# Patient Record
Sex: FEMALE | Race: WHITE | Employment: UNEMPLOYED | URBAN - METROPOLITAN AREA
[De-identification: names, ages, dates, MRNs, and addresses within clinical notes are randomized per-mention and may not be internally consistent; named-entity substitution may affect disease eponyms.]

---

## 2019-06-10 ENCOUNTER — DOCUMENTATION ONLY (OUTPATIENT)
Dept: SURGERY | Age: 44
End: 2019-06-10

## 2019-06-10 ENCOUNTER — HOSPITAL ENCOUNTER (OUTPATIENT)
Dept: LAB | Age: 44
Discharge: HOME | End: 2019-06-10

## 2019-06-10 ENCOUNTER — OFFICE VISIT (OUTPATIENT)
Dept: SURGERY | Age: 44
End: 2019-06-10

## 2019-06-10 VITALS
HEART RATE: 87 BPM | DIASTOLIC BLOOD PRESSURE: 72 MMHG | BODY MASS INDEX: 25.34 KG/M2 | SYSTOLIC BLOOD PRESSURE: 113 MMHG | WEIGHT: 143 LBS | HEIGHT: 63 IN

## 2019-06-10 DIAGNOSIS — N63.20 BREAST MASS, LEFT: Primary | ICD-10-CM

## 2019-06-10 DIAGNOSIS — R92.8 ABNORMAL ULTRASOUND OF BREAST: ICD-10-CM

## 2019-06-10 PROCEDURE — 88360 TUMOR IMMUNOHISTOCHEM/MANUAL: CPT

## 2019-06-10 RX ORDER — LEVOTHYROXINE, LIOTHYRONINE 9.5; 2.25 UG/1; UG/1
15 TABLET ORAL DAILY
COMMUNITY
Start: 2019-04-12 | End: 2020-01-28

## 2019-06-10 NOTE — PROGRESS NOTES
HISTORY OF PRESENT ILLNESS  Floresita Rahman is a 44 y.o. female. HPI  NEW patient presents who is self-referred for consultation for a palpable LEFT breast mass that she felt about a month ago. Says that the lump feels hard, is \"square\" in shape. Thinks that the lump has decreased in size since when she first felt it. Does have aching pain in the LEFT breast and it feels firmer and \"heavier\" to her. There is no nipple discharge/retraction or skin change. Says that she had a breast biopsy 9-10 years, but she is unsure which breast.      FH is significant for two paternal aunts who were diagnosed with breast cancer (both probably post-menopausal). They  of their breast cancer. Last mammogram was at least 2 years ago and was when she lived in [place]. No past medical history on file. Past Surgical History:   Procedure Laterality Date    HX APPENDECTOMY      HX  SECTION      x3       Social History     Socioeconomic History    Marital status: UNKNOWN     Spouse name: Not on file    Number of children: Not on file    Years of education: Not on file    Highest education level: Not on file   Occupational History    Not on file   Social Needs    Financial resource strain: Not on file    Food insecurity:     Worry: Not on file     Inability: Not on file    Transportation needs:     Medical: Not on file     Non-medical: Not on file   Tobacco Use    Smoking status: Never Smoker    Smokeless tobacco: Never Used   Substance and Sexual Activity    Alcohol use:  Yes    Drug use: Not on file    Sexual activity: Not on file   Lifestyle    Physical activity:     Days per week: Not on file     Minutes per session: Not on file    Stress: Not on file   Relationships    Social connections:     Talks on phone: Not on file     Gets together: Not on file     Attends Zoroastrian service: Not on file     Active member of club or organization: Not on file     Attends meetings of clubs or organizations: Not on file     Relationship status: Not on file    Intimate partner violence:     Fear of current or ex partner: Not on file     Emotionally abused: Not on file     Physically abused: Not on file     Forced sexual activity: Not on file   Other Topics Concern    Not on file   Social History Narrative    Not on file       Current Outpatient Medications on File Prior to Visit   Medication Sig Dispense Refill    NP THYROID 15 mg tablet        No current facility-administered medications on file prior to visit. No Known Allergies    OB History    None         ROS  Constitutional: Negative. HENT: Negative. Eyes: Negative. Respiratory: Negative. Cardiovascular: Negative. Gastrointestinal: Negative. Genitourinary: Negative. Musculoskeletal: Negative. Skin: Negative. Neurological: Negative. Endo/Heme/Allergies: Negative. Psychiatric/Behavioral: Negative. Physical Exam   Cardiovascular: Normal rate, regular rhythm and normal heart sounds. Pulmonary/Chest: Breath sounds normal. Right breast exhibits no inverted nipple, no mass, no nipple discharge, no skin change and no tenderness. Left breast exhibits mass. Left breast exhibits no inverted nipple, no nipple discharge, no skin change and no tenderness. Breasts are symmetrical.       Lymphadenopathy:        Right cervical: No superficial cervical, no deep cervical and no posterior cervical adenopathy present. Left cervical: No superficial cervical, no deep cervical and no posterior cervical adenopathy present. She has no axillary adenopathy. Right axillary: No pectoral and no lateral adenopathy present. Left axillary: No pectoral and no lateral adenopathy present. BREAST ULTRASOUND  Indication: Left breast mass 3:00  Technique:   The area was scanned using a high-frequency linear-array near-field transducer  Findings: 1.27 x 1.34 x 1.35cm mass  Impression: Suspicious mass  Disposition: Ultrasound-guided biopsy performed    US - Guided Core Biopsy  Following detailed explanation and description of the Biopsy procedure, its risk, benefits and possible alternatives, the patient signed the informed consent. Indication: Mass, Ultrasound Visible, LEFT Breast 3:00   Prep: We cleansed the skin with alcohol. Anesthesia: We anesthetized the skin and underlying tissues with 1% lidocaine with epinephrine. Device: We advanced the BARD Marquee device through the lesion and captured tissue with real-time Ultrasound Confirmation. Core Sampling: We repeated this sampling for the following number of cores, 3. Marker: We placed a marking clip to damien the biopsy site. Marker Type: HydroMARK. Dressing: We then closed the incision with steristrips and placed a sterile dressing. Instructions: The patient was instructed regarding post-procedure care and activities. Pathology: Pending at this time. Patient tolerated procedure well and discharged in stable condition. Informed patient that they will be notified of pathology results in 3 to 5 days. ASSESSMENT and PLAN    ICD-10-CM ICD-9-CM    1. Breast mass, left N63.20 611.72    2. Abnormal ultrasound of breast R92.8 793.89       New patient presents for evaluation of LEFT breast mass, and is doing well overall. Palpable mass on exam at LEFT breast 3:00. US visualizes 1.27 x 1.34 x 1.35cm mass. Discussed bx of this mass and pt agreed to proceed. She tolerated the procedure well, and 3 cores were taken, [facility] marker placed. If bx PATH demonstrates malignancy, may also order breast MRI for further evaluation. Will send for PATH and f/u with results. This plan was reviewed with the patient and patient agrees. All questions were answered.     Written by Benigno Quinonez, as dictated by Dr. Adelfo Barnes MD.

## 2019-06-10 NOTE — LETTER
2019 12:48 PM 
 
Patient: Lázaro Dueñas YOB: 1975 Date of Visit: 6/10/2019 Dear Dr. Josue Webster: Thank you for referring Ms. Lázaro Dueñas to me for evaluation/treatment. Below are the relevant portions of my assessment and plan of care. HISTORY OF PRESENT ILLNESS Lázaro Dueñas is a 44 y.o. female. HPI 
NEW patient presents who is self-referred for consultation for a palpable LEFT breast mass that she felt about a month ago. Says that the lump feels hard, is \"square\" in shape. Thinks that the lump has decreased in size since when she first felt it. Does have aching pain in the LEFT breast and it feels firmer and \"heavier\" to her. There is no nipple discharge/retraction or skin change. Says that she had a breast biopsy 9-10 years, but she is unsure which breast.   
 
FH is significant for two paternal aunts who were diagnosed with breast cancer (both probably post-menopausal). They  of their breast cancer. Last mammogram was at least 2 years ago and was when she lived in [place]. No past medical history on file. Past Surgical History:  
Procedure Laterality Date  HX APPENDECTOMY  HX  SECTION    
 x3 Social History Socioeconomic History  Marital status: UNKNOWN Spouse name: Not on file  Number of children: Not on file  Years of education: Not on file  Highest education level: Not on file Occupational History  Not on file Social Needs  Financial resource strain: Not on file  Food insecurity:  
  Worry: Not on file Inability: Not on file  Transportation needs:  
  Medical: Not on file Non-medical: Not on file Tobacco Use  Smoking status: Never Smoker  Smokeless tobacco: Never Used Substance and Sexual Activity  Alcohol use: Yes  Drug use: Not on file  Sexual activity: Not on file Lifestyle  Physical activity:  
  Days per week: Not on file Minutes per session: Not on file  Stress: Not on file Relationships  Social connections:  
  Talks on phone: Not on file Gets together: Not on file Attends Yazdanism service: Not on file Active member of club or organization: Not on file Attends meetings of clubs or organizations: Not on file Relationship status: Not on file  Intimate partner violence:  
  Fear of current or ex partner: Not on file Emotionally abused: Not on file Physically abused: Not on file Forced sexual activity: Not on file Other Topics Concern  Not on file Social History Narrative  Not on file Current Outpatient Medications on File Prior to Visit Medication Sig Dispense Refill  NP THYROID 15 mg tablet No current facility-administered medications on file prior to visit. No Known Allergies OB History None ROS Constitutional: Negative. HENT: Negative. Eyes: Negative. Respiratory: Negative. Cardiovascular: Negative. Gastrointestinal: Negative. Genitourinary: Negative. Musculoskeletal: Negative. Skin: Negative. Neurological: Negative. Endo/Heme/Allergies: Negative. Psychiatric/Behavioral: Negative. Physical Exam  
Cardiovascular: Normal rate, regular rhythm and normal heart sounds. Pulmonary/Chest: Breath sounds normal. Right breast exhibits no inverted nipple, no mass, no nipple discharge, no skin change and no tenderness. Left breast exhibits mass. Left breast exhibits no inverted nipple, no nipple discharge, no skin change and no tenderness. Breasts are symmetrical.  
 
 
Lymphadenopathy:  
     Right cervical: No superficial cervical, no deep cervical and no posterior cervical adenopathy present. Left cervical: No superficial cervical, no deep cervical and no posterior cervical adenopathy present. She has no axillary adenopathy. Right axillary: No pectoral and no lateral adenopathy present. Left axillary: No pectoral and no lateral adenopathy present. BREAST ULTRASOUND Indication: Left breast mass 3:00 Technique: The area was scanned using a high-frequency linear-array near-field transducer Findings: 1.27 x 1.34 x 1.35cm mass Impression: Suspicious mass Disposition: Ultrasound-guided biopsy performed US - Guided Core Biopsy Following detailed explanation and description of the Biopsy procedure, its risk, benefits and possible alternatives, the patient signed the informed consent. Indication: Mass, Ultrasound Visible, LEFT Breast 3:00 Prep: We cleansed the skin with alcohol. Anesthesia: We anesthetized the skin and underlying tissues with 1% lidocaine with epinephrine. Device: We advanced the [employer] Marquee device through the lesion and captured tissue with real-time Ultrasound Confirmation. Core Sampling: We repeated this sampling for the following number of cores, 3. Marker: We placed a marking clip to damien the biopsy site. Marker Type: HydroMARK. Dressing: We then closed the incision with steristrips and placed a sterile dressing. Instructions: The patient was instructed regarding post-procedure care and activities. Pathology: Pending at this time. Patient tolerated procedure well and discharged in stable condition. Informed patient that they will be notified of pathology results in 3 to 5 days. ASSESSMENT and PLAN 
  ICD-10-CM ICD-9-CM 1. Breast mass, left N63.20 611.72   
2. Abnormal ultrasound of breast R92.8 793.89 New patient presents for evaluation of LEFT breast mass, and is doing well overall. Palpable mass on exam at LEFT breast 3:00. US visualizes 1.27 x 1.34 x 1.35cm mass. Discussed bx of this mass and pt agreed to proceed. She tolerated the procedure well, and 3 cores were taken, [facility] marker placed.  If bx PATH demonstrates malignancy, may also order breast MRI for further evaluation. Will send for PATH and f/u with results. This plan was reviewed with the patient and patient agrees. All questions were answered. Written by Benigno Quinonez, as dictated by Dr. Adelfo Barnes MD. 
 
If you have questions, please do not hesitate to call me. I look forward to following MsOrtega Josue Redman along with you.  
 
 
 
Sincerely, 
 
 
Quincy More MD

## 2019-06-10 NOTE — PROGRESS NOTES
HISTORY OF PRESENT ILLNESS Osman Grayson is a 44 y.o. female. HPI    NEW patient presents who is self-referred for consultation for a palpable LEFT breast mass that she felt about a month ago. Says that the lump feels hard, is \"square\" in shape. Thinks that the lump has decreased in size since when she first felt it. Does have aching pain in the LEFT breast and it feels firmer and \"heavier\" to her. There is no nipple discharge/retraction or skin change. Says that she had a breast biopsy 9-10 years, but she is unsure which breast.   
FH is significant for two paternal aunts who were diagnosed with breast cancer (both probably post-menopausal). They  of their breast cancer. Last mammogram was at least 2 years ago and was when she lived in [place]. Review of Systems Constitutional: Negative. HENT: Negative. Eyes: Negative. Respiratory: Negative. Cardiovascular: Negative. Gastrointestinal: Negative. Genitourinary: Negative. Musculoskeletal: Negative. Skin: Negative. Neurological: Negative. Endo/Heme/Allergies: Negative. Psychiatric/Behavioral: Negative. Physical Exam 
 
ASSESSMENT and PLAN 
{ASSESSMENT/PLAN:[id number]}

## 2019-06-10 NOTE — PROGRESS NOTES
[facility]  OFFICE PROCEDURE PROGRESS NOTE        Chart reviewed for the following:   Toni Gavin MD, have reviewed the History, Physical and updated the Allergic reactions for [address] performed immediately prior to start of procedure:   Toni Gavin MD, have performed the following reviews on [employer] prior to the start of the procedure:            * Patient was identified by name and date of birth   * Agreement on procedure being performed was verified  * Risks and Benefits explained to the patient  * Procedure site verified and marked as necessary  * Patient was positioned for comfort  * Consent was signed and verified     Time: 11:37 am      Date of procedure: 6/10/2019    Procedure performed by:  Avila Novak MD    Provider assisted by:   Anna Swain RN    Patient assisted by: self    How tolerated by patient: tolerated the procedure well with no complications    Post Procedural Pain Scale: 0 - No Hurt    Comments: Written and verbal post biopsy instructions reviewed with and given to patient with her understanding.

## 2019-06-11 NOTE — COMMUNICATION BODY
HISTORY OF PRESENT ILLNESS  Genna Mccann is a 44 y.o. female. HPI  NEW patient presents who is self-referred for consultation for a palpable LEFT breast mass that she felt about a month ago. Says that the lump feels hard, is \"square\" in shape. Thinks that the lump has decreased in size since when she first felt it. Does have aching pain in the LEFT breast and it feels firmer and \"heavier\" to her. There is no nipple discharge/retraction or skin change. Says that she had a breast biopsy 9-10 years, but she is unsure which breast.      FH is significant for two paternal aunts who were diagnosed with breast cancer (both probably post-menopausal). They  of their breast cancer. Last mammogram was at least 2 years ago and was when she lived in [place]. No past medical history on file. Past Surgical History:   Procedure Laterality Date    HX APPENDECTOMY      HX  SECTION      x3       Social History     Socioeconomic History    Marital status: UNKNOWN     Spouse name: Not on file    Number of children: Not on file    Years of education: Not on file    Highest education level: Not on file   Occupational History    Not on file   Social Needs    Financial resource strain: Not on file    Food insecurity:     Worry: Not on file     Inability: Not on file    Transportation needs:     Medical: Not on file     Non-medical: Not on file   Tobacco Use    Smoking status: Never Smoker    Smokeless tobacco: Never Used   Substance and Sexual Activity    Alcohol use:  Yes    Drug use: Not on file    Sexual activity: Not on file   Lifestyle    Physical activity:     Days per week: Not on file     Minutes per session: Not on file    Stress: Not on file   Relationships    Social connections:     Talks on phone: Not on file     Gets together: Not on file     Attends Pentecostalism service: Not on file     Active member of club or organization: Not on file     Attends meetings of clubs or organizations: Not on file     Relationship status: Not on file    Intimate partner violence:     Fear of current or ex partner: Not on file     Emotionally abused: Not on file     Physically abused: Not on file     Forced sexual activity: Not on file   Other Topics Concern    Not on file   Social History Narrative    Not on file       Current Outpatient Medications on File Prior to Visit   Medication Sig Dispense Refill    NP THYROID 15 mg tablet        No current facility-administered medications on file prior to visit. No Known Allergies    OB History    None         ROS  Constitutional: Negative. HENT: Negative. Eyes: Negative. Respiratory: Negative. Cardiovascular: Negative. Gastrointestinal: Negative. Genitourinary: Negative. Musculoskeletal: Negative. Skin: Negative. Neurological: Negative. Endo/Heme/Allergies: Negative. Psychiatric/Behavioral: Negative. Physical Exam   Cardiovascular: Normal rate, regular rhythm and normal heart sounds. Pulmonary/Chest: Breath sounds normal. Right breast exhibits no inverted nipple, no mass, no nipple discharge, no skin change and no tenderness. Left breast exhibits mass. Left breast exhibits no inverted nipple, no nipple discharge, no skin change and no tenderness. Breasts are symmetrical.       Lymphadenopathy:        Right cervical: No superficial cervical, no deep cervical and no posterior cervical adenopathy present. Left cervical: No superficial cervical, no deep cervical and no posterior cervical adenopathy present. She has no axillary adenopathy. Right axillary: No pectoral and no lateral adenopathy present. Left axillary: No pectoral and no lateral adenopathy present. BREAST ULTRASOUND  Indication: Left breast mass 3:00  Technique:   The area was scanned using a high-frequency linear-array near-field transducer  Findings: 1.27 x 1.34 x 1.35cm mass  Impression: Suspicious mass  Disposition: Ultrasound-guided biopsy performed    US - Guided Core Biopsy  Following detailed explanation and description of the Biopsy procedure, its risk, benefits and possible alternatives, the patient signed the informed consent. Indication: Mass, Ultrasound Visible, LEFT Breast 3:00   Prep: We cleansed the skin with alcohol. Anesthesia: We anesthetized the skin and underlying tissues with 1% lidocaine with epinephrine. Device: We advanced the BARD Marquee device through the lesion and captured tissue with real-time Ultrasound Confirmation. Core Sampling: We repeated this sampling for the following number of cores, 3. Marker: We placed a marking clip to damien the biopsy site. Marker Type: HydroMARK. Dressing: We then closed the incision with steristrips and placed a sterile dressing. Instructions: The patient was instructed regarding post-procedure care and activities. Pathology: Pending at this time. Patient tolerated procedure well and discharged in stable condition. Informed patient that they will be notified of pathology results in 3 to 5 days. ASSESSMENT and PLAN    ICD-10-CM ICD-9-CM    1. Breast mass, left N63.20 611.72    2. Abnormal ultrasound of breast R92.8 793.89       New patient presents for evaluation of LEFT breast mass, and is doing well overall. Palpable mass on exam at LEFT breast 3:00. US visualizes 1.27 x 1.34 x 1.35cm mass. Discussed bx of this mass and pt agreed to proceed. She tolerated the procedure well, and 3 cores were taken, [facility] marker placed. If bx PATH demonstrates malignancy, may also order breast MRI for further evaluation. Will send for PATH and f/u with results. This plan was reviewed with the patient and patient agrees. All questions were answered.     Written by Stephie Clifton, as dictated by Dr. Secundino Saint, MD.

## 2019-06-12 ENCOUNTER — TELEPHONE (OUTPATIENT)
Dept: SURGERY | Age: 44
End: 2019-06-12

## 2019-06-13 DIAGNOSIS — Z17.1 BREAST CANCER, STAGE 1, ESTROGEN RECEPTOR NEGATIVE, LEFT (HCC): Primary | ICD-10-CM

## 2019-06-13 DIAGNOSIS — C50.912 BREAST CANCER, STAGE 1, ESTROGEN RECEPTOR NEGATIVE, LEFT (HCC): Primary | ICD-10-CM

## 2019-06-14 ENCOUNTER — TELEPHONE (OUTPATIENT)
Dept: SURGERY | Age: 44
End: 2019-06-14

## 2019-06-14 NOTE — TELEPHONE ENCOUNTER
Returned call to patient. No answer. Left message, re: okay to do massage. Also, patient had wanted to know \"what kind\" of cancer she has. I said IDC. Provided office call back number.

## 2019-06-14 NOTE — TELEPHONE ENCOUNTER
----- Message from Saad Gonzalez MD sent at 6/14/2019 10:33 AM EDT -----  Regarding: RE: massage  Absolutely, recommended      ----- Message -----  From: Bandar Chinchilla RN  Sent: 6/14/2019   9:53 AM  To: Saad Gonzalez MD, #  Subject: massage                                          Dr. Je Esquivel,  Patient called and asked if it is okay to get massage while she is undergoing breast cancer treatment?   Thanks,  Danilo Guido

## 2019-06-20 DIAGNOSIS — C50.912 MALIGNANT NEOPLASM OF LEFT FEMALE BREAST, UNSPECIFIED ESTROGEN RECEPTOR STATUS, UNSPECIFIED SITE OF BREAST (HCC): Primary | ICD-10-CM

## 2019-06-24 ENCOUNTER — TELEPHONE (OUTPATIENT)
Dept: SURGERY | Age: 44
End: 2019-06-24

## 2019-06-24 NOTE — TELEPHONE ENCOUNTER
JACKELINE calling asking questions about patient's auth for breast MRI. (new diagnosis of breast cancer). I called her back and answered her questions.

## 2019-06-27 NOTE — PERIOP NOTES
[address] [address] [facility]                            MAIN OR                                  [phone]   MAIN PRE OP                          [phone]                                                                                AMBULATORY PRE OP          [phone]  PRE-ADMISSION TESTING    [phone]     Surgery Date:   7/9/19         Is surgery arrival time given by surgeon? NO  If NO, [facility] staff will call you between 3 and 7pm the day before your surgery with your arrival time. (If your surgery is on a Monday, we will call you the Friday before.)    Call [phone] after 7pm Monday-Friday if you did not receive your arrival time. INSTRUCTIONS BEFORE YOUR SURGERY   When You  Arrive   Arrive at the [address] [facility] on the day of your surgery  Have your insurance card, photo ID, and any copayment (if needed)     Food   and   Drink   NO food or drink after midnight the night before surgery    This means NO water, gum, mints, coffee, juice, etc.  No alcohol (beer, wine, liquor) 24 hours before and after surgery     Medications to   TAKE   Morning of Surgery   MEDICATIONS TO TAKE THE MORNING OF SURGERY WITH A SIP OF WATER:    NONE     Medications  To  STOP      7 days before surgery    Non-Steroidal anti-inflammatory Drugs (NSAID's): for example, Ibuprofen (Advil, Motrin), Naproxen (Aleve)   Aspirin, if taking for pain    Herbal supplements, vitamins, and fish oil   Other:  (Pain medications not listed above, including Tylenol may be taken)   Blood  Thinners    If you take  Aspirin, Plavix, Coumadin, or any blood-thinning or anti-blood clot medicine, talk to the doctor who prescribed the medications for pre-operative instructions.      Bathing Clothing  Jewelry  Valuables       If you shower the morning of surgery, please do not apply anything to your skin (lotions, powders, deodorant, or makeup, especially ivett)   Follow all special bath instructions (for total joint replacement, spine and bowel surgeries)   Do not shave or trim anywhere 24 hours before surgery   Wear your hair loose or down; no pony-tails, buns, or metal hair clips   Wear loose, comfortable, clean clothes   Wear glasses instead of contacts   Leave money, valuables, and jewelry, including body piercings, at home     Going Home       or Spending the Night    SAME-DAY SURGERY: You must have a responsible adult drive you home and stay with you 24 hours after surgery   ADMITS: If your doctor is keeping you into the hospital after surgery, leave personal belongings/luggage in your car until you have a hospital room number. Hospital discharge time is 12 noon  Drivers must be here before 12 noon unless you are told differently   Special Instructions          Follow all instructions so your surgery wont be cancelled. Please, be on time. If a situation occurs and you are delayed the day of surgery, call [phone] or          [phone]. If your physical condition changes (like a fever, cold, flu, etc.) call your surgeon. The patient was contacted  via phone. Home medication reviewed and verified during PAT appointment. The patient verbalizes understanding of all instructions and does not  need reinforcement.

## 2019-07-02 ENCOUNTER — HOSPITAL ENCOUNTER (OUTPATIENT)
Dept: MRI IMAGING | Age: 44
Discharge: HOME | End: 2019-07-02
Attending: SURGERY
Payer: OTHER GOVERNMENT

## 2019-07-02 DIAGNOSIS — Z17.1 BREAST CANCER, STAGE 1, ESTROGEN RECEPTOR NEGATIVE, LEFT (HCC): ICD-10-CM

## 2019-07-02 DIAGNOSIS — C50.912 BREAST CANCER, STAGE 1, ESTROGEN RECEPTOR NEGATIVE, LEFT (HCC): ICD-10-CM

## 2019-07-02 PROCEDURE — A9585 GADOBUTROL INJECTION: HCPCS | Performed by: SURGERY

## 2019-07-02 PROCEDURE — 77049 MRI BREAST C-+ W/CAD BI: CPT

## 2019-07-02 PROCEDURE — 74011250636 HC RX REV CODE- 250/636: Performed by: SURGERY

## 2019-07-02 RX ADMIN — GADOBUTROL 6 ML: 604.72 INJECTION INTRAVENOUS at 14:29

## 2019-07-03 ENCOUNTER — TELEPHONE (OUTPATIENT)
Dept: SURGERY | Age: 44
End: 2019-07-03

## 2019-07-03 ENCOUNTER — RESEARCH ENCOUNTER (OUTPATIENT)
Dept: ONCOLOGY | Age: 44
End: 2019-07-03

## 2019-07-03 ENCOUNTER — DOCUMENTATION ONLY (OUTPATIENT)
Dept: ONCOLOGY | Age: 44
End: 2019-07-03

## 2019-07-03 ENCOUNTER — OFFICE VISIT (OUTPATIENT)
Dept: ONCOLOGY | Age: 44
End: 2019-07-03

## 2019-07-03 VITALS
HEIGHT: 63 IN | OXYGEN SATURATION: 98 % | SYSTOLIC BLOOD PRESSURE: 152 MMHG | DIASTOLIC BLOOD PRESSURE: 101 MMHG | HEART RATE: 81 BPM | WEIGHT: 137.8 LBS | RESPIRATION RATE: 18 BRPM | TEMPERATURE: 98 F | BODY MASS INDEX: 24.41 KG/M2

## 2019-07-03 DIAGNOSIS — Z17.1 MALIGNANT NEOPLASM OF UPPER-OUTER QUADRANT OF LEFT BREAST IN FEMALE, ESTROGEN RECEPTOR NEGATIVE (HCC): Primary | ICD-10-CM

## 2019-07-03 DIAGNOSIS — C50.412 MALIGNANT NEOPLASM OF UPPER-OUTER QUADRANT OF LEFT BREAST IN FEMALE, ESTROGEN RECEPTOR NEGATIVE (HCC): Primary | ICD-10-CM

## 2019-07-03 DIAGNOSIS — R92.8 ABNORMAL MRI, BREAST: Primary | ICD-10-CM

## 2019-07-03 RX ORDER — PROCHLORPERAZINE MALEATE 10 MG
10 TABLET ORAL
Qty: 50 TAB | Refills: 5 | Status: SHIPPED | OUTPATIENT
Start: 2019-07-03 | End: 2020-01-28

## 2019-07-03 RX ORDER — LIDOCAINE AND PRILOCAINE 25; 25 MG/G; MG/G
CREAM TOPICAL AS NEEDED
Qty: 30 G | Refills: 0 | Status: SHIPPED | OUTPATIENT
Start: 2019-07-03 | End: 2020-01-28

## 2019-07-03 RX ORDER — ONDANSETRON HYDROCHLORIDE 8 MG/1
8 TABLET, FILM COATED ORAL
Qty: 60 TAB | Refills: 3 | Status: SHIPPED | OUTPATIENT
Start: 2019-07-03 | End: 2020-01-28

## 2019-07-03 RX ORDER — OLANZAPINE 10 MG/1
TABLET ORAL
Qty: 8 TAB | Refills: 1 | Status: SHIPPED | OUTPATIENT
Start: 2019-07-03 | End: 2019-10-07 | Stop reason: SDUPTHER

## 2019-07-03 NOTE — PROGRESS NOTES
Keara Ziegler is a 44 y.o. female here as a new patient for the evaluation of therapy for breast cancer.

## 2019-07-03 NOTE — PROGRESS NOTES
[facility] [place] [address] [facility] Pedro: [phone]  F: [phone]      Reason for Visit:   Avani Alcala is a 44 y.o. female who is seen in consultation at the request of Dr. Karlo Mercedes for evaluation of therapy for breast cancer    Treatment History:   · 6/10/19 L breast core bx:  IDC, gr 3, 1.1 cm, + LVI, ER negative, OR negative, HER 2 negative at [facility]+; DCIS present gr 3, solid with expansile necrosis    History of Present Illness:   She felt the L breast mass herself in May 2019, with aching pain, leading to the pathology above. FH:   2 paternal aunts with post-menopausal breast cancer, one aunt with ovarian cancer; that aunt's daughter tested positive for BRCA1 (first cousin); no pancreas cancer, no prostate cancer    Past Medical History:   Diagnosis Date    Breast cancer ([facility] [address] 2019    left breast cancer      Past Surgical History:   Procedure Laterality Date    HX APPENDECTOMY      HX  SECTION      x3      Social History     Tobacco Use    Smoking status: Never Smoker    Smokeless tobacco: Never Used   Substance Use Topics    Alcohol use: Not Currently      Family History   Problem Relation Age of Onset    Diabetes Mother     Heart Disease Mother     Hypertension Mother     Heart Disease Father     Cancer Paternal Aunt         Breast    Cancer Paternal Aunt         Breast     Current Outpatient Medications   Medication Sig    cholecalciferol, vitamin D3, (VITAMIN D3 PO) Take  by mouth.  Lactobacillus acidophilus (PROBIOTIC PO) Take  by mouth.  prasterone, DHEA, (DHEA PO) Take  by mouth.  MELATONIN PO Take  by mouth.  NP THYROID 15 mg tablet     testosterone 75 mg pllt by Implant route. No current facility-administered medications for this visit. No Known Allergies     Review of Systems: A complete review of systems was obtained, negative except as described above.     Physical Exam: Visit Vitals  BP (!) 152/101 (BP 1 Location: Right arm, BP Patient Position: Sitting)   Pulse 81   Temp 98 °F (36.7 °C) (Oral)   Resp 18   Ht 5' 3\" (1.6 m)   Wt 137 lb 12.8 oz (62.5 kg)   LMP 07/03/2019   SpO2 98%   BMI 24.41 kg/m²     ECOG PS: 0  General: No distress  Eyes: PERRLA, anicteric sclerae  HENT: Atraumatic, OP clear  Neck: Supple  Lymphatic: No cervical, supraclavicular adenopathy  Respiratory: CTAB, normal respiratory effort  CV: Normal rate, regular rhythm, no murmurs, no peripheral edema  GI: Soft, nontender, nondistended, no masses, no hepatomegaly, no splenomegaly; + BS  MS:  Digits without clubbing or cyanosis. Skin: No rashes, ecchymoses, or petechiae. Normal temperature, turgor, and texture. Psych: Alert, oriented, appropriate affect, normal judgment/insight  Breasts:  L 2:00 breast, 4 cmfn, 3 cm mass, no LAD    Results:   No results found for: WBC, HGB, HCT, PLT, MCV, ANEU, HGBPOC, HCTPOC, HGBEXT, HCTEXT, PLTEXT, HGBEXT, HCTEXT, PLTEXT  No results found for: NA, K, CL, CO2, GLU, BUN, CREA, GFRAA, GFRNA, CA, NAPOC, KPOCT, CLPOC, GLUCPOC, IBUN, CREAPOC, ICAI  No results found for: TBILI, ALT, SGOT, AP, TP, ALB, GLOB    6/13/19 MRI breast  FINDINGS:     Background parenchymal enhancement: Moderate. Lymph nodes: No lymphadenopathy.     Left breast: Irregular triangular-shaped mass in the posterior third of the left  breast at 2:00 measures 2.8 x 1.8 x 4.2 cm. Posterior margin is 0.3 cm from the  left pectoralis major muscle. Biopsy clip is in the inferior margin of the mass.     In the middle and posterior thirds of the left breast at 5-6:00, segmental non  mass enhancement with heterogeneous kinetics measures 2.6 cm in oblique AP  diameter in the axial plane. Otherwise, there are foci in the left breast.     Right breast: Heterogeneous patchy enhancement in multiple locations. No  suspicious kinetics. Biopsy clip in the middle third is at 3:00.  No surrounding  abnormal enhancement or morphology.     IMPRESSION  IMPRESSION:   1. 2.8 x 1.8 x 4.2 cm biopsy-proven infiltrating ductal carcinoma in the left  breast at 2:00 is in close approximation to the chest wall. 2. Left breast 5-6:00 suspicious segmental non mass enhancement. 3. No MRI evidence of malignancy in the right breast.  4. No lymphadenopathy.     Assessment: ACR BI-RADS category   Left breast: BI-RADS Assessment Category 4: Suspicious abnormality - Biopsy  should be performed in the absence of clinical contraindication. Right breast: BI-RADS Assessment Category 2: Benign finding.     Recommendation: Bilateral mammography if not recently performed elsewhere. Second look ultrasound of the left breast at 5-6:00 to determine  ultrasound-guided or MRI guided biopsy of non mass enhancement.     A negative breast MRI examination speaks strongly against invasive cancer down  to a detection threshold of 3 to 5 mm but may not detect some lower grade or in  situ carcinomas. Therefore, routine clinical and mammographic followup are  recommended. A summary portfolio has been created in PACS. Records reviewed and summarized above. Pathology report(s) reviewed above. Radiology report(s) reviewed above. Assessment/plan:   1. L UOQ IDC, gr 3, triple negative:  cT2 cN0 cM0, stage IIA, prognostic stage IIB    We explained to the patient that the goal of systemic adjuvant therapy is to improve the chances for cure and decrease the risk of relapse. We explained why a patient can have microscopic cancer spread now even though physical examination, laboratory studies and imaging studies are negative for cancer. We explained that the same treatments used now as adjuvant or preventive treatments rarely if ever are curative in women who develop metastases. We discussed that there is no difference in overall survival between neoadjuvant and adjuvant chemotherapy.   We discussed the rationale for neoadjuvant chemotherapy, if chemotherapy is warranted, as it is in this case: to avoid any potential delays in giving chemotherapy following surgery, to be able to see the response of the tumor to chemotherapy, and to potentially downstage the tumor prior to surgery. I discussed the potential risks of dose-dense chemotherapy with the patient. (DD AC-T, adriamycin 60 mg/m2; cyclophosphamide 600 mg/m2 q 2 weeks x 4; paclitaxel 80 mg/m2 qweekly x 12). Major toxicities include nausea and vomiting, stomatitis, fatigue, and a small risk of heart damage. Anemia frequently results and occasionally requires growth factors and rarely transfusions. Neutropenic fever is uncommon, but can be a life-threatening problem. Also, there is a small but increased risk of myelodysplasia and acute leukemia. We provided the patient with detailed information concerning toxicity including frequent toxicities that only last a few days, such as nausea, vomiting, mouth sores, arthralgia, myalgia, and potentially allergic reactions to paclitaxel, as well as toxicities which can be longer lasting including total alopecia, fatigue, anemia and neuropathy. We provided the patient with detailed information concerning the toxicities of their regimen in addition to our verbal discussion. We discussed the potential addition of carboplatin auc 6 q 3 weeks during weekly paclitaxel as evaluated in CALGB 81759, showing a significant improvement in pCR for patients with stage II-III triple negative breast cancer. Additional side effects of added myelosuppression, fatigue, renal damage with dehydration, and nausea and vomiting were discussed. AUC is the dose that is currently being used in all investigations, and thus is what will be used here. Discussed in great detail NSABP-B59 which is using the same backbone carbo/taxol, then [facility] +/- atezolizumab. A research biopsy would be required. Research team has met with the patient today.     We discussed the risks and benefits of atezolizumab therapy today. Potential side effects include, but are not limited to fatigue, rash, auto-immune issues, infertility, allergic reactions, and rarely, death. Discussed oral and peripheral cryotherapy. Cold caps would not work with [facility]. MRI biopsy of 2nd breast lesion ordered by Dr. Raimundo Rosado. Dr. Raimundo Rosado to place port on Tuesday    TTE ordered, will also get bilateral mammogram.  Dr. Raimundo Rosado did an [address] in his office. The patient was given the following prescriptions with written and verbal instructions on how to use each:  Compazine, zofran, olanzapine, a wig, and emla cream.  IV Olivas Moros will be used with AC. Neulasta the following day (bone pain side effect discussed) with AC. She is agreeable to neoadjuvant chemotherapy, will let us know Friday or Monday about B-59. Plan to start on 7/22/19    2. Emotional well being:  She has excellent support and is coping well with her disease    3. [place] breast cancer/FH of BRCA1 mutation:   Discussed potential ramifications of a positive test including the need for bilateral mastectomies and bilateral oophorectomies and the risk then for her family members to also have a mutation. VUS discussed as well. Testing performed today    4. Elevated BP:  Will monitor, if remains high at next visit, will treat; 117/81 on repeat    5. Fertility preservation:  Discussed that there is > 20% chance of loss of menses with chemotherapy. She and her  state that they are not interested in further children and fertility preservation     > 80 min were spent with this patient with > 50% of that time spent in face to face counseling. I appreciate the opportunity to participate in Ms. Lanny East's care. Signed By: John Farfan MD      No orders of the defined types were placed in this encounter.

## 2019-07-03 NOTE — PROGRESS NOTES
7/3/19 3:56 PM: Provided patient with chemotherapy education packet. Packet included a handout on breast cancer diagnosis printed from cancer. net, handouts on carboplatin/paclitaxel and DD-AC chemotherapy regimens, a handout on side effects of chemotherapy, and a handout on how to safely handle bodily fluids at home during chemotherapy. RN reviewed the packet with the patient and provided opportunity for questions and concerns. Saliva specimen obtained for [facility] genetic testing.

## 2019-07-03 NOTE — PROGRESS NOTES
Oncology Navigator  Psychosocial Assessment    Reason for Assessment:    []Depression  []Anxiety  []Caregiver [place]  []Maladaptive Coping with Serious Illness   [] Social Work Referral [x] Initial Assessment  [] Other newly dx: breast cancer    Sources of Information:    [x]Patient  [x]Family  [x]Staff  [x]Medical Record    Advance Care Planning: None on file; conversation deferred  No flowsheet data found.     Mental Status:    [x]Alert  []Lethargic  []Unresponsive   [] Unable to assess   Oriented to:  [x]Person  [x]Place  [x]Time  [x]Situation      Barriers to Learning:    []Language  []Developmental  []Cognitive  []Altered Mental Status  []Visual/Hearing Impairment  []Unable to Read/Write  []Motivational   [x]No Barriers Identified  []Other:    Relationship Status:  []Single  [x]  []Significant Other/Life Partner  []  []  []      Living Circumstances:  []Lives Alone  [x]Family/Significant Other in Household  []Roommates  [x]Children in the Home  []Paid Caregivers  []Assisted Living Facility/Group Home  []Skilled [address]  []Homeless  []Incarcerated  []Environmental/Care Concerns  []other:    Employment Status:  []Employed Full-time []Employed 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 [] Disabled  [] Retired []Other:    Support System:    [x]Strong  []Fair  []Limited    Financial/Legal Concerns:    []Uninsured  []Limited Income/Resources  []Non-Citizen  [x]No Concerns Identified  []Financial POA:    []Other:    Zoroastrian/Spiritual/Existential:  [x]Strong Sense of Spirituality  [x]Involved in [employer]  []Request  Visit  []Expressing Edwige Rubiery  []No Concerns Identified    Coping with Illness:         Patient: Family/Caregiver:   Understanding and Acceptance of Illness/Prognosis  [x] []   Strong Sense of Resilience [] []   Self Reflection [] []   Engaged Support System [x] []   Does not Readily Discuss Illness [] []   Denial of Terminal Status [] []   Anger [] [] Depression [] []   Anxiety/Fear [x] []   Bargaining [] []   Recent Diagnosis/Prognosis [x] []   Difficulties with Body Image [x] []   Loss of Identity [] []   Excessive Substance Use [] []   Mental Health History [] []   Enmeshed Relationships [] []   History of Loss [] []   Anticipatory Grief [] []   Concern for Complicated Grief [] []   Suicidal Ideation or Plan [] []   Unable to assess [] []            Narrative: Met with patient to introduce social work navigator role and supports. Patient here with her , Rashida Ambrosio. They have three daughters ages 3, 5 and 15. They are a  family who move frequently. They moved [place], [place] in December. Patient tells me she has a few local friends who have offered assistance. Her sister has also offered to travel from [place] to help during treatment. Patient advised dana is important to her and a source of strength. Patient tells me she has been coping through exercise and distraction. Patient has shared diagnosis with her daughters. Reviewed barriers to care and none identified at this time. Reviewed supportive resources with patient (Yoga, massage, meditation, support groups). Patient would like a massage referral. Encouraged patient to contact me as needed for support. Assessment/Action:   1. Informed the patient of the [employer] and available supportive programing and services offered there. Massage referral placed. 2. Ongoing psychosocial support to include assessment of patients adjustment to diagnosis and treatment, counseling, information sharing and resource linkage. 3. Provided reassurance that we are here to support her during this process.      Plan/Referral:   Complementary therapies referral [employer])  Support Groups referral

## 2019-07-03 NOTE — PATIENT INSTRUCTIONS
Common Side Effects of Chemotherapy Decreased Blood Counts Your blood counts can decrease temporarily due to chemotherapy, they will recover over time. This is an expected side effect that your Doctor will be monitoring. 
- If you experience fevers (temperature >100.4°F), bleeding or unexplained bruising, please call the office right away Risk of Infection Your white blood cells can decrease temporarily due to chemotherapy and can put you at higher risk of infection. Washing hands frequently with soap and avoiding sick contacts can reduce your risk of infection. 
- If you experience fevers (temperature >100.4°F), shaking chills, or any signs of infection, please call the office immediately Anemia Chemotherapy can cause your red blood cells to temporarily decrease; this is an expected side effect that your Doctor will be monitoring. 
- You may experience fatigue if this occurs, please notify the office if you experience bleeding, shortness of breath with minimal exertion or at rest, rapid heartbeat, or feeling as though you may lose consciousness. Hair Loss Chemotherapy can affect your hair follicles and cause you to lose hair. This can occur on your scalp hair but also all over your body including eyebrows and eye lashes Nausea  You have been prescribed nausea medication to take if needed. Please follow the directions given to you by your Doctor. - Please call the office if the medications you have been given are not relieving nausea. Vomiting Make sure you are taking anti-nausea medication as prescribed. Eating small amounts of bland foods frequently can help. - Please call the office right away if you are vomiting more than 4 times per day or are unable to keep down food or fluids Diarrhea Eating small amounts of bland foods frequently can help, increase your fluid intake. It is usually ok to take Imodium for diarrhea. - Please call the office right away if you experience more than 4 episodes of watery diarrhea or if you are feeling dehydrated. Female patients of childbearing age need to avoid pregnancy during chemotherapy. You can reach Medical Oncology at [facility] with further questions or concerns at: [phone]. 
- Calls during normal business hours will reach our office. 
- Calls after hours or on the weekend will reach an answering service and the on-call Oncologist will return your call. Prognosis: Good This is our best current assessment. Cancers respond differently to treatment. Overall prognosis depends on many factors including other conditions, cancer stage, side effects, and other unforeseen events. Goal of therapy: Curative Expected response to treatment:  Very good: Anticipate remission (no sign of cancer) and possible cancer cure Treatment benefits and harms:  We discussed potential short term side effects to include:see handout Long term side effects of treatment:  see handout Quality of life: Quality of life concerns have been addressed. Treatment as outlined is expected to have minimal impact on patients quality of life.   
 
prescriptions:  Compazine, zofran, olanzapine, a wig, and emla cream.

## 2019-07-05 ENCOUNTER — DOCUMENTATION ONLY (OUTPATIENT)
Dept: ONCOLOGY | Age: 44
End: 2019-07-05

## 2019-07-05 ENCOUNTER — TELEPHONE (OUTPATIENT)
Dept: ONCOLOGY | Age: 44
End: 2019-07-05

## 2019-07-05 PROBLEM — Z17.1 MALIGNANT NEOPLASM OF UPPER-OUTER QUADRANT OF LEFT BREAST IN FEMALE, ESTROGEN RECEPTOR NEGATIVE (HCC): Status: ACTIVE | Noted: 2019-07-05

## 2019-07-05 PROBLEM — C50.412 MALIGNANT NEOPLASM OF UPPER-OUTER QUADRANT OF LEFT BREAST IN FEMALE, ESTROGEN RECEPTOR NEGATIVE (HCC): Status: ACTIVE | Noted: 2019-07-05

## 2019-07-05 RX ORDER — ACETAMINOPHEN 325 MG/1
650 TABLET ORAL AS NEEDED
Status: CANCELLED
Start: 2019-12-03

## 2019-07-05 RX ORDER — PALONOSETRON 0.05 MG/ML
0.25 INJECTION, SOLUTION INTRAVENOUS ONCE
Status: CANCELLED | OUTPATIENT
Start: 2019-11-11

## 2019-07-05 RX ORDER — ACETAMINOPHEN 325 MG/1
650 TABLET ORAL AS NEEDED
Status: CANCELLED
Start: 2019-09-16

## 2019-07-05 RX ORDER — DIPHENHYDRAMINE HYDROCHLORIDE 50 MG/ML
50 INJECTION, SOLUTION INTRAMUSCULAR; INTRAVENOUS AS NEEDED
Status: CANCELLED
Start: 2019-10-07

## 2019-07-05 RX ORDER — DIPHENHYDRAMINE HYDROCHLORIDE 50 MG/ML
50 INJECTION, SOLUTION INTRAMUSCULAR; INTRAVENOUS AS NEEDED
Status: CANCELLED
Start: 2019-10-21

## 2019-07-05 RX ORDER — EPINEPHRINE 1 MG/ML
0.3 INJECTION, SOLUTION, CONCENTRATE INTRAVENOUS AS NEEDED
Status: CANCELLED | OUTPATIENT
Start: 2019-07-29

## 2019-07-05 RX ORDER — SODIUM CHLORIDE 0.9 % (FLUSH) 0.9 %
10 SYRINGE (ML) INJECTION AS NEEDED
Status: CANCELLED
Start: 2019-12-17

## 2019-07-05 RX ORDER — ONDANSETRON 2 MG/ML
8 INJECTION INTRAMUSCULAR; INTRAVENOUS AS NEEDED
Status: CANCELLED | OUTPATIENT
Start: 2019-12-17

## 2019-07-05 RX ORDER — HEPARIN 100 UNIT/ML
300-500 SYRINGE INTRAVENOUS AS NEEDED
Status: CANCELLED
Start: 2019-09-30

## 2019-07-05 RX ORDER — ALBUTEROL SULFATE 0.83 MG/ML
2.5 SOLUTION RESPIRATORY (INHALATION) AS NEEDED
Status: CANCELLED
Start: 2019-12-17

## 2019-07-05 RX ORDER — EPINEPHRINE 1 MG/ML
0.3 INJECTION, SOLUTION, CONCENTRATE INTRAVENOUS AS NEEDED
Status: CANCELLED | OUTPATIENT
Start: 2019-08-12

## 2019-07-05 RX ORDER — ACETAMINOPHEN 325 MG/1
650 TABLET ORAL AS NEEDED
Status: CANCELLED
Start: 2019-11-11

## 2019-07-05 RX ORDER — SODIUM CHLORIDE 9 MG/ML
25 INJECTION, SOLUTION INTRAVENOUS CONTINUOUS
Status: CANCELLED | OUTPATIENT
Start: 2019-08-26

## 2019-07-05 RX ORDER — ALBUTEROL SULFATE 0.83 MG/ML
2.5 SOLUTION RESPIRATORY (INHALATION) AS NEEDED
Status: CANCELLED
Start: 2019-09-09

## 2019-07-05 RX ORDER — HEPARIN 100 UNIT/ML
300-500 SYRINGE INTRAVENOUS AS NEEDED
Status: CANCELLED
Start: 2019-08-26

## 2019-07-05 RX ORDER — SODIUM CHLORIDE 0.9 % (FLUSH) 0.9 %
10 SYRINGE (ML) INJECTION AS NEEDED
Status: CANCELLED
Start: 2019-08-19

## 2019-07-05 RX ORDER — EPINEPHRINE 1 MG/ML
0.3 INJECTION, SOLUTION, CONCENTRATE INTRAVENOUS AS NEEDED
Status: CANCELLED | OUTPATIENT
Start: 2019-10-21

## 2019-07-05 RX ORDER — HEPARIN 100 UNIT/ML
300-500 SYRINGE INTRAVENOUS AS NEEDED
Status: CANCELLED
Start: 2019-07-29

## 2019-07-05 RX ORDER — ACETAMINOPHEN 325 MG/1
650 TABLET ORAL AS NEEDED
Status: CANCELLED
Start: 2019-08-26

## 2019-07-05 RX ORDER — HYDROCORTISONE SODIUM SUCCINATE 100 MG/2ML
100 INJECTION, POWDER, FOR SOLUTION INTRAMUSCULAR; INTRAVENOUS AS NEEDED
Status: CANCELLED | OUTPATIENT
Start: 2019-08-26

## 2019-07-05 RX ORDER — ALBUTEROL SULFATE 0.83 MG/ML
2.5 SOLUTION RESPIRATORY (INHALATION) AS NEEDED
Status: CANCELLED
Start: 2019-10-14

## 2019-07-05 RX ORDER — SODIUM CHLORIDE 9 MG/ML
10 INJECTION INTRAMUSCULAR; INTRAVENOUS; SUBCUTANEOUS AS NEEDED
Status: CANCELLED | OUTPATIENT
Start: 2019-11-11

## 2019-07-05 RX ORDER — ALBUTEROL SULFATE 0.83 MG/ML
2.5 SOLUTION RESPIRATORY (INHALATION) AS NEEDED
Status: CANCELLED
Start: 2019-09-30

## 2019-07-05 RX ORDER — SODIUM CHLORIDE 0.9 % (FLUSH) 0.9 %
10 SYRINGE (ML) INJECTION AS NEEDED
Status: CANCELLED
Start: 2019-10-21

## 2019-07-05 RX ORDER — DEXAMETHASONE SODIUM PHOSPHATE 100 MG/10ML
10 INJECTION INTRAMUSCULAR; INTRAVENOUS ONCE
Status: CANCELLED | OUTPATIENT
Start: 2019-08-19

## 2019-07-05 RX ORDER — DIPHENHYDRAMINE HYDROCHLORIDE 50 MG/ML
50 INJECTION, SOLUTION INTRAMUSCULAR; INTRAVENOUS AS NEEDED
Status: CANCELLED
Start: 2019-09-30

## 2019-07-05 RX ORDER — SODIUM CHLORIDE 9 MG/ML
10 INJECTION INTRAMUSCULAR; INTRAVENOUS; SUBCUTANEOUS AS NEEDED
Status: CANCELLED | OUTPATIENT
Start: 2019-07-29

## 2019-07-05 RX ORDER — DIPHENHYDRAMINE HYDROCHLORIDE 50 MG/ML
50 INJECTION, SOLUTION INTRAMUSCULAR; INTRAVENOUS AS NEEDED
Status: CANCELLED
Start: 2019-12-03

## 2019-07-05 RX ORDER — ACETAMINOPHEN 325 MG/1
650 TABLET ORAL AS NEEDED
Status: CANCELLED
Start: 2019-10-14

## 2019-07-05 RX ORDER — SODIUM CHLORIDE 9 MG/ML
10 INJECTION INTRAMUSCULAR; INTRAVENOUS; SUBCUTANEOUS AS NEEDED
Status: CANCELLED | OUTPATIENT
Start: 2019-08-05

## 2019-07-05 RX ORDER — HEPARIN 100 UNIT/ML
300-500 SYRINGE INTRAVENOUS AS NEEDED
Status: CANCELLED
Start: 2019-09-03

## 2019-07-05 RX ORDER — DOXORUBICIN HYDROCHLORIDE 2 MG/ML
60 INJECTION, SOLUTION INTRAVENOUS ONCE
Status: CANCELLED
Start: 2019-11-11

## 2019-07-05 RX ORDER — ONDANSETRON 2 MG/ML
8 INJECTION INTRAMUSCULAR; INTRAVENOUS AS NEEDED
Status: CANCELLED | OUTPATIENT
Start: 2019-10-07

## 2019-07-05 RX ORDER — DIPHENHYDRAMINE HYDROCHLORIDE 50 MG/ML
50 INJECTION, SOLUTION INTRAMUSCULAR; INTRAVENOUS AS NEEDED
Status: CANCELLED
Start: 2019-07-22

## 2019-07-05 RX ORDER — HYDROCORTISONE SODIUM SUCCINATE 100 MG/2ML
100 INJECTION, POWDER, FOR SOLUTION INTRAMUSCULAR; INTRAVENOUS AS NEEDED
Status: CANCELLED | OUTPATIENT
Start: 2019-10-07

## 2019-07-05 RX ORDER — SODIUM CHLORIDE 9 MG/ML
25 INJECTION, SOLUTION INTRAVENOUS CONTINUOUS
Status: CANCELLED | OUTPATIENT
Start: 2019-12-03

## 2019-07-05 RX ORDER — HEPARIN 100 UNIT/ML
300-500 SYRINGE INTRAVENOUS AS NEEDED
Status: CANCELLED
Start: 2019-09-09

## 2019-07-05 RX ORDER — ACETAMINOPHEN 325 MG/1
650 TABLET ORAL AS NEEDED
Status: CANCELLED
Start: 2019-09-30

## 2019-07-05 RX ORDER — HYDROCORTISONE SODIUM SUCCINATE 100 MG/2ML
100 INJECTION, POWDER, FOR SOLUTION INTRAMUSCULAR; INTRAVENOUS AS NEEDED
Status: CANCELLED | OUTPATIENT
Start: 2019-12-03

## 2019-07-05 RX ORDER — SODIUM CHLORIDE 9 MG/ML
25 INJECTION, SOLUTION INTRAVENOUS CONTINUOUS
Status: CANCELLED | OUTPATIENT
Start: 2019-07-22

## 2019-07-05 RX ORDER — HEPARIN 100 UNIT/ML
300-500 SYRINGE INTRAVENOUS AS NEEDED
Status: CANCELLED
Start: 2019-10-21

## 2019-07-05 RX ORDER — DIPHENHYDRAMINE HYDROCHLORIDE 50 MG/ML
50 INJECTION, SOLUTION INTRAMUSCULAR; INTRAVENOUS AS NEEDED
Status: CANCELLED
Start: 2019-08-12

## 2019-07-05 RX ORDER — ONDANSETRON 2 MG/ML
8 INJECTION INTRAMUSCULAR; INTRAVENOUS AS NEEDED
Status: CANCELLED | OUTPATIENT
Start: 2019-09-16

## 2019-07-05 RX ORDER — ALBUTEROL SULFATE 0.83 MG/ML
2.5 SOLUTION RESPIRATORY (INHALATION) AS NEEDED
Status: CANCELLED
Start: 2019-07-29

## 2019-07-05 RX ORDER — HYDROCORTISONE SODIUM SUCCINATE 100 MG/2ML
100 INJECTION, POWDER, FOR SOLUTION INTRAMUSCULAR; INTRAVENOUS AS NEEDED
Status: CANCELLED | OUTPATIENT
Start: 2019-08-12

## 2019-07-05 RX ORDER — DIPHENHYDRAMINE HYDROCHLORIDE 50 MG/ML
50 INJECTION, SOLUTION INTRAMUSCULAR; INTRAVENOUS AS NEEDED
Status: CANCELLED
Start: 2019-08-05

## 2019-07-05 RX ORDER — HYDROCORTISONE SODIUM SUCCINATE 100 MG/2ML
100 INJECTION, POWDER, FOR SOLUTION INTRAMUSCULAR; INTRAVENOUS AS NEEDED
Status: CANCELLED | OUTPATIENT
Start: 2019-08-19

## 2019-07-05 RX ORDER — ALBUTEROL SULFATE 0.83 MG/ML
2.5 SOLUTION RESPIRATORY (INHALATION) AS NEEDED
Status: CANCELLED
Start: 2019-10-21

## 2019-07-05 RX ORDER — SODIUM CHLORIDE 0.9 % (FLUSH) 0.9 %
10 SYRINGE (ML) INJECTION AS NEEDED
Status: CANCELLED
Start: 2019-07-29

## 2019-07-05 RX ORDER — SODIUM CHLORIDE 0.9 % (FLUSH) 0.9 %
10 SYRINGE (ML) INJECTION AS NEEDED
Status: CANCELLED
Start: 2019-09-09

## 2019-07-05 RX ORDER — SODIUM CHLORIDE 9 MG/ML
25 INJECTION, SOLUTION INTRAVENOUS CONTINUOUS
Status: CANCELLED | OUTPATIENT
Start: 2019-09-30

## 2019-07-05 RX ORDER — SODIUM CHLORIDE 9 MG/ML
25 INJECTION, SOLUTION INTRAVENOUS CONTINUOUS
Status: CANCELLED | OUTPATIENT
Start: 2019-12-17

## 2019-07-05 RX ORDER — ONDANSETRON 2 MG/ML
8 INJECTION INTRAMUSCULAR; INTRAVENOUS AS NEEDED
Status: CANCELLED | OUTPATIENT
Start: 2019-07-22

## 2019-07-05 RX ORDER — ACETAMINOPHEN 325 MG/1
650 TABLET ORAL AS NEEDED
Status: CANCELLED
Start: 2019-07-22

## 2019-07-05 RX ORDER — EPINEPHRINE 1 MG/ML
0.3 INJECTION, SOLUTION, CONCENTRATE INTRAVENOUS AS NEEDED
Status: CANCELLED | OUTPATIENT
Start: 2019-07-22

## 2019-07-05 RX ORDER — ALBUTEROL SULFATE 0.83 MG/ML
2.5 SOLUTION RESPIRATORY (INHALATION) AS NEEDED
Status: CANCELLED
Start: 2019-09-03

## 2019-07-05 RX ORDER — HYDROCORTISONE SODIUM SUCCINATE 100 MG/2ML
100 INJECTION, POWDER, FOR SOLUTION INTRAMUSCULAR; INTRAVENOUS AS NEEDED
Status: CANCELLED | OUTPATIENT
Start: 2019-07-22

## 2019-07-05 RX ORDER — ACETAMINOPHEN 325 MG/1
650 TABLET ORAL AS NEEDED
Status: CANCELLED
Start: 2019-09-03

## 2019-07-05 RX ORDER — PALONOSETRON 0.05 MG/ML
0.25 INJECTION, SOLUTION INTRAVENOUS ONCE
Status: CANCELLED | OUTPATIENT
Start: 2019-08-12

## 2019-07-05 RX ORDER — PALONOSETRON 0.05 MG/ML
0.25 INJECTION, SOLUTION INTRAVENOUS ONCE
Status: CANCELLED | OUTPATIENT
Start: 2019-10-21

## 2019-07-05 RX ORDER — PALONOSETRON 0.05 MG/ML
0.25 INJECTION, SOLUTION INTRAVENOUS ONCE
Status: CANCELLED | OUTPATIENT
Start: 2019-12-17

## 2019-07-05 RX ORDER — HYDROCORTISONE SODIUM SUCCINATE 100 MG/2ML
100 INJECTION, POWDER, FOR SOLUTION INTRAMUSCULAR; INTRAVENOUS AS NEEDED
Status: CANCELLED | OUTPATIENT
Start: 2019-09-30

## 2019-07-05 RX ORDER — SODIUM CHLORIDE 9 MG/ML
10 INJECTION INTRAMUSCULAR; INTRAVENOUS; SUBCUTANEOUS AS NEEDED
Status: CANCELLED | OUTPATIENT
Start: 2019-08-19

## 2019-07-05 RX ORDER — DIPHENHYDRAMINE HYDROCHLORIDE 50 MG/ML
50 INJECTION, SOLUTION INTRAMUSCULAR; INTRAVENOUS AS NEEDED
Status: CANCELLED
Start: 2019-09-16

## 2019-07-05 RX ORDER — HEPARIN 100 UNIT/ML
300-500 SYRINGE INTRAVENOUS AS NEEDED
Status: CANCELLED
Start: 2019-08-19

## 2019-07-05 RX ORDER — ALBUTEROL SULFATE 0.83 MG/ML
2.5 SOLUTION RESPIRATORY (INHALATION) AS NEEDED
Status: CANCELLED
Start: 2019-09-16

## 2019-07-05 RX ORDER — DIPHENHYDRAMINE HYDROCHLORIDE 50 MG/ML
50 INJECTION, SOLUTION INTRAMUSCULAR; INTRAVENOUS AS NEEDED
Status: CANCELLED
Start: 2019-10-14

## 2019-07-05 RX ORDER — ALBUTEROL SULFATE 0.83 MG/ML
2.5 SOLUTION RESPIRATORY (INHALATION) AS NEEDED
Status: CANCELLED
Start: 2019-08-12

## 2019-07-05 RX ORDER — DIPHENHYDRAMINE HYDROCHLORIDE 50 MG/ML
50 INJECTION, SOLUTION INTRAMUSCULAR; INTRAVENOUS AS NEEDED
Status: CANCELLED
Start: 2019-11-11

## 2019-07-05 RX ORDER — SODIUM CHLORIDE 0.9 % (FLUSH) 0.9 %
10 SYRINGE (ML) INJECTION AS NEEDED
Status: CANCELLED
Start: 2019-09-03

## 2019-07-05 RX ORDER — SODIUM CHLORIDE 9 MG/ML
25 INJECTION, SOLUTION INTRAVENOUS CONTINUOUS
Status: CANCELLED | OUTPATIENT
Start: 2019-08-12

## 2019-07-05 RX ORDER — HYDROCORTISONE SODIUM SUCCINATE 100 MG/2ML
100 INJECTION, POWDER, FOR SOLUTION INTRAMUSCULAR; INTRAVENOUS AS NEEDED
Status: CANCELLED | OUTPATIENT
Start: 2019-10-14

## 2019-07-05 RX ORDER — HEPARIN 100 UNIT/ML
300-500 SYRINGE INTRAVENOUS AS NEEDED
Status: CANCELLED
Start: 2019-10-14

## 2019-07-05 RX ORDER — SODIUM CHLORIDE 9 MG/ML
10 INJECTION INTRAMUSCULAR; INTRAVENOUS; SUBCUTANEOUS AS NEEDED
Status: CANCELLED | OUTPATIENT
Start: 2019-12-17

## 2019-07-05 RX ORDER — EPINEPHRINE 1 MG/ML
0.3 INJECTION, SOLUTION, CONCENTRATE INTRAVENOUS AS NEEDED
Status: CANCELLED | OUTPATIENT
Start: 2019-09-09

## 2019-07-05 RX ORDER — ACETAMINOPHEN 325 MG/1
650 TABLET ORAL AS NEEDED
Status: CANCELLED
Start: 2019-12-17

## 2019-07-05 RX ORDER — ACETAMINOPHEN 325 MG/1
650 TABLET ORAL AS NEEDED
Status: CANCELLED
Start: 2019-08-19

## 2019-07-05 RX ORDER — EPINEPHRINE 1 MG/ML
0.3 INJECTION, SOLUTION, CONCENTRATE INTRAVENOUS AS NEEDED
Status: CANCELLED | OUTPATIENT
Start: 2019-09-30

## 2019-07-05 RX ORDER — SODIUM CHLORIDE 0.9 % (FLUSH) 0.9 %
10 SYRINGE (ML) INJECTION AS NEEDED
Status: CANCELLED
Start: 2019-08-12

## 2019-07-05 RX ORDER — ONDANSETRON 2 MG/ML
8 INJECTION INTRAMUSCULAR; INTRAVENOUS AS NEEDED
Status: CANCELLED | OUTPATIENT
Start: 2019-09-03

## 2019-07-05 RX ORDER — SODIUM CHLORIDE 0.9 % (FLUSH) 0.9 %
10 SYRINGE (ML) INJECTION AS NEEDED
Status: CANCELLED
Start: 2019-07-22

## 2019-07-05 RX ORDER — SODIUM CHLORIDE 9 MG/ML
25 INJECTION, SOLUTION INTRAVENOUS CONTINUOUS
Status: CANCELLED | OUTPATIENT
Start: 2019-07-29

## 2019-07-05 RX ORDER — HEPARIN 100 UNIT/ML
300-500 SYRINGE INTRAVENOUS AS NEEDED
Status: CANCELLED
Start: 2019-07-22

## 2019-07-05 RX ORDER — DIPHENHYDRAMINE HYDROCHLORIDE 50 MG/ML
50 INJECTION, SOLUTION INTRAMUSCULAR; INTRAVENOUS AS NEEDED
Status: CANCELLED
Start: 2019-12-17

## 2019-07-05 RX ORDER — HYDROCORTISONE SODIUM SUCCINATE 100 MG/2ML
100 INJECTION, POWDER, FOR SOLUTION INTRAMUSCULAR; INTRAVENOUS AS NEEDED
Status: CANCELLED | OUTPATIENT
Start: 2019-11-11

## 2019-07-05 RX ORDER — DIPHENHYDRAMINE HYDROCHLORIDE 50 MG/ML
50 INJECTION, SOLUTION INTRAMUSCULAR; INTRAVENOUS ONCE
Status: CANCELLED
Start: 2019-08-05

## 2019-07-05 RX ORDER — EPINEPHRINE 1 MG/ML
0.3 INJECTION, SOLUTION, CONCENTRATE INTRAVENOUS AS NEEDED
Status: CANCELLED | OUTPATIENT
Start: 2019-10-14

## 2019-07-05 RX ORDER — DIPHENHYDRAMINE HYDROCHLORIDE 50 MG/ML
50 INJECTION, SOLUTION INTRAMUSCULAR; INTRAVENOUS AS NEEDED
Status: CANCELLED
Start: 2019-09-03

## 2019-07-05 RX ORDER — SODIUM CHLORIDE 9 MG/ML
25 INJECTION, SOLUTION INTRAVENOUS CONTINUOUS
Status: CANCELLED | OUTPATIENT
Start: 2019-10-07

## 2019-07-05 RX ORDER — DIPHENHYDRAMINE HYDROCHLORIDE 50 MG/ML
50 INJECTION, SOLUTION INTRAMUSCULAR; INTRAVENOUS AS NEEDED
Status: CANCELLED
Start: 2019-09-09

## 2019-07-05 RX ORDER — DEXAMETHASONE SODIUM PHOSPHATE 100 MG/10ML
10 INJECTION INTRAMUSCULAR; INTRAVENOUS ONCE
Status: CANCELLED | OUTPATIENT
Start: 2019-07-29

## 2019-07-05 RX ORDER — HEPARIN 100 UNIT/ML
300-500 SYRINGE INTRAVENOUS AS NEEDED
Status: CANCELLED
Start: 2019-09-16

## 2019-07-05 RX ORDER — DOXORUBICIN HYDROCHLORIDE 2 MG/ML
60 INJECTION, SOLUTION INTRAVENOUS ONCE
Status: CANCELLED
Start: 2019-11-25

## 2019-07-05 RX ORDER — ACETAMINOPHEN 325 MG/1
650 TABLET ORAL AS NEEDED
Status: CANCELLED
Start: 2019-10-07

## 2019-07-05 RX ORDER — ACETAMINOPHEN 325 MG/1
650 TABLET ORAL AS NEEDED
Status: CANCELLED
Start: 2019-08-12

## 2019-07-05 RX ORDER — DIPHENHYDRAMINE HYDROCHLORIDE 50 MG/ML
50 INJECTION, SOLUTION INTRAMUSCULAR; INTRAVENOUS AS NEEDED
Status: CANCELLED
Start: 2019-07-29

## 2019-07-05 RX ORDER — SODIUM CHLORIDE 9 MG/ML
10 INJECTION INTRAMUSCULAR; INTRAVENOUS; SUBCUTANEOUS AS NEEDED
Status: CANCELLED | OUTPATIENT
Start: 2019-08-26

## 2019-07-05 RX ORDER — HEPARIN 100 UNIT/ML
300-500 SYRINGE INTRAVENOUS AS NEEDED
Status: CANCELLED
Start: 2019-10-07

## 2019-07-05 RX ORDER — SODIUM CHLORIDE 9 MG/ML
25 INJECTION, SOLUTION INTRAVENOUS CONTINUOUS
Status: CANCELLED | OUTPATIENT
Start: 2019-09-03

## 2019-07-05 RX ORDER — DOXORUBICIN HYDROCHLORIDE 2 MG/ML
60 INJECTION, SOLUTION INTRAVENOUS ONCE
Status: CANCELLED
Start: 2019-12-09

## 2019-07-05 RX ORDER — SODIUM CHLORIDE 0.9 % (FLUSH) 0.9 %
10 SYRINGE (ML) INJECTION AS NEEDED
Status: CANCELLED
Start: 2019-10-07

## 2019-07-05 RX ORDER — EPINEPHRINE 1 MG/ML
0.3 INJECTION, SOLUTION, CONCENTRATE INTRAVENOUS AS NEEDED
Status: CANCELLED | OUTPATIENT
Start: 2019-08-26

## 2019-07-05 RX ORDER — EPINEPHRINE 1 MG/ML
0.3 INJECTION, SOLUTION, CONCENTRATE INTRAVENOUS AS NEEDED
Status: CANCELLED | OUTPATIENT
Start: 2019-10-07

## 2019-07-05 RX ORDER — DIPHENHYDRAMINE HYDROCHLORIDE 50 MG/ML
50 INJECTION, SOLUTION INTRAMUSCULAR; INTRAVENOUS AS NEEDED
Status: CANCELLED
Start: 2019-08-26

## 2019-07-05 RX ORDER — DIPHENHYDRAMINE HYDROCHLORIDE 50 MG/ML
50 INJECTION, SOLUTION INTRAMUSCULAR; INTRAVENOUS ONCE
Status: CANCELLED
Start: 2019-07-29

## 2019-07-05 RX ORDER — SODIUM CHLORIDE 0.9 % (FLUSH) 0.9 %
10 SYRINGE (ML) INJECTION AS NEEDED
Status: CANCELLED
Start: 2019-09-16

## 2019-07-05 RX ORDER — HYDROCORTISONE SODIUM SUCCINATE 100 MG/2ML
100 INJECTION, POWDER, FOR SOLUTION INTRAMUSCULAR; INTRAVENOUS AS NEEDED
Status: CANCELLED | OUTPATIENT
Start: 2019-09-16

## 2019-07-05 RX ORDER — ONDANSETRON 2 MG/ML
8 INJECTION INTRAMUSCULAR; INTRAVENOUS AS NEEDED
Status: CANCELLED | OUTPATIENT
Start: 2019-09-30

## 2019-07-05 RX ORDER — HEPARIN 100 UNIT/ML
300-500 SYRINGE INTRAVENOUS AS NEEDED
Status: CANCELLED
Start: 2019-12-17

## 2019-07-05 RX ORDER — SODIUM CHLORIDE 9 MG/ML
25 INJECTION, SOLUTION INTRAVENOUS CONTINUOUS
Status: CANCELLED | OUTPATIENT
Start: 2019-08-19

## 2019-07-05 RX ORDER — DIPHENHYDRAMINE HYDROCHLORIDE 50 MG/ML
50 INJECTION, SOLUTION INTRAMUSCULAR; INTRAVENOUS ONCE
Status: CANCELLED
Start: 2019-07-22

## 2019-07-05 RX ORDER — SODIUM CHLORIDE 9 MG/ML
10 INJECTION INTRAMUSCULAR; INTRAVENOUS; SUBCUTANEOUS AS NEEDED
Status: CANCELLED | OUTPATIENT
Start: 2019-08-12

## 2019-07-05 RX ORDER — ONDANSETRON 2 MG/ML
8 INJECTION INTRAMUSCULAR; INTRAVENOUS AS NEEDED
Status: CANCELLED | OUTPATIENT
Start: 2019-12-03

## 2019-07-05 RX ORDER — SODIUM CHLORIDE 9 MG/ML
10 INJECTION INTRAMUSCULAR; INTRAVENOUS; SUBCUTANEOUS AS NEEDED
Status: CANCELLED | OUTPATIENT
Start: 2019-10-07

## 2019-07-05 RX ORDER — ONDANSETRON 2 MG/ML
8 INJECTION INTRAMUSCULAR; INTRAVENOUS AS NEEDED
Status: CANCELLED | OUTPATIENT
Start: 2019-08-26

## 2019-07-05 RX ORDER — HYDROCORTISONE SODIUM SUCCINATE 100 MG/2ML
100 INJECTION, POWDER, FOR SOLUTION INTRAMUSCULAR; INTRAVENOUS AS NEEDED
Status: CANCELLED | OUTPATIENT
Start: 2019-12-17

## 2019-07-05 RX ORDER — PALONOSETRON 0.05 MG/ML
0.25 INJECTION, SOLUTION INTRAVENOUS ONCE
Status: CANCELLED | OUTPATIENT
Start: 2019-09-03

## 2019-07-05 RX ORDER — ONDANSETRON 2 MG/ML
8 INJECTION INTRAMUSCULAR; INTRAVENOUS AS NEEDED
Status: CANCELLED | OUTPATIENT
Start: 2019-10-14

## 2019-07-05 RX ORDER — SODIUM CHLORIDE 0.9 % (FLUSH) 0.9 %
10 SYRINGE (ML) INJECTION AS NEEDED
Status: CANCELLED
Start: 2019-12-03

## 2019-07-05 RX ORDER — SODIUM CHLORIDE 9 MG/ML
10 INJECTION INTRAMUSCULAR; INTRAVENOUS; SUBCUTANEOUS AS NEEDED
Status: CANCELLED | OUTPATIENT
Start: 2019-10-14

## 2019-07-05 RX ORDER — PALONOSETRON 0.05 MG/ML
0.25 INJECTION, SOLUTION INTRAVENOUS ONCE
Status: CANCELLED | OUTPATIENT
Start: 2019-07-22

## 2019-07-05 RX ORDER — HYDROCORTISONE SODIUM SUCCINATE 100 MG/2ML
100 INJECTION, POWDER, FOR SOLUTION INTRAMUSCULAR; INTRAVENOUS AS NEEDED
Status: CANCELLED | OUTPATIENT
Start: 2019-09-03

## 2019-07-05 RX ORDER — EPINEPHRINE 1 MG/ML
0.3 INJECTION, SOLUTION, CONCENTRATE INTRAVENOUS AS NEEDED
Status: CANCELLED | OUTPATIENT
Start: 2019-09-03

## 2019-07-05 RX ORDER — PALONOSETRON 0.05 MG/ML
0.25 INJECTION, SOLUTION INTRAVENOUS ONCE
Status: CANCELLED | OUTPATIENT
Start: 2019-09-30

## 2019-07-05 RX ORDER — SODIUM CHLORIDE 9 MG/ML
25 INJECTION, SOLUTION INTRAVENOUS CONTINUOUS
Status: CANCELLED | OUTPATIENT
Start: 2019-11-11

## 2019-07-05 RX ORDER — ALBUTEROL SULFATE 0.83 MG/ML
2.5 SOLUTION RESPIRATORY (INHALATION) AS NEEDED
Status: CANCELLED
Start: 2019-12-03

## 2019-07-05 RX ORDER — ONDANSETRON 2 MG/ML
8 INJECTION INTRAMUSCULAR; INTRAVENOUS AS NEEDED
Status: CANCELLED | OUTPATIENT
Start: 2019-09-09

## 2019-07-05 RX ORDER — ONDANSETRON 2 MG/ML
8 INJECTION INTRAMUSCULAR; INTRAVENOUS AS NEEDED
Status: CANCELLED | OUTPATIENT
Start: 2019-07-29

## 2019-07-05 RX ORDER — SODIUM CHLORIDE 9 MG/ML
25 INJECTION, SOLUTION INTRAVENOUS CONTINUOUS
Status: CANCELLED | OUTPATIENT
Start: 2019-10-14

## 2019-07-05 RX ORDER — HYDROCORTISONE SODIUM SUCCINATE 100 MG/2ML
100 INJECTION, POWDER, FOR SOLUTION INTRAMUSCULAR; INTRAVENOUS AS NEEDED
Status: CANCELLED | OUTPATIENT
Start: 2019-07-29

## 2019-07-05 RX ORDER — SODIUM CHLORIDE 9 MG/ML
25 INJECTION, SOLUTION INTRAVENOUS CONTINUOUS
Status: CANCELLED | OUTPATIENT
Start: 2019-08-05

## 2019-07-05 RX ORDER — ONDANSETRON 2 MG/ML
8 INJECTION INTRAMUSCULAR; INTRAVENOUS AS NEEDED
Status: CANCELLED | OUTPATIENT
Start: 2019-08-05

## 2019-07-05 RX ORDER — SODIUM CHLORIDE 0.9 % (FLUSH) 0.9 %
10 SYRINGE (ML) INJECTION AS NEEDED
Status: CANCELLED
Start: 2019-08-05

## 2019-07-05 RX ORDER — SODIUM CHLORIDE 9 MG/ML
10 INJECTION INTRAMUSCULAR; INTRAVENOUS; SUBCUTANEOUS AS NEEDED
Status: CANCELLED | OUTPATIENT
Start: 2019-09-09

## 2019-07-05 RX ORDER — DEXAMETHASONE SODIUM PHOSPHATE 100 MG/10ML
10 INJECTION INTRAMUSCULAR; INTRAVENOUS ONCE
Status: CANCELLED | OUTPATIENT
Start: 2019-10-07

## 2019-07-05 RX ORDER — ONDANSETRON 2 MG/ML
8 INJECTION INTRAMUSCULAR; INTRAVENOUS AS NEEDED
Status: CANCELLED | OUTPATIENT
Start: 2019-11-11

## 2019-07-05 RX ORDER — ALBUTEROL SULFATE 0.83 MG/ML
2.5 SOLUTION RESPIRATORY (INHALATION) AS NEEDED
Status: CANCELLED
Start: 2019-08-19

## 2019-07-05 RX ORDER — SODIUM CHLORIDE 9 MG/ML
10 INJECTION INTRAMUSCULAR; INTRAVENOUS; SUBCUTANEOUS AS NEEDED
Status: CANCELLED | OUTPATIENT
Start: 2019-12-03

## 2019-07-05 RX ORDER — DEXAMETHASONE SODIUM PHOSPHATE 100 MG/10ML
10 INJECTION INTRAMUSCULAR; INTRAVENOUS ONCE
Status: CANCELLED | OUTPATIENT
Start: 2019-08-05

## 2019-07-05 RX ORDER — SODIUM CHLORIDE 0.9 % (FLUSH) 0.9 %
10 SYRINGE (ML) INJECTION AS NEEDED
Status: CANCELLED
Start: 2019-11-11

## 2019-07-05 RX ORDER — DEXAMETHASONE SODIUM PHOSPHATE 100 MG/10ML
10 INJECTION INTRAMUSCULAR; INTRAVENOUS ONCE
Status: CANCELLED | OUTPATIENT
Start: 2019-09-09

## 2019-07-05 RX ORDER — SODIUM CHLORIDE 9 MG/ML
10 INJECTION INTRAMUSCULAR; INTRAVENOUS; SUBCUTANEOUS AS NEEDED
Status: CANCELLED | OUTPATIENT
Start: 2019-09-30

## 2019-07-05 RX ORDER — EPINEPHRINE 1 MG/ML
0.3 INJECTION, SOLUTION, CONCENTRATE INTRAVENOUS AS NEEDED
Status: CANCELLED | OUTPATIENT
Start: 2019-09-16

## 2019-07-05 RX ORDER — EPINEPHRINE 1 MG/ML
0.3 INJECTION, SOLUTION, CONCENTRATE INTRAVENOUS AS NEEDED
Status: CANCELLED | OUTPATIENT
Start: 2019-11-11

## 2019-07-05 RX ORDER — EPINEPHRINE 1 MG/ML
0.3 INJECTION, SOLUTION, CONCENTRATE INTRAVENOUS AS NEEDED
Status: CANCELLED | OUTPATIENT
Start: 2019-12-17

## 2019-07-05 RX ORDER — DIPHENHYDRAMINE HYDROCHLORIDE 50 MG/ML
50 INJECTION, SOLUTION INTRAMUSCULAR; INTRAVENOUS ONCE
Status: CANCELLED
Start: 2019-09-23

## 2019-07-05 RX ORDER — ACETAMINOPHEN 325 MG/1
650 TABLET ORAL AS NEEDED
Status: CANCELLED
Start: 2019-07-29

## 2019-07-05 RX ORDER — DEXAMETHASONE SODIUM PHOSPHATE 100 MG/10ML
10 INJECTION INTRAMUSCULAR; INTRAVENOUS ONCE
Status: CANCELLED | OUTPATIENT
Start: 2019-08-26

## 2019-07-05 RX ORDER — HEPARIN 100 UNIT/ML
300-500 SYRINGE INTRAVENOUS AS NEEDED
Status: CANCELLED
Start: 2019-12-03

## 2019-07-05 RX ORDER — SODIUM CHLORIDE 9 MG/ML
10 INJECTION INTRAMUSCULAR; INTRAVENOUS; SUBCUTANEOUS AS NEEDED
Status: CANCELLED | OUTPATIENT
Start: 2019-10-21

## 2019-07-05 RX ORDER — ALBUTEROL SULFATE 0.83 MG/ML
2.5 SOLUTION RESPIRATORY (INHALATION) AS NEEDED
Status: CANCELLED
Start: 2019-07-22

## 2019-07-05 RX ORDER — DIPHENHYDRAMINE HYDROCHLORIDE 50 MG/ML
50 INJECTION, SOLUTION INTRAMUSCULAR; INTRAVENOUS ONCE
Status: CANCELLED
Start: 2019-08-12

## 2019-07-05 RX ORDER — DIPHENHYDRAMINE HYDROCHLORIDE 50 MG/ML
50 INJECTION, SOLUTION INTRAMUSCULAR; INTRAVENOUS ONCE
Status: CANCELLED
Start: 2019-09-02

## 2019-07-05 RX ORDER — ACETAMINOPHEN 325 MG/1
650 TABLET ORAL AS NEEDED
Status: CANCELLED
Start: 2019-10-21

## 2019-07-05 RX ORDER — DEXAMETHASONE SODIUM PHOSPHATE 100 MG/10ML
10 INJECTION INTRAMUSCULAR; INTRAVENOUS ONCE
Status: CANCELLED | OUTPATIENT
Start: 2019-10-14

## 2019-07-05 RX ORDER — DIPHENHYDRAMINE HYDROCHLORIDE 50 MG/ML
50 INJECTION, SOLUTION INTRAMUSCULAR; INTRAVENOUS ONCE
Status: CANCELLED
Start: 2019-10-07

## 2019-07-05 RX ORDER — HYDROCORTISONE SODIUM SUCCINATE 100 MG/2ML
100 INJECTION, POWDER, FOR SOLUTION INTRAMUSCULAR; INTRAVENOUS AS NEEDED
Status: CANCELLED | OUTPATIENT
Start: 2019-10-21

## 2019-07-05 RX ORDER — DIPHENHYDRAMINE HYDROCHLORIDE 50 MG/ML
50 INJECTION, SOLUTION INTRAMUSCULAR; INTRAVENOUS ONCE
Status: CANCELLED
Start: 2019-09-30

## 2019-07-05 RX ORDER — DIPHENHYDRAMINE HYDROCHLORIDE 50 MG/ML
50 INJECTION, SOLUTION INTRAMUSCULAR; INTRAVENOUS AS NEEDED
Status: CANCELLED
Start: 2019-08-19

## 2019-07-05 RX ORDER — HEPARIN 100 UNIT/ML
300-500 SYRINGE INTRAVENOUS AS NEEDED
Status: CANCELLED
Start: 2019-11-11

## 2019-07-05 RX ORDER — PALONOSETRON 0.05 MG/ML
0.25 INJECTION, SOLUTION INTRAVENOUS ONCE
Status: CANCELLED | OUTPATIENT
Start: 2019-12-03

## 2019-07-05 RX ORDER — SODIUM CHLORIDE 9 MG/ML
25 INJECTION, SOLUTION INTRAVENOUS CONTINUOUS
Status: CANCELLED | OUTPATIENT
Start: 2019-09-09

## 2019-07-05 RX ORDER — SODIUM CHLORIDE 9 MG/ML
25 INJECTION, SOLUTION INTRAVENOUS CONTINUOUS
Status: CANCELLED | OUTPATIENT
Start: 2019-10-21

## 2019-07-05 RX ORDER — HYDROCORTISONE SODIUM SUCCINATE 100 MG/2ML
100 INJECTION, POWDER, FOR SOLUTION INTRAMUSCULAR; INTRAVENOUS AS NEEDED
Status: CANCELLED | OUTPATIENT
Start: 2019-08-05

## 2019-07-05 RX ORDER — SODIUM CHLORIDE 9 MG/ML
10 INJECTION INTRAMUSCULAR; INTRAVENOUS; SUBCUTANEOUS AS NEEDED
Status: CANCELLED | OUTPATIENT
Start: 2019-09-16

## 2019-07-05 RX ORDER — DIPHENHYDRAMINE HYDROCHLORIDE 50 MG/ML
50 INJECTION, SOLUTION INTRAMUSCULAR; INTRAVENOUS ONCE
Status: CANCELLED
Start: 2019-08-19

## 2019-07-05 RX ORDER — HEPARIN 100 UNIT/ML
300-500 SYRINGE INTRAVENOUS AS NEEDED
Status: CANCELLED
Start: 2019-08-12

## 2019-07-05 RX ORDER — SODIUM CHLORIDE 0.9 % (FLUSH) 0.9 %
10 SYRINGE (ML) INJECTION AS NEEDED
Status: CANCELLED
Start: 2019-09-30

## 2019-07-05 RX ORDER — EPINEPHRINE 1 MG/ML
0.3 INJECTION, SOLUTION, CONCENTRATE INTRAVENOUS AS NEEDED
Status: CANCELLED | OUTPATIENT
Start: 2019-08-19

## 2019-07-05 RX ORDER — DIPHENHYDRAMINE HYDROCHLORIDE 50 MG/ML
50 INJECTION, SOLUTION INTRAMUSCULAR; INTRAVENOUS ONCE
Status: CANCELLED
Start: 2019-09-16

## 2019-07-05 RX ORDER — ALBUTEROL SULFATE 0.83 MG/ML
2.5 SOLUTION RESPIRATORY (INHALATION) AS NEEDED
Status: CANCELLED
Start: 2019-08-05

## 2019-07-05 RX ORDER — DOXORUBICIN HYDROCHLORIDE 2 MG/ML
60 INJECTION, SOLUTION INTRAVENOUS ONCE
Status: CANCELLED
Start: 2019-10-21

## 2019-07-05 RX ORDER — ONDANSETRON 2 MG/ML
8 INJECTION INTRAMUSCULAR; INTRAVENOUS AS NEEDED
Status: CANCELLED | OUTPATIENT
Start: 2019-08-12

## 2019-07-05 RX ORDER — ONDANSETRON 2 MG/ML
8 INJECTION INTRAMUSCULAR; INTRAVENOUS AS NEEDED
Status: CANCELLED | OUTPATIENT
Start: 2019-10-21

## 2019-07-05 RX ORDER — SODIUM CHLORIDE 0.9 % (FLUSH) 0.9 %
10 SYRINGE (ML) INJECTION AS NEEDED
Status: CANCELLED
Start: 2019-10-14

## 2019-07-05 RX ORDER — SODIUM CHLORIDE 9 MG/ML
25 INJECTION, SOLUTION INTRAVENOUS CONTINUOUS
Status: CANCELLED | OUTPATIENT
Start: 2019-09-16

## 2019-07-05 RX ORDER — SODIUM CHLORIDE 0.9 % (FLUSH) 0.9 %
10 SYRINGE (ML) INJECTION AS NEEDED
Status: CANCELLED
Start: 2019-08-26

## 2019-07-05 RX ORDER — ALBUTEROL SULFATE 0.83 MG/ML
2.5 SOLUTION RESPIRATORY (INHALATION) AS NEEDED
Status: CANCELLED
Start: 2019-11-11

## 2019-07-05 RX ORDER — EPINEPHRINE 1 MG/ML
0.3 INJECTION, SOLUTION, CONCENTRATE INTRAVENOUS AS NEEDED
Status: CANCELLED | OUTPATIENT
Start: 2019-08-05

## 2019-07-05 RX ORDER — ACETAMINOPHEN 325 MG/1
650 TABLET ORAL AS NEEDED
Status: CANCELLED
Start: 2019-08-05

## 2019-07-05 RX ORDER — HYDROCORTISONE SODIUM SUCCINATE 100 MG/2ML
100 INJECTION, POWDER, FOR SOLUTION INTRAMUSCULAR; INTRAVENOUS AS NEEDED
Status: CANCELLED | OUTPATIENT
Start: 2019-09-09

## 2019-07-05 RX ORDER — SODIUM CHLORIDE 9 MG/ML
10 INJECTION INTRAMUSCULAR; INTRAVENOUS; SUBCUTANEOUS AS NEEDED
Status: CANCELLED | OUTPATIENT
Start: 2019-07-22

## 2019-07-05 RX ORDER — DIPHENHYDRAMINE HYDROCHLORIDE 50 MG/ML
50 INJECTION, SOLUTION INTRAMUSCULAR; INTRAVENOUS ONCE
Status: CANCELLED
Start: 2019-09-09

## 2019-07-05 RX ORDER — HEPARIN 100 UNIT/ML
300-500 SYRINGE INTRAVENOUS AS NEEDED
Status: CANCELLED
Start: 2019-08-05

## 2019-07-05 RX ORDER — EPINEPHRINE 1 MG/ML
0.3 INJECTION, SOLUTION, CONCENTRATE INTRAVENOUS AS NEEDED
Status: CANCELLED | OUTPATIENT
Start: 2019-12-03

## 2019-07-05 RX ORDER — ONDANSETRON 2 MG/ML
8 INJECTION INTRAMUSCULAR; INTRAVENOUS AS NEEDED
Status: CANCELLED | OUTPATIENT
Start: 2019-08-19

## 2019-07-05 RX ORDER — DIPHENHYDRAMINE HYDROCHLORIDE 50 MG/ML
50 INJECTION, SOLUTION INTRAMUSCULAR; INTRAVENOUS ONCE
Status: CANCELLED
Start: 2019-08-26

## 2019-07-05 RX ORDER — ALBUTEROL SULFATE 0.83 MG/ML
2.5 SOLUTION RESPIRATORY (INHALATION) AS NEEDED
Status: CANCELLED
Start: 2019-10-07

## 2019-07-05 RX ORDER — SODIUM CHLORIDE 9 MG/ML
10 INJECTION INTRAMUSCULAR; INTRAVENOUS; SUBCUTANEOUS AS NEEDED
Status: CANCELLED | OUTPATIENT
Start: 2019-09-03

## 2019-07-05 RX ORDER — ALBUTEROL SULFATE 0.83 MG/ML
2.5 SOLUTION RESPIRATORY (INHALATION) AS NEEDED
Status: CANCELLED
Start: 2019-08-26

## 2019-07-05 RX ORDER — DEXAMETHASONE SODIUM PHOSPHATE 100 MG/10ML
10 INJECTION INTRAMUSCULAR; INTRAVENOUS ONCE
Status: CANCELLED | OUTPATIENT
Start: 2019-09-16

## 2019-07-05 RX ORDER — ACETAMINOPHEN 325 MG/1
650 TABLET ORAL AS NEEDED
Status: CANCELLED
Start: 2019-09-09

## 2019-07-05 NOTE — PROGRESS NOTES
7/5/19 5:36 PM: [facility] saliva specimen prepared for [employer] shipment and scheduled to be picked up on Monday, 7/8/19, by 5 PM. Tracking number [id number].

## 2019-07-08 ENCOUNTER — RESEARCH ENCOUNTER (OUTPATIENT)
Dept: ONCOLOGY | Age: 44
End: 2019-07-08

## 2019-07-08 ENCOUNTER — ANESTHESIA EVENT (OUTPATIENT)
Dept: SURGERY | Age: 44
End: 2019-07-08
Payer: OTHER GOVERNMENT

## 2019-07-08 NOTE — PROGRESS NOTES
Met with patient to discuss clinical trial B-59. Provided read only consent for her review. Patient will call with decision Friday or Monday.

## 2019-07-09 ENCOUNTER — ANESTHESIA (OUTPATIENT)
Dept: SURGERY | Age: 44
End: 2019-07-09
Payer: OTHER GOVERNMENT

## 2019-07-09 ENCOUNTER — APPOINTMENT (OUTPATIENT)
Dept: GENERAL RADIOLOGY | Age: 44
End: 2019-07-09
Attending: SURGERY
Payer: OTHER GOVERNMENT

## 2019-07-09 ENCOUNTER — HOSPITAL ENCOUNTER (OUTPATIENT)
Age: 44
Setting detail: OUTPATIENT SURGERY
Discharge: HOME | End: 2019-07-09
Attending: SURGERY | Admitting: SURGERY
Payer: OTHER GOVERNMENT

## 2019-07-09 VITALS
RESPIRATION RATE: 17 BRPM | HEIGHT: 63 IN | SYSTOLIC BLOOD PRESSURE: 106 MMHG | BODY MASS INDEX: 24.57 KG/M2 | OXYGEN SATURATION: 93 % | HEART RATE: 83 BPM | TEMPERATURE: 99.2 F | WEIGHT: 138.67 LBS | DIASTOLIC BLOOD PRESSURE: 70 MMHG

## 2019-07-09 DIAGNOSIS — Z17.1 MALIGNANT NEOPLASM OF UPPER-OUTER QUADRANT OF LEFT BREAST IN FEMALE, ESTROGEN RECEPTOR NEGATIVE (HCC): Primary | ICD-10-CM

## 2019-07-09 DIAGNOSIS — C50.412 MALIGNANT NEOPLASM OF UPPER-OUTER QUADRANT OF LEFT BREAST IN FEMALE, ESTROGEN RECEPTOR NEGATIVE (HCC): Primary | ICD-10-CM

## 2019-07-09 DIAGNOSIS — C50.912 MALIGNANT NEOPLASM OF LEFT FEMALE BREAST, UNSPECIFIED ESTROGEN RECEPTOR STATUS, UNSPECIFIED SITE OF BREAST (HCC): ICD-10-CM

## 2019-07-09 LAB — HCG UR QL: NEGATIVE

## 2019-07-09 PROCEDURE — 36561 INSERT TUNNELED CV CATH: CPT

## 2019-07-09 PROCEDURE — 74011250636 HC RX REV CODE- 250/636: Performed by: SURGERY

## 2019-07-09 PROCEDURE — 74011250636 HC RX REV CODE- 250/636

## 2019-07-09 PROCEDURE — 76060000061 HC AMB SURG ANES 0.5 TO 1 HR: Performed by: SURGERY

## 2019-07-09 PROCEDURE — C1894 INTRO/SHEATH, NON-LASER: HCPCS | Performed by: SURGERY

## 2019-07-09 PROCEDURE — 77030031139 HC SUT VCRL2 J&J -A: Performed by: SURGERY

## 2019-07-09 PROCEDURE — 77030011267 HC ELECTRD BLD COVD -A: Performed by: SURGERY

## 2019-07-09 PROCEDURE — 77030020256 HC SOL INJ NACL 0.9%  500ML: Performed by: SURGERY

## 2019-07-09 PROCEDURE — 77030032490 HC SLV COMPR SCD KNE COVD -B

## 2019-07-09 PROCEDURE — 74011000250 HC RX REV CODE- 250: Performed by: SURGERY

## 2019-07-09 PROCEDURE — C1788 PORT, INDWELLING, IMP: HCPCS | Performed by: SURGERY

## 2019-07-09 PROCEDURE — 77030020782 HC GWN BAIR PAWS FLX 3M -B

## 2019-07-09 PROCEDURE — 74011250636 HC RX REV CODE- 250/636: Performed by: ANESTHESIOLOGY

## 2019-07-09 PROCEDURE — 71045 X-RAY EXAM CHEST 1 VIEW: CPT

## 2019-07-09 PROCEDURE — 76210000057 HC AMBSU PH II REC 1 TO 1.5 HR: Performed by: SURGERY

## 2019-07-09 PROCEDURE — 77030010507 HC ADH SKN DERMBND J&J -B: Performed by: SURGERY

## 2019-07-09 PROCEDURE — 76030000000 HC AMB SURG OR TIME 0.5 TO 1: Performed by: SURGERY

## 2019-07-09 PROCEDURE — 77030002933 HC SUT MCRYL J&J -A: Performed by: SURGERY

## 2019-07-09 PROCEDURE — 81025 URINE PREGNANCY TEST: CPT

## 2019-07-09 DEVICE — POWERPORT ISP IMPLANTABLE PORT WITH ATTACHABLE 8F CHRONOFLEX OPEN-ENDED SINGLE-LUMEN VENOUS CATHETER INTERMEDIATE KIT (WITH SUTURE PLUGS)
Type: IMPLANTABLE DEVICE | Site: CHEST | Status: FUNCTIONAL
Brand: POWERPORT, CHRONOFLEX

## 2019-07-09 RX ORDER — SODIUM CHLORIDE, SODIUM LACTATE, POTASSIUM CHLORIDE, CALCIUM CHLORIDE 600; 310; 30; 20 MG/100ML; MG/100ML; MG/100ML; MG/100ML
125 INJECTION, SOLUTION INTRAVENOUS CONTINUOUS
Status: DISCONTINUED | OUTPATIENT
Start: 2019-07-09 | End: 2019-07-09 | Stop reason: HOSPADM

## 2019-07-09 RX ORDER — PROPOFOL 10 MG/ML
INJECTION, EMULSION INTRAVENOUS
Status: DISCONTINUED | OUTPATIENT
Start: 2019-07-09 | End: 2019-07-09 | Stop reason: HOSPADM

## 2019-07-09 RX ORDER — LIDOCAINE HYDROCHLORIDE 20 MG/ML
INJECTION, SOLUTION INFILTRATION; PERINEURAL AS NEEDED
Status: DISCONTINUED | OUTPATIENT
Start: 2019-07-09 | End: 2019-07-09 | Stop reason: HOSPADM

## 2019-07-09 RX ORDER — PROPOFOL 10 MG/ML
INJECTION, EMULSION INTRAVENOUS AS NEEDED
Status: DISCONTINUED | OUTPATIENT
Start: 2019-07-09 | End: 2019-07-09 | Stop reason: HOSPADM

## 2019-07-09 RX ORDER — ONDANSETRON 2 MG/ML
INJECTION INTRAMUSCULAR; INTRAVENOUS AS NEEDED
Status: DISCONTINUED | OUTPATIENT
Start: 2019-07-09 | End: 2019-07-09 | Stop reason: HOSPADM

## 2019-07-09 RX ORDER — HEPARIN SODIUM (PORCINE) LOCK FLUSH IV SOLN 100 UNIT/ML 100 UNIT/ML
SOLUTION INTRAVENOUS AS NEEDED
Status: DISCONTINUED | OUTPATIENT
Start: 2019-07-09 | End: 2019-07-09 | Stop reason: HOSPADM

## 2019-07-09 RX ORDER — LIDOCAINE HYDROCHLORIDE AND EPINEPHRINE 10; 10 MG/ML; UG/ML
30 INJECTION, SOLUTION INFILTRATION; PERINEURAL ONCE
Status: DISCONTINUED | OUTPATIENT
Start: 2019-07-09 | End: 2019-07-09 | Stop reason: HOSPADM

## 2019-07-09 RX ORDER — HYDROCODONE BITARTRATE AND ACETAMINOPHEN 7.5; 325 MG/1; MG/1
1 TABLET ORAL
Qty: 20 TAB | Refills: 0 | Status: SHIPPED | OUTPATIENT
Start: 2019-07-09 | End: 2019-07-16

## 2019-07-09 RX ORDER — LIDOCAINE HYDROCHLORIDE 10 MG/ML
0.1 INJECTION, SOLUTION EPIDURAL; INFILTRATION; INTRACAUDAL; PERINEURAL AS NEEDED
Status: DISCONTINUED | OUTPATIENT
Start: 2019-07-09 | End: 2019-07-09 | Stop reason: HOSPADM

## 2019-07-09 RX ORDER — HYDROMORPHONE HYDROCHLORIDE 1 MG/ML
.5-1 INJECTION, SOLUTION INTRAMUSCULAR; INTRAVENOUS; SUBCUTANEOUS
Status: DISCONTINUED | OUTPATIENT
Start: 2019-07-09 | End: 2019-07-09 | Stop reason: HOSPADM

## 2019-07-09 RX ORDER — BUPIVACAINE HYDROCHLORIDE AND EPINEPHRINE 5; 5 MG/ML; UG/ML
30 INJECTION, SOLUTION EPIDURAL; INTRACAUDAL; PERINEURAL ONCE
Status: DISCONTINUED | OUTPATIENT
Start: 2019-07-09 | End: 2019-07-09 | Stop reason: HOSPADM

## 2019-07-09 RX ORDER — FENTANYL CITRATE 50 UG/ML
INJECTION, SOLUTION INTRAMUSCULAR; INTRAVENOUS AS NEEDED
Status: DISCONTINUED | OUTPATIENT
Start: 2019-07-09 | End: 2019-07-09 | Stop reason: HOSPADM

## 2019-07-09 RX ORDER — ONDANSETRON 2 MG/ML
4 INJECTION INTRAMUSCULAR; INTRAVENOUS AS NEEDED
Status: DISCONTINUED | OUTPATIENT
Start: 2019-07-09 | End: 2019-07-09 | Stop reason: HOSPADM

## 2019-07-09 RX ORDER — MIDAZOLAM HYDROCHLORIDE 1 MG/ML
INJECTION, SOLUTION INTRAMUSCULAR; INTRAVENOUS AS NEEDED
Status: DISCONTINUED | OUTPATIENT
Start: 2019-07-09 | End: 2019-07-09 | Stop reason: HOSPADM

## 2019-07-09 RX ORDER — CEFAZOLIN SODIUM/WATER 2 G/20 ML
2 SYRINGE (ML) INTRAVENOUS
Status: COMPLETED | OUTPATIENT
Start: 2019-07-09 | End: 2019-07-09

## 2019-07-09 RX ADMIN — PROPOFOL 20 MG: 10 INJECTION, EMULSION INTRAVENOUS at 08:24

## 2019-07-09 RX ADMIN — LIDOCAINE HYDROCHLORIDE 40 MG: 20 INJECTION, SOLUTION INFILTRATION; PERINEURAL at 08:23

## 2019-07-09 RX ADMIN — Medication 2 G: at 08:22

## 2019-07-09 RX ADMIN — ONDANSETRON 4 MG: 2 INJECTION INTRAMUSCULAR; INTRAVENOUS at 08:28

## 2019-07-09 RX ADMIN — FENTANYL CITRATE 50 MCG: 50 INJECTION, SOLUTION INTRAMUSCULAR; INTRAVENOUS at 08:42

## 2019-07-09 RX ADMIN — PROPOFOL 140 MCG/KG/MIN: 10 INJECTION, EMULSION INTRAVENOUS at 08:24

## 2019-07-09 RX ADMIN — SODIUM CHLORIDE, SODIUM LACTATE, POTASSIUM CHLORIDE, AND CALCIUM CHLORIDE 125 ML/HR: 600; 310; 30; 20 INJECTION, SOLUTION INTRAVENOUS at 07:54

## 2019-07-09 RX ADMIN — FENTANYL CITRATE 50 MCG: 50 INJECTION, SOLUTION INTRAMUSCULAR; INTRAVENOUS at 08:23

## 2019-07-09 RX ADMIN — MIDAZOLAM HYDROCHLORIDE 2 MG: 1 INJECTION, SOLUTION INTRAMUSCULAR; INTRAVENOUS at 08:20

## 2019-07-09 NOTE — H&P
HISTORY OF PRESENT ILLNESS  Saad Sepulveda is a 44 y.o. female. HPI  NEW patient presents who is self-referred for consultation for a palpable LEFT breast mass that she felt about a month ago. Benja Crockett that the lump feels hard, is \"square\" in shape.  Thinks that the lump has decreased in size since when she first felt it.  Does have aching pain in the LEFT breast and it feels firmer and \"heavier\" to her. Margaret Cuevas is no nipple discharge/retraction or skin change.   Says that she had a breast biopsy 9-10 years, but she is unsure which breast.       FH is significant for two paternal aunts who were diagnosed with breast cancer (both probably post-menopausal).  They  of their breast cancer.       Last mammogram was at least 2 years ago and was when she lived in [facility]        No past medical history on file.           Past Surgical History:   Procedure Laterality Date    HX APPENDECTOMY        HX  SECTION         x3         Social History            Socioeconomic History    Marital status: UNKNOWN       Spouse name: Not on file    Number of children: Not on file    Years of education: Not on file    Highest education level: Not on file   Occupational History    Not on file   Social Needs    Financial resource strain: Not on file    Food insecurity:       Worry: Not on file       Inability: Not on file    Transportation needs:       Medical: Not on file       Non-medical: Not on file   Tobacco Use    Smoking status: Never Smoker    Smokeless tobacco: Never Used   Substance and Sexual Activity    Alcohol use:  Yes    Drug use: Not on file    Sexual activity: Not on file   Lifestyle    Physical activity:       Days per week: Not on file       Minutes per session: Not on file    Stress: Not on file   Relationships    Social connections:       Talks on phone: Not on file       Gets together: Not on file       Attends Yazdanism service: Not on file       Active member of club or organization: Not on file       Attends meetings of clubs or organizations: Not on file       Relationship status: Not on file    Intimate partner violence:       Fear of current or ex partner: Not on file       Emotionally abused: Not on file       Physically abused: Not on file       Forced sexual activity: Not on file   Other Topics Concern    Not on file   Social History Narrative    Not on file                Current Outpatient Medications on File Prior to Visit   Medication Sig Dispense Refill    NP THYROID 15 mg tablet            No current facility-administered medications on file prior to visit.          No Known Allergies     OB History    None            ROS  Constitutional: Negative.    HENT: Negative.    Eyes: Negative.    Respiratory: Negative.    Cardiovascular: Negative.    Gastrointestinal: Negative.    Genitourinary: Negative.    Musculoskeletal: Negative.    Skin: Negative.    Neurological: Negative.    Endo/Heme/Allergies: Negative.    Psychiatric/Behavioral: Negative.         Physical Exam   Cardiovascular: Normal rate, regular rhythm and normal heart sounds. Pulmonary/Chest: Breath sounds normal. Right breast exhibits no inverted nipple, no mass, no nipple discharge, no skin change and no tenderness. Left breast exhibits mass. Left breast exhibits no inverted nipple, no nipple discharge, no skin change and no tenderness. Breasts are symmetrical.       Lymphadenopathy:        Right cervical: No superficial cervical, no deep cervical and no posterior cervical adenopathy present. Left cervical: No superficial cervical, no deep cervical and no posterior cervical adenopathy present. She has no axillary adenopathy. Right axillary: No pectoral and no lateral adenopathy present. Left axillary: No pectoral and no lateral adenopathy present.        BREAST ULTRASOUND  Indication: Left breast mass 3:00  Technique:   The area was scanned using a high-frequency linear-array near-field transducer  Findings: 1.27 x 1.34 x 1.35cm mass  Impression: Suspicious mass  Disposition: Ultrasound-guided biopsy performed     US - Guided Core Biopsy  Following detailed explanation and description of the Biopsy procedure, its risk, benefits and possible alternatives, the patient signed the informed consent. Indication: Mass, Ultrasound Visible, LEFT Breast 3:00   Prep: We cleansed the skin with alcohol. Anesthesia: We anesthetized the skin and underlying tissues with 1% lidocaine with epinephrine. Device: We advanced the [employer] device through the lesion and captured tissue with real-time Ultrasound Confirmation. Core Sampling: We repeated this sampling for the following number of cores, 3. Marker: We placed a marking clip to damien the biopsy site. Marker Type: HydroMARK. Dressing: We then closed the incision with steristrips and placed a sterile dressing. Instructions: The patient was instructed regarding post-procedure care and activities. Pathology: Pending at this time.      Patient tolerated procedure well and discharged in stable condition. Informed patient that they will be notified of pathology results in 3 to 5 days.        ASSESSMENT and PLAN      ICD-10-CM ICD-9-CM     1. Breast mass, left N63.20 611.72     2. Abnormal ultrasound of breast R92.8 793.89        New patient presents for evaluation of LEFT breast mass, and is doing well overall. Palpable mass on exam at LEFT breast 3:00. US visualizes 1.27 x 1.34 x 1.35cm mass. Discussed bx of this mass and pt agreed to proceed. She tolerated the procedure well, and 3 cores were taken, [facility] marker placed. If bx PATH demonstrates malignancy, may also order breast MRI for further evaluation. Will send for PATH and f/u with results. This plan was reviewed with the patient and patient agrees.  All questions were answered.     For portacath for neoadjuvant chemotherapy

## 2019-07-09 NOTE — ANESTHESIA POSTPROCEDURE EVALUATION
Procedure(s):  PORT A CATH INSERTION. MAC    Anesthesia Post Evaluation      Multimodal analgesia: multimodal analgesia not used between 6 hours prior to anesthesia start to PACU discharge  Patient location during evaluation: PACU  Patient participation: complete - patient participated  Level of consciousness: awake and alert  Pain score: 0  Pain management: satisfactory to patient  Airway patency: patent  Anesthetic complications: no  Cardiovascular status: acceptable  Respiratory status: acceptable  Hydration status: acceptable  Post anesthesia nausea and vomiting:  none      Vitals Value Taken Time   /74 7/9/2019  9:40 AM   Temp     Pulse 85 7/9/2019  9:44 AM   Resp 16 7/9/2019  9:44 AM   SpO2 94 % 7/9/2019  9:44 AM   Vitals shown include unvalidated device data.

## 2019-07-09 NOTE — PERIOP NOTES
PACU IN REPORT FROM ANESTHESIA    Verbal report received from   Jamar Beauchamp   [] MD Anesthesiologist    [x] CRNA   [] with student    CHOICE ANESTHESIA:  [x] MAC  [] GENERAL  [x] LOCAL  [] REGIONAL  [] SPINAL   **Note the anesthesia record for medications given intraoperatively. **    SURGICAL PROCEDURE: Procedure(s) (LRB):  PORT A CATH INSERTION (N/A)     SURGEON: Brian Mckee MD.    Brief Initial Visual Assessment:    Patient Age: [] Infant(1-12mo)      []Pediatric(1-13yrs)    [] Adolescent(13-18yrs)    [x] Adult(18-65yrs)      []Geriatric Adult(>65yrs). Patient    [x] Alert           []Calm & Cooperative      [] Anxious  Appearance: [x] Drowsy      [] Sedated                          [] Unresponsive     Oriented x  3             Airway:     [x] Patent                          [] Near-obstructed   [] Obstructed                        []Improved with head/airway repositioning                       Airway Adjuncts Present: [] Oral Airway    [] Nasal Trumpet         [] ETT     Respiratory  [x] Even      [] Labored      [] Shallow  Pattern:    [x] Non-Labored  [] VENT and/or respiratory assistance     being provided. Skin:     [x] Pink [] Dusky    [] Pale        [x] Warm    [] Hot [] Cool        [] Cold   [x]Dry [] Moist [] Diaphoretic     Membranes:  [x] Pink [] Pale       [x] Moist [] Dry [] Crusty     Pain:   [x] No Acute Discomfort. 0   /10 Scale [x] Verbal Numeric   [] Moderate Discomfort.      [] V.A.S. [] Acute Discomfort.                [] A.N.V.    [] Chronic-Issue Related Discomfort.   [] F.L.A.C.C. Note E-MAR for medications administered. []Faces, Sanchez/Baker    Note assessments documented in flowsheets; any assessment variants to be found in comments or narrative perioperative nurse notes.        Post-anesthesia care now assumed by [facility] BSN, RN-BC

## 2019-07-09 NOTE — BRIEF OP NOTE
BRIEF OPERATIVE NOTE    Date of Procedure: 7/9/2019   Preoperative Diagnosis: LEFT BREAST CANCER  Postoperative Diagnosis: LEFT BREAST CANCER    Procedure(s):  PORT A CATH INSERTION  Surgeon(s) and Role:     * Amrik Saavedra MD - Primary         Surgical Assistant: None    Surgical Staff:  Circ-1: Michael Negrete, RN  Scrub RN-1: Lavern Arizmendi RN  Radiology Technologist: Yovani Dean RT, R  Event Time In Time Out   Incision Start 9612    Incision Close 0904      Anesthesia: MAC   Estimated Blood Loss: minimal  Specimens: * No specimens in log *   Findings: 8 Fr power port right subclavian vein   Complications: none  Implants:   Implant Name Type Inv.  Item Serial No.  Lot No. LRB No. Used Action   PORT SL POWERPORT 8FR TI --  - SNA  PORT SL POWERPORT 8FR TI --  NA [employer] [id number] 1 Implanted

## 2019-07-09 NOTE — DISCHARGE INSTRUCTIONS
Discharge Instructions from Dr. Kimberly Townsend      · You may shower, but no hot tubs, swimming pools, or baths until your incision is healed. · No heavy lifting with the affected extremity (nothing greater than 5 pounds), and limit its use for the next 4-5 days. · You may use an ice pack for comfort for the next couple of days, but do not place ice directly on the skin. Rather, use a towel or clothing to serve as a barrier between skin and ice to prevent injury. · Follow medication instructions carefully. · Watch for signs of infection as listed below. · Redness  · Swelling  · Drainage from the incision or from your nipple that appears infected  · Fever over 101 degrees for consecutive readings, or over 99.5 if you are currently undergoing chemotherapy. · Call our office (number is below) for a follow-up appointment. · If you have any problems, our phone number is [phone]. DISCHARGE SUMMARY from Your Nurse    PATIENT INSTRUCTIONS:    AFTER ANESTHESIA & SEDATION, and WHILE TAKING PAIN MEDICINE  After general anesthesia / intravenous sedation and the 24 hours following, and/or while taking prescription Opiates:  · Limit your activities  · Do not drive and operate hazardous machinery until you have been of all narcotics and sedatives for over 24 hours  · Do not make important personal or business decisions  · Do  not drink alcoholic beverages  · If you have not urinated within 8 hours after discharge, please contact your surgeon on call.       SIGNS OF INFECTION  Report the following Signs of Infection or General Problems after surgery to your surgeon:  · Excessive pain, swelling, redness or odor of or around the surgical area  · Temperature over 101; Temperature over 100 if on medications that affect your ability to fight infections  · Nausea and vomiting lasting longer than 4 hours or if unable to take medications  · Any signs of decreased circulation or nerve impairment to extremity: change in color, persistent  numbness, tingling, coldness or increase pain  · If you have any questions. COUGH AND DEEP BREATHE  Breathing deep and coughing are very important exercises to do after surgery. Deep breathing and coughing open the little air tubes and air sacks in your lungs. You take deep breaths every day. You may not even notice - it is just something you do when you sigh or yawn. It is a natural exercise you do to keep these air passages open. After surgery, take deep breaths and cough, on purpose. Coughing and deep breathing help prevent bronchitis and pneumonia after surgery. If you had chest or belly surgery, use a pillow as a \"hug josue\" and hold it tightly to your chest or belly when you cough. DIRECTIONS:  1. Take 10 to 15 slow deep breaths every hour while awake. 2. Breathe in deeply, and hold it for 2 seconds. 3. Exhale slowly through puckered lips, like blowing up a balloon. 4. After every 4th or 5th deep breath, hug your pillow to your chest or belly and give a hard, deep cough. Yes, it will probably hurt. But doing this exercise is very important part of healing after surgery. Take your pain medicine to help you do this exercise without too much pain. ANKLE PUMPS    Ankle pumps increase the circulation of oxygenated blood to your lower extremities and decrease your risk for circulation problems such as blood clots. They also stretch the muscles, tendons and ligaments in your foot and ankle, and prevent joint contracture in the ankle and foot, especially after surgeries on the legs. It is important to do ankle pump exercises regularly after surgery because immobility increases your risk for developing a blood clot. Your doctor may also have you take an Aspirin for the next few days as well. If your doctor did not ask you to take an Aspirin, consult with him before starting Aspirin therapy on your own.     The exercise is quite simple. · Slowly point your foot forward, feeling the muscles on the top of your lower leg stretch, and hold this position for 5 seconds. · Next, pull your foot back toward you as far as possible, stretching the calf muscles, and hold that position for 5 seconds. · Repeat with the other foot. · Perform 10 repetitions every hour while awake for both ankles if possible (down and then up with the foot once is one repetition). You should feel gentle stretching of the muscles in your lower leg when doing this exercise. If you feel pain, or your range of motion is limited, don't push too hard. Only go the limit your joint and muscles will let you go. If you have increasing pain, progressively worsening leg warmth or swelling, STOP the exercise and call your doctor. Other Wound Care information:  [x] No additional recommendations. Below is information on the medication(s) your doctor is prescribing for you: The maximum daily dose of acetaminophen was discussed with the patient. She was encouraged not to exceed 3,000 mg of acetaminophen during a 24 hour period and was asked to keep in mind that acetaminophen can also be found in many over-the-counter cold medications as well as narcotics that may be given for pain. The patient expresses understanding of these issues and questions were answered. 4 THINGS ABOUT PAIN MEDICINE I ALWAYS TALK ABOUT:  There are 4 side effects I always talk about for pain medications. 1. They make you sleepy and drowsy. Do not drive a car or operate machines while taking pain medication. Do not make any major decisions. Take a nap. Relax. Let your body recover from the affects of anesthesia and surgery. 2. Some people have quite a problem with itching and. ..  3. Nausea or vomiting. I mention these together because research tends to suggest there is a gene-related issue.   While some have a hard time with these problems, others do not. These are expected and know side effects. Over-the-counter Benadryl® (the drug name is diphenhydramine) can help with the itching. Your doctor may also have given you some medicine for nausea. IF HE DID NOT, CALL HIM/HER. 4. Last but not least is the problem of constipation (not carlton able to have a bowel movement - poop.)  All pain medicine can slow down the movement of food through the gut. The slower it goes, the worse it can be. Frankly, this means hard poop adding insult to the injury of surgery. And if you had tummy surgery, like having your gall bladder removed or a hernia repair, YOU DO NOT WANT THIS PROBLEM. There are 4 things I recommend. · Drink lots of fluids. For healthy people with no heart problems, this means at least 64 ounces of liquids or more per day. For example, a Big Gulp® from [employer] is 32 ounces. So you need to drink at least 2  Big Gulp®'s of fluids every day. If you have heart problems you may not be able to do this. Talk to your doctor about what you should do to prevent constipation. · Drinking fruit juice like apple, pear, or prune juice gives you extra \"BANG\" for your beverage. These drinks are high in natural fiber. If you are a diabetic, drink sugar-free fluids with fiber additives (see next 2 points.)  Avoid drinking extra fruit juice unless this is a regular part of your diet plan. · Eat extra fresh fruits and vegetables. · Add extra fiber-products. Fiber products like Metamucil®, Citrucel®, Miralax® or Benefiber® can help. These products are over-the-counter and you do not need a prescription from your doctor. If you have followed these recommendations and still have some difficulty having a good poop, take and over-the-counter stimulant like Dulcolax® (biscodyl)  or Senokot® (senna concentrate). These may help get things moving.       [facility] MEDICATION AND   SIDE EFFECT GUIDE    The [facility] MEDICATION AND SIDE EFFECT GUIDE was provided to the PATIENT AND CARE PROVIDER. Information provided includes instruction about drug purpose and common side effects for the following medications:   · [address]        Medication information added to discharge record on July 9, 2019 at 9:25 AM.      Some information we wish all of our patients to be familiar with and General Information for Healthy Lifestyle choices:    · Make a list of your current medications with your Primary Care Provider. · Update this list whenever your medications are discontinued, doses are changed, or new medications (including over-the-counter products like ibuprofen, vitamins, or herbal remedies) are added. · Carry medication information at all times in case of emergency situations      No smoking / No tobacco products / Avoid exposure to second hand smoke    Surgeon General's Warning:  Quitting smoking now greatly reduces serious risk to your health. Obesity, smoking, and sedentary lifestyle greatly increases your risk for illness. A healthy diet, regular physical exercise & weight monitoring are important for maintaining a healthy lifestyle. A Note About Congestive Heart Failure: You may be retaining fluid if you have a history of heart failure or if you experience any of the following symptoms:      · Weight gain of 3 pounds or more overnight or 5 pounds in a week  · Increased swelling in our hands or feet  · Shortness of breath while lying flat in bed      Please call your doctor as soon as you notice any of these symptoms; do not wait until your next office visit. A Note About Strokes:  Recognize signs and symptoms of STROKE.   The simple mnemonic, F.A.S.T., can help you remember signs of a stroke and what to do if you suspect a stroke is occuring to you or someone you are with:    F - Face looks uneven  A - Arms unable to move, or move evenly  S - Speech is slurred or non-existent  T - Time - CALL 911 as soon as signs and symptoms begin - DO NOT go to bed or wait to see if you get better - TIME IS BRAIN. Warning Signs of HEART ATTACK   Call 911 if you have these symptoms:     Chest discomfort. Most heart attacks involve discomfort in the center of the chest that lasts more than a few minutes, or that goes away and comes back. It can feel like uncomfortable pressure, squeezing, fullness, or pain.  Discomfort in other areas of the upper body. Symptoms can include pain or discomfort in one or both arms, the back, neck, jaw, or stomach.  Shortness of breath with or without chest discomfort.  Other signs may include breaking out in a cold sweat, nausea, or lightheadedness. Don't wait more than five minutes to call 911 - MINUTES MATTER! Fast action can save your life. Calling 911 is almost always the fastest way to get lifesaving treatment. Emergency Medical Services staff can begin treatment when they arrive -- up to an hour sooner than if someone gets to the hospital by car. AT THE COMPLETION OF DISCHARGE INSTRUCTION REVIEW, WE VERIFY:  The discharge information has been reviewed with the patient and caregiver. Questions have been asked and answered meeting patient and caregiver expectations. The patient and caregiver verbalized understanding.     Your discharge nurse was Joe Da Silva RN-BC       Board Certified - Pain Management      CONTENTS FOUND IN YOUR DISCHARGE ENVELOPE:  [x]     Surgeon and Hospital Discharge Instructions  [x]     [facility] Care Provider Card  [x]     Medication Prescription(s) x 1    ( [] These have been sent electronically to your preferred pharmacy on file by your surgeon, or   your surgeon has already provided these to you during a previous/pre-op office visit)  []     Physical Therapy Prescription  []     Follow-up Appointment Cards  []     Surgery-related Pictures/Media  []     Pain block and/or block with On-Q Catheter from Anesthesia Service (information included in your instructions above)  []     Medical device information sheets/pamphlets from their    []     School/work excuse note. []     /parent work excuse note. The following personal items collected during your admission for safe keeping are returned to you:     Dental Appliance: Dental Appliances: None  Vi ray: Visual Aid: None  Hearing Aid:    Jewelry:    Clothing: Clothing: (denies valuables. clothing to locker)  Other Valuables:    Valuables sent to safe:

## 2019-07-09 NOTE — ANESTHESIA PREPROCEDURE EVALUATION
Relevant Problems   No relevant active problems       Anesthetic History   No history of anesthetic complications            Review of Systems / Medical History  Patient summary reviewed, nursing notes reviewed and pertinent labs reviewed    Pulmonary  Within defined limits                 Neuro/Psych   Within defined limits           Cardiovascular                  Exercise tolerance: >4 METS     GI/Hepatic/Renal  Within defined limits              Endo/Other      Hypothyroidism: well controlled       Other Findings   Comments: Breast cancer           Physical Exam    Airway  Mallampati: I    Neck ROM: normal range of motion   Mouth opening: Normal     Cardiovascular    Rhythm: regular  Rate: normal         Dental  No notable dental hx       Pulmonary  Breath sounds clear to auscultation               Abdominal  GI exam deferred       Other Findings            Anesthetic Plan    ASA: 2  Anesthesia type: MAC          Induction: Intravenous  Anesthetic plan and risks discussed with: Patient

## 2019-07-09 NOTE — PROGRESS NOTES
POST ANESTHESIA CARE    DISCHARGE / TRANSFER NOTE    Barby Zeng was   discharged     via     wheel chair     to      private vehicle    . Patient was escorted by   nurse      . Patient verbalized   appreciation and was very pleased with care received throughout their stay. Patient was discharged in     pleasant mood    . Pain at discharge/transfer was       0   /10. Discharge, medication and follow-up instructions were verbalized as understood prior to discharge  (if applicable for same-day procedures being discharged.)    All personal belongings have been returned to patient, and patient/family verbally confirm receiving belongings as all present.     Signed: Emigdio JOHNSONN RN-BC

## 2019-07-10 NOTE — OP NOTES
[facility] [place] [facility]  OPERATIVE REPORT    Name:  Darius aHll  MR#:  [id number]  :  1975  ACCOUNT #:  [de-identified]  DATE OF SERVICE:  2019    PREOPERATIVE DIAGNOSIS:  Carcinoma of the breast with need for neoadjuvant chemotherapy. POSTOPERATIVE DIAGNOSIS:  Carcinoma of the breast with need for neoadjuvant chemotherapy. PROCEDURE PERFORMED:  Insertion of venous Port-A-Cath. SURGEON:  Conrado Palumbo MD    ASSISTANT:  None. ANESTHESIA:  Local standby. COMPLICATIONS:  None. SPECIMENS REMOVED:  None. IMPLANTS:  None. ESTIMATED BLOOD LOSS:  Minimal.    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  She is admitted for Port-A-Cath insertion. PROCEDURE:  After adequate anesthesia, sterile prep and drape, and local anesthesia with 1% lidocaine mixed with 0.5% Marcaine, the right subclavian was cannulated on the first pass. A wire was passed into the superior vena cava and position confirmed with fluoroscopy. An anterior chest wall pocket was made and the 8-[place] PowerPort was tunneled from the chest wall pocket to the original stick site and cut to length. Using a combination of dilator and breakaway sheath, the catheter was placed in the superior vena cava and again position confirmed with fluoroscopy. The catheter did not aspirate, although flushed easily. I felt that this was in a tight area underneath the clavicle and therefore, the catheter was removed. Then, we re-accessed a little bit more laterally and the wire passed into the superior vena cava. Using a second combination of dilator and breakaway sheath, the catheter was placed in the superior vena cava and position again confirmed with fluoroscopy. The catheter was then aspirated and was flushed with heparinized saline, followed by Hep-Lock flush.   The stick site was closed with a single stitch of 4-0 Monocryl and the skin was closed with interrupted 3-0 Vicryl and a running subcuticular 4-0 Monocryl on the skin. The patient tolerated the procedure well with no complications. She was taken to the recovery room in stable condition.         Ines Liu MD      /[id number]  D:  07/09/2019 13:56  T:  07/09/2019 21:24  JOB #:  [id number]  CC:  MD Nikkie Banegas MD

## 2019-07-18 ENCOUNTER — HOSPITAL ENCOUNTER (OUTPATIENT)
Dept: MRI IMAGING | Age: 44
Discharge: HOME | End: 2019-07-18
Attending: SURGERY
Payer: OTHER GOVERNMENT

## 2019-07-18 ENCOUNTER — HOSPITAL ENCOUNTER (OUTPATIENT)
Dept: MAMMOGRAPHY | Age: 44
Discharge: HOME | End: 2019-07-18
Attending: SURGERY
Payer: OTHER GOVERNMENT

## 2019-07-18 VITALS — BODY MASS INDEX: 24.45 KG/M2 | WEIGHT: 138 LBS

## 2019-07-18 DIAGNOSIS — C50.919 BREAST CA (HCC): ICD-10-CM

## 2019-07-18 DIAGNOSIS — R92.8 ABNORMAL MAMMOGRAM: ICD-10-CM

## 2019-07-18 DIAGNOSIS — R92.8 ABNORMAL MRI, BREAST: ICD-10-CM

## 2019-07-18 PROCEDURE — A9585 GADOBUTROL INJECTION: HCPCS | Performed by: SURGERY

## 2019-07-18 PROCEDURE — 77061 BREAST TOMOSYNTHESIS UNI: CPT

## 2019-07-18 PROCEDURE — 19085 BX BREAST 1ST LESION MR IMAG: CPT

## 2019-07-18 PROCEDURE — 74011000250 HC RX REV CODE- 250: Performed by: RADIOLOGY

## 2019-07-18 PROCEDURE — 77065 DX MAMMO INCL CAD UNI: CPT

## 2019-07-18 PROCEDURE — 77062 BREAST TOMOSYNTHESIS BI: CPT

## 2019-07-18 PROCEDURE — 74011250636 HC RX REV CODE- 250/636: Performed by: SURGERY

## 2019-07-18 PROCEDURE — 88305 TISSUE EXAM BY PATHOLOGIST: CPT

## 2019-07-18 RX ORDER — LIDOCAINE HYDROCHLORIDE AND EPINEPHRINE 10; 10 MG/ML; UG/ML
19 INJECTION, SOLUTION INFILTRATION; PERINEURAL ONCE
Status: COMPLETED | OUTPATIENT
Start: 2019-07-18 | End: 2019-07-18

## 2019-07-18 RX ORDER — LIDOCAINE HYDROCHLORIDE 10 MG/ML
4 INJECTION INFILTRATION; PERINEURAL ONCE
Status: COMPLETED | OUTPATIENT
Start: 2019-07-18 | End: 2019-07-18

## 2019-07-18 RX ADMIN — LIDOCAINE HYDROCHLORIDE 4 ML: 10 INJECTION INFILTRATION; PERINEURAL at 13:28

## 2019-07-18 RX ADMIN — LIDOCAINE HYDROCHLORIDE,EPINEPHRINE BITARTRATE 190 MG: 10; .01 INJECTION, SOLUTION INFILTRATION; PERINEURAL at 13:28

## 2019-07-18 RX ADMIN — GADOBUTROL 6 ML: 604.72 INJECTION INTRAVENOUS at 14:09

## 2019-07-18 NOTE — PROGRESS NOTES
1300- IV SL established without problem. Pt tolerated well. Resting on stretcher. 12- Dr. Basil Downey speaking with pt and answering questions. 1310- Pt amb to MRI room for positioning and pre-procedural scanning. 1328- Time out done. Pt procedure confirmed. Dr. Basil Downey begins invasive portion of left breast MRI guided biopsy. 1350- Biopsy complete. Pt tolerated procedure well. Specimens obtained and labeled accordingly. Pressure held to biopsy site immediately. 1400- Pt moved to stretcher in recovery area. No active bleeding noted. Resting comfortably. IV D/C'd intact without problem. 1410- Pt to mammo area for clip films. 1420- Pt returns. Bx site dressed with steri-strip, telfa, and hypafix. 6\" ace wrap snugly to chest with ice pack over bx site. D/C instructions reviewed with pt and copy given. Verbalized her understanding. D/C'd amb, stable, NAD with daughter. Specimens prepped for  p/u.

## 2019-07-22 ENCOUNTER — DOCUMENTATION ONLY (OUTPATIENT)
Dept: SURGERY | Age: 44
End: 2019-07-22

## 2019-07-22 ENCOUNTER — HOSPITAL ENCOUNTER (OUTPATIENT)
Dept: INFUSION THERAPY | Age: 44
Discharge: HOME | End: 2019-07-22
Payer: OTHER GOVERNMENT

## 2019-07-22 ENCOUNTER — OFFICE VISIT (OUTPATIENT)
Dept: ONCOLOGY | Age: 44
End: 2019-07-22

## 2019-07-22 VITALS
HEIGHT: 63 IN | RESPIRATION RATE: 18 BRPM | OXYGEN SATURATION: 100 % | SYSTOLIC BLOOD PRESSURE: 108 MMHG | HEART RATE: 90 BPM | BODY MASS INDEX: 23.94 KG/M2 | WEIGHT: 135.1 LBS | TEMPERATURE: 97.3 F | DIASTOLIC BLOOD PRESSURE: 65 MMHG

## 2019-07-22 VITALS
WEIGHT: 135.1 LBS | RESPIRATION RATE: 18 BRPM | BODY MASS INDEX: 23.94 KG/M2 | HEART RATE: 82 BPM | HEIGHT: 63 IN | TEMPERATURE: 97.3 F | DIASTOLIC BLOOD PRESSURE: 76 MMHG | SYSTOLIC BLOOD PRESSURE: 111 MMHG | OXYGEN SATURATION: 100 %

## 2019-07-22 DIAGNOSIS — Z17.1 MALIGNANT NEOPLASM OF UPPER-OUTER QUADRANT OF LEFT BREAST IN FEMALE, ESTROGEN RECEPTOR NEGATIVE (HCC): Primary | ICD-10-CM

## 2019-07-22 DIAGNOSIS — C50.412 MALIGNANT NEOPLASM OF UPPER-OUTER QUADRANT OF LEFT BREAST IN FEMALE, ESTROGEN RECEPTOR NEGATIVE (HCC): Primary | ICD-10-CM

## 2019-07-22 LAB
ALBUMIN SERPL-MCNC: 3.9 G/DL (ref 3.5–5)
ALBUMIN/GLOB SERPL: 1.2 {RATIO} (ref 1.1–2.2)
ALP SERPL-CCNC: 38 U/L (ref 45–117)
ALT SERPL-CCNC: 16 U/L (ref 12–78)
ANION GAP SERPL CALC-SCNC: 8 MMOL/L (ref 5–15)
AST SERPL-CCNC: 15 U/L (ref 15–37)
BASOPHILS # BLD: 0 K/UL (ref 0–0.1)
BASOPHILS NFR BLD: 1 % (ref 0–1)
BILIRUB SERPL-MCNC: 1.1 MG/DL (ref 0.2–1)
BUN SERPL-MCNC: 7 MG/DL (ref 6–20)
BUN/CREAT SERPL: 12 (ref 12–20)
CALCIUM SERPL-MCNC: 8.8 MG/DL (ref 8.5–10.1)
CHLORIDE SERPL-SCNC: 108 MMOL/L (ref 97–108)
CO2 SERPL-SCNC: 22 MMOL/L (ref 21–32)
CREAT SERPL-MCNC: 0.6 MG/DL (ref 0.55–1.02)
DIFFERENTIAL METHOD BLD: NORMAL
EOSINOPHIL # BLD: 0.1 K/UL (ref 0–0.4)
EOSINOPHIL NFR BLD: 1 % (ref 0–7)
ERYTHROCYTE [DISTWIDTH] IN BLOOD BY AUTOMATED COUNT: 13.7 % (ref 11.5–14.5)
GLOBULIN SER CALC-MCNC: 3.3 G/DL (ref 2–4)
GLUCOSE SERPL-MCNC: 70 MG/DL (ref 65–100)
HCT VFR BLD AUTO: 38.8 % (ref 35–47)
HGB BLD-MCNC: 12.9 G/DL (ref 11.5–16)
IMM GRANULOCYTES # BLD AUTO: 0 K/UL (ref 0–0.04)
IMM GRANULOCYTES NFR BLD AUTO: 0 % (ref 0–0.5)
LYMPHOCYTES # BLD: 1.9 K/UL (ref 0.8–3.5)
LYMPHOCYTES NFR BLD: 27 % (ref 12–49)
MCH RBC QN AUTO: 29.9 PG (ref 26–34)
MCHC RBC AUTO-ENTMCNC: 33.2 G/DL (ref 30–36.5)
MCV RBC AUTO: 89.8 FL (ref 80–99)
MONOCYTES # BLD: 0.6 K/UL (ref 0–1)
MONOCYTES NFR BLD: 8 % (ref 5–13)
NEUTS SEG # BLD: 4.3 K/UL (ref 1.8–8)
NEUTS SEG NFR BLD: 63 % (ref 32–75)
NRBC # BLD: 0 K/UL (ref 0–0.01)
NRBC BLD-RTO: 0 PER 100 WBC
PLATELET # BLD AUTO: 230 K/UL (ref 150–400)
PMV BLD AUTO: 10.6 FL (ref 8.9–12.9)
POTASSIUM SERPL-SCNC: 3.6 MMOL/L (ref 3.5–5.1)
PROT SERPL-MCNC: 7.2 G/DL (ref 6.4–8.2)
RBC # BLD AUTO: 4.32 M/UL (ref 3.8–5.2)
SODIUM SERPL-SCNC: 138 MMOL/L (ref 136–145)
WBC # BLD AUTO: 6.9 K/UL (ref 3.6–11)

## 2019-07-22 PROCEDURE — 77030012965 HC NDL HUBR BBMI -A

## 2019-07-22 PROCEDURE — 96375 TX/PRO/DX INJ NEW DRUG ADDON: CPT

## 2019-07-22 PROCEDURE — 74011000258 HC RX REV CODE- 258: Performed by: INTERNAL MEDICINE

## 2019-07-22 PROCEDURE — 96413 CHEMO IV INFUSION 1 HR: CPT

## 2019-07-22 PROCEDURE — 74011250636 HC RX REV CODE- 250/636: Performed by: INTERNAL MEDICINE

## 2019-07-22 PROCEDURE — 85025 COMPLETE CBC W/AUTO DIFF WBC: CPT

## 2019-07-22 PROCEDURE — 96367 TX/PROPH/DG ADDL SEQ IV INF: CPT

## 2019-07-22 PROCEDURE — 80053 COMPREHEN METABOLIC PANEL: CPT

## 2019-07-22 PROCEDURE — 96417 CHEMO IV INFUS EACH ADDL SEQ: CPT

## 2019-07-22 PROCEDURE — 36415 COLL VENOUS BLD VENIPUNCTURE: CPT

## 2019-07-22 RX ORDER — DIPHENHYDRAMINE HYDROCHLORIDE 50 MG/ML
50 INJECTION, SOLUTION INTRAMUSCULAR; INTRAVENOUS ONCE
Status: COMPLETED | OUTPATIENT
Start: 2019-07-22 | End: 2019-07-22

## 2019-07-22 RX ORDER — ACETAMINOPHEN 325 MG/1
650 TABLET ORAL AS NEEDED
Status: DISCONTINUED | OUTPATIENT
Start: 2019-07-22 | End: 2019-07-23 | Stop reason: HOSPADM

## 2019-07-22 RX ORDER — DIPHENHYDRAMINE HYDROCHLORIDE 50 MG/ML
50 INJECTION, SOLUTION INTRAMUSCULAR; INTRAVENOUS AS NEEDED
Status: DISCONTINUED | OUTPATIENT
Start: 2019-07-22 | End: 2019-07-23 | Stop reason: HOSPADM

## 2019-07-22 RX ORDER — SODIUM CHLORIDE 9 MG/ML
25 INJECTION, SOLUTION INTRAVENOUS CONTINUOUS
Status: DISCONTINUED | OUTPATIENT
Start: 2019-07-22 | End: 2019-07-23 | Stop reason: HOSPADM

## 2019-07-22 RX ORDER — PALONOSETRON 0.05 MG/ML
0.25 INJECTION, SOLUTION INTRAVENOUS ONCE
Status: COMPLETED | OUTPATIENT
Start: 2019-07-22 | End: 2019-07-22

## 2019-07-22 RX ORDER — SODIUM CHLORIDE 9 MG/ML
10 INJECTION INTRAMUSCULAR; INTRAVENOUS; SUBCUTANEOUS AS NEEDED
Status: DISCONTINUED | OUTPATIENT
Start: 2019-07-22 | End: 2019-07-23 | Stop reason: HOSPADM

## 2019-07-22 RX ORDER — ONDANSETRON 2 MG/ML
8 INJECTION INTRAMUSCULAR; INTRAVENOUS AS NEEDED
Status: DISCONTINUED | OUTPATIENT
Start: 2019-07-22 | End: 2019-07-23 | Stop reason: HOSPADM

## 2019-07-22 RX ORDER — SODIUM CHLORIDE 0.9 % (FLUSH) 0.9 %
10 SYRINGE (ML) INJECTION AS NEEDED
Status: DISCONTINUED | OUTPATIENT
Start: 2019-07-22 | End: 2019-07-23 | Stop reason: HOSPADM

## 2019-07-22 RX ORDER — HEPARIN 100 UNIT/ML
300-500 SYRINGE INTRAVENOUS AS NEEDED
Status: DISCONTINUED | OUTPATIENT
Start: 2019-07-22 | End: 2019-07-23 | Stop reason: HOSPADM

## 2019-07-22 RX ADMIN — SODIUM CHLORIDE 150 MG: 900 INJECTION, SOLUTION INTRAVENOUS at 14:25

## 2019-07-22 RX ADMIN — Medication 10 ML: at 14:10

## 2019-07-22 RX ADMIN — FAMOTIDINE 20 MG: 10 INJECTION, SOLUTION INTRAVENOUS at 14:17

## 2019-07-22 RX ADMIN — SODIUM CHLORIDE 10 ML: 9 INJECTION INTRAMUSCULAR; INTRAVENOUS; SUBCUTANEOUS at 14:10

## 2019-07-22 RX ADMIN — DIPHENHYDRAMINE HYDROCHLORIDE 50 MG: 50 INJECTION INTRAMUSCULAR; INTRAVENOUS at 14:21

## 2019-07-22 RX ADMIN — Medication 10 ML: at 17:11

## 2019-07-22 RX ADMIN — SODIUM CHLORIDE 25 ML/HR: 900 INJECTION, SOLUTION INTRAVENOUS at 14:12

## 2019-07-22 RX ADMIN — CARBOPLATIN 750 MG: 10 INJECTION, SOLUTION INTRAVENOUS at 16:35

## 2019-07-22 RX ADMIN — HEPARIN 500 UNITS: 100 SYRINGE at 17:11

## 2019-07-22 RX ADMIN — PALONOSETRON HYDROCHLORIDE 0.25 MG: 0.25 INJECTION, SOLUTION INTRAVENOUS at 14:14

## 2019-07-22 RX ADMIN — DEXAMETHASONE SODIUM PHOSPHATE 12 MG: 4 INJECTION INTRA-ARTICULAR; INTRALESIONAL; INTRAMUSCULAR; INTRAVENOUS; SOFT TISSUE at 14:28

## 2019-07-22 RX ADMIN — PACLITAXEL 134 MG: 6 INJECTION, SOLUTION INTRAVENOUS at 15:21

## 2019-07-22 NOTE — PROGRESS NOTES
MRI guided biopsy results were negative. It looks like Dr. Stewart Dancer office has relayed this result to her, so I won't call her. She is getting her first chemotherapy treatment today.

## 2019-07-22 NOTE — PROGRESS NOTES
[facility] - Chemotherapy Progress Note    7157 Pt admit to [facility] for C1D1 Carbo/Taxol ambulatory in stable condition. Assessment completed. No new concerns voiced. PAC with positive blood return. Labs drawn and pt proceeded to scheduled appt. Chemotherapy Flowsheet 7/22/2019   Cycle D1C1   Date 7/22/2019   Drug / Regimen Carbo/Taxol       Patient Vitals for the past 12 hrs:   Temp Pulse Resp BP SpO2   07/22/19 1710 -- 90 -- 108/65 --   07/22/19 1213 97.3 °F (36.3 °C) 82 18 111/76 100 %       Pt declines pregnancy    Medications:  Emend IV  Aloxi IVP  Decadron IVP  Benadryl  Pepcid  Taxol  Carboplatin      1715 Pt tolerated treatment well. PAC maintained positive blood return throughout treatment, flushed with positive blood return at conclusion. D/c home ambulatory in no distress. Pt aware of next appointment scheduled for 7/29 at 1. Recent Results (from the past 12 hour(s))   CBC WITH AUTOMATED DIFF    Collection Time: 07/22/19 12:23 PM   Result Value Ref Range    WBC 6.9 3.6 - 11.0 K/uL    RBC 4.32 3.80 - 5.20 M/uL    HGB 12.9 11.5 - 16.0 g/dL    HCT 38.8 35.0 - 47.0 %    MCV 89.8 80.0 - 99.0 FL    MCH 29.9 26.0 - 34.0 PG    MCHC 33.2 30.0 - 36.5 g/dL    RDW 13.7 11.5 - 14.5 %    PLATELET 113 912 - 655 K/uL    MPV 10.6 8.9 - 12.9 FL    NRBC 0.0 0  WBC    ABSOLUTE NRBC 0.00 0.00 - 0.01 K/uL    NEUTROPHILS 63 32 - 75 %    LYMPHOCYTES 27 12 - 49 %    MONOCYTES 8 5 - 13 %    EOSINOPHILS 1 0 - 7 %    BASOPHILS 1 0 - 1 %    IMMATURE GRANULOCYTES 0 0.0 - 0.5 %    ABS. NEUTROPHILS 4.3 1.8 - 8.0 K/UL    ABS. LYMPHOCYTES 1.9 0.8 - 3.5 K/UL    ABS. MONOCYTES 0.6 0.0 - 1.0 K/UL    ABS. EOSINOPHILS 0.1 0.0 - 0.4 K/UL    ABS. BASOPHILS 0.0 0.0 - 0.1 K/UL    ABS. IMM.  GRANS. 0.0 0.00 - 0.04 K/UL    DF AUTOMATED     METABOLIC PANEL, COMPREHENSIVE    Collection Time: 07/22/19 12:23 PM   Result Value Ref Range    Sodium 138 136 - 145 mmol/L    Potassium 3.6 3.5 - 5.1 mmol/L    Chloride 108 97 - 108 mmol/L    CO2 22 21 - 32 mmol/L    Anion gap 8 5 - 15 mmol/L    Glucose 70 65 - 100 mg/dL    BUN 7 6 - 20 MG/DL    Creatinine 0.60 0.55 - 1.02 MG/DL    BUN/Creatinine ratio 12 12 - 20      GFR est AA >60 >60 ml/min/1.73m2    GFR est non-AA >60 >60 ml/min/1.73m2    Calcium 8.8 8.5 - 10.1 MG/DL    Bilirubin, total 1.1 (H) 0.2 - 1.0 MG/DL    ALT (SGPT) 16 12 - 78 U/L    AST (SGOT) 15 15 - 37 U/L    Alk.  phosphatase 38 (L) 45 - 117 U/L    Protein, total 7.2 6.4 - 8.2 g/dL    Albumin 3.9 3.5 - 5.0 g/dL    Globulin 3.3 2.0 - 4.0 g/dL    A-G Ratio 1.2 1.1 - 2.2

## 2019-07-22 NOTE — PROGRESS NOTES

## 2019-07-22 NOTE — PROGRESS NOTES
[facility]  [address] [facility], [address] [place]: [phone]  F: [phone]      Reason for Visit:   Holly Lgauna is a 44 y.o. female who is seen in consultation at the request of Dr. Raimundo Rosado for evaluation of therapy for breast cancer    Treatment History:   · 6/10/19 L breast core bx:  IDC, gr 3, 1.1 cm, + LVI, ER negative, MD negative, HER 2 negative at [facility] 1+; DCIS present gr 3, solid with expansile necrosis  · 7/3/19 BRCA1 pathogenic mutation (invitae), c.5266dup  · 19 MRI breast bx:  negative    History of Present Illness:   She felt the L breast mass herself in May 2019, with aching pain, leading to the pathology above. Interval history:  No complaints today    FH:   2 paternal aunts with post-menopausal breast cancer, one aunt with ovarian cancer; that aunt's daughter tested positive for BRCA1 (first cousin); no pancreas cancer, no prostate cancer    Past Medical History:   Diagnosis Date    Breast cancer ([facility] [address] 2019    left breast cancer      Past Surgical History:   Procedure Laterality Date    HX APPENDECTOMY      HX  SECTION      x3      Social History     Tobacco Use    Smoking status: Never Smoker    Smokeless tobacco: Never Used   Substance Use Topics    Alcohol use: Not Currently      Family History   Problem Relation Age of Onset    Diabetes Mother     Heart Disease Mother     Hypertension Mother     Heart Disease Father     Cancer Paternal Aunt         Breast    Cancer Paternal Aunt         Breast     Current Outpatient Medications   Medication Sig    cholecalciferol, vitamin D3, (VITAMIN D3 PO) Take 2,000 Units by mouth daily.  Lactobacillus acidophilus (PROBIOTIC PO) Take 1 Tab by mouth daily.  prasterone, DHEA, (DHEA PO) Take 10 mg by mouth daily.  OTHER Massage    Dx c50.412 breast cancer    NP THYROID 15 mg tablet Take 15 mg by mouth daily.  testosterone 75 mg pllt by Implant route.  MELATONIN PO Take  by mouth nightly as needed.  prochlorperazine (COMPAZINE) 10 mg tablet Take 1 Tab by mouth every six (6) hours as needed for Nausea.  lidocaine-prilocaine (EMLA) topical cream Apply  to affected area as needed for Pain.  ondansetron hcl (ZOFRAN) 8 mg tablet Take 1 Tab by mouth every eight (8) hours as needed for Nausea.  OLANZapine (ZYPREXA) 10 mg tablet 10 mg qhs on days 1-4 following chemo     No current facility-administered medications for this visit. No Known Allergies     Review of Systems: A complete review of systems was obtained, negative except as described above. Physical Exam:     Visit Vitals  /76   Pulse 82   Temp 97.3 °F (36.3 °C) (Temporal)   Resp 18   Ht 5' 3\" (1.6 m)   Wt 135 lb 1.6 oz (61.3 kg)   LMP 07/03/2019   SpO2 100%   BMI 23.93 kg/m²     ECOG PS: 0  General: No distress  Eyes: PERRLA, anicteric sclerae  HENT: Atraumatic, OP clear  Neck: Supple  Lymphatic: No cervical, supraclavicular adenopathy  Respiratory: CTAB, normal respiratory effort  CV: Normal rate, regular rhythm, no murmurs, no peripheral edema  GI: Soft, nontender, nondistended, no masses, no hepatomegaly, no splenomegaly; + BS  MS:  Digits without clubbing or cyanosis. Skin: No rashes, ecchymoses, or petechiae. Normal temperature, turgor, and texture. Psych: Alert, oriented, appropriate affect, normal judgment/insight  Breasts:  L 2:00 breast, 4 cmfn, 3 cm mass, no LAD, last exam, deferred today    Results:     Lab Results   Component Value Date/Time    WBC 6.9 07/22/2019 12:23 PM    HGB 12.9 07/22/2019 12:23 PM    HCT 38.8 07/22/2019 12:23 PM    PLATELET 452 94/39/0638 12:23 PM    MCV 89.8 07/22/2019 12:23 PM    ABS.  NEUTROPHILS 4.3 07/22/2019 12:23 PM     No results found for: NA, K, CL, CO2, GLU, BUN, CREA, GFRAA, GFRNA, CA, NAPOC, KPOCT, CLPOC, GLUCPOC, IBUN, CREAPOC, ICAI  No results found for: TBILI, ALT, SGOT, AP, TP, ALB, GLOB    6/13/19 MRI breast  FINDINGS:     Background parenchymal enhancement: Moderate. Lymph nodes: No lymphadenopathy.     Left breast: Irregular triangular-shaped mass in the posterior third of the left  breast at 2:00 measures 2.8 x 1.8 x 4.2 cm. Posterior margin is 0.3 cm from the  left pectoralis major muscle. Biopsy clip is in the inferior margin of the mass.     In the middle and posterior thirds of the left breast at 5-6:00, segmental non  mass enhancement with heterogeneous kinetics measures 2.6 cm in oblique AP  diameter in the axial plane. Otherwise, there are foci in the left breast.     Right breast: Heterogeneous patchy enhancement in multiple locations. No  suspicious kinetics. Biopsy clip in the middle third is at 3:00. No surrounding  abnormal enhancement or morphology.     IMPRESSION  IMPRESSION:   1. 2.8 x 1.8 x 4.2 cm biopsy-proven infiltrating ductal carcinoma in the left  breast at 2:00 is in close approximation to the chest wall. 2. Left breast 5-6:00 suspicious segmental non mass enhancement. 3. No MRI evidence of malignancy in the right breast.  4. No lymphadenopathy.     Assessment: ACR BI-RADS category   Left breast: BI-RADS Assessment Category 4: Suspicious abnormality - Biopsy  should be performed in the absence of clinical contraindication. Right breast: BI-RADS Assessment Category 2: Benign finding.     Recommendation: Bilateral mammography if not recently performed elsewhere. Second look ultrasound of the left breast at 5-6:00 to determine  ultrasound-guided or MRI guided biopsy of non mass enhancement.     A negative breast MRI examination speaks strongly against invasive cancer down  to a detection threshold of 3 to 5 mm but may not detect some lower grade or in  situ carcinomas. Therefore, routine clinical and mammographic followup are  recommended. A summary portfolio has been created in PACS.    7/12/19 TTE EF 65%    Records reviewed and summarized above.   Pathology report(s) reviewed above. Radiology report(s) reviewed above. Assessment/plan:   1. L UOQ IDC, gr 3, triple negative:  cT2 cN0 cM0, stage IIA, prognostic stage IIB    We explained to the patient that the goal of systemic adjuvant therapy is to improve the chances for cure and decrease the risk of relapse. We explained why a patient can have microscopic cancer spread now even though physical examination, laboratory studies and imaging studies are negative for cancer. We explained that the same treatments used now as adjuvant or preventive treatments rarely if ever are curative in women who develop metastases. We discussed that there is no difference in overall survival between neoadjuvant and adjuvant chemotherapy. We discussed the rationale for neoadjuvant chemotherapy, if chemotherapy is warranted, as it is in this case: to avoid any potential delays in giving chemotherapy following surgery, to be able to see the response of the tumor to chemotherapy, and to potentially downstage the tumor prior to surgery. I discussed the potential risks of dose-dense chemotherapy with the patient. (DD AC-T, adriamycin 60 mg/m2; cyclophosphamide 600 mg/m2 q 2 weeks x 4; paclitaxel 80 mg/m2 qweekly x 12). Major toxicities include nausea and vomiting, stomatitis, fatigue, and a small risk of heart damage. Anemia frequently results and occasionally requires growth factors and rarely transfusions. Neutropenic fever is uncommon, but can be a life-threatening problem. Also, there is a small but increased risk of myelodysplasia and acute leukemia. We provided the patient with detailed information concerning toxicity including frequent toxicities that only last a few days, such as nausea, vomiting, mouth sores, arthralgia, myalgia, and potentially allergic reactions to paclitaxel, as well as toxicities which can be longer lasting including total alopecia, fatigue, anemia and neuropathy.  We provided the patient with detailed information concerning the toxicities of their regimen in addition to our verbal discussion. We discussed the potential addition of carboplatin auc 6 q 3 weeks during weekly paclitaxel as evaluated in CALGB 74129, showing a significant improvement in pCR for patients with stage II-III triple negative breast cancer. Additional side effects of added myelosuppression, fatigue, renal damage with dehydration, and nausea and vomiting were discussed. AUC is the dose that is currently being used in all investigations, and thus is what will be used here. Discussed in great detail NSABP-B59 which is using the same backbone carbo/taxol, then DD [facility] +/- atezolizumab. A research biopsy would be required. We discussed the risks and benefits of atezolizumab therapy today. Potential side effects include, but are not limited to fatigue, rash, auto-immune issues, infertility, allergic reactions, and rarely, death. Discussed oral and peripheral cryotherapy. Cold caps would not work with [facility]. MRI biopsy of 2nd breast lesion ordered by Dr. Isai Mclain, negative. Dr. Isai Mclain has placed port     The patient was given the following prescriptions with written and verbal instructions on how to use each:  Compazine, zofran, olanzapine, a wig, and emla cream.  IV [place] will be used with [facility]. Neulasta the following day (bone pain side effect discussed) with AC. She is agreeable to neoadjuvant chemotherapy, declined B-59. Plan to start on 7/22/19. Agreeable to carboplatin/paclitaxel followed by DD AC. She has signed informed consent    2. Emotional well being:  She has excellent support and is coping well with her disease    3. BRCA1 pathogenic mutation:   Discussed recommendation for bilateral mastectomies and bilateral oophorectomies; risk of ovarian, fallopian or peritoneal cancer is 16-59%; risk for pancreatic cancer is 1-3%; risk for contralateral breast cancer is 10-15%    4. Elevated BP:  Resolved    5.  Fertility preservation:  Discussed that there is > 20% chance of loss of menses with chemotherapy. She and her  state that they are not interested in further children and fertility preservation       > 40 minutes were spent with this patient with > 50% of that time spent in face to face counseling. I appreciate the opportunity to participate in Ms. Lanny East's care. Signed By: Caroline Parrish MD      No orders of the defined types were placed in this encounter.

## 2019-07-23 ENCOUNTER — TELEPHONE (OUTPATIENT)
Dept: ONCOLOGY | Age: 44
End: 2019-07-23

## 2019-07-23 NOTE — TELEPHONE ENCOUNTER
Patient called and stated that her body looks red. Patient stated that she looks like she has been in the sun but has not been. Patient denied any itching, swollen. Patient stated that it does feel a little warm.  # [phone]

## 2019-07-23 NOTE — TELEPHONE ENCOUNTER
7/23/19 1:38 PM: Returned call to patient, who stated she is doing well except that she noticed her face, chest, arms, and back are red today. Patient stated that it looks like a \"mild sunburn\" and that it is even/solid in color. Denied rash, itching, and swelling but stated that her skin is warm to the touch. Requested that the patient check her temperature while I had her on the phone; patient stated that she has a temporal thermometer and on the right side, it was 101 and 100.8 on the left side but she had been drinking hot soup when I called her. Requested that patient not drink anything hot for the next 10 - 15 minutes and then recheck her temp and I will call her back. Patient verbalized understanding. 7/23/19 2:00 PM: Called patient, who stated that her temperature recheck was 100.1 on both sides. Advised that Dr. Fili Thibodeaux will be consulted and the patient will receive a call back. 7/23/19 3:58 PM: Called patient and advised that per Dr. Fili Thibodeaux and Zak Royal NP, the skin redness could be from Taxol and they recommend over the counter Benadryl and continuing to monitor her temperature, especially if she develops chills. Patient verbalized understanding and wanted to know what she should do overnight if her temperature rises. Advised patient to call this office's  number and she will be connected to the on-call physician. Patient verbalized understanding and denied further questions or concerns.

## 2019-07-29 ENCOUNTER — HOSPITAL ENCOUNTER (OUTPATIENT)
Dept: INFUSION THERAPY | Age: 44
Discharge: HOME | End: 2019-07-29
Payer: OTHER GOVERNMENT

## 2019-07-29 ENCOUNTER — OFFICE VISIT (OUTPATIENT)
Dept: ONCOLOGY | Age: 44
End: 2019-07-29

## 2019-07-29 VITALS
BODY MASS INDEX: 23.55 KG/M2 | SYSTOLIC BLOOD PRESSURE: 122 MMHG | TEMPERATURE: 96.5 F | OXYGEN SATURATION: 100 % | RESPIRATION RATE: 18 BRPM | WEIGHT: 132.9 LBS | HEIGHT: 63 IN | DIASTOLIC BLOOD PRESSURE: 73 MMHG | HEART RATE: 85 BPM

## 2019-07-29 VITALS
HEART RATE: 84 BPM | WEIGHT: 132.9 LBS | BODY MASS INDEX: 23.55 KG/M2 | TEMPERATURE: 97.6 F | SYSTOLIC BLOOD PRESSURE: 118 MMHG | RESPIRATION RATE: 18 BRPM | DIASTOLIC BLOOD PRESSURE: 68 MMHG | HEIGHT: 63 IN

## 2019-07-29 DIAGNOSIS — Z17.1 MALIGNANT NEOPLASM OF UPPER-OUTER QUADRANT OF LEFT BREAST IN FEMALE, ESTROGEN RECEPTOR NEGATIVE (HCC): Primary | ICD-10-CM

## 2019-07-29 DIAGNOSIS — K59.03 DRUG-INDUCED CONSTIPATION: ICD-10-CM

## 2019-07-29 DIAGNOSIS — C50.412 MALIGNANT NEOPLASM OF UPPER-OUTER QUADRANT OF LEFT BREAST IN FEMALE, ESTROGEN RECEPTOR NEGATIVE (HCC): Primary | ICD-10-CM

## 2019-07-29 DIAGNOSIS — Z51.11 CHEMOTHERAPY MANAGEMENT, ENCOUNTER FOR: ICD-10-CM

## 2019-07-29 DIAGNOSIS — R21 RASH: ICD-10-CM

## 2019-07-29 LAB
BASO+EOS+MONOS # BLD AUTO: 0.3 K/UL (ref 0.2–1.2)
BASO+EOS+MONOS NFR BLD AUTO: 8 % (ref 3.2–16.9)
DIFFERENTIAL METHOD BLD: ABNORMAL
ERYTHROCYTE [DISTWIDTH] IN BLOOD BY AUTOMATED COUNT: 13.3 % (ref 11.8–15.8)
HCT VFR BLD AUTO: 34.1 % (ref 35–47)
HGB BLD-MCNC: 12.2 G/DL (ref 11.5–16)
LYMPHOCYTES # BLD: 1.6 K/UL (ref 0.8–3.5)
LYMPHOCYTES NFR BLD: 38 % (ref 12–49)
MCH RBC QN AUTO: 32 PG (ref 26–34)
MCHC RBC AUTO-ENTMCNC: 35.8 G/DL (ref 30–36.5)
MCV RBC AUTO: 89.5 FL (ref 80–99)
NEUTS SEG # BLD: 2.4 K/UL (ref 1.8–8)
NEUTS SEG NFR BLD: 54 % (ref 32–75)
PLATELET # BLD AUTO: 173 K/UL (ref 150–400)
RBC # BLD AUTO: 3.81 M/UL (ref 3.8–5.2)
WBC # BLD AUTO: 4.3 K/UL (ref 3.6–11)

## 2019-07-29 PROCEDURE — 74011250636 HC RX REV CODE- 250/636: Performed by: INTERNAL MEDICINE

## 2019-07-29 PROCEDURE — 85025 COMPLETE CBC W/AUTO DIFF WBC: CPT

## 2019-07-29 PROCEDURE — 96413 CHEMO IV INFUSION 1 HR: CPT

## 2019-07-29 PROCEDURE — 74011000250 HC RX REV CODE- 250: Performed by: INTERNAL MEDICINE

## 2019-07-29 PROCEDURE — 77030012965 HC NDL HUBR BBMI -A

## 2019-07-29 PROCEDURE — 36415 COLL VENOUS BLD VENIPUNCTURE: CPT

## 2019-07-29 PROCEDURE — 74011250636 HC RX REV CODE- 250/636: Performed by: NURSE PRACTITIONER

## 2019-07-29 PROCEDURE — 96375 TX/PRO/DX INJ NEW DRUG ADDON: CPT

## 2019-07-29 RX ORDER — DEXAMETHASONE SODIUM PHOSPHATE 100 MG/10ML
10 INJECTION INTRAMUSCULAR; INTRAVENOUS ONCE
Status: COMPLETED | OUTPATIENT
Start: 2019-07-29 | End: 2019-07-29

## 2019-07-29 RX ORDER — SODIUM CHLORIDE 9 MG/ML
10 INJECTION INTRAMUSCULAR; INTRAVENOUS; SUBCUTANEOUS AS NEEDED
Status: DISCONTINUED | OUTPATIENT
Start: 2019-07-29 | End: 2019-07-30 | Stop reason: HOSPADM

## 2019-07-29 RX ORDER — DIPHENHYDRAMINE HCL 25 MG
50 CAPSULE ORAL
Status: DISCONTINUED | OUTPATIENT
Start: 2019-07-29 | End: 2019-08-02 | Stop reason: HOSPADM

## 2019-07-29 RX ORDER — SODIUM CHLORIDE 0.9 % (FLUSH) 0.9 %
10 SYRINGE (ML) INJECTION AS NEEDED
Status: DISCONTINUED | OUTPATIENT
Start: 2019-07-29 | End: 2019-07-30 | Stop reason: HOSPADM

## 2019-07-29 RX ORDER — HEPARIN 100 UNIT/ML
300-500 SYRINGE INTRAVENOUS AS NEEDED
Status: DISCONTINUED | OUTPATIENT
Start: 2019-07-29 | End: 2019-07-30 | Stop reason: HOSPADM

## 2019-07-29 RX ORDER — SODIUM CHLORIDE 9 MG/ML
25 INJECTION, SOLUTION INTRAVENOUS CONTINUOUS
Status: DISCONTINUED | OUTPATIENT
Start: 2019-07-29 | End: 2019-07-30 | Stop reason: HOSPADM

## 2019-07-29 RX ORDER — DIPHENHYDRAMINE HCL 25 MG
50 CAPSULE ORAL
Status: CANCELLED
Start: 2019-07-29

## 2019-07-29 RX ADMIN — Medication 500 UNITS: at 16:55

## 2019-07-29 RX ADMIN — METHYLPREDNISOLONE SODIUM SUCCINATE 125 MG: 125 INJECTION, POWDER, FOR SOLUTION INTRAMUSCULAR; INTRAVENOUS at 15:19

## 2019-07-29 RX ADMIN — SODIUM CHLORIDE 10 ML: 9 INJECTION, SOLUTION INTRAMUSCULAR; INTRAVENOUS; SUBCUTANEOUS at 13:46

## 2019-07-29 RX ADMIN — FAMOTIDINE 20 MG: 10 INJECTION, SOLUTION INTRAVENOUS at 15:12

## 2019-07-29 RX ADMIN — PACLITAXEL 134 MG: 6 INJECTION INTRAVENOUS at 15:54

## 2019-07-29 RX ADMIN — Medication 10 ML: at 16:55

## 2019-07-29 RX ADMIN — DEXAMETHASONE SODIUM PHOSPHATE 10 MG: 10 INJECTION INTRAMUSCULAR; INTRAVENOUS at 15:15

## 2019-07-29 RX ADMIN — SODIUM CHLORIDE 25 ML/HR: 900 INJECTION, SOLUTION INTRAVENOUS at 15:09

## 2019-07-29 NOTE — PROGRESS NOTES
[facility] [place] [facility] [place] [address] [facility]  [place]: [phone]  F: [phone]      Reason for Visit:   Karlo Rivas is a 44 y.o. female who is seen in consultation at the request of Dr. Heather England for evaluation of therapy for breast cancer    Treatment History:   · 6/10/19 L breast core bx:  IDC, gr 3, 1.1 cm, + LVI, ER negative, PA negative, HER 2 negative at [facility] 1+; DCIS present gr 3, solid with expansile necrosis  · 7/3/19 BRCA1 pathogenic mutation (invitae), c.5266dup  · 19 MRI breast bx:  negative    History of Present Illness:   She felt the L breast mass herself in May 2019, with aching pain, leading to the pathology above. Interval history:  No complaints today    FH:   2 paternal aunts with post-menopausal breast cancer, one aunt with ovarian cancer; that aunt's daughter tested positive for BRCA1 (first cousin); no pancreas cancer, no prostate cancer    Past Medical History:   Diagnosis Date    Breast cancer ([facility] [address] 2019    left breast cancer      Past Surgical History:   Procedure Laterality Date    HX APPENDECTOMY      HX  SECTION      x3      Social History     Tobacco Use    Smoking status: Never Smoker    Smokeless tobacco: Never Used   Substance Use Topics    Alcohol use: Not Currently      Family History   Problem Relation Age of Onset    Diabetes Mother     Heart Disease Mother     Hypertension Mother     Heart Disease Father     Cancer Paternal Aunt         Breast    Cancer Paternal Aunt         Breast     Current Outpatient Medications   Medication Sig    cholecalciferol, vitamin D3, (VITAMIN D3 PO) Take 2,000 Units by mouth daily.  Lactobacillus acidophilus (PROBIOTIC PO) Take 1 Tab by mouth daily.  prasterone, DHEA, (DHEA PO) Take 10 mg by mouth daily.     OLANZapine (ZYPREXA) 10 mg tablet 10 mg qhs on days 1-4 following chemo    OTHER Massage    Dx c50.412 breast cancer    NP THYROID 15 mg tablet Take 15 mg by mouth daily.  testosterone 75 mg pllt by Implant route.  MELATONIN PO Take  by mouth nightly as needed.  prochlorperazine (COMPAZINE) 10 mg tablet Take 1 Tab by mouth every six (6) hours as needed for Nausea.  lidocaine-prilocaine (EMLA) topical cream Apply  to affected area as needed for Pain.  ondansetron hcl (ZOFRAN) 8 mg tablet Take 1 Tab by mouth every eight (8) hours as needed for Nausea. No current facility-administered medications for this visit. Facility-Administered Medications Ordered in Other Visits   Medication Dose Route Frequency    saline peripheral flush soln 10 mL  10 mL InterCATHeter PRN    sodium chloride 0.9% injection 10 mL  10 mL IntraVENous PRN    heparin (porcine) pf 300-500 Units  300-500 Units InterCATHeter PRN      No Known Allergies     Review of Systems: A complete review of systems was obtained, negative except as described above. Physical Exam:     Visit Vitals  /73   Pulse 85   Temp 96.5 °F (35.8 °C) (Temporal)   Resp 18   Ht 5' 2.99\" (1.6 m)   Wt 132 lb 14.4 oz (60.3 kg)   LMP 07/03/2019   SpO2 100%   BMI 23.55 kg/m²     ECOG PS: 0  General: No distress  Eyes: PERRLA, anicteric sclerae  HENT: Atraumatic, OP clear  Neck: Supple  Lymphatic: No cervical, supraclavicular adenopathy  Respiratory: CTAB, normal respiratory effort  CV: Normal rate, regular rhythm, no murmurs, no peripheral edema  GI: Soft, nontender, nondistended, no masses, no hepatomegaly, no splenomegaly; + BS  MS:  Digits without clubbing or cyanosis. Skin: No rashes, ecchymoses, or petechiae. Normal temperature, turgor, and texture.   Psych: Alert, oriented, appropriate affect, normal judgment/insight  Breasts:  L 2:00 breast, 4 cmfn, 1.8 cm mass, no LAD, last exam    Results:     Lab Results   Component Value Date/Time    WBC 4.3 07/29/2019 01:45 PM    HGB 12.2 07/29/2019 01:45 PM    HCT 34.1 (L) 07/29/2019 01:45 PM    PLATELET 491 39/00/7325 01:45 PM    MCV 89.5 07/29/2019 01:45 PM    ABS. NEUTROPHILS 2.4 07/29/2019 01:45 PM     Lab Results   Component Value Date/Time    Sodium 138 07/22/2019 12:23 PM    Potassium 3.6 07/22/2019 12:23 PM    Chloride 108 07/22/2019 12:23 PM    CO2 22 07/22/2019 12:23 PM    Glucose 70 07/22/2019 12:23 PM    BUN 7 07/22/2019 12:23 PM    Creatinine 0.60 07/22/2019 12:23 PM    GFR est AA >60 07/22/2019 12:23 PM    GFR est non-AA >60 07/22/2019 12:23 PM    Calcium 8.8 07/22/2019 12:23 PM     Lab Results   Component Value Date/Time    Bilirubin, total 1.1 (H) 07/22/2019 12:23 PM    ALT (SGPT) 16 07/22/2019 12:23 PM    AST (SGOT) 15 07/22/2019 12:23 PM    Alk. phosphatase 38 (L) 07/22/2019 12:23 PM    Protein, total 7.2 07/22/2019 12:23 PM    Albumin 3.9 07/22/2019 12:23 PM    Globulin 3.3 07/22/2019 12:23 PM       6/13/19 MRI breast  FINDINGS:     Background parenchymal enhancement: Moderate. Lymph nodes: No lymphadenopathy.     Left breast: Irregular triangular-shaped mass in the posterior third of the left  breast at 2:00 measures 2.8 x 1.8 x 4.2 cm. Posterior margin is 0.3 cm from the  left pectoralis major muscle. Biopsy clip is in the inferior margin of the mass.     In the middle and posterior thirds of the left breast at 5-6:00, segmental non  mass enhancement with heterogeneous kinetics measures 2.6 cm in oblique AP  diameter in the axial plane. Otherwise, there are foci in the left breast.     Right breast: Heterogeneous patchy enhancement in multiple locations. No  suspicious kinetics. Biopsy clip in the middle third is at 3:00. No surrounding  abnormal enhancement or morphology.     IMPRESSION  IMPRESSION:   1. 2.8 x 1.8 x 4.2 cm biopsy-proven infiltrating ductal carcinoma in the left  breast at 2:00 is in close approximation to the chest wall. 2. Left breast 5-6:00 suspicious segmental non mass enhancement. 3.  No MRI evidence of malignancy in the right breast.  4. No lymphadenopathy.     Assessment: ACR BI-RADS category   Left breast: BI-RADS Assessment Category 4: Suspicious abnormality - Biopsy  should be performed in the absence of clinical contraindication. Right breast: BI-RADS Assessment Category 2: Benign finding.     Recommendation: Bilateral mammography if not recently performed elsewhere. Second look ultrasound of the left breast at 5-6:00 to determine  ultrasound-guided or MRI guided biopsy of non mass enhancement.     A negative breast MRI examination speaks strongly against invasive cancer down  to a detection threshold of 3 to 5 mm but may not detect some lower grade or in  situ carcinomas. Therefore, routine clinical and mammographic followup are  recommended. A summary portfolio has been created in PACS.    7/12/19 TTE EF 65%    Records reviewed and summarized above. Pathology report(s) reviewed above. Radiology report(s) reviewed above. Assessment/plan:   1. L UOQ IDC, gr 3, triple negative:  cT2 cN0 cM0, stage IIA, prognostic stage IIB    We explained to the patient that the goal of systemic adjuvant therapy is to improve the chances for cure and decrease the risk of relapse. We explained why a patient can have microscopic cancer spread now even though physical examination, laboratory studies and imaging studies are negative for cancer. We explained that the same treatments used now as adjuvant or preventive treatments rarely if ever are curative in women who develop metastases. We discussed that there is no difference in overall survival between neoadjuvant and adjuvant chemotherapy. We discussed the rationale for neoadjuvant chemotherapy, if chemotherapy is warranted, as it is in this case: to avoid any potential delays in giving chemotherapy following surgery, to be able to see the response of the tumor to chemotherapy, and to potentially downstage the tumor prior to surgery. I discussed the potential risks of dose-dense chemotherapy with the patient.   (DD AC-T, adriamycin 60 mg/m2; cyclophosphamide 600 mg/m2 q 2 weeks x 4; paclitaxel 80 mg/m2 qweekly x 12). Major toxicities include nausea and vomiting, stomatitis, fatigue, and a small risk of heart damage. Anemia frequently results and occasionally requires growth factors and rarely transfusions. Neutropenic fever is uncommon, but can be a life-threatening problem. Also, there is a small but increased risk of myelodysplasia and acute leukemia. We provided the patient with detailed information concerning toxicity including frequent toxicities that only last a few days, such as nausea, vomiting, mouth sores, arthralgia, myalgia, and potentially allergic reactions to paclitaxel, as well as toxicities which can be longer lasting including total alopecia, fatigue, anemia and neuropathy. We provided the patient with detailed information concerning the toxicities of their regimen in addition to our verbal discussion. We discussed the potential addition of carboplatin auc 6 q 3 weeks during weekly paclitaxel as evaluated in CALGB 05606, showing a significant improvement in pCR for patients with stage II-III triple negative breast cancer. Additional side effects of added myelosuppression, fatigue, renal damage with dehydration, and nausea and vomiting were discussed. AUC is the dose that is currently being used in all investigations, and thus is what will be used here. Discussed in great detail NSABP-B59 which is using the same backbone carbo/taxol, then DD [facility] [place], operated by [facility] +/- atezolizumab. A research biopsy would be required. We discussed the risks and benefits of atezolizumab therapy today. Potential side effects include, but are not limited to fatigue, rash, auto-immune issues, infertility, allergic reactions, and rarely, death. Discussed oral and peripheral cryotherapy. Cold caps would not work with [facility] [place], operated by [facility]. MRI biopsy of 2nd breast lesion ordered by Dr. Wen Babb, negative.   Dr. Wen Babb has placed port     The patient was given the following prescriptions with written and verbal instructions on how to use each:  Compazine, zofran, olanzapine, a wig, and emla cream.  IV [place] will be used with AC. Neulasta the following day (bone pain side effect discussed) with AC. She is agreeable to neoadjuvant chemotherapy, declined B-59. Plan to start on 7/22/19. Agreeable to carboplatin/paclitaxel followed by DD AC. She has signed informed consent     Carbo/taxol C1D8 today. Reports IV benadryl made her feel extremely loopy and drowsy. Plan to switch to PO benadryl. 2. Emotional well being:  She has excellent support and is coping well with her disease    3. BRCA1 pathogenic mutation:   Discussed recommendation for bilateral mastectomies and bilateral oophorectomies; risk of ovarian, fallopian or peritoneal cancer is 16-59%; risk for pancreatic cancer is 1-3%; risk for contralateral breast cancer is 10-15%    4. Elevated BP:  Resolved    5. Fertility preservation:  Discussed that there is > 20% chance of loss of menses with chemotherapy. She and her  state that they are not interested in further children and fertility preservation     6. Rash: Started after treatment last week, lasted for about 2-3 days. Giovanna Donahue Resolved with PRN po benadryl. Will add solumedrol 125 mg IV     7. Low grade temperatures:  Reports temperatures throughout the last week ranged between .5. States she uses a temporal thermometer, recommend getting an oral one as well and to notify us if her temperature remains elevated or is greater than 100.4.    8. Constipation: Relieved with a suppository. Now she is taking fiber as well as a stool softener. Recommend adding miralax or senna and use suppositories only as needed. I appreciate the opportunity to participate in Ms. Lanny East's care. Signed By: Stephanie Leigh MD      No orders of the defined types were placed in this encounter.

## 2019-07-29 NOTE — PROGRESS NOTES
[facility] VISIT NOTE    1330. Pt arrived at [address] [place] [address] ambulatory and in no distress for C1D8 Taxol. Assessment completed, pt c/o a rash after last treatment. MD office aware. RCW chest port accessed with . 75 in hernandez with no difficulty. Positive blood return noted and labs drawn. Pt to see MD. Lucas Room resulted and are within parameters to treat. Medications received:  NS KVO  Pepcid IV  Dexamethasone IV  Solumedrol IV  Taxol IV    Tolerated treatment well, no adverse reaction noted. Port de-accessed and flushed per protocol. Positive blood return noted. Patient Vitals for the past 12 hrs:   Temp Pulse Resp BP   07/29/19 1653 97.6 °F (36.4 °C) 84 18 118/68   07/29/19 1336 96.5 °F (35.8 °C) 85 18 122/73       Recent Results (from the past 12 hour(s))   CBC WITH 3 PART DIFF    Collection Time: 07/29/19  1:45 PM   Result Value Ref Range    WBC 4.3 3.6 - 11.0 K/uL    RBC 3.81 3.80 - 5.20 M/uL    HGB 12.2 11.5 - 16.0 g/dL    HCT 34.1 (L) 35.0 - 47.0 %    MCV 89.5 80.0 - 99.0 FL    MCH 32.0 26.0 - 34.0 PG    MCHC 35.8 30.0 - 36.5 g/dL    RDW 13.3 11.8 - 15.8 %    PLATELET 035 634 - 405 K/uL    NEUTROPHILS 54 32 - 75 %    MIXED CELLS 8 3.2 - 16.9 %    LYMPHOCYTES 38 12 - 49 %    ABS. NEUTROPHILS 2.4 1.8 - 8.0 K/UL    ABS. MIXED CELLS 0.3 0.2 - 1.2 K/uL    ABS. LYMPHOCYTES 1.6 0.8 - 3.5 K/UL    DF AUTOMATED       1700. D/C'd from [address] [place] [address] and in no distress accompanied by friend.  Next appointment is 8/5/19 at 1:00 pm.

## 2019-08-05 ENCOUNTER — HOSPITAL ENCOUNTER (OUTPATIENT)
Dept: INFUSION THERAPY | Age: 44
Discharge: HOME | End: 2019-08-05
Payer: OTHER GOVERNMENT

## 2019-08-05 VITALS
SYSTOLIC BLOOD PRESSURE: 120 MMHG | DIASTOLIC BLOOD PRESSURE: 74 MMHG | TEMPERATURE: 96.4 F | HEIGHT: 63 IN | RESPIRATION RATE: 18 BRPM | BODY MASS INDEX: 23.24 KG/M2 | WEIGHT: 131.17 LBS | HEART RATE: 84 BPM

## 2019-08-05 DIAGNOSIS — Z17.1 MALIGNANT NEOPLASM OF UPPER-OUTER QUADRANT OF LEFT BREAST IN FEMALE, ESTROGEN RECEPTOR NEGATIVE (HCC): Primary | ICD-10-CM

## 2019-08-05 DIAGNOSIS — C50.412 MALIGNANT NEOPLASM OF UPPER-OUTER QUADRANT OF LEFT BREAST IN FEMALE, ESTROGEN RECEPTOR NEGATIVE (HCC): Primary | ICD-10-CM

## 2019-08-05 LAB
BASOPHILS # BLD: 0 K/UL (ref 0–0.1)
BASOPHILS NFR BLD: 0 % (ref 0–1)
DIFFERENTIAL METHOD BLD: ABNORMAL
EOSINOPHIL # BLD: 0.1 K/UL (ref 0–0.4)
EOSINOPHIL NFR BLD: 2 % (ref 0–7)
ERYTHROCYTE [DISTWIDTH] IN BLOOD BY AUTOMATED COUNT: 12.2 % (ref 11.5–14.5)
HCT VFR BLD AUTO: 32.4 % (ref 35–47)
HGB BLD-MCNC: 10.8 G/DL (ref 11.5–16)
IMM GRANULOCYTES # BLD AUTO: 0 K/UL (ref 0–0.04)
IMM GRANULOCYTES NFR BLD AUTO: 0 % (ref 0–0.5)
LYMPHOCYTES # BLD: 2 K/UL (ref 0.8–3.5)
LYMPHOCYTES NFR BLD: 63 % (ref 12–49)
MCH RBC QN AUTO: 29.7 PG (ref 26–34)
MCHC RBC AUTO-ENTMCNC: 33.3 G/DL (ref 30–36.5)
MCV RBC AUTO: 89 FL (ref 80–99)
MONOCYTES # BLD: 0.2 K/UL (ref 0–1)
MONOCYTES NFR BLD: 7 % (ref 5–13)
NEUTS SEG # BLD: 0.9 K/UL (ref 1.8–8)
NEUTS SEG NFR BLD: 28 % (ref 32–75)
NRBC # BLD: 0 K/UL (ref 0–0.01)
NRBC BLD-RTO: 0 PER 100 WBC
PATH REV BLD -IMP: NORMAL
PLATELET # BLD AUTO: 115 K/UL (ref 150–400)
PMV BLD AUTO: 10.5 FL (ref 8.9–12.9)
RBC # BLD AUTO: 3.64 M/UL (ref 3.8–5.2)
RBC MORPH BLD: ABNORMAL
WBC # BLD AUTO: 3.2 K/UL (ref 3.6–11)

## 2019-08-05 PROCEDURE — 77030012965 HC NDL HUBR BBMI -A

## 2019-08-05 PROCEDURE — 74011250636 HC RX REV CODE- 250/636: Performed by: NURSE PRACTITIONER

## 2019-08-05 PROCEDURE — 74011250636 HC RX REV CODE- 250/636: Performed by: INTERNAL MEDICINE

## 2019-08-05 PROCEDURE — 85025 COMPLETE CBC W/AUTO DIFF WBC: CPT

## 2019-08-05 PROCEDURE — 96413 CHEMO IV INFUSION 1 HR: CPT

## 2019-08-05 PROCEDURE — 96375 TX/PRO/DX INJ NEW DRUG ADDON: CPT

## 2019-08-05 PROCEDURE — 36415 COLL VENOUS BLD VENIPUNCTURE: CPT

## 2019-08-05 PROCEDURE — 74011000250 HC RX REV CODE- 250: Performed by: INTERNAL MEDICINE

## 2019-08-05 RX ORDER — DEXAMETHASONE SODIUM PHOSPHATE 100 MG/10ML
10 INJECTION INTRAMUSCULAR; INTRAVENOUS ONCE
Status: COMPLETED | OUTPATIENT
Start: 2019-08-05 | End: 2019-08-05

## 2019-08-05 RX ORDER — DIPHENHYDRAMINE HCL 25 MG
50 CAPSULE ORAL
Status: DISCONTINUED | OUTPATIENT
Start: 2019-08-05 | End: 2019-08-06 | Stop reason: HOSPADM

## 2019-08-05 RX ORDER — SODIUM CHLORIDE 9 MG/ML
10 INJECTION INTRAMUSCULAR; INTRAVENOUS; SUBCUTANEOUS AS NEEDED
Status: DISCONTINUED | OUTPATIENT
Start: 2019-08-05 | End: 2019-08-06 | Stop reason: HOSPADM

## 2019-08-05 RX ORDER — SODIUM CHLORIDE 9 MG/ML
25 INJECTION, SOLUTION INTRAVENOUS CONTINUOUS
Status: DISCONTINUED | OUTPATIENT
Start: 2019-08-05 | End: 2019-08-06 | Stop reason: HOSPADM

## 2019-08-05 RX ORDER — HEPARIN 100 UNIT/ML
300-500 SYRINGE INTRAVENOUS AS NEEDED
Status: DISCONTINUED | OUTPATIENT
Start: 2019-08-05 | End: 2019-08-06 | Stop reason: HOSPADM

## 2019-08-05 RX ORDER — SODIUM CHLORIDE 0.9 % (FLUSH) 0.9 %
10 SYRINGE (ML) INJECTION AS NEEDED
Status: DISCONTINUED | OUTPATIENT
Start: 2019-08-05 | End: 2019-08-06 | Stop reason: HOSPADM

## 2019-08-05 RX ADMIN — FAMOTIDINE 20 MG: 10 INJECTION, SOLUTION INTRAVENOUS at 14:47

## 2019-08-05 RX ADMIN — DEXAMETHASONE SODIUM PHOSPHATE 10 MG: 10 INJECTION INTRAMUSCULAR; INTRAVENOUS at 14:50

## 2019-08-05 RX ADMIN — SODIUM CHLORIDE 25 ML/HR: 900 INJECTION, SOLUTION INTRAVENOUS at 14:31

## 2019-08-05 RX ADMIN — Medication 10 ML: at 16:36

## 2019-08-05 RX ADMIN — Medication 500 UNITS: at 16:40

## 2019-08-05 RX ADMIN — METHYLPREDNISOLONE SODIUM SUCCINATE 125 MG: 125 INJECTION, POWDER, FOR SOLUTION INTRAMUSCULAR; INTRAVENOUS at 14:56

## 2019-08-05 RX ADMIN — PACLITAXEL 134 MG: 6 INJECTION, SOLUTION INTRAVENOUS at 15:37

## 2019-08-05 RX ADMIN — SODIUM CHLORIDE 10 ML: 9 INJECTION, SOLUTION INTRAMUSCULAR; INTRAVENOUS; SUBCUTANEOUS at 13:24

## 2019-08-09 RX ORDER — DIPHENHYDRAMINE HCL 25 MG
50 CAPSULE ORAL
Status: CANCELLED
Start: 2019-08-19

## 2019-08-09 RX ORDER — DIPHENHYDRAMINE HCL 25 MG
50 CAPSULE ORAL
Status: CANCELLED
Start: 2019-08-26

## 2019-08-09 RX ORDER — DIPHENHYDRAMINE HCL 25 MG
50 CAPSULE ORAL
Status: CANCELLED
Start: 2019-08-12

## 2019-08-12 ENCOUNTER — OFFICE VISIT (OUTPATIENT)
Dept: ONCOLOGY | Age: 44
End: 2019-08-12

## 2019-08-12 ENCOUNTER — HOSPITAL ENCOUNTER (OUTPATIENT)
Dept: INFUSION THERAPY | Age: 44
Discharge: HOME | End: 2019-08-12
Payer: OTHER GOVERNMENT

## 2019-08-12 VITALS
BODY MASS INDEX: 23.11 KG/M2 | RESPIRATION RATE: 16 BRPM | TEMPERATURE: 98.1 F | HEIGHT: 63 IN | DIASTOLIC BLOOD PRESSURE: 70 MMHG | WEIGHT: 130.4 LBS | SYSTOLIC BLOOD PRESSURE: 109 MMHG | HEART RATE: 89 BPM

## 2019-08-12 VITALS
HEART RATE: 82 BPM | TEMPERATURE: 97.8 F | HEIGHT: 63 IN | RESPIRATION RATE: 18 BRPM | SYSTOLIC BLOOD PRESSURE: 124 MMHG | DIASTOLIC BLOOD PRESSURE: 78 MMHG | WEIGHT: 130.4 LBS | BODY MASS INDEX: 23.11 KG/M2 | OXYGEN SATURATION: 99 %

## 2019-08-12 DIAGNOSIS — Z17.1 MALIGNANT NEOPLASM OF UPPER-OUTER QUADRANT OF LEFT BREAST IN FEMALE, ESTROGEN RECEPTOR NEGATIVE (HCC): Primary | ICD-10-CM

## 2019-08-12 DIAGNOSIS — C50.412 MALIGNANT NEOPLASM OF UPPER-OUTER QUADRANT OF LEFT BREAST IN FEMALE, ESTROGEN RECEPTOR NEGATIVE (HCC): Primary | ICD-10-CM

## 2019-08-12 LAB
ALBUMIN SERPL-MCNC: 4 G/DL (ref 3.5–5)
ALBUMIN/GLOB SERPL: 1.3 {RATIO} (ref 1.1–2.2)
ALP SERPL-CCNC: 42 U/L (ref 45–117)
ALT SERPL-CCNC: 40 U/L (ref 12–78)
ANION GAP SERPL CALC-SCNC: 7 MMOL/L (ref 5–15)
APPEARANCE UR: ABNORMAL
AST SERPL-CCNC: 22 U/L (ref 15–37)
BACTERIA URNS QL MICRO: ABNORMAL /HPF
BASOPHILS # BLD: 0 K/UL (ref 0–0.1)
BASOPHILS NFR BLD: 0 % (ref 0–1)
BILIRUB SERPL-MCNC: 0.5 MG/DL (ref 0.2–1)
BILIRUB UR QL: NEGATIVE
BUN SERPL-MCNC: 6 MG/DL (ref 6–20)
BUN/CREAT SERPL: 10 (ref 12–20)
CALCIUM SERPL-MCNC: 8.8 MG/DL (ref 8.5–10.1)
CHLORIDE SERPL-SCNC: 109 MMOL/L (ref 97–108)
CO2 SERPL-SCNC: 27 MMOL/L (ref 21–32)
COLOR UR: ABNORMAL
CREAT SERPL-MCNC: 0.61 MG/DL (ref 0.55–1.02)
DIFFERENTIAL METHOD BLD: ABNORMAL
EOSINOPHIL # BLD: 0 K/UL (ref 0–0.4)
EOSINOPHIL NFR BLD: 0 % (ref 0–7)
EPITH CASTS URNS QL MICRO: ABNORMAL /LPF
ERYTHROCYTE [DISTWIDTH] IN BLOOD BY AUTOMATED COUNT: 13 % (ref 11.5–14.5)
GLOBULIN SER CALC-MCNC: 3.1 G/DL (ref 2–4)
GLUCOSE SERPL-MCNC: 84 MG/DL (ref 65–100)
GLUCOSE UR STRIP.AUTO-MCNC: NEGATIVE MG/DL
HCT VFR BLD AUTO: 32.5 % (ref 35–47)
HGB BLD-MCNC: 10.8 G/DL (ref 11.5–16)
HGB UR QL STRIP: ABNORMAL
HYALINE CASTS URNS QL MICRO: ABNORMAL /LPF (ref 0–5)
IMM GRANULOCYTES # BLD AUTO: 0 K/UL (ref 0–0.04)
IMM GRANULOCYTES NFR BLD AUTO: 0 % (ref 0–0.5)
KETONES UR QL STRIP.AUTO: NEGATIVE MG/DL
LEUKOCYTE ESTERASE UR QL STRIP.AUTO: ABNORMAL
LYMPHOCYTES # BLD: 1.8 K/UL (ref 0.8–3.5)
LYMPHOCYTES NFR BLD: 35 % (ref 12–49)
MCH RBC QN AUTO: 30.1 PG (ref 26–34)
MCHC RBC AUTO-ENTMCNC: 33.2 G/DL (ref 30–36.5)
MCV RBC AUTO: 90.5 FL (ref 80–99)
MONOCYTES # BLD: 0.5 K/UL (ref 0–1)
MONOCYTES NFR BLD: 10 % (ref 5–13)
NEUTS SEG # BLD: 2.8 K/UL (ref 1.8–8)
NEUTS SEG NFR BLD: 55 % (ref 32–75)
NITRITE UR QL STRIP.AUTO: NEGATIVE
NRBC # BLD: 0 K/UL (ref 0–0.01)
NRBC BLD-RTO: 0 PER 100 WBC
PH UR STRIP: 7 [PH] (ref 5–8)
PLATELET # BLD AUTO: 155 K/UL (ref 150–400)
PMV BLD AUTO: 9.6 FL (ref 8.9–12.9)
POTASSIUM SERPL-SCNC: 3.6 MMOL/L (ref 3.5–5.1)
PROT SERPL-MCNC: 7.1 G/DL (ref 6.4–8.2)
PROT UR STRIP-MCNC: NEGATIVE MG/DL
RBC # BLD AUTO: 3.59 M/UL (ref 3.8–5.2)
RBC #/AREA URNS HPF: ABNORMAL /HPF (ref 0–5)
SODIUM SERPL-SCNC: 143 MMOL/L (ref 136–145)
SP GR UR REFRACTOMETRY: <1.005 (ref 1–1.03)
UA: UC IF INDICATED,UAUC: ABNORMAL
UROBILINOGEN UR QL STRIP.AUTO: 0.2 EU/DL (ref 0.2–1)
WBC # BLD AUTO: 5.2 K/UL (ref 3.6–11)
WBC URNS QL MICRO: ABNORMAL /HPF (ref 0–4)

## 2019-08-12 PROCEDURE — 96375 TX/PRO/DX INJ NEW DRUG ADDON: CPT

## 2019-08-12 PROCEDURE — 96413 CHEMO IV INFUSION 1 HR: CPT

## 2019-08-12 PROCEDURE — 36415 COLL VENOUS BLD VENIPUNCTURE: CPT

## 2019-08-12 PROCEDURE — 74011250636 HC RX REV CODE- 250/636: Performed by: INTERNAL MEDICINE

## 2019-08-12 PROCEDURE — 74011250637 HC RX REV CODE- 250/637: Performed by: NURSE PRACTITIONER

## 2019-08-12 PROCEDURE — 87186 SC STD MICRODIL/AGAR DIL: CPT

## 2019-08-12 PROCEDURE — 87077 CULTURE AEROBIC IDENTIFY: CPT

## 2019-08-12 PROCEDURE — 80053 COMPREHEN METABOLIC PANEL: CPT

## 2019-08-12 PROCEDURE — 74011000250 HC RX REV CODE- 250: Performed by: INTERNAL MEDICINE

## 2019-08-12 PROCEDURE — 96417 CHEMO IV INFUS EACH ADDL SEQ: CPT

## 2019-08-12 PROCEDURE — 74011250636 HC RX REV CODE- 250/636: Performed by: NURSE PRACTITIONER

## 2019-08-12 PROCEDURE — 74011000258 HC RX REV CODE- 258: Performed by: INTERNAL MEDICINE

## 2019-08-12 PROCEDURE — 96367 TX/PROPH/DG ADDL SEQ IV INF: CPT

## 2019-08-12 PROCEDURE — 81001 URINALYSIS AUTO W/SCOPE: CPT

## 2019-08-12 PROCEDURE — 77030012965 HC NDL HUBR BBMI -A

## 2019-08-12 PROCEDURE — 87086 URINE CULTURE/COLONY COUNT: CPT

## 2019-08-12 PROCEDURE — 85025 COMPLETE CBC W/AUTO DIFF WBC: CPT

## 2019-08-12 RX ORDER — DIPHENHYDRAMINE HCL 25 MG
50 CAPSULE ORAL
Status: COMPLETED | OUTPATIENT
Start: 2019-08-12 | End: 2019-08-12

## 2019-08-12 RX ORDER — SODIUM CHLORIDE 0.9 % (FLUSH) 0.9 %
10 SYRINGE (ML) INJECTION AS NEEDED
Status: ACTIVE | OUTPATIENT
Start: 2019-08-12 | End: 2019-08-12

## 2019-08-12 RX ORDER — PALONOSETRON 0.05 MG/ML
0.25 INJECTION, SOLUTION INTRAVENOUS ONCE
Status: COMPLETED | OUTPATIENT
Start: 2019-08-12 | End: 2019-08-12

## 2019-08-12 RX ORDER — SODIUM CHLORIDE 9 MG/ML
10 INJECTION INTRAMUSCULAR; INTRAVENOUS; SUBCUTANEOUS AS NEEDED
Status: ACTIVE | OUTPATIENT
Start: 2019-08-12 | End: 2019-08-12

## 2019-08-12 RX ORDER — HEPARIN 100 UNIT/ML
300-500 SYRINGE INTRAVENOUS AS NEEDED
Status: ACTIVE | OUTPATIENT
Start: 2019-08-12 | End: 2019-08-12

## 2019-08-12 RX ORDER — SODIUM CHLORIDE 9 MG/ML
25 INJECTION, SOLUTION INTRAVENOUS CONTINUOUS
Status: DISCONTINUED | OUTPATIENT
Start: 2019-08-12 | End: 2019-08-13 | Stop reason: HOSPADM

## 2019-08-12 RX ORDER — NITROFURANTOIN 25; 75 MG/1; MG/1
100 CAPSULE ORAL 2 TIMES DAILY
Qty: 14 CAP | Refills: 0 | Status: SHIPPED | OUTPATIENT
Start: 2019-08-12 | End: 2019-08-19

## 2019-08-12 RX ADMIN — SODIUM CHLORIDE 25 ML/HR: 900 INJECTION, SOLUTION INTRAVENOUS at 12:39

## 2019-08-12 RX ADMIN — SODIUM CHLORIDE 150 MG: 900 INJECTION, SOLUTION INTRAVENOUS at 12:50

## 2019-08-12 RX ADMIN — Medication 10 ML: at 11:10

## 2019-08-12 RX ADMIN — CARBOPLATIN 686 MG: 10 INJECTION, SOLUTION INTRAVENOUS at 15:21

## 2019-08-12 RX ADMIN — SODIUM CHLORIDE 10 ML: 9 INJECTION, SOLUTION INTRAMUSCULAR; INTRAVENOUS; SUBCUTANEOUS at 11:11

## 2019-08-12 RX ADMIN — Medication 10 ML: at 11:11

## 2019-08-12 RX ADMIN — PACLITAXEL 134 MG: 6 INJECTION, SOLUTION INTRAVENOUS at 13:56

## 2019-08-12 RX ADMIN — FAMOTIDINE 20 MG: 10 INJECTION, SOLUTION INTRAVENOUS at 12:40

## 2019-08-12 RX ADMIN — DIPHENHYDRAMINE HYDROCHLORIDE 50 MG: 25 CAPSULE ORAL at 12:39

## 2019-08-12 RX ADMIN — PALONOSETRON 0.25 MG: 0.05 INJECTION, SOLUTION INTRAVENOUS at 12:41

## 2019-08-12 RX ADMIN — DEXAMETHASONE SODIUM PHOSPHATE 12 MG: 4 INJECTION INTRA-ARTICULAR; INTRALESIONAL; INTRAMUSCULAR; INTRAVENOUS; SOFT TISSUE at 12:50

## 2019-08-12 NOTE — PROGRESS NOTES
[facility] VISIT NOTE    1050  Pt arrived at [facility] and in no distress for C2D1 Carbo/Taxol. Assessment completed, pt c/o right flank pain and pressure when urinating that started last night. She notes her constipation is better. Blood pressure 124/78, pulse 82, temperature 97.8 °F (36.6 °C), resp. rate 16, height 5' 2.99\" (1.6 m), weight 59.1 kg (130 lb 6.4 oz), not currently breastfeeding. Right chest port accessed with 0.75 in hernandez no difficulty. Positive blood return noted and labs drawn. Patient proceeded to MD clinic visit. Lab results are within treatment parameters. UA/culture ordered today and sample was sent. Abx called to patient's pharmacy by Dr. Putnam Every office. Recent Results (from the past 12 hour(s))   CBC WITH AUTOMATED DIFF    Collection Time: 08/12/19 11:09 AM   Result Value Ref Range    WBC 5.2 3.6 - 11.0 K/uL    RBC 3.59 (L) 3.80 - 5.20 M/uL    HGB 10.8 (L) 11.5 - 16.0 g/dL    HCT 32.5 (L) 35.0 - 47.0 %    MCV 90.5 80.0 - 99.0 FL    MCH 30.1 26.0 - 34.0 PG    MCHC 33.2 30.0 - 36.5 g/dL    RDW 13.0 11.5 - 14.5 %    PLATELET 946 697 - 707 K/uL    MPV 9.6 8.9 - 12.9 FL    NRBC 0.0 0  WBC    ABSOLUTE NRBC 0.00 0.00 - 0.01 K/uL    NEUTROPHILS 55 32 - 75 %    LYMPHOCYTES 35 12 - 49 %    MONOCYTES 10 5 - 13 %    EOSINOPHILS 0 0 - 7 %    BASOPHILS 0 0 - 1 %    IMMATURE GRANULOCYTES 0 0.0 - 0.5 %    ABS. NEUTROPHILS 2.8 1.8 - 8.0 K/UL    ABS. LYMPHOCYTES 1.8 0.8 - 3.5 K/UL    ABS. MONOCYTES 0.5 0.0 - 1.0 K/UL    ABS. EOSINOPHILS 0.0 0.0 - 0.4 K/UL    ABS. BASOPHILS 0.0 0.0 - 0.1 K/UL    ABS. IMM.  GRANS. 0.0 0.00 - 0.04 K/UL    DF AUTOMATED     METABOLIC PANEL, COMPREHENSIVE    Collection Time: 08/12/19 11:09 AM   Result Value Ref Range    Sodium 143 136 - 145 mmol/L    Potassium 3.6 3.5 - 5.1 mmol/L    Chloride 109 (H) 97 - 108 mmol/L    CO2 27 21 - 32 mmol/L    Anion gap 7 5 - 15 mmol/L    Glucose 84 65 - 100 mg/dL    BUN 6 6 - 20 MG/DL    Creatinine 0.61 0.55 - 1.02 MG/DL    BUN/Creatinine ratio 10 (L) 12 - 20      GFR est AA >60 >60 ml/min/1.73m2    GFR est non-AA >60 >60 ml/min/1.73m2    Calcium 8.8 8.5 - 10.1 MG/DL    Bilirubin, total 0.5 0.2 - 1.0 MG/DL    ALT (SGPT) 40 12 - 78 U/L    AST (SGOT) 22 15 - 37 U/L    Alk. phosphatase 42 (L) 45 - 117 U/L    Protein, total 7.1 6.4 - 8.2 g/dL    Albumin 4.0 3.5 - 5.0 g/dL    Globulin 3.1 2.0 - 4.0 g/dL    A-G Ratio 1.3 1.1 - 2.2     URINALYSIS W/ REFLEX CULTURE    Collection Time: 08/12/19 12:21 PM   Result Value Ref Range    Color YELLOW/STRAW      Appearance CLOUDY (A) CLEAR      Specific gravity <1.005 1.003 - 1.030    pH (UA) 7.0 5.0 - 8.0      Protein NEGATIVE  NEG mg/dL    Glucose NEGATIVE  NEG mg/dL    Ketone NEGATIVE  NEG mg/dL    Bilirubin NEGATIVE  NEG      Blood MODERATE (A) NEG      Urobilinogen 0.2 0.2 - 1.0 EU/dL    Nitrites NEGATIVE  NEG      Leukocyte Esterase LARGE (A) NEG      WBC 20-50 0 - 4 /hpf    RBC 5-10 0 - 5 /hpf    Epithelial cells FEW FEW /lpf    Bacteria 4+ (A) NEG /hpf    UA:UC IF INDICATED URINE CULTURE ORDERED (A) CNI      Hyaline cast 0-2 0 - 5 /lpf     Medications received:  Aloxi IVP  Dexamethasone IV  Emend IV  Pepcid IVP  Benadryl PO  Taxol IV  Carboplatin IV    Blood pressure 109/70, pulse 89, temperature 98.1 °F (36.7 °C), resp. rate 16, height 5' 2.99\" (1.6 m), weight 59.1 kg (130 lb 6.4 oz), last menstrual period 07/27/2019, not currently breastfeeding. Tolerated treatment well, no adverse reaction noted. Port de-accessed and flushed per protocol. Positive blood return noted. 1610  D/C'd from [facility] ambulatory and in no distress accompanied by her friend. Next appointment is 8/19/19 at 1300.

## 2019-08-12 NOTE — PROGRESS NOTES
[facility] [place] [facility]  [address] [facility], [address] [facility] Michelle: [phone]  F: [phone]      Reason for Visit:   Saad Sepulveda is a 44 y.o. female who is seen in consultation at the request of Dr. Margarita Schwartz for evaluation of therapy for breast cancer    Treatment History:   · 6/10/19 L breast core bx:  IDC, gr 3, 1.1 cm, + LVI, ER negative, [place] negative, HER 2 negative at [facility] 1+; DCIS present gr 3, solid with expansile necrosis  · 7/3/19 BRCA1 pathogenic mutation (invitae), c.5266dup  · 19 MRI breast bx:  Negative  · 19- carbo/taxol    History of Present Illness:   She felt the L breast mass herself in May 2019, with aching pain, leading to the pathology above. Interval history:  Complains of gr 1 constipation, gr 1 fatigue, gr 1 urinary pain. FH:   2 paternal aunts with post-menopausal breast cancer, one aunt with ovarian cancer; that aunt's daughter tested positive for BRCA1 (first cousin); no pancreas cancer, no prostate cancer    Past Medical History:   Diagnosis Date    Breast cancer ([address] 2019    left breast cancer      Past Surgical History:   Procedure Laterality Date    HX APPENDECTOMY      HX  SECTION      x3      Social History     Tobacco Use    Smoking status: Never Smoker    Smokeless tobacco: Never Used   Substance Use Topics    Alcohol use: Not Currently      Family History   Problem Relation Age of Onset    Diabetes Mother     Heart Disease Mother     Hypertension Mother     Heart Disease Father     Cancer Paternal Aunt         Breast    Cancer Paternal Aunt         Breast     Current Outpatient Medications   Medication Sig    cholecalciferol, vitamin D3, (VITAMIN D3 PO) Take 2,000 Units by mouth daily.  Lactobacillus acidophilus (PROBIOTIC PO) Take 1 Tab by mouth daily.  prasterone, DHEA, (DHEA PO) Take 10 mg by mouth daily.     OLANZapine (ZYPREXA) 10 mg tablet 10 mg qhs on days 1-4 following chemo  OTHER Massage    Dx c50.412 breast cancer    NP THYROID 15 mg tablet Take 15 mg by mouth daily.  testosterone 75 mg pllt by Implant route.  MELATONIN PO Take  by mouth nightly as needed.  prochlorperazine (COMPAZINE) 10 mg tablet Take 1 Tab by mouth every six (6) hours as needed for Nausea.  lidocaine-prilocaine (EMLA) topical cream Apply  to affected area as needed for Pain.  ondansetron hcl (ZOFRAN) 8 mg tablet Take 1 Tab by mouth every eight (8) hours as needed for Nausea. No current facility-administered medications for this visit. Facility-Administered Medications Ordered in Other Visits   Medication Dose Route Frequency    saline peripheral flush soln 10 mL  10 mL InterCATHeter PRN    sodium chloride 0.9% injection 10 mL  10 mL IntraVENous PRN    heparin (porcine) pf 300-500 Units  300-500 Units InterCATHeter PRN      No Known Allergies     Review of Systems: A complete review of systems was obtained, negative except as described above. Physical Exam:     Visit Vitals  /78   Pulse 82   Temp 97.8 °F (36.6 °C) (Temporal)   Resp 18   Ht 5' 2.99\" (1.6 m)   Wt 130 lb 6.4 oz (59.1 kg)   LMP 07/27/2019   SpO2 99%   BMI 23.11 kg/m²     ECOG PS: 0  General: No distress  Eyes: PERRLA, anicteric sclerae  HENT: Atraumatic, OP clear  Neck: Supple  Lymphatic: No cervical, supraclavicular adenopathy  Respiratory: CTAB, normal respiratory effort  CV: Normal rate, regular rhythm, no murmurs, no peripheral edema  GI: Soft, nontender, nondistended, no masses, no hepatomegaly, no splenomegaly; + BS  MS:  Digits without clubbing or cyanosis. Skin: No rashes, ecchymoses, or petechiae. Normal temperature, turgor, and texture.   Psych: Alert, oriented, appropriate affect, normal judgment/insight  Breasts:  L 2:00 breast, 4 cmfn, 1.1 cm mass, no LAD    Results:     Lab Results   Component Value Date/Time    WBC 5.2 08/12/2019 11:09 AM    HGB 10.8 (L) 08/12/2019 11:09 AM    HCT 32.5 (L) 08/12/2019 11:09 AM    PLATELET 647 18/00/1886 11:09 AM    MCV 90.5 08/12/2019 11:09 AM    ABS. NEUTROPHILS 2.8 08/12/2019 11:09 AM     Lab Results   Component Value Date/Time    Sodium 138 07/22/2019 12:23 PM    Potassium 3.6 07/22/2019 12:23 PM    Chloride 108 07/22/2019 12:23 PM    CO2 22 07/22/2019 12:23 PM    Glucose 70 07/22/2019 12:23 PM    BUN 7 07/22/2019 12:23 PM    Creatinine 0.60 07/22/2019 12:23 PM    GFR est AA >60 07/22/2019 12:23 PM    GFR est non-AA >60 07/22/2019 12:23 PM    Calcium 8.8 07/22/2019 12:23 PM     Lab Results   Component Value Date/Time    Bilirubin, total 1.1 (H) 07/22/2019 12:23 PM    ALT (SGPT) 16 07/22/2019 12:23 PM    AST (SGOT) 15 07/22/2019 12:23 PM    Alk. phosphatase 38 (L) 07/22/2019 12:23 PM    Protein, total 7.2 07/22/2019 12:23 PM    Albumin 3.9 07/22/2019 12:23 PM    Globulin 3.3 07/22/2019 12:23 PM       6/13/19 MRI breast  FINDINGS:     Background parenchymal enhancement: Moderate. Lymph nodes: No lymphadenopathy.     Left breast: Irregular triangular-shaped mass in the posterior third of the left  breast at 2:00 measures 2.8 x 1.8 x 4.2 cm. Posterior margin is 0.3 cm from the  left pectoralis major muscle. Biopsy clip is in the inferior margin of the mass.     In the middle and posterior thirds of the left breast at 5-6:00, segmental non  mass enhancement with heterogeneous kinetics measures 2.6 cm in oblique AP  diameter in the axial plane. Otherwise, there are foci in the left breast.     Right breast: Heterogeneous patchy enhancement in multiple locations. No  suspicious kinetics. Biopsy clip in the middle third is at 3:00. No surrounding  abnormal enhancement or morphology.     IMPRESSION  IMPRESSION:   1. 2.8 x 1.8 x 4.2 cm biopsy-proven infiltrating ductal carcinoma in the left  breast at 2:00 is in close approximation to the chest wall. 2. Left breast 5-6:00 suspicious segmental non mass enhancement. 3.  No MRI evidence of malignancy in the right breast.  4. No lymphadenopathy.     Assessment: ACR BI-RADS category   Left breast: BI-RADS Assessment Category 4: Suspicious abnormality - Biopsy  should be performed in the absence of clinical contraindication. Right breast: BI-RADS Assessment Category 2: Benign finding.     Recommendation: Bilateral mammography if not recently performed elsewhere. Second look ultrasound of the left breast at 5-6:00 to determine  ultrasound-guided or MRI guided biopsy of non mass enhancement.     A negative breast MRI examination speaks strongly against invasive cancer down  to a detection threshold of 3 to 5 mm but may not detect some lower grade or in  situ carcinomas. Therefore, routine clinical and mammographic followup are  recommended. A summary portfolio has been created in PACS.    7/12/19 TTE EF 65%    Records reviewed and summarized above. Pathology report(s) reviewed above. Radiology report(s) reviewed above. Assessment/plan:   1. L UOQ IDC, gr 3, triple negative:  cT2 cN0 cM0, stage IIA, prognostic stage IIB    We explained to the patient that the goal of systemic adjuvant therapy is to improve the chances for cure and decrease the risk of relapse. We explained why a patient can have microscopic cancer spread now even though physical examination, laboratory studies and imaging studies are negative for cancer. We explained that the same treatments used now as adjuvant or preventive treatments rarely if ever are curative in women who develop metastases. We discussed that there is no difference in overall survival between neoadjuvant and adjuvant chemotherapy. We discussed the rationale for neoadjuvant chemotherapy, if chemotherapy is warranted, as it is in this case: to avoid any potential delays in giving chemotherapy following surgery, to be able to see the response of the tumor to chemotherapy, and to potentially downstage the tumor prior to surgery.     I discussed the potential risks of dose-dense chemotherapy with the patient. (DD AC-T, adriamycin 60 mg/m2; cyclophosphamide 600 mg/m2 q 2 weeks x 4; paclitaxel 80 mg/m2 qweekly x 12). Major toxicities include nausea and vomiting, stomatitis, fatigue, and a small risk of heart damage. Anemia frequently results and occasionally requires growth factors and rarely transfusions. Neutropenic fever is uncommon, but can be a life-threatening problem. Also, there is a small but increased risk of myelodysplasia and acute leukemia. We provided the patient with detailed information concerning toxicity including frequent toxicities that only last a few days, such as nausea, vomiting, mouth sores, arthralgia, myalgia, and potentially allergic reactions to paclitaxel, as well as toxicities which can be longer lasting including total alopecia, fatigue, anemia and neuropathy. We provided the patient with detailed information concerning the toxicities of their regimen in addition to our verbal discussion. We discussed the potential addition of carboplatin auc 6 q 3 weeks during weekly paclitaxel as evaluated in CALGB 19856, showing a significant improvement in pCR for patients with stage II-III triple negative breast cancer. Additional side effects of added myelosuppression, fatigue, renal damage with dehydration, and nausea and vomiting were discussed. AUC is the dose that is currently being used in all investigations, and thus is what will be used here. Discussed in great detail [id number] which is using the same backbone carbo/taxol, then [facility] +/- atezolizumab. A research biopsy would be required. We discussed the risks and benefits of atezolizumab therapy today. Potential side effects include, but are not limited to fatigue, rash, auto-immune issues, infertility, allergic reactions, and rarely, death. Discussed oral and peripheral cryotherapy. Cold caps would not work with [facility]. MRI biopsy of 2nd breast lesion ordered by Dr. Analia Stroud, negative.   Dr. Analia Stroud has placed port     The patient was given the following prescriptions with written and verbal instructions on how to use each:  Compazine, zofran, olanzapine, a wig, and emla cream.  IV Lisa Pinch will be used with AC. Neulasta the following day (bone pain side effect discussed) with AC. She is agreeable to neoadjuvant chemotherapy, declined B-59. Plan to start on 7/22/19. Agreeable to carboplatin/paclitaxel followed by DD AC. She has signed informed consent     Carbo/taxol C2D1 today. Reports IV benadryl made her feel extremely loopy and drowsy. Plan to switch to PO benadryl. 2. Emotional well being:  She has excellent support and is coping well with her disease    3. BRCA1 pathogenic mutation:   Discussed recommendation for bilateral mastectomies and bilateral oophorectomies; risk of ovarian, fallopian or peritoneal cancer is 16-59%; risk for pancreatic cancer is 1-3%; risk for contralateral breast cancer is 10-15%    4. Elevated BP:  Resolved    5. Fertility preservation:  Discussed that there is > 20% chance of loss of menses with chemotherapy. She and her  state that they are not interested in further children and fertility preservation     6. Rash: Started after treatment last week, lasted for about 2-3 days. Remona Mellow Resolved with PRN po benadryl. Will continue solumedrol 125 mg IV. 7. Low grade temperatures:  Reports temperatures throughout the last week ranged between .5. States she uses a temporal thermometer, recommend getting an oral one as well and to notify us if her temperature remains elevated or is greater than 100.4. None since last visit. 8. Constipation: Relieved with a suppository. Now she is taking fiber and miralax. This has improved. 9. Menses:  Reports this lasted a little longer than usual.  Will continue to monitor. 10. Dysuria:  Will get UA/UC today. I appreciate the opportunity to participate in Ms. Lanny East's care.     Signed By: Talha Romero MD No orders of the defined types were placed in this encounter.

## 2019-08-13 ENCOUNTER — TELEPHONE (OUTPATIENT)
Dept: ONCOLOGY | Age: 44
End: 2019-08-13

## 2019-08-13 RX ORDER — SULFAMETHOXAZOLE AND TRIMETHOPRIM 800; 160 MG/1; MG/1
1 TABLET ORAL 2 TIMES DAILY
Qty: 14 TAB | Refills: 0 | Status: SHIPPED | OUTPATIENT
Start: 2019-08-13 | End: 2019-08-20

## 2019-08-14 LAB
BACTERIA SPEC CULT: ABNORMAL
CC UR VC: ABNORMAL
SERVICE CMNT-IMP: ABNORMAL

## 2019-08-19 ENCOUNTER — HOSPITAL ENCOUNTER (OUTPATIENT)
Dept: INFUSION THERAPY | Age: 44
Discharge: HOME | End: 2019-08-19
Payer: OTHER GOVERNMENT

## 2019-08-19 VITALS
SYSTOLIC BLOOD PRESSURE: 108 MMHG | DIASTOLIC BLOOD PRESSURE: 64 MMHG | HEART RATE: 88 BPM | OXYGEN SATURATION: 98 % | RESPIRATION RATE: 18 BRPM | TEMPERATURE: 98.2 F | BODY MASS INDEX: 22.18 KG/M2 | WEIGHT: 125.2 LBS | HEIGHT: 63 IN

## 2019-08-19 DIAGNOSIS — Z17.1 MALIGNANT NEOPLASM OF UPPER-OUTER QUADRANT OF LEFT BREAST IN FEMALE, ESTROGEN RECEPTOR NEGATIVE (HCC): Primary | ICD-10-CM

## 2019-08-19 DIAGNOSIS — C50.412 MALIGNANT NEOPLASM OF UPPER-OUTER QUADRANT OF LEFT BREAST IN FEMALE, ESTROGEN RECEPTOR NEGATIVE (HCC): Primary | ICD-10-CM

## 2019-08-19 LAB
ALBUMIN SERPL-MCNC: 4.1 G/DL (ref 3.5–5)
ALBUMIN/GLOB SERPL: 1.3 {RATIO} (ref 1.1–2.2)
ALP SERPL-CCNC: 42 U/L (ref 45–117)
ALT SERPL-CCNC: 61 U/L (ref 12–78)
ANION GAP SERPL CALC-SCNC: 5 MMOL/L (ref 5–15)
AST SERPL-CCNC: 42 U/L (ref 15–37)
BASOPHILS # BLD: 0 K/UL (ref 0–0.1)
BASOPHILS NFR BLD: 1 % (ref 0–1)
BILIRUB SERPL-MCNC: 0.6 MG/DL (ref 0.2–1)
BUN SERPL-MCNC: 11 MG/DL (ref 6–20)
BUN/CREAT SERPL: 17 (ref 12–20)
CALCIUM SERPL-MCNC: 8.5 MG/DL (ref 8.5–10.1)
CHLORIDE SERPL-SCNC: 104 MMOL/L (ref 97–108)
CO2 SERPL-SCNC: 27 MMOL/L (ref 21–32)
CREAT SERPL-MCNC: 0.66 MG/DL (ref 0.55–1.02)
DIFFERENTIAL METHOD BLD: ABNORMAL
EOSINOPHIL # BLD: 0 K/UL (ref 0–0.4)
EOSINOPHIL NFR BLD: 1 % (ref 0–7)
ERYTHROCYTE [DISTWIDTH] IN BLOOD BY AUTOMATED COUNT: 13 % (ref 11.5–14.5)
GLOBULIN SER CALC-MCNC: 3.2 G/DL (ref 2–4)
GLUCOSE SERPL-MCNC: 83 MG/DL (ref 65–100)
HCT VFR BLD AUTO: 31.7 % (ref 35–47)
HGB BLD-MCNC: 10.6 G/DL (ref 11.5–16)
IMM GRANULOCYTES # BLD AUTO: 0 K/UL (ref 0–0.04)
IMM GRANULOCYTES NFR BLD AUTO: 0 % (ref 0–0.5)
LYMPHOCYTES # BLD: 1.3 K/UL (ref 0.8–3.5)
LYMPHOCYTES NFR BLD: 43 % (ref 12–49)
MCH RBC QN AUTO: 30.2 PG (ref 26–34)
MCHC RBC AUTO-ENTMCNC: 33.4 G/DL (ref 30–36.5)
MCV RBC AUTO: 90.3 FL (ref 80–99)
MONOCYTES # BLD: 0.2 K/UL (ref 0–1)
MONOCYTES NFR BLD: 6 % (ref 5–13)
NEUTS SEG # BLD: 1.5 K/UL (ref 1.8–8)
NEUTS SEG NFR BLD: 49 % (ref 32–75)
NRBC # BLD: 0 K/UL (ref 0–0.01)
NRBC BLD-RTO: 0 PER 100 WBC
PLATELET # BLD AUTO: 225 K/UL (ref 150–400)
PMV BLD AUTO: 9.5 FL (ref 8.9–12.9)
POTASSIUM SERPL-SCNC: 3.7 MMOL/L (ref 3.5–5.1)
PROT SERPL-MCNC: 7.3 G/DL (ref 6.4–8.2)
RBC # BLD AUTO: 3.51 M/UL (ref 3.8–5.2)
SODIUM SERPL-SCNC: 136 MMOL/L (ref 136–145)
WBC # BLD AUTO: 3 K/UL (ref 3.6–11)

## 2019-08-19 PROCEDURE — 74011250636 HC RX REV CODE- 250/636: Performed by: INTERNAL MEDICINE

## 2019-08-19 PROCEDURE — 77030012965 HC NDL HUBR BBMI -A

## 2019-08-19 PROCEDURE — 74011000250 HC RX REV CODE- 250: Performed by: INTERNAL MEDICINE

## 2019-08-19 PROCEDURE — 80053 COMPREHEN METABOLIC PANEL: CPT

## 2019-08-19 PROCEDURE — 36415 COLL VENOUS BLD VENIPUNCTURE: CPT

## 2019-08-19 PROCEDURE — 96375 TX/PRO/DX INJ NEW DRUG ADDON: CPT

## 2019-08-19 PROCEDURE — 96361 HYDRATE IV INFUSION ADD-ON: CPT

## 2019-08-19 PROCEDURE — 96413 CHEMO IV INFUSION 1 HR: CPT

## 2019-08-19 PROCEDURE — 74011250636 HC RX REV CODE- 250/636: Performed by: NURSE PRACTITIONER

## 2019-08-19 PROCEDURE — 85025 COMPLETE CBC W/AUTO DIFF WBC: CPT

## 2019-08-19 PROCEDURE — 74011250637 HC RX REV CODE- 250/637: Performed by: NURSE PRACTITIONER

## 2019-08-19 RX ORDER — SODIUM CHLORIDE 9 MG/ML
10 INJECTION INTRAMUSCULAR; INTRAVENOUS; SUBCUTANEOUS AS NEEDED
Status: DISCONTINUED | OUTPATIENT
Start: 2019-08-19 | End: 2019-08-20 | Stop reason: HOSPADM

## 2019-08-19 RX ORDER — DIPHENHYDRAMINE HCL 25 MG
50 CAPSULE ORAL
Status: COMPLETED | OUTPATIENT
Start: 2019-08-19 | End: 2019-08-19

## 2019-08-19 RX ORDER — SODIUM CHLORIDE 0.9 % (FLUSH) 0.9 %
10 SYRINGE (ML) INJECTION AS NEEDED
Status: DISCONTINUED | OUTPATIENT
Start: 2019-08-19 | End: 2019-08-20 | Stop reason: HOSPADM

## 2019-08-19 RX ORDER — SODIUM CHLORIDE 9 MG/ML
25 INJECTION, SOLUTION INTRAVENOUS CONTINUOUS
Status: DISCONTINUED | OUTPATIENT
Start: 2019-08-19 | End: 2019-08-20 | Stop reason: HOSPADM

## 2019-08-19 RX ORDER — ONDANSETRON 2 MG/ML
8 INJECTION INTRAMUSCULAR; INTRAVENOUS ONCE
Status: COMPLETED | OUTPATIENT
Start: 2019-08-19 | End: 2019-08-19

## 2019-08-19 RX ORDER — DEXAMETHASONE SODIUM PHOSPHATE 100 MG/10ML
10 INJECTION INTRAMUSCULAR; INTRAVENOUS ONCE
Status: COMPLETED | OUTPATIENT
Start: 2019-08-19 | End: 2019-08-19

## 2019-08-19 RX ORDER — HEPARIN 100 UNIT/ML
300-500 SYRINGE INTRAVENOUS AS NEEDED
Status: DISCONTINUED | OUTPATIENT
Start: 2019-08-19 | End: 2019-08-20 | Stop reason: HOSPADM

## 2019-08-19 RX ADMIN — FAMOTIDINE 20 MG: 10 INJECTION, SOLUTION INTRAVENOUS at 15:01

## 2019-08-19 RX ADMIN — PACLITAXEL 134 MG: 6 INJECTION, SOLUTION INTRAVENOUS at 15:48

## 2019-08-19 RX ADMIN — Medication 10 ML: at 16:55

## 2019-08-19 RX ADMIN — Medication 500 UNITS: at 16:55

## 2019-08-19 RX ADMIN — DIPHENHYDRAMINE HYDROCHLORIDE 50 MG: 25 CAPSULE ORAL at 14:51

## 2019-08-19 RX ADMIN — SODIUM CHLORIDE 1000 ML: 900 INJECTION, SOLUTION INTRAVENOUS at 14:21

## 2019-08-19 RX ADMIN — ONDANSETRON 8 MG: 2 INJECTION, SOLUTION INTRAMUSCULAR; INTRAVENOUS at 14:24

## 2019-08-19 RX ADMIN — SODIUM CHLORIDE 25 ML/HR: 900 INJECTION, SOLUTION INTRAVENOUS at 15:43

## 2019-08-19 RX ADMIN — DEXAMETHASONE SODIUM PHOSPHATE 10 MG: 10 INJECTION INTRAMUSCULAR; INTRAVENOUS at 14:53

## 2019-08-19 NOTE — PROGRESS NOTES
[facility] Progress Note    Date: 2019    Name: Cheyenne Galvan    MRN: [id number]         : 1975     1310:  Ms. Marta Flowers Arrived ambulatory and in no distress for C2D8 of Taxol Regimen. Assessment was completed, patient complained of increased nausea with the last treatment and constipation. Pati De Jesus NP visited patient in the [facility] and ordered hydration and an additional antiemetic, Zofran. PAC chest wall port accessed without difficulty, labs drawn & sent for processing. CMP ordered to assess kidney function. Chemotherapy Flowsheet 2019   Cycle C2D8   Date 2019   Drug / Regimen Taxol   Pre Meds given   Notes given           Ms. East's vitals were reviewed. Patient Vitals for the past 24 hrs:   Temp Pulse Resp BP SpO2   19 1653 98.2 °F (36.8 °C) 88 18 108/64 --   19 1320 98.2 °F (36.8 °C) 92 18 113/73 98 %     Lab results were obtained and reviewed. Recent Results (from the past 24 hour(s))   CBC WITH AUTOMATED DIFF    Collection Time: 19  1:45 PM   Result Value Ref Range    WBC 3.0 (L) 3.6 - 11.0 K/uL    RBC 3.51 (L) 3.80 - 5.20 M/uL    HGB 10.6 (L) 11.5 - 16.0 g/dL    HCT 31.7 (L) 35.0 - 47.0 %    MCV 90.3 80.0 - 99.0 FL    MCH 30.2 26.0 - 34.0 PG    MCHC 33.4 30.0 - 36.5 g/dL    RDW 13.0 11.5 - 14.5 %    PLATELET 791 132 - 365 K/uL    MPV 9.5 8.9 - 12.9 FL    NRBC 0.0 0  WBC    ABSOLUTE NRBC 0.00 0.00 - 0.01 K/uL    NEUTROPHILS 49 32 - 75 %    LYMPHOCYTES 43 12 - 49 %    MONOCYTES 6 5 - 13 %    EOSINOPHILS 1 0 - 7 %    BASOPHILS 1 0 - 1 %    IMMATURE GRANULOCYTES 0 0.0 - 0.5 %    ABS. NEUTROPHILS 1.5 (L) 1.8 - 8.0 K/UL    ABS. LYMPHOCYTES 1.3 0.8 - 3.5 K/UL    ABS. MONOCYTES 0.2 0.0 - 1.0 K/UL    ABS. EOSINOPHILS 0.0 0.0 - 0.4 K/UL    ABS. BASOPHILS 0.0 0.0 - 0.1 K/UL    ABS. IMM.  GRANS. 0.0 0.00 - 0.04 K/UL    DF AUTOMATED     METABOLIC PANEL, COMPREHENSIVE    Collection Time: 19  2:11 PM   Result Value Ref Range    Sodium 136 136 - 145 mmol/L Potassium 3.7 3.5 - 5.1 mmol/L    Chloride 104 97 - 108 mmol/L    CO2 27 21 - 32 mmol/L    Anion gap 5 5 - 15 mmol/L    Glucose 83 65 - 100 mg/dL    BUN 11 6 - 20 MG/DL    Creatinine 0.66 0.55 - 1.02 MG/DL    BUN/Creatinine ratio 17 12 - 20      GFR est AA >60 >60 ml/min/1.73m2    GFR est non-AA >60 >60 ml/min/1.73m2    Calcium 8.5 8.5 - 10.1 MG/DL    Bilirubin, total 0.6 0.2 - 1.0 MG/DL    ALT (SGPT) 61 12 - 78 U/L    AST (SGOT) 42 (H) 15 - 37 U/L    Alk.  phosphatase 42 (L) 45 - 117 U/L    Protein, total 7.3 6.4 - 8.2 g/dL    Albumin 4.1 3.5 - 5.0 g/dL    Globulin 3.2 2.0 - 4.0 g/dL    A-G Ratio 1.3 1.1 - 2.2         Medications:  Medications Administered     0.9% sodium chloride infusion     Admin Date  08/19/2019 Action  New Bag Dose  25 mL/hr Rate  25 mL/hr Route  IntraVENous Administered By  Debbie Bhandari RN          dexamethasone (DECADRON) injection 10 mg     Admin Date  08/19/2019 Action  Given Dose  10 mg Route  IntraVENous Administered By  Debbie Bhandari RN          diphenhydrAMINE (BENADRYL) capsule 50 mg     Admin Date  08/19/2019 Action  Given Dose  50 mg Route  Oral Administered By  Debbie Bhandari RN          famotidine (PF) (PEPCID) 20 mg in sodium chloride 0.9% 10 mL injection     Admin Date  08/19/2019 Action  Given Dose  20 mg Route  IntraVENous Administered By  Debbie Bhandari RN          heparin (porcine) pf 300-500 Units     Admin Date  08/19/2019 Action  Given Dose  500 Units Route  InterCATHeter Administered By  Debbie Bhandari RN          ondansetron [facility]) injection 8 mg     Admin Date  08/19/2019 Action  Given Dose  8 mg Route  IntraVENous Administered By  Debbie Bhandari RN          PACLitaxel (TAXOL) 134 mg in 0.9% sodium chloride 250 mL, overfill volume 25 mL chemo infusion     Admin Date  08/19/2019 Action  New Bag Dose  134 mg Rate  297.3 mL/hr Route  IntraVENous Administered By  Debbie Bhandari RN          saline peripheral flush soln 10 mL Admin Date  08/19/2019 Action  Given Dose  10 mL Route  InterCATHeter Administered By  Debbie Bhandari RN          sodium chloride 0.9 % bolus infusion 1,000 mL     Admin Date  08/19/2019 Action  New Bag Dose  1000 mL Rate  1,000 mL/hr Route  IntraVENous Administered By  Debbie Bhandari RN              Ms. Rom Morrison tolerated treatment well and was discharged from [place] [address] in stable condition at [phone]. Port de-accessed, flushed & heparinized per protocol. She is to return on August 26 at 1300 for her next appointment.     Analy Phillips RN  August 19, 2019

## 2019-08-26 ENCOUNTER — HOSPITAL ENCOUNTER (OUTPATIENT)
Dept: INFUSION THERAPY | Age: 44
Discharge: HOME | End: 2019-08-26
Payer: OTHER GOVERNMENT

## 2019-08-26 ENCOUNTER — OFFICE VISIT (OUTPATIENT)
Dept: ONCOLOGY | Age: 44
End: 2019-08-26

## 2019-08-26 VITALS
HEIGHT: 63 IN | SYSTOLIC BLOOD PRESSURE: 100 MMHG | WEIGHT: 129 LBS | BODY MASS INDEX: 22.86 KG/M2 | OXYGEN SATURATION: 100 % | RESPIRATION RATE: 16 BRPM | DIASTOLIC BLOOD PRESSURE: 71 MMHG | TEMPERATURE: 97.3 F | HEART RATE: 79 BPM

## 2019-08-26 VITALS
SYSTOLIC BLOOD PRESSURE: 109 MMHG | HEART RATE: 80 BPM | HEIGHT: 63 IN | OXYGEN SATURATION: 99 % | BODY MASS INDEX: 22.93 KG/M2 | DIASTOLIC BLOOD PRESSURE: 78 MMHG | WEIGHT: 129.4 LBS | RESPIRATION RATE: 16 BRPM | TEMPERATURE: 97.3 F

## 2019-08-26 DIAGNOSIS — R21 RASH: ICD-10-CM

## 2019-08-26 DIAGNOSIS — K59.03 DRUG-INDUCED CONSTIPATION: ICD-10-CM

## 2019-08-26 DIAGNOSIS — Z17.1 MALIGNANT NEOPLASM OF UPPER-OUTER QUADRANT OF LEFT BREAST IN FEMALE, ESTROGEN RECEPTOR NEGATIVE (HCC): Primary | ICD-10-CM

## 2019-08-26 DIAGNOSIS — C50.412 MALIGNANT NEOPLASM OF UPPER-OUTER QUADRANT OF LEFT BREAST IN FEMALE, ESTROGEN RECEPTOR NEGATIVE (HCC): Primary | ICD-10-CM

## 2019-08-26 DIAGNOSIS — Z51.11 CHEMOTHERAPY MANAGEMENT, ENCOUNTER FOR: ICD-10-CM

## 2019-08-26 LAB
BASOPHILS # BLD: 0 K/UL (ref 0–0.1)
BASOPHILS NFR BLD: 0 % (ref 0–1)
DIFFERENTIAL METHOD BLD: ABNORMAL
EOSINOPHIL # BLD: 0 K/UL (ref 0–0.4)
EOSINOPHIL NFR BLD: 1 % (ref 0–7)
ERYTHROCYTE [DISTWIDTH] IN BLOOD BY AUTOMATED COUNT: 13.2 % (ref 11.5–14.5)
HCT VFR BLD AUTO: 28 % (ref 35–47)
HGB BLD-MCNC: 9.3 G/DL (ref 11.5–16)
IMM GRANULOCYTES # BLD AUTO: 0 K/UL (ref 0–0.04)
IMM GRANULOCYTES NFR BLD AUTO: 0 % (ref 0–0.5)
LYMPHOCYTES # BLD: 1.7 K/UL (ref 0.8–3.5)
LYMPHOCYTES NFR BLD: 56 % (ref 12–49)
MCH RBC QN AUTO: 30.9 PG (ref 26–34)
MCHC RBC AUTO-ENTMCNC: 33.2 G/DL (ref 30–36.5)
MCV RBC AUTO: 93 FL (ref 80–99)
MONOCYTES # BLD: 0.3 K/UL (ref 0–1)
MONOCYTES NFR BLD: 10 % (ref 5–13)
NEUTS SEG # BLD: 1 K/UL (ref 1.8–8)
NEUTS SEG NFR BLD: 33 % (ref 32–75)
NRBC # BLD: 0 K/UL (ref 0–0.01)
NRBC BLD-RTO: 0 PER 100 WBC
PLATELET # BLD AUTO: 126 K/UL (ref 150–400)
PMV BLD AUTO: 9.9 FL (ref 8.9–12.9)
RBC # BLD AUTO: 3.01 M/UL (ref 3.8–5.2)
WBC # BLD AUTO: 3 K/UL (ref 3.6–11)

## 2019-08-26 PROCEDURE — 74011250637 HC RX REV CODE- 250/637: Performed by: NURSE PRACTITIONER

## 2019-08-26 PROCEDURE — 85025 COMPLETE CBC W/AUTO DIFF WBC: CPT

## 2019-08-26 PROCEDURE — 74011000250 HC RX REV CODE- 250: Performed by: INTERNAL MEDICINE

## 2019-08-26 PROCEDURE — 96375 TX/PRO/DX INJ NEW DRUG ADDON: CPT

## 2019-08-26 PROCEDURE — 74011250636 HC RX REV CODE- 250/636: Performed by: NURSE PRACTITIONER

## 2019-08-26 PROCEDURE — 36415 COLL VENOUS BLD VENIPUNCTURE: CPT

## 2019-08-26 PROCEDURE — 96413 CHEMO IV INFUSION 1 HR: CPT

## 2019-08-26 PROCEDURE — 96367 TX/PROPH/DG ADDL SEQ IV INF: CPT

## 2019-08-26 PROCEDURE — 74011250636 HC RX REV CODE- 250/636: Performed by: INTERNAL MEDICINE

## 2019-08-26 PROCEDURE — 77030012965 HC NDL HUBR BBMI -A

## 2019-08-26 RX ORDER — DEXAMETHASONE SODIUM PHOSPHATE 100 MG/10ML
10 INJECTION INTRAMUSCULAR; INTRAVENOUS ONCE
Status: COMPLETED | OUTPATIENT
Start: 2019-08-26 | End: 2019-08-26

## 2019-08-26 RX ORDER — DIPHENHYDRAMINE HCL 25 MG
50 CAPSULE ORAL
Status: COMPLETED | OUTPATIENT
Start: 2019-08-26 | End: 2019-08-26

## 2019-08-26 RX ORDER — SODIUM CHLORIDE 9 MG/ML
10 INJECTION INTRAMUSCULAR; INTRAVENOUS; SUBCUTANEOUS AS NEEDED
Status: DISCONTINUED | OUTPATIENT
Start: 2019-08-26 | End: 2019-08-27 | Stop reason: HOSPADM

## 2019-08-26 RX ORDER — HEPARIN 100 UNIT/ML
300-500 SYRINGE INTRAVENOUS AS NEEDED
Status: DISCONTINUED | OUTPATIENT
Start: 2019-08-26 | End: 2019-08-27 | Stop reason: HOSPADM

## 2019-08-26 RX ORDER — SODIUM CHLORIDE 9 MG/ML
25 INJECTION, SOLUTION INTRAVENOUS CONTINUOUS
Status: DISCONTINUED | OUTPATIENT
Start: 2019-08-26 | End: 2019-08-27 | Stop reason: HOSPADM

## 2019-08-26 RX ORDER — SODIUM CHLORIDE 0.9 % (FLUSH) 0.9 %
10 SYRINGE (ML) INJECTION AS NEEDED
Status: DISCONTINUED | OUTPATIENT
Start: 2019-08-26 | End: 2019-08-27 | Stop reason: HOSPADM

## 2019-08-26 RX ADMIN — Medication 10 ML: at 17:24

## 2019-08-26 RX ADMIN — PACLITAXEL 134 MG: 6 INJECTION, SOLUTION INTRAVENOUS at 16:24

## 2019-08-26 RX ADMIN — DIPHENHYDRAMINE HYDROCHLORIDE 50 MG: 25 CAPSULE ORAL at 15:29

## 2019-08-26 RX ADMIN — SODIUM CHLORIDE 10 ML: 9 INJECTION, SOLUTION INTRAMUSCULAR; INTRAVENOUS; SUBCUTANEOUS at 13:26

## 2019-08-26 RX ADMIN — SODIUM CHLORIDE 1000 ML: 900 INJECTION, SOLUTION INTRAVENOUS at 15:25

## 2019-08-26 RX ADMIN — Medication 500 UNITS: at 17:25

## 2019-08-26 RX ADMIN — FAMOTIDINE 20 MG: 10 INJECTION, SOLUTION INTRAVENOUS at 15:30

## 2019-08-26 RX ADMIN — DEXAMETHASONE SODIUM PHOSPHATE 10 MG: 10 INJECTION INTRAMUSCULAR; INTRAVENOUS at 15:33

## 2019-08-26 NOTE — PROGRESS NOTES
[facility] [place] [facility] [place] [address] [address] [facility] [place] [place]: [phone]  F: [phone]      Reason for Visit:   Martha Rodriguez is a 44 y.o. female who is seen in consultation at the request of Dr. Jose Turk for evaluation of therapy for breast cancer    Treatment History:   · 6/10/19 L breast core bx:  IDC, gr 3, 1.1 cm, + LVI, ER negative, ID negative, HER 2 negative at [facility] 1+; DCIS present gr 3, solid with expansile necrosis  · 7/3/19 BRCA1 pathogenic mutation (invitae), c.[id number]  · 19 MRI breast bx:  Negative  · 19- carbo/taxol    History of Present Illness:   She felt the L breast mass herself in May 2019, with aching pain, leading to the pathology above. Interval history:  In today for follow up and treatment. Complains of gr 1 constipation, gr 1 fatigue, gr 1 hair loss, gr 1 sob, gr 1 libido. FH:   2 paternal aunts with post-menopausal breast cancer, one aunt with ovarian cancer; that aunt's daughter tested positive for BRCA1 (first cousin); no pancreas cancer, no prostate cancer    Past Medical History:   Diagnosis Date    Breast cancer ([address] 2019    left breast cancer      Past Surgical History:   Procedure Laterality Date    HX APPENDECTOMY      HX  SECTION      x3      Social History     Tobacco Use    Smoking status: Never Smoker    Smokeless tobacco: Never Used   Substance Use Topics    Alcohol use: Not Currently      Family History   Problem Relation Age of Onset    Diabetes Mother     Heart Disease Mother     Hypertension Mother     Heart Disease Father     Cancer Paternal Aunt         Breast    Cancer Paternal Aunt         Breast     Current Outpatient Medications   Medication Sig    cholecalciferol, vitamin D3, (VITAMIN D3 PO) Take 2,000 Units by mouth daily.  Lactobacillus acidophilus (PROBIOTIC PO) Take 1 Tab by mouth daily.  prasterone, DHEA, (DHEA PO) Take 10 mg by mouth daily.     MELATONIN PO Take  by mouth nightly as needed.  prochlorperazine (COMPAZINE) 10 mg tablet Take 1 Tab by mouth every six (6) hours as needed for Nausea.  lidocaine-prilocaine (EMLA) topical cream Apply  to affected area as needed for Pain.  ondansetron hcl (ZOFRAN) 8 mg tablet Take 1 Tab by mouth every eight (8) hours as needed for Nausea.  OLANZapine (ZYPREXA) 10 mg tablet 10 mg qhs on days 1-4 following chemo    OTHER Massage    Dx c50.412 breast cancer    NP THYROID 15 mg tablet Take 15 mg by mouth daily.  testosterone 75 mg pllt by Implant route. No current facility-administered medications for this visit. Facility-Administered Medications Ordered in Other Visits   Medication Dose Route Frequency    saline peripheral flush soln 10 mL  10 mL InterCATHeter PRN    sodium chloride 0.9% injection 10 mL  10 mL IntraVENous PRN    heparin (porcine) pf 300-500 Units  300-500 Units InterCATHeter PRN      No Known Allergies     Review of Systems: A complete review of systems was obtained, negative except as described above. Physical Exam:     Visit Vitals  /71   Pulse 79   Temp 97.3 °F (36.3 °C) (Oral)   Resp 16   Ht 5' 3\" (1.6 m)   Wt 129 lb (58.5 kg)   LMP 07/27/2019   SpO2 100%   BMI 22.85 kg/m²     ECOG PS: 0  General: No distress  Eyes: PERRLA, anicteric sclerae  HENT: Atraumatic, OP clear  Neck: Supple  Lymphatic: No cervical, supraclavicular adenopathy  Respiratory: CTAB, normal respiratory effort  CV: Normal rate, regular rhythm, no murmurs, no peripheral edema  GI: Soft, nontender, nondistended, no masses, no hepatomegaly, no splenomegaly; + BS  MS:  Digits without clubbing or cyanosis. Skin: No rashes, ecchymoses, or petechiae. Normal temperature, turgor, and texture.   Psych: Alert, oriented, appropriate affect, normal judgment/insight  Breasts:  L 2:00 breast, 4 cmfn, <1 cm mass, no LAD    Results:     Lab Results   Component Value Date/Time    WBC 3.0 (L) 08/26/2019 01:25 PM    HGB 9.3 (L) 08/26/2019 01:25 PM    HCT 28.0 (L) 08/26/2019 01:25 PM    PLATELET 787 (L) 69/23/4538 01:25 PM    MCV 93.0 08/26/2019 01:25 PM    ABS. NEUTROPHILS 1.0 (L) 08/26/2019 01:25 PM     Lab Results   Component Value Date/Time    Sodium 136 08/19/2019 02:11 PM    Potassium 3.7 08/19/2019 02:11 PM    Chloride 104 08/19/2019 02:11 PM    CO2 27 08/19/2019 02:11 PM    Glucose 83 08/19/2019 02:11 PM    BUN 11 08/19/2019 02:11 PM    Creatinine 0.66 08/19/2019 02:11 PM    GFR est AA >60 08/19/2019 02:11 PM    GFR est non-AA >60 08/19/2019 02:11 PM    Calcium 8.5 08/19/2019 02:11 PM     Lab Results   Component Value Date/Time    Bilirubin, total 0.6 08/19/2019 02:11 PM    ALT (SGPT) 61 08/19/2019 02:11 PM    AST (SGOT) 42 (H) 08/19/2019 02:11 PM    Alk. phosphatase 42 (L) 08/19/2019 02:11 PM    Protein, total 7.3 08/19/2019 02:11 PM    Albumin 4.1 08/19/2019 02:11 PM    Globulin 3.2 08/19/2019 02:11 PM       6/13/19 MRI breast  FINDINGS:     Background parenchymal enhancement: Moderate. Lymph nodes: No lymphadenopathy.     Left breast: Irregular triangular-shaped mass in the posterior third of the left  breast at 2:00 measures 2.8 x 1.8 x 4.2 cm. Posterior margin is 0.3 cm from the  left pectoralis major muscle. Biopsy clip is in the inferior margin of the mass.     In the middle and posterior thirds of the left breast at 5-6:00, segmental non  mass enhancement with heterogeneous kinetics measures 2.6 cm in oblique AP  diameter in the axial plane. Otherwise, there are foci in the left breast.     Right breast: Heterogeneous patchy enhancement in multiple locations. No  suspicious kinetics. Biopsy clip in the middle third is at 3:00. No surrounding  abnormal enhancement or morphology.     IMPRESSION  IMPRESSION:   1. 2.8 x 1.8 x 4.2 cm biopsy-proven infiltrating ductal carcinoma in the left  breast at 2:00 is in close approximation to the chest wall. 2. Left breast 5-6:00 suspicious segmental non mass enhancement. 3.  No MRI evidence of malignancy in the right breast.  4. No lymphadenopathy.     Assessment: ACR BI-RADS category   Left breast: BI-RADS Assessment Category 4: Suspicious abnormality - Biopsy  should be performed in the absence of clinical contraindication. Right breast: BI-RADS Assessment Category 2: Benign finding.     Recommendation: Bilateral mammography if not recently performed elsewhere. Second look ultrasound of the left breast at 5-6:00 to determine  ultrasound-guided or MRI guided biopsy of non mass enhancement.     A negative breast MRI examination speaks strongly against invasive cancer down  to a detection threshold of 3 to 5 mm but may not detect some lower grade or in  situ carcinomas. Therefore, routine clinical and mammographic followup are  recommended. A summary portfolio has been created in PACS.    7/12/19 TTE EF 65%    Records reviewed and summarized above. Pathology report(s) reviewed above. Radiology report(s) reviewed above. Assessment/plan:   1. L UOQ IDC, gr 3, triple negative:  cT2 cN0 cM0, stage IIA, prognostic stage IIB    We explained to the patient that the goal of systemic adjuvant therapy is to improve the chances for cure and decrease the risk of relapse. We explained why a patient can have microscopic cancer spread now even though physical examination, laboratory studies and imaging studies are negative for cancer. We explained that the same treatments used now as adjuvant or preventive treatments rarely if ever are curative in women who develop metastases. We discussed that there is no difference in overall survival between neoadjuvant and adjuvant chemotherapy. We discussed the rationale for neoadjuvant chemotherapy, if chemotherapy is warranted, as it is in this case: to avoid any potential delays in giving chemotherapy following surgery, to be able to see the response of the tumor to chemotherapy, and to potentially downstage the tumor prior to surgery.     I discussed the potential risks of dose-dense chemotherapy with the patient. (DD AC-T, adriamycin 60 mg/m2; cyclophosphamide 600 mg/m2 q 2 weeks x 4; paclitaxel 80 mg/m2 qweekly x 12). Major toxicities include nausea and vomiting, stomatitis, fatigue, and a small risk of heart damage. Anemia frequently results and occasionally requires growth factors and rarely transfusions. Neutropenic fever is uncommon, but can be a life-threatening problem. Also, there is a small but increased risk of myelodysplasia and acute leukemia. We provided the patient with detailed information concerning toxicity including frequent toxicities that only last a few days, such as nausea, vomiting, mouth sores, arthralgia, myalgia, and potentially allergic reactions to paclitaxel, as well as toxicities which can be longer lasting including total alopecia, fatigue, anemia and neuropathy. We provided the patient with detailed information concerning the toxicities of their regimen in addition to our verbal discussion. We discussed the potential addition of carboplatin auc 6 q 3 weeks during weekly paclitaxel as evaluated in CALGB 42591, showing a significant improvement in pCR for patients with stage II-III triple negative breast cancer. Additional side effects of added myelosuppression, fatigue, renal damage with dehydration, and nausea and vomiting were discussed. AUC is the dose that is currently being used in all investigations, and thus is what will be used here. Discussed in great detail [id number]-B59 which is using the same backbone carbo/taxol, then DD [facility] +/- atezolizumab. A research biopsy would be required. We discussed the risks and benefits of atezolizumab therapy today. Potential side effects include, but are not limited to fatigue, rash, auto-immune issues, infertility, allergic reactions, and rarely, death. Discussed oral and peripheral cryotherapy. Cold caps would not work with [facility].     MRI biopsy of 2nd breast lesion ordered by Dr. Jyoti Mcdowell, negative. Dr. Jyoti Mcdowell has placed port     The patient was given the following prescriptions with written and verbal instructions on how to use each:  Compazine, zofran, olanzapine, a wig, and emla cream.  IV Marlaine Grams will be used with [facility]. Neulasta the following day (bone pain side effect discussed) with AC. She is agreeable to neoadjuvant chemotherapy, declined B-59. Plan to start on 7/22/19. Agreeable to carboplatin/paclitaxel followed by DD AC. She has signed informed consent     Carbo/taxol C2D15 today. Reports IV benadryl made her feel extremely loopy and drowsy, switched to PO bendaryl, patient taking this PRN. States she has been taking zyprexa with each treatment. Discussed with patient that this is for DDAC portion of treatment. 2. Emotional well being:  She has excellent support and is coping well with her disease    3. BRCA1 pathogenic mutation:   Discussed recommendation for bilateral mastectomies and bilateral oophorectomies; risk of ovarian, fallopian or peritoneal cancer is 16-59%; risk for pancreatic cancer is 1-3%; risk for contralateral breast cancer is 10-15%    4. Elevated BP:  Resolved    5. Fertility preservation:  Discussed that there is > 20% chance of loss of menses with chemotherapy. She and her  state that they are not interested in further children and fertility preservation     6. Rash: Started after treatment last week, lasted for about 2-3 days. Previously added solumedrol 125 mg with treatment. Rashes resolved so this was stopped. 7. Low grade temperatures:  None since last visit. 8. Constipation: Relieved with a suppository. Now she is taking fiber and stool softener, prn miralax. This has improved. 9. Menses:  Reports this lasted a little longer than usual.  Will continue to monitor. 10. Dysuria:  UA/UC positive for UTI, s/p abx. Resolved. 11. Nausea: Worse with carboplatin and was worse yesterday.  Continue to take scheduled zofran and compazine. Also will add 1L IV hydration and PRN antiemetics for Friday of next week after Carbo/Taxol. 12. Headaches:  Due to treatment. PRN Excedrin with improvement. I appreciate the opportunity to participate in Ms. Lanny East's care. Signed By: Eligio San MD      No orders of the defined types were placed in this encounter.

## 2019-08-26 NOTE — PROGRESS NOTES
Taran Mccracken is a 44 y.o. female here today for follow up of breast cancer. She is asking about refill for Zyprexa.

## 2019-08-26 NOTE — PROGRESS NOTES
[facility] - Chemotherapy Progress Note    1320 Pt admit to [facility] for Taxol C2D15 ambulatory in stable condition. Assessment completed. No new concerns voiced. Port accessed with positive blood return. Labs drawn and sent for processing. Patient proceeded to scheduled MD appointment. Patient returned with no change in treatment. Results are within parameters to treat. Chemotherapy Flowsheet 8/26/2019   Cycle C2D15   Date 8/26/2019   Drug / Regimen Taxol   Pre Meds given   Notes -       Visit Vitals  /71 (BP 1 Location: Right arm, BP Patient Position: Sitting)   Pulse 79   Temp 97.3 °F (36.3 °C)   Resp 16   Ht 5' 3\" (1.6 m)   Wt 58.7 kg (129 lb 6.4 oz)   LMP 07/27/2019   SpO2 100%   BMI 22.92 kg/m²       Patient Vitals for the past 12 hrs:   Temp Pulse Resp BP SpO2   08/26/19 1728 97.3 °F (36.3 °C) 80 16 109/78 99 %   08/26/19 1323 97.3 °F (36.3 °C) 79 16 100/71 100 %     Recent Results (from the past 12 hour(s))   CBC WITH AUTOMATED DIFF    Collection Time: 08/26/19  1:25 PM   Result Value Ref Range    WBC 3.0 (L) 3.6 - 11.0 K/uL    RBC 3.01 (L) 3.80 - 5.20 M/uL    HGB 9.3 (L) 11.5 - 16.0 g/dL    HCT 28.0 (L) 35.0 - 47.0 %    MCV 93.0 80.0 - 99.0 FL    MCH 30.9 26.0 - 34.0 PG    MCHC 33.2 30.0 - 36.5 g/dL    RDW 13.2 11.5 - 14.5 %    PLATELET 336 (L) 143 - 400 K/uL    MPV 9.9 8.9 - 12.9 FL    NRBC 0.0 0  WBC    ABSOLUTE NRBC 0.00 0.00 - 0.01 K/uL    NEUTROPHILS 33 32 - 75 %    LYMPHOCYTES 56 (H) 12 - 49 %    MONOCYTES 10 5 - 13 %    EOSINOPHILS 1 0 - 7 %    BASOPHILS 0 0 - 1 %    IMMATURE GRANULOCYTES 0 0.0 - 0.5 %    ABS. NEUTROPHILS 1.0 (L) 1.8 - 8.0 K/UL    ABS. LYMPHOCYTES 1.7 0.8 - 3.5 K/UL    ABS. MONOCYTES 0.3 0.0 - 1.0 K/UL    ABS. EOSINOPHILS 0.0 0.0 - 0.4 K/UL    ABS. BASOPHILS 0.0 0.0 - 0.1 K/UL    ABS. IMM.  GRANS. 0.0 0.00 - 0.04 K/UL    DF AUTOMATED         Medications:  Medications Administered     dexamethasone (DECADRON) injection 10 mg     Admin Date  08/26/2019 Action  Given Dose  10 mg Route  IntraVENous Administered By  Biju Crowe, RN          diphenhydrAMINE (BENADRYL) capsule 50 mg     Admin Date  08/26/2019 Action  Given Dose  50 mg Route  Oral Administered By  Biju Crowe, RN          famotidine (PF) (PEPCID) 20 mg in sodium chloride 0.9% 10 mL injection     Admin Date  08/26/2019 Action  Given Dose  20 mg Route  IntraVENous Administered By  Biju Crowe, RN          heparin (porcine) pf 300-500 Units     Admin Date  08/26/2019 Action  Given Dose  500 Units Route  InterCATHeter Administered By  Biju Crowe, RN          PACLitaxel (TAXOL) 134 mg in 0.9% sodium chloride 250 mL, overfill volume 25 mL chemo infusion     Admin Date  08/26/2019 Action  New Bag Dose  134 mg Rate  297.3 mL/hr Route  IntraVENous Administered By  Biju Crowe, RN          saline peripheral flush soln 10 mL     Admin Date  08/26/2019 Action  Given Dose  10 mL Route  InterCATHeter Administered By  Biju Crowe, RN          sodium chloride 0.9 % bolus infusion 1,000 mL     Admin Date  08/26/2019 Action  New Bag Dose  1000 mL Rate  1,000 mL/hr Route  IntraVENous Administered By  Biju Crowe, RN          sodium chloride 0.9% injection 10 mL     Admin Date  08/26/2019 Action  Given Dose  10 mL Route  IntraVENous Administered By  Biju Service, RN                  0047 Pt tolerated treatment well. Port maintained positive blood return throughout treatment, flushed with positive blood return at conclusion and throughout. D/c home ambulatory in no distress.  Pt aware of next appointment scheduled for     Future Appointments   Date Time Provider Misty Martinez   9/3/2019 11:00 AM SS INF1 CH3 <4H [facility]   9/6/2019  2:30 PM SS INF1 CH4 <2H [facility]   9/9/2019  1:00 [facility]   9/9/2019  1:45 PM Quin Hassan NP [facility] SCHED   9/16/2019  1:00 PM SS INF7 CH4 <4H [facility]   9/23/2019 10:30 AM [facility]   9/30/2019  1:00 PM SS INF7 CH4 <4H [facility]   10/7/2019  1:00 PM [facility] [address] [place] [address]

## 2019-08-30 NOTE — PROGRESS NOTES
[facility] at [address], [address] [facility]  Chevy Cazares: [phone]  F: [phone]      Reason for Visit:   Barby Zeng is a 44 y.o. female who is seen in follow up for evaluation of therapy for breast cancer    Treatment History:   · 6/10/19 L breast core bx:  IDC, gr 3, 1.1 cm, + LVI, ER negative, [place] negative, HER 2 negative at [facility] 1+; DCIS present gr 3, solid with expansile necrosis  · 7/3/19 BRCA1 pathogenic mutation (invitae), c.5266dup  · 19 MRI breast bx:  Negative  · 19- carbo/taxol    History of Present Illness:   She felt the L breast mass herself in May 2019, with aching pain, leading to the pathology above. Interval history:  In today for follow up and treatment. Complains of gr 2 loss of appetite and nausea. No vomiting. Grade 2 fatigue. Grade 1 constipation. Having GARRETT, grade 1, with walking to [place]. FH:   2 paternal aunts with post-menopausal breast cancer, one aunt with ovarian cancer; that aunt's daughter tested positive for BRCA1 (first cousin); no pancreas cancer, no prostate cancer    Past Medical History:   Diagnosis Date    Breast cancer ([address] 2019    left breast cancer      Past Surgical History:   Procedure Laterality Date    HX APPENDECTOMY      HX  SECTION      x3      Social History     Tobacco Use    Smoking status: Never Smoker    Smokeless tobacco: Never Used   Substance Use Topics    Alcohol use: Not Currently      Family History   Problem Relation Age of Onset    Diabetes Mother     Heart Disease Mother     Hypertension Mother     Heart Disease Father     Cancer Paternal Aunt         Breast    Cancer Paternal Aunt         Breast     Current Outpatient Medications   Medication Sig    cholecalciferol, vitamin D3, (VITAMIN D3 PO) Take 2,000 Units by mouth daily.  Lactobacillus acidophilus (PROBIOTIC PO) Take 1 Tab by mouth daily.     prasterone, DHEA, (DHEA PO) Take 10 mg by mouth daily.    OTHER Massage    Dx c50.412 breast cancer    NP THYROID 15 mg tablet Take 15 mg by mouth daily.  testosterone 75 mg pllt by Implant route.  MELATONIN PO Take  by mouth nightly as needed.  prochlorperazine (COMPAZINE) 10 mg tablet Take 1 Tab by mouth every six (6) hours as needed for Nausea.  lidocaine-prilocaine (EMLA) topical cream Apply  to affected area as needed for Pain.  ondansetron hcl (ZOFRAN) 8 mg tablet Take 1 Tab by mouth every eight (8) hours as needed for Nausea.  OLANZapine (ZYPREXA) 10 mg tablet 10 mg qhs on days 1-4 following chemo     No current facility-administered medications for this visit. Facility-Administered Medications Ordered in Other Visits   Medication Dose Route Frequency    saline peripheral flush soln 10 mL  10 mL InterCATHeter PRN    sodium chloride 0.9% injection 10 mL  10 mL IntraVENous PRN    heparin (porcine) pf 300-500 Units  300-500 Units InterCATHeter PRN    sodium chloride 0.9 % bolus infusion 1,000 mL  1,000 mL IntraVENous ONCE      No Known Allergies     Review of Systems: A complete review of systems was obtained, negative except as described above. Physical Exam:     Visit Vitals  /77   Pulse 96   Temp 97.1 °F (36.2 °C) (Temporal)   Resp 18   Ht 5' 2.99\" (1.6 m)   Wt 120 lb 4.8 oz (54.6 kg)   SpO2 99%   BMI 21.32 kg/m²     ECOG PS: 0  General: No distress  Eyes: PERRLA, anicteric sclerae  HENT: Atraumatic, OP clear  Neck: Supple  Lymphatic: No cervical, supraclavicular adenopathy  Respiratory: CTAB, normal respiratory effort  CV: Normal rate, regular rhythm, no murmurs, no peripheral edema  GI: Soft, nontender, nondistended, no masses, no hepatomegaly, no splenomegaly; + BS  MS:  Digits without clubbing or cyanosis. Skin: No rashes, ecchymoses, or petechiae. Normal temperature, turgor, and texture.   Psych: Alert, oriented, appropriate affect, normal judgment/insight  Breasts:  L 2:00 breast, 4 cmfn, <1 cm mass, no LAD    Results:     Lab Results   Component Value Date/Time    WBC 3.5 (L) 09/09/2019 01:25 PM    HGB 10.6 (L) 09/09/2019 01:25 PM    HCT 30.9 (L) 09/09/2019 01:25 PM    PLATELET 040 18/64/4726 01:25 PM    MCV 91.4 09/09/2019 01:25 PM    ABS. NEUTROPHILS 2.0 09/09/2019 01:25 PM     Lab Results   Component Value Date/Time    Sodium 142 09/03/2019 11:40 AM    Potassium 3.4 (L) 09/03/2019 11:40 AM    Chloride 108 09/03/2019 11:40 AM    CO2 28 09/03/2019 11:40 AM    Glucose 88 09/03/2019 11:40 AM    BUN 8 09/03/2019 11:40 AM    Creatinine 0.56 09/03/2019 11:40 AM    GFR est AA >60 09/03/2019 11:40 AM    GFR est non-AA >60 09/03/2019 11:40 AM    Calcium 8.7 09/03/2019 11:40 AM     Lab Results   Component Value Date/Time    Bilirubin, total 0.4 09/03/2019 11:40 AM    ALT (SGPT) 134 (H) 09/03/2019 11:40 AM    AST (SGOT) 75 (H) 09/03/2019 11:40 AM    Alk. phosphatase 42 (L) 09/03/2019 11:40 AM    Protein, total 6.7 09/03/2019 11:40 AM    Albumin 3.8 09/03/2019 11:40 AM    Globulin 2.9 09/03/2019 11:40 AM       6/13/19 MRI breast  FINDINGS:     Background parenchymal enhancement: Moderate. Lymph nodes: No lymphadenopathy.     Left breast: Irregular triangular-shaped mass in the posterior third of the left  breast at 2:00 measures 2.8 x 1.8 x 4.2 cm. Posterior margin is 0.3 cm from the  left pectoralis major muscle. Biopsy clip is in the inferior margin of the mass.     In the middle and posterior thirds of the left breast at 5-6:00, segmental non  mass enhancement with heterogeneous kinetics measures 2.6 cm in oblique AP  diameter in the axial plane. Otherwise, there are foci in the left breast.     Right breast: Heterogeneous patchy enhancement in multiple locations. No  suspicious kinetics. Biopsy clip in the middle third is at 3:00.  No surrounding  abnormal enhancement or morphology.     IMPRESSION:   1. 2.8 x 1.8 x 4.2 cm biopsy-proven infiltrating ductal carcinoma in the left  breast at 2:00 is in close approximation to the chest wall. 2. Left breast 5-6:00 suspicious segmental non mass enhancement. 3. No MRI evidence of malignancy in the right breast.  4. No lymphadenopathy.     Assessment: ACR BI-RADS category   Left breast: BI-RADS Assessment Category 4: Suspicious abnormality - Biopsy  should be performed in the absence of clinical contraindication. Right breast: BI-RADS Assessment Category 2: Benign finding.     Recommendation: Bilateral mammography if not recently performed elsewhere. Second look ultrasound of the left breast at 5-6:00 to determine  ultrasound-guided or MRI guided biopsy of non mass enhancement.     A negative breast MRI examination speaks strongly against invasive cancer down  to a detection threshold of 3 to 5 mm but may not detect some lower grade or in  situ carcinomas. Therefore, routine clinical and mammographic followup are  recommended. A summary portfolio has been created in PACS.    7/12/19 TTE EF 65%    Records reviewed and summarized above. Pathology report(s) reviewed above. Radiology report(s) reviewed above. Assessment/plan:   1. L UOQ IDC, gr 3, triple negative:  cT2 cN0 cM0, stage IIA, prognostic stage IIB    We explained to the patient that the goal of systemic adjuvant therapy is to improve the chances for cure and decrease the risk of relapse. We explained why a patient can have microscopic cancer spread now even though physical examination, laboratory studies and imaging studies are negative for cancer. We explained that the same treatments used now as adjuvant or preventive treatments rarely if ever are curative in women who develop metastases. We discussed that there is no difference in overall survival between neoadjuvant and adjuvant chemotherapy.   We discussed the rationale for neoadjuvant chemotherapy, if chemotherapy is warranted, as it is in this case: to avoid any potential delays in giving chemotherapy following surgery, to be able to see the response of the tumor to chemotherapy, and to potentially downstage the tumor prior to surgery. I discussed the potential risks of dose-dense chemotherapy with the patient. (DD AC-T, adriamycin 60 mg/m2; cyclophosphamide 600 mg/m2 q 2 weeks x 4; paclitaxel 80 mg/m2 qweekly x 12). Major toxicities include nausea and vomiting, stomatitis, fatigue, and a small risk of heart damage. Anemia frequently results and occasionally requires growth factors and rarely transfusions. Neutropenic fever is uncommon, but can be a life-threatening problem. Also, there is a small but increased risk of myelodysplasia and acute leukemia. We provided the patient with detailed information concerning toxicity including frequent toxicities that only last a few days, such as nausea, vomiting, mouth sores, arthralgia, myalgia, and potentially allergic reactions to paclitaxel, as well as toxicities which can be longer lasting including total alopecia, fatigue, anemia and neuropathy. We provided the patient with detailed information concerning the toxicities of their regimen in addition to our verbal discussion. We discussed the potential addition of carboplatin auc 6 q 3 weeks during weekly paclitaxel as evaluated in CALGB 18992, showing a significant improvement in pCR for patients with stage II-III triple negative breast cancer. Additional side effects of added myelosuppression, fatigue, renal damage with dehydration, and nausea and vomiting were discussed. AUC is the dose that is currently being used in all investigations, and thus is what will be used here. Discussed in great detail [id number]-B59 which is using the same backbone carbo/taxol, then [facility] +/- atezolizumab. A research biopsy would be required. We discussed the risks and benefits of atezolizumab therapy today. Potential side effects include, but are not limited to fatigue, rash, auto-immune issues, infertility, allergic reactions, and rarely, death.      Discussed oral and peripheral cryotherapy. Cold caps would not work with [facility]. MRI biopsy of 2nd breast lesion ordered by Dr. Analia Stroud, negative. Dr. Analia Stroud has placed port     The patient was given the following prescriptions with written and verbal instructions on how to use each:  Compazine, zofran, olanzapine, a wig, and emla cream.  IV Basil Orf will be used with [facility]. Neulasta the following day (bone pain side effect discussed) with AC. She is agreeable to neoadjuvant chemotherapy, declined B-59. Initiated therapy on 7/22/19. Agreeable to carboplatin/paclitaxel followed by DD AC. She has signed informed consent     Carbo/taxol C3D8 today. Reports IV benadryl made her feel extremely loopy and drowsy, switched to PO bendaryl, patient taking this PRN. States she has been taking zyprexa with each treatment. Discussed with patient that this is for DDAC portion of treatment. 2. Emotional well being:  She has excellent support and is coping well with her disease    3. BRCA1 pathogenic mutation:   Discussed recommendation for bilateral mastectomies and bilateral oophorectomies; risk of ovarian, fallopian or peritoneal cancer is 16-59%; risk for pancreatic cancer is 1-3%; risk for contralateral breast cancer is 10-15%    4. Fertility preservation:  Discussed that there is > 20% chance of loss of menses with chemotherapy. She and her  state that they are not interested in further children and fertility preservation     5. Constipation: Relieved with a suppository. Now she is taking fiber and stool softener, prn miralax. This has improved. 6. Menses:  Reports this lasted a little longer than usual.  Will continue to monitor. 7. Nausea, chemotherapy induced: Worse with carboplatin. Continue to take scheduled zofran and compazine. Added 1L IV hydration with each treatment and Zofran prior to Taxol on day 8 and 15.     8. Headaches:  Due to treatment. PRN Excedrin with improvement.       Patient was seen in conjunction with Rafael Kat NP. Signed By: Corinne Babb MD      No orders of the defined types were placed in this encounter.

## 2019-09-03 ENCOUNTER — HOSPITAL ENCOUNTER (OUTPATIENT)
Dept: INFUSION THERAPY | Age: 44
Discharge: HOME | End: 2019-09-03
Payer: OTHER GOVERNMENT

## 2019-09-03 VITALS
DIASTOLIC BLOOD PRESSURE: 66 MMHG | HEART RATE: 80 BPM | RESPIRATION RATE: 16 BRPM | SYSTOLIC BLOOD PRESSURE: 105 MMHG | WEIGHT: 125.2 LBS | HEIGHT: 63 IN | BODY MASS INDEX: 22.18 KG/M2 | TEMPERATURE: 96.5 F

## 2019-09-03 DIAGNOSIS — Z17.1 MALIGNANT NEOPLASM OF UPPER-OUTER QUADRANT OF LEFT BREAST IN FEMALE, ESTROGEN RECEPTOR NEGATIVE (HCC): Primary | ICD-10-CM

## 2019-09-03 DIAGNOSIS — C50.412 MALIGNANT NEOPLASM OF UPPER-OUTER QUADRANT OF LEFT BREAST IN FEMALE, ESTROGEN RECEPTOR NEGATIVE (HCC): Primary | ICD-10-CM

## 2019-09-03 LAB
ALBUMIN SERPL-MCNC: 3.8 G/DL (ref 3.5–5)
ALBUMIN/GLOB SERPL: 1.3 {RATIO} (ref 1.1–2.2)
ALP SERPL-CCNC: 42 U/L (ref 45–117)
ALT SERPL-CCNC: 134 U/L (ref 12–78)
ANION GAP SERPL CALC-SCNC: 6 MMOL/L (ref 5–15)
AST SERPL-CCNC: 75 U/L (ref 15–37)
BASOPHILS # BLD: 0 K/UL (ref 0–0.1)
BASOPHILS NFR BLD: 0 % (ref 0–1)
BILIRUB SERPL-MCNC: 0.4 MG/DL (ref 0.2–1)
BUN SERPL-MCNC: 8 MG/DL (ref 6–20)
BUN/CREAT SERPL: 14 (ref 12–20)
CALCIUM SERPL-MCNC: 8.7 MG/DL (ref 8.5–10.1)
CHLORIDE SERPL-SCNC: 108 MMOL/L (ref 97–108)
CO2 SERPL-SCNC: 28 MMOL/L (ref 21–32)
CREAT SERPL-MCNC: 0.56 MG/DL (ref 0.55–1.02)
DIFFERENTIAL METHOD BLD: ABNORMAL
EOSINOPHIL # BLD: 0 K/UL (ref 0–0.4)
EOSINOPHIL NFR BLD: 1 % (ref 0–7)
ERYTHROCYTE [DISTWIDTH] IN BLOOD BY AUTOMATED COUNT: 15.4 % (ref 11.5–14.5)
GLOBULIN SER CALC-MCNC: 2.9 G/DL (ref 2–4)
GLUCOSE SERPL-MCNC: 88 MG/DL (ref 65–100)
HCT VFR BLD AUTO: 27.5 % (ref 35–47)
HGB BLD-MCNC: 9.1 G/DL (ref 11.5–16)
IMM GRANULOCYTES # BLD AUTO: 0 K/UL (ref 0–0.04)
IMM GRANULOCYTES NFR BLD AUTO: 1 % (ref 0–0.5)
LYMPHOCYTES # BLD: 1.8 K/UL (ref 0.8–3.5)
LYMPHOCYTES NFR BLD: 51 % (ref 12–49)
MCH RBC QN AUTO: 31.1 PG (ref 26–34)
MCHC RBC AUTO-ENTMCNC: 33.1 G/DL (ref 30–36.5)
MCV RBC AUTO: 93.9 FL (ref 80–99)
MONOCYTES # BLD: 0.4 K/UL (ref 0–1)
MONOCYTES NFR BLD: 10 % (ref 5–13)
NEUTS SEG # BLD: 1.3 K/UL (ref 1.8–8)
NEUTS SEG NFR BLD: 37 % (ref 32–75)
NRBC # BLD: 0 K/UL (ref 0–0.01)
NRBC BLD-RTO: 0 PER 100 WBC
PLATELET # BLD AUTO: 99 K/UL (ref 150–400)
PMV BLD AUTO: 10.1 FL (ref 8.9–12.9)
POTASSIUM SERPL-SCNC: 3.4 MMOL/L (ref 3.5–5.1)
PROT SERPL-MCNC: 6.7 G/DL (ref 6.4–8.2)
RBC # BLD AUTO: 2.93 M/UL (ref 3.8–5.2)
SODIUM SERPL-SCNC: 142 MMOL/L (ref 136–145)
WBC # BLD AUTO: 3.5 K/UL (ref 3.6–11)

## 2019-09-03 PROCEDURE — 74011000258 HC RX REV CODE- 258: Performed by: INTERNAL MEDICINE

## 2019-09-03 PROCEDURE — 74011250637 HC RX REV CODE- 250/637: Performed by: NURSE PRACTITIONER

## 2019-09-03 PROCEDURE — 36415 COLL VENOUS BLD VENIPUNCTURE: CPT

## 2019-09-03 PROCEDURE — 96367 TX/PROPH/DG ADDL SEQ IV INF: CPT

## 2019-09-03 PROCEDURE — 96375 TX/PRO/DX INJ NEW DRUG ADDON: CPT

## 2019-09-03 PROCEDURE — 74011250636 HC RX REV CODE- 250/636: Performed by: NURSE PRACTITIONER

## 2019-09-03 PROCEDURE — 96413 CHEMO IV INFUSION 1 HR: CPT

## 2019-09-03 PROCEDURE — 96417 CHEMO IV INFUS EACH ADDL SEQ: CPT

## 2019-09-03 PROCEDURE — 77030012965 HC NDL HUBR BBMI -A

## 2019-09-03 PROCEDURE — 80053 COMPREHEN METABOLIC PANEL: CPT

## 2019-09-03 PROCEDURE — 74011250636 HC RX REV CODE- 250/636: Performed by: INTERNAL MEDICINE

## 2019-09-03 PROCEDURE — 85025 COMPLETE CBC W/AUTO DIFF WBC: CPT

## 2019-09-03 RX ORDER — PALONOSETRON 0.05 MG/ML
0.25 INJECTION, SOLUTION INTRAVENOUS ONCE
Status: COMPLETED | OUTPATIENT
Start: 2019-09-03 | End: 2019-09-03

## 2019-09-03 RX ORDER — SODIUM CHLORIDE 0.9 % (FLUSH) 0.9 %
10 SYRINGE (ML) INJECTION AS NEEDED
Status: ACTIVE | OUTPATIENT
Start: 2019-09-03 | End: 2019-09-03

## 2019-09-03 RX ORDER — SODIUM CHLORIDE 9 MG/ML
10 INJECTION INTRAMUSCULAR; INTRAVENOUS; SUBCUTANEOUS AS NEEDED
Status: ACTIVE | OUTPATIENT
Start: 2019-09-03 | End: 2019-09-03

## 2019-09-03 RX ORDER — SODIUM CHLORIDE 9 MG/ML
25 INJECTION, SOLUTION INTRAVENOUS CONTINUOUS
Status: DISCONTINUED | OUTPATIENT
Start: 2019-09-03 | End: 2019-09-04 | Stop reason: HOSPADM

## 2019-09-03 RX ORDER — HEPARIN 100 UNIT/ML
300-500 SYRINGE INTRAVENOUS AS NEEDED
Status: ACTIVE | OUTPATIENT
Start: 2019-09-03 | End: 2019-09-03

## 2019-09-03 RX ORDER — DIPHENHYDRAMINE HCL 25 MG
50 CAPSULE ORAL ONCE
Status: COMPLETED | OUTPATIENT
Start: 2019-09-03 | End: 2019-09-03

## 2019-09-03 RX ORDER — POTASSIUM CHLORIDE 750 MG/1
40 TABLET, FILM COATED, EXTENDED RELEASE ORAL
Status: COMPLETED | OUTPATIENT
Start: 2019-09-03 | End: 2019-09-03

## 2019-09-03 RX ADMIN — SODIUM CHLORIDE 25 ML/HR: 900 INJECTION, SOLUTION INTRAVENOUS at 13:44

## 2019-09-03 RX ADMIN — PALONOSETRON 0.25 MG: 0.25 INJECTION, SOLUTION INTRAVENOUS at 13:46

## 2019-09-03 RX ADMIN — Medication 500 UNITS: at 16:45

## 2019-09-03 RX ADMIN — POTASSIUM CHLORIDE 40 MEQ: 750 TABLET, FILM COATED, EXTENDED RELEASE ORAL at 13:44

## 2019-09-03 RX ADMIN — Medication 10 ML: at 11:30

## 2019-09-03 RX ADMIN — FAMOTIDINE 20 MG: 10 INJECTION INTRAVENOUS at 13:48

## 2019-09-03 RX ADMIN — Medication 10 ML: at 16:45

## 2019-09-03 RX ADMIN — DIPHENHYDRAMINE HYDROCHLORIDE 50 MG: 25 CAPSULE ORAL at 13:44

## 2019-09-03 RX ADMIN — CARBOPLATIN 712 MG: 10 INJECTION, SOLUTION INTRAVENOUS at 15:56

## 2019-09-03 RX ADMIN — SODIUM CHLORIDE 150 MG: 900 INJECTION, SOLUTION INTRAVENOUS at 13:55

## 2019-09-03 RX ADMIN — PACLITAXEL 134 MG: 6 INJECTION, SOLUTION INTRAVENOUS at 14:49

## 2019-09-03 RX ADMIN — SODIUM CHLORIDE 10 ML: 9 INJECTION INTRAMUSCULAR; INTRAVENOUS; SUBCUTANEOUS at 11:30

## 2019-09-03 RX ADMIN — DEXAMETHASONE SODIUM PHOSPHATE 12 MG: 4 INJECTION, SOLUTION INTRA-ARTICULAR; INTRALESIONAL; INTRAMUSCULAR; INTRAVENOUS; SOFT TISSUE at 13:55

## 2019-09-03 NOTE — PROGRESS NOTES
[facility] Progress Note    Date: September 3, 2019    Name: Cheyenne Galvan    MRN: [id number]         : 1975    1120  Ms. [facility] Arrived ambulatory and in no distress for C3D1 of Taxol/Carbo Regimen. Assessment was completed, no acute issues at this time, no new complaints voiced. Right chest wall port accessed without difficulty, labs drawn & sent for processing. Ms. Dyan Loaiza vitals were reviewed. Visit Vitals  /61 (BP 1 Location: Right arm, BP Patient Position: Sitting)   Pulse 79   Temp 96.2 °F (35.7 °C)   Resp 16   Ht 5' 3\" (1.6 m)   Wt 56.8 kg (125 lb 3.2 oz)   Breastfeeding? No   BMI 22.18 kg/m²       Lab results were obtained and reviewed. Recent Results (from the past 12 hour(s))   CBC WITH AUTOMATED DIFF    Collection Time: 19 11:40 AM   Result Value Ref Range    WBC 3.5 (L) 3.6 - 11.0 K/uL    RBC 2.93 (L) 3.80 - 5.20 M/uL    HGB 9.1 (L) 11.5 - 16.0 g/dL    HCT 27.5 (L) 35.0 - 47.0 %    MCV 93.9 80.0 - 99.0 FL    MCH 31.1 26.0 - 34.0 PG    MCHC 33.1 30.0 - 36.5 g/dL    RDW 15.4 (H) 11.5 - 14.5 %    PLATELET 99 (L) 436 - 400 K/uL    MPV 10.1 8.9 - 12.9 FL    NRBC 0.0 0  WBC    ABSOLUTE NRBC 0.00 0.00 - 0.01 K/uL    NEUTROPHILS 37 32 - 75 %    LYMPHOCYTES 51 (H) 12 - 49 %    MONOCYTES 10 5 - 13 %    EOSINOPHILS 1 0 - 7 %    BASOPHILS 0 0 - 1 %    IMMATURE GRANULOCYTES 1 (H) 0.0 - 0.5 %    ABS. NEUTROPHILS 1.3 (L) 1.8 - 8.0 K/UL    ABS. LYMPHOCYTES 1.8 0.8 - 3.5 K/UL    ABS. MONOCYTES 0.4 0.0 - 1.0 K/UL    ABS. EOSINOPHILS 0.0 0.0 - 0.4 K/UL    ABS. BASOPHILS 0.0 0.0 - 0.1 K/UL    ABS. IMM.  GRANS. 0.0 0.00 - 0.04 K/UL    DF AUTOMATED     METABOLIC PANEL, COMPREHENSIVE    Collection Time: 19 11:40 AM   Result Value Ref Range    Sodium 142 136 - 145 mmol/L    Potassium 3.4 (L) 3.5 - 5.1 mmol/L    Chloride 108 97 - 108 mmol/L    CO2 28 21 - 32 mmol/L    Anion gap 6 5 - 15 mmol/L    Glucose 88 65 - 100 mg/dL    BUN 8 6 - 20 MG/DL    Creatinine 0.56 0.55 - 1.02 MG/DL BUN/Creatinine ratio 14 12 - 20      GFR est AA >60 >60 ml/min/1.73m2    GFR est non-AA >60 >60 ml/min/1.73m2    Calcium 8.7 8.5 - 10.1 MG/DL    Bilirubin, total 0.4 0.2 - 1.0 MG/DL    ALT (SGPT) 134 (H) 12 - 78 U/L    AST (SGOT) 75 (H) 15 - 37 U/L    Alk. phosphatase 42 (L) 45 - 117 U/L    Protein, total 6.7 6.4 - 8.2 g/dL    Albumin 3.8 3.5 - 5.0 g/dL    Globulin 2.9 2.0 - 4.0 g/dL    A-G Ratio 1.3 1.1 - 2.2       Received an OK to treat with platelet count of 44A/YR and AST of 75 U/L.        Medications:  Medications Administered     0.9% sodium chloride infusion     Admin Date  09/03/2019 Action  New Bag Dose  25 mL/hr Rate  25 mL/hr Route  IntraVENous Administered By  Ismael Lu RN          CARBOplatin (PARAPLATIN) 712 mg in 0.9% sodium chloride 250 mL, overfill volume 25 mL chemo infusion     Admin Date  09/03/2019 Action  New Bag Dose  712 mg Rate  692.4 mL/hr Route  IntraVENous Administered By  Ismael Lu RN          dexamethasone (DECADRON) 12 mg in 0.9% sodium chloride 50 mL IVPB     Admin Date  09/03/2019 Action  Given Dose  12 mg Route  IntraVENous Administered By  Ismael Lu RN          diphenhydrAMINE (BENADRYL) capsule 50 mg     Admin Date  09/03/2019 Action  Given Dose  50 mg Route  Oral Administered By  Ismael Lu RN          famotidine (PF) (PEPCID) 20 mg in sodium chloride 0.9% 10 mL injection     Admin Date  09/03/2019 Action  Given Dose  20 mg Route  IntraVENous Administered By  Ismael Lu RN          fosaprepitant (EMEND) 150 mg in 0.9% sodium chloride 150 mL IVPB     Admin Date  09/03/2019 Action  Given Dose  150 mg Rate  450 mL/hr Route  IntraVENous Administered By  Ismael Lu RN          heparin (porcine) pf 300-500 Units     Admin Date  09/03/2019 Action  Given Dose  500 Units Route  InterCATHeter Administered By  Ismael Lu RN          PACLitaxel (TAXOL) 134 mg in 0.9% sodium chloride 250 mL, overfill volume 25 mL chemo infusion     Admin Date  09/03/2019 Action  New Bag Dose  134 mg Rate  297.3 mL/hr Route  IntraVENous Administered By  Leeanna Long RN          palonosetron HCl (ALOXI) injection 0.25 mg     Admin Date  09/03/2019 Action  Given Dose  0.25 mg Route  IntraVENous Administered By  Leeanna Long RN          potassium chloride SR (KLOR-CON 10) tablet 40 mEq     Admin Date  09/03/2019 Action  Given Dose  40 mEq Route  Oral Administered By  Leeanna Long RN          saline peripheral flush soln 10 mL     Admin Date  09/03/2019 Action  Given Dose  10 mL Route  InterCATHeter Administered By  Leeanna Long RN           Admin Date  09/03/2019 Action  Given Dose  10 mL Route  InterCATHeter Administered By  Leeanna Long RN          sodium chloride 0.9% injection 10 mL     Admin Date  09/03/2019 Action  Given Dose  10 mL Route  IntraVENous Administered By  Leeanna Long RN              Visit Vitals  /66   Pulse 80   Temp 96.5 °F (35.8 °C)   Resp 16   Ht 5' 3\" (1.6 m)   Wt 56.8 kg (125 lb 3.2 oz)   Breastfeeding? No   BMI 22.18 kg/m²       1650  MsOrtega East tolerated treatment well and was discharged from [place] [address] in stable condition at 1650. Port de-accessed, flushed & heparinized per protocol. She is to return on September 6 at 1430 for her next appointment.     Mega Navarro RN  September 3, 2019

## 2019-09-06 ENCOUNTER — HOSPITAL ENCOUNTER (OUTPATIENT)
Dept: INFUSION THERAPY | Age: 44
Discharge: HOME | End: 2019-09-06
Payer: OTHER GOVERNMENT

## 2019-09-06 VITALS
HEART RATE: 85 BPM | DIASTOLIC BLOOD PRESSURE: 67 MMHG | SYSTOLIC BLOOD PRESSURE: 100 MMHG | RESPIRATION RATE: 18 BRPM | TEMPERATURE: 98 F

## 2019-09-06 DIAGNOSIS — C50.412 MALIGNANT NEOPLASM OF UPPER-OUTER QUADRANT OF LEFT BREAST IN FEMALE, ESTROGEN RECEPTOR NEGATIVE (HCC): Primary | ICD-10-CM

## 2019-09-06 DIAGNOSIS — Z17.1 MALIGNANT NEOPLASM OF UPPER-OUTER QUADRANT OF LEFT BREAST IN FEMALE, ESTROGEN RECEPTOR NEGATIVE (HCC): Primary | ICD-10-CM

## 2019-09-06 PROCEDURE — 74011000250 HC RX REV CODE- 250: Performed by: INTERNAL MEDICINE

## 2019-09-06 PROCEDURE — 74011250636 HC RX REV CODE- 250/636: Performed by: NURSE PRACTITIONER

## 2019-09-06 PROCEDURE — 96360 HYDRATION IV INFUSION INIT: CPT

## 2019-09-06 PROCEDURE — 74011250636 HC RX REV CODE- 250/636: Performed by: INTERNAL MEDICINE

## 2019-09-06 PROCEDURE — 77030012965 HC NDL HUBR BBMI -A

## 2019-09-06 PROCEDURE — 96375 TX/PRO/DX INJ NEW DRUG ADDON: CPT

## 2019-09-06 RX ORDER — PROCHLORPERAZINE EDISYLATE 5 MG/ML
10 INJECTION INTRAMUSCULAR; INTRAVENOUS SEE ADMIN INSTRUCTIONS
Status: CANCELLED
Start: 2019-09-06

## 2019-09-06 RX ORDER — SODIUM CHLORIDE 0.9 % (FLUSH) 0.9 %
5-10 SYRINGE (ML) INJECTION AS NEEDED
Status: DISCONTINUED | OUTPATIENT
Start: 2019-09-06 | End: 2019-09-07 | Stop reason: HOSPADM

## 2019-09-06 RX ORDER — ONDANSETRON 2 MG/ML
8 INJECTION INTRAMUSCULAR; INTRAVENOUS SEE ADMIN INSTRUCTIONS
Status: CANCELLED
Start: 2019-09-06

## 2019-09-06 RX ORDER — PROCHLORPERAZINE EDISYLATE 5 MG/ML
10 INJECTION INTRAMUSCULAR; INTRAVENOUS ONCE
Status: DISCONTINUED | OUTPATIENT
Start: 2019-09-06 | End: 2019-09-07 | Stop reason: HOSPADM

## 2019-09-06 RX ORDER — SODIUM CHLORIDE 9 MG/ML
10 INJECTION INTRAMUSCULAR; INTRAVENOUS; SUBCUTANEOUS AS NEEDED
Status: DISCONTINUED | OUTPATIENT
Start: 2019-09-06 | End: 2019-09-07 | Stop reason: HOSPADM

## 2019-09-06 RX ORDER — ONDANSETRON 2 MG/ML
8 INJECTION INTRAMUSCULAR; INTRAVENOUS ONCE
Status: COMPLETED | OUTPATIENT
Start: 2019-09-06 | End: 2019-09-06

## 2019-09-06 RX ORDER — HEPARIN 100 UNIT/ML
500 SYRINGE INTRAVENOUS AS NEEDED
Status: DISCONTINUED | OUTPATIENT
Start: 2019-09-06 | End: 2019-09-07 | Stop reason: HOSPADM

## 2019-09-06 RX ADMIN — SODIUM CHLORIDE 10 ML: 9 INJECTION, SOLUTION INTRAMUSCULAR; INTRAVENOUS; SUBCUTANEOUS at 14:55

## 2019-09-06 RX ADMIN — SODIUM CHLORIDE 1000 ML: 900 INJECTION, SOLUTION INTRAVENOUS at 14:55

## 2019-09-06 RX ADMIN — Medication 500 UNITS: at 15:57

## 2019-09-06 RX ADMIN — SODIUM CHLORIDE 10 ML: 9 INJECTION, SOLUTION INTRAMUSCULAR; INTRAVENOUS; SUBCUTANEOUS at 15:56

## 2019-09-06 RX ADMIN — ONDANSETRON 8 MG: 2 INJECTION, SOLUTION INTRAMUSCULAR; INTRAVENOUS at 15:18

## 2019-09-06 NOTE — PROGRESS NOTES
[facility] VISIT NOTE    1522. Pt arrived at [facility] ambulatory and in no distress for Hydration. Assessment completed, pt c/o feeling weak and run down. RCW chest port accessed with . 75 in hernandez with no difficulty. Positive blood return noted and flushes easily. Patient requesting only IV zofran today. Patient reports her last dose of zofran was last night. She took compazine at 0900 today and states she will take some more when she gets home. Medications received:  1 Liter NS  Zofran IV    Tolerated treatment well, no adverse reaction noted. Port de-accessed and flushed per protocol. Positive blood return noted. Patient Vitals for the past 12 hrs:   Temp Pulse Resp BP   09/06/19 1449 98 °F (36.7 °C) 85 18 100/67     1600. D/C'd from [facility] ambulatory and in no distress accompanied by friend.  Next appointment is 9/9/19 at 1:00 pm.

## 2019-09-09 ENCOUNTER — HOSPITAL ENCOUNTER (OUTPATIENT)
Dept: INFUSION THERAPY | Age: 44
Discharge: HOME | End: 2019-09-09
Payer: OTHER GOVERNMENT

## 2019-09-09 ENCOUNTER — OFFICE VISIT (OUTPATIENT)
Dept: ONCOLOGY | Age: 44
End: 2019-09-09

## 2019-09-09 VITALS
HEART RATE: 83 BPM | SYSTOLIC BLOOD PRESSURE: 117 MMHG | WEIGHT: 120.3 LBS | TEMPERATURE: 97.1 F | HEIGHT: 63 IN | DIASTOLIC BLOOD PRESSURE: 69 MMHG | OXYGEN SATURATION: 99 % | RESPIRATION RATE: 18 BRPM | BODY MASS INDEX: 21.32 KG/M2

## 2019-09-09 VITALS
WEIGHT: 120.3 LBS | TEMPERATURE: 97.1 F | HEIGHT: 63 IN | HEART RATE: 96 BPM | RESPIRATION RATE: 18 BRPM | SYSTOLIC BLOOD PRESSURE: 109 MMHG | BODY MASS INDEX: 21.32 KG/M2 | OXYGEN SATURATION: 99 % | DIASTOLIC BLOOD PRESSURE: 77 MMHG

## 2019-09-09 DIAGNOSIS — R21 RASH: ICD-10-CM

## 2019-09-09 DIAGNOSIS — Z17.1 MALIGNANT NEOPLASM OF UPPER-OUTER QUADRANT OF LEFT BREAST IN FEMALE, ESTROGEN RECEPTOR NEGATIVE (HCC): Primary | ICD-10-CM

## 2019-09-09 DIAGNOSIS — C50.412 MALIGNANT NEOPLASM OF UPPER-OUTER QUADRANT OF LEFT BREAST IN FEMALE, ESTROGEN RECEPTOR NEGATIVE (HCC): Primary | ICD-10-CM

## 2019-09-09 DIAGNOSIS — R11.0 NAUSEA: ICD-10-CM

## 2019-09-09 DIAGNOSIS — K59.03 DRUG-INDUCED CONSTIPATION: ICD-10-CM

## 2019-09-09 DIAGNOSIS — Z51.11 CHEMOTHERAPY MANAGEMENT, ENCOUNTER FOR: ICD-10-CM

## 2019-09-09 LAB
ALBUMIN SERPL-MCNC: 3.9 G/DL (ref 3.5–5)
ALBUMIN/GLOB SERPL: 1.3 {RATIO} (ref 1.1–2.2)
ALP SERPL-CCNC: 46 U/L (ref 45–117)
ALT SERPL-CCNC: 95 U/L (ref 12–78)
AST SERPL-CCNC: 43 U/L (ref 15–37)
BASOPHILS # BLD: 0 K/UL (ref 0–0.1)
BASOPHILS NFR BLD: 1 % (ref 0–1)
BILIRUB DIRECT SERPL-MCNC: 0.2 MG/DL (ref 0–0.2)
BILIRUB SERPL-MCNC: 0.7 MG/DL (ref 0.2–1)
DIFFERENTIAL METHOD BLD: ABNORMAL
EOSINOPHIL # BLD: 0 K/UL (ref 0–0.4)
EOSINOPHIL NFR BLD: 0 % (ref 0–7)
ERYTHROCYTE [DISTWIDTH] IN BLOOD BY AUTOMATED COUNT: 15.3 % (ref 11.5–14.5)
GLOBULIN SER CALC-MCNC: 2.9 G/DL (ref 2–4)
HCT VFR BLD AUTO: 30.9 % (ref 35–47)
HGB BLD-MCNC: 10.6 G/DL (ref 11.5–16)
IMM GRANULOCYTES # BLD AUTO: 0 K/UL (ref 0–0.04)
IMM GRANULOCYTES NFR BLD AUTO: 0 % (ref 0–0.5)
LYMPHOCYTES # BLD: 1.4 K/UL (ref 0.8–3.5)
LYMPHOCYTES NFR BLD: 39 % (ref 12–49)
MCH RBC QN AUTO: 31.4 PG (ref 26–34)
MCHC RBC AUTO-ENTMCNC: 34.3 G/DL (ref 30–36.5)
MCV RBC AUTO: 91.4 FL (ref 80–99)
MONOCYTES # BLD: 0.2 K/UL (ref 0–1)
MONOCYTES NFR BLD: 5 % (ref 5–13)
NEUTS SEG # BLD: 2 K/UL (ref 1.8–8)
NEUTS SEG NFR BLD: 55 % (ref 32–75)
NRBC # BLD: 0 K/UL (ref 0–0.01)
NRBC BLD-RTO: 0 PER 100 WBC
PLATELET # BLD AUTO: 209 K/UL (ref 150–400)
PMV BLD AUTO: 9.5 FL (ref 8.9–12.9)
PROT SERPL-MCNC: 6.8 G/DL (ref 6.4–8.2)
RBC # BLD AUTO: 3.38 M/UL (ref 3.8–5.2)
WBC # BLD AUTO: 3.5 K/UL (ref 3.6–11)

## 2019-09-09 PROCEDURE — 74011250636 HC RX REV CODE- 250/636: Performed by: NURSE PRACTITIONER

## 2019-09-09 PROCEDURE — 96375 TX/PRO/DX INJ NEW DRUG ADDON: CPT

## 2019-09-09 PROCEDURE — 74011250636 HC RX REV CODE- 250/636: Performed by: INTERNAL MEDICINE

## 2019-09-09 PROCEDURE — 80076 HEPATIC FUNCTION PANEL: CPT

## 2019-09-09 PROCEDURE — 96360 HYDRATION IV INFUSION INIT: CPT

## 2019-09-09 PROCEDURE — 85025 COMPLETE CBC W/AUTO DIFF WBC: CPT

## 2019-09-09 PROCEDURE — 74011250637 HC RX REV CODE- 250/637: Performed by: INTERNAL MEDICINE

## 2019-09-09 PROCEDURE — 77030012965 HC NDL HUBR BBMI -A

## 2019-09-09 PROCEDURE — 96413 CHEMO IV INFUSION 1 HR: CPT

## 2019-09-09 PROCEDURE — 74011000250 HC RX REV CODE- 250: Performed by: INTERNAL MEDICINE

## 2019-09-09 PROCEDURE — 36415 COLL VENOUS BLD VENIPUNCTURE: CPT

## 2019-09-09 RX ORDER — DEXAMETHASONE SODIUM PHOSPHATE 100 MG/10ML
10 INJECTION INTRAMUSCULAR; INTRAVENOUS ONCE
Status: COMPLETED | OUTPATIENT
Start: 2019-09-09 | End: 2019-09-09

## 2019-09-09 RX ORDER — SODIUM CHLORIDE 9 MG/ML
10 INJECTION INTRAMUSCULAR; INTRAVENOUS; SUBCUTANEOUS AS NEEDED
Status: DISCONTINUED | OUTPATIENT
Start: 2019-09-09 | End: 2019-09-10 | Stop reason: HOSPADM

## 2019-09-09 RX ORDER — ONDANSETRON 2 MG/ML
8 INJECTION INTRAMUSCULAR; INTRAVENOUS ONCE
Status: COMPLETED | OUTPATIENT
Start: 2019-09-09 | End: 2019-09-09

## 2019-09-09 RX ORDER — SODIUM CHLORIDE 9 MG/ML
25 INJECTION, SOLUTION INTRAVENOUS CONTINUOUS
Status: DISCONTINUED | OUTPATIENT
Start: 2019-09-09 | End: 2019-09-10 | Stop reason: HOSPADM

## 2019-09-09 RX ORDER — HEPARIN 100 UNIT/ML
300-500 SYRINGE INTRAVENOUS AS NEEDED
Status: DISCONTINUED | OUTPATIENT
Start: 2019-09-09 | End: 2019-09-10 | Stop reason: HOSPADM

## 2019-09-09 RX ORDER — SODIUM CHLORIDE 0.9 % (FLUSH) 0.9 %
10 SYRINGE (ML) INJECTION AS NEEDED
Status: DISCONTINUED | OUTPATIENT
Start: 2019-09-09 | End: 2019-09-10 | Stop reason: HOSPADM

## 2019-09-09 RX ORDER — ONDANSETRON 2 MG/ML
8 INJECTION INTRAMUSCULAR; INTRAVENOUS ONCE
Status: CANCELLED
Start: 2019-09-09

## 2019-09-09 RX ORDER — DIPHENHYDRAMINE HCL 25 MG
50 CAPSULE ORAL ONCE
Status: COMPLETED | OUTPATIENT
Start: 2019-09-09 | End: 2019-09-09

## 2019-09-09 RX ORDER — DIPHENHYDRAMINE HCL 25 MG
50 CAPSULE ORAL ONCE
Status: CANCELLED
Start: 2019-09-09

## 2019-09-09 RX ADMIN — DEXAMETHASONE SODIUM PHOSPHATE 10 MG: 10 INJECTION INTRAMUSCULAR; INTRAVENOUS at 14:55

## 2019-09-09 RX ADMIN — SODIUM CHLORIDE 10 ML: 9 INJECTION, SOLUTION INTRAMUSCULAR; INTRAVENOUS; SUBCUTANEOUS at 13:26

## 2019-09-09 RX ADMIN — DIPHENHYDRAMINE HYDROCHLORIDE 50 MG: 25 CAPSULE ORAL at 15:17

## 2019-09-09 RX ADMIN — FAMOTIDINE 20 MG: 10 INJECTION INTRAVENOUS at 15:00

## 2019-09-09 RX ADMIN — SODIUM CHLORIDE 1000 ML: 900 INJECTION, SOLUTION INTRAVENOUS at 14:52

## 2019-09-09 RX ADMIN — Medication 10 ML: at 17:03

## 2019-09-09 RX ADMIN — ONDANSETRON 8 MG: 2 INJECTION, SOLUTION INTRAMUSCULAR; INTRAVENOUS at 15:18

## 2019-09-09 RX ADMIN — Medication 500 UNITS: at 17:04

## 2019-09-09 RX ADMIN — PACLITAXEL 134 MG: 6 INJECTION, SOLUTION INTRAVENOUS at 16:00

## 2019-09-09 NOTE — PROGRESS NOTES
[facility] VISIT NOTE    1310. Pt arrived at [facility] ambulatory and in no distress for C3D8 Taxol. Assessment completed, pt c/o feeling weak and nauseous with no appetite. RCW chest port accessed with . 75 in hernandez with no difficulty. Positive blood return noted and labs drawn. Pt to see MD. Deneen Hutchinson resulted and are within parameters to treat. Medications received:  1 Liter NS  Dexamethasone IV  Pepcid IV  Benadryl PO  Zofran IV  Paclitaxel IV    Tolerated treatment well, no adverse reaction noted. Port de-accessed and flushed per protocol. Positive blood return noted. Patient Vitals for the past 12 hrs:   Temp Pulse Resp BP SpO2   09/09/19 1702 -- 83 18 117/69 --   09/09/19 1314 97.1 °F (36.2 °C) 96 18 109/77 99 %     Recent Results (from the past 12 hour(s))   CBC WITH AUTOMATED DIFF    Collection Time: 09/09/19  1:25 PM   Result Value Ref Range    WBC 3.5 (L) 3.6 - 11.0 K/uL    RBC 3.38 (L) 3.80 - 5.20 M/uL    HGB 10.6 (L) 11.5 - 16.0 g/dL    HCT 30.9 (L) 35.0 - 47.0 %    MCV 91.4 80.0 - 99.0 FL    MCH 31.4 26.0 - 34.0 PG    MCHC 34.3 30.0 - 36.5 g/dL    RDW 15.3 (H) 11.5 - 14.5 %    PLATELET 040 114 - 584 K/uL    MPV 9.5 8.9 - 12.9 FL    NRBC 0.0 0  WBC    ABSOLUTE NRBC 0.00 0.00 - 0.01 K/uL    NEUTROPHILS 55 32 - 75 %    LYMPHOCYTES 39 12 - 49 %    MONOCYTES 5 5 - 13 %    EOSINOPHILS 0 0 - 7 %    BASOPHILS 1 0 - 1 %    IMMATURE GRANULOCYTES 0 0.0 - 0.5 %    ABS. NEUTROPHILS 2.0 1.8 - 8.0 K/UL    ABS. LYMPHOCYTES 1.4 0.8 - 3.5 K/UL    ABS. MONOCYTES 0.2 0.0 - 1.0 K/UL    ABS. EOSINOPHILS 0.0 0.0 - 0.4 K/UL    ABS. BASOPHILS 0.0 0.0 - 0.1 K/UL    ABS. IMM.  GRANS. 0.0 0.00 - 0.04 K/UL    DF AUTOMATED     HEPATIC FUNCTION PANEL    Collection Time: 09/09/19  3:52 PM   Result Value Ref Range    Protein, total 6.8 6.4 - 8.2 g/dL    Albumin 3.9 3.5 - 5.0 g/dL    Globulin 2.9 2.0 - 4.0 g/dL    A-G Ratio 1.3 1.1 - 2.2      Bilirubin, total 0.7 0.2 - 1.0 MG/DL    Bilirubin, direct 0.2 0.0 - 0.2 MG/DL Alk. phosphatase 46 45 - 117 U/L    AST (SGOT) 43 (H) 15 - 37 U/L    ALT (SGPT) 95 (H) 12 - 78 U/L     1710. D/C'd from [facility] ambulatory and in no distress accompanied by friend.  Next appointment is 9/16/19 at 1:00 pm.

## 2019-09-11 ENCOUNTER — TELEPHONE (OUTPATIENT)
Dept: ONCOLOGY | Age: 44
End: 2019-09-11

## 2019-09-11 DIAGNOSIS — R11.0 NAUSEA: Primary | ICD-10-CM

## 2019-09-11 RX ORDER — LORAZEPAM 1 MG/1
1 TABLET ORAL
Qty: 30 TAB | Refills: 1 | Status: SHIPPED | OUTPATIENT
Start: 2019-09-11 | End: 2020-01-28

## 2019-09-11 NOTE — TELEPHONE ENCOUNTER
----- Message from Elly Florian NP sent at 9/10/2019  7:57 AM EDT -----  Please let her know liver function tests have improved. Will continue to monitor on therapy.

## 2019-09-11 NOTE — TELEPHONE ENCOUNTER
9/11/19 5:00 p.m.- Spoke to patient and advised her of lab results below as per ROGER Neves NP. Patient voices understanding and states she continues to have severe nausea despite taking the Zofran and Compazine as prescribed. Patient states she has lost about 20 pounds since starting chemo and just doesn't have much of an appetite. Patient states that the NP had mentioned that if her nausea continues that they could try adding Lorazepam to see if that would help. Patient states she would be interested in trying it. Advised patient that per Dr. Dony Pineda new prescription for Lorazepam would be sent to her pharmacy, and to try taking it at night first to see how she tolerates it. Advised patient that the Lorazepam could cause drowsiness and that she could cut the pill in half (0.5 mg) and try that first, if she didn't want to take the full 1 mg. Advised patient that the Lorazepam would be in addition to taking the Zofran and Compazine. Advised patient if the Lorazepam didn't help with nausea or she couldn't tolerate it to call our office back. Patient voices understanding and denies any further questions at this time. Per verbal order from Dr. Dony Pineda:  Requested Prescriptions     Signed Prescriptions Disp Refills    LORazepam (ATIVAN) 1 mg tablet 30 Tab 1     Sig: Take 1 Tab by mouth every eight (8) hours as needed (nausea). Max Daily Amount: 3 mg. Authorizing Provider: Lesa Elam     Ordering User: CAMPOS Mohan         9/11/19 5:45 p.m.- Called and left message on patient's voicemail that Dr. Dony Pineda wanted me to call her and advise her to start drinking 3-5 Boost supplements daily to help with weight loss and to call our office back with any questions or concerns.

## 2019-09-16 ENCOUNTER — HOSPITAL ENCOUNTER (OUTPATIENT)
Dept: INFUSION THERAPY | Age: 44
Discharge: HOME | End: 2019-09-16
Payer: OTHER GOVERNMENT

## 2019-09-16 VITALS
DIASTOLIC BLOOD PRESSURE: 70 MMHG | TEMPERATURE: 96.9 F | HEART RATE: 66 BPM | BODY MASS INDEX: 22.54 KG/M2 | OXYGEN SATURATION: 99 % | HEIGHT: 62 IN | RESPIRATION RATE: 18 BRPM | SYSTOLIC BLOOD PRESSURE: 102 MMHG | WEIGHT: 122.5 LBS

## 2019-09-16 DIAGNOSIS — Z17.1 MALIGNANT NEOPLASM OF UPPER-OUTER QUADRANT OF LEFT BREAST IN FEMALE, ESTROGEN RECEPTOR NEGATIVE (HCC): Primary | ICD-10-CM

## 2019-09-16 DIAGNOSIS — C50.412 MALIGNANT NEOPLASM OF UPPER-OUTER QUADRANT OF LEFT BREAST IN FEMALE, ESTROGEN RECEPTOR NEGATIVE (HCC): Primary | ICD-10-CM

## 2019-09-16 LAB
BASOPHILS # BLD: 0 K/UL (ref 0–0.1)
BASOPHILS NFR BLD: 0 % (ref 0–1)
DIFFERENTIAL METHOD BLD: ABNORMAL
EOSINOPHIL # BLD: 0 K/UL (ref 0–0.4)
EOSINOPHIL NFR BLD: 0 % (ref 0–7)
ERYTHROCYTE [DISTWIDTH] IN BLOOD BY AUTOMATED COUNT: 15.6 % (ref 11.5–14.5)
HCT VFR BLD AUTO: 23.9 % (ref 35–47)
HGB BLD-MCNC: 8.1 G/DL (ref 11.5–16)
IMM GRANULOCYTES # BLD AUTO: 0 K/UL (ref 0–0.04)
IMM GRANULOCYTES NFR BLD AUTO: 0 % (ref 0–0.5)
LYMPHOCYTES # BLD: 2.1 K/UL (ref 0.8–3.5)
LYMPHOCYTES NFR BLD: 67 % (ref 12–49)
MCH RBC QN AUTO: 31.5 PG (ref 26–34)
MCHC RBC AUTO-ENTMCNC: 33.9 G/DL (ref 30–36.5)
MCV RBC AUTO: 93 FL (ref 80–99)
MONOCYTES # BLD: 0.4 K/UL (ref 0–1)
MONOCYTES NFR BLD: 13 % (ref 5–13)
NEUTS SEG # BLD: 0.6 K/UL (ref 1.8–8)
NEUTS SEG NFR BLD: 20 % (ref 32–75)
NRBC # BLD: 0 K/UL (ref 0–0.01)
NRBC BLD-RTO: 0 PER 100 WBC
PLATELET # BLD AUTO: 157 K/UL (ref 150–400)
PMV BLD AUTO: 9.9 FL (ref 8.9–12.9)
RBC # BLD AUTO: 2.57 M/UL (ref 3.8–5.2)
RBC MORPH BLD: ABNORMAL
RBC MORPH BLD: ABNORMAL
WBC # BLD AUTO: 3.1 K/UL (ref 3.6–11)

## 2019-09-16 PROCEDURE — 36415 COLL VENOUS BLD VENIPUNCTURE: CPT

## 2019-09-16 PROCEDURE — 74011250636 HC RX REV CODE- 250/636: Performed by: INTERNAL MEDICINE

## 2019-09-16 PROCEDURE — 77030012965 HC NDL HUBR BBMI -A

## 2019-09-16 PROCEDURE — 85025 COMPLETE CBC W/AUTO DIFF WBC: CPT

## 2019-09-16 RX ORDER — DEXAMETHASONE SODIUM PHOSPHATE 100 MG/10ML
10 INJECTION INTRAMUSCULAR; INTRAVENOUS ONCE
Status: DISCONTINUED | OUTPATIENT
Start: 2019-09-16 | End: 2019-09-16

## 2019-09-16 RX ORDER — SODIUM CHLORIDE 9 MG/ML
25 INJECTION, SOLUTION INTRAVENOUS CONTINUOUS
Status: DISCONTINUED | OUTPATIENT
Start: 2019-09-16 | End: 2019-09-16

## 2019-09-16 RX ORDER — SODIUM CHLORIDE 9 MG/ML
10 INJECTION INTRAMUSCULAR; INTRAVENOUS; SUBCUTANEOUS AS NEEDED
Status: DISCONTINUED | OUTPATIENT
Start: 2019-09-16 | End: 2019-09-17 | Stop reason: HOSPADM

## 2019-09-16 RX ORDER — ONDANSETRON 2 MG/ML
8 INJECTION INTRAMUSCULAR; INTRAVENOUS ONCE
Status: DISCONTINUED | OUTPATIENT
Start: 2019-09-16 | End: 2019-09-16

## 2019-09-16 RX ORDER — HEPARIN 100 UNIT/ML
300-500 SYRINGE INTRAVENOUS AS NEEDED
Status: DISCONTINUED | OUTPATIENT
Start: 2019-09-16 | End: 2019-09-17 | Stop reason: HOSPADM

## 2019-09-16 RX ORDER — SODIUM CHLORIDE 0.9 % (FLUSH) 0.9 %
10 SYRINGE (ML) INJECTION AS NEEDED
Status: DISCONTINUED | OUTPATIENT
Start: 2019-09-16 | End: 2019-09-17 | Stop reason: HOSPADM

## 2019-09-16 RX ORDER — DIPHENHYDRAMINE HCL 25 MG
50 CAPSULE ORAL ONCE
Status: DISCONTINUED | OUTPATIENT
Start: 2019-09-16 | End: 2019-09-16

## 2019-09-16 RX ADMIN — SODIUM CHLORIDE 10 ML: 9 INJECTION INTRAMUSCULAR; INTRAVENOUS; SUBCUTANEOUS at 13:00

## 2019-09-16 RX ADMIN — HEPARIN 500 UNITS: 100 SYRINGE at 15:30

## 2019-09-16 RX ADMIN — Medication 10 ML: at 15:30

## 2019-09-16 NOTE — PROGRESS NOTES
[facility] VISIT NOTE    1325  Pt arrived at [facility] ambulatory and in no distress for C3D15 Taxol. Assessment completed, no new needs expressed at this time. Right chest port accessed with 0.75 in hernandez with no difficulty. Positive blood return noted and labs drawn. Labs resulted out of parameters and treatment was held. Chemotherapy Flowsheet 9/16/2019   Cycle C3D15   Date 9/16/2019   Drug / Regimen Taxol   Pre Hydration -   Pre Meds Held   Notes Held       Vitals:  /70   Pulse 66   Temp 96.9 °F (36.1 °C)   Resp 18   Ht 5' 2\" (1.575 m)   Wt 55.6 kg (122 lb 8 oz)   SpO2 99%   BMI 22.41 kg/m²     Labs:     Recent Results (from the past 12 hour(s))   CBC WITH AUTOMATED DIFF    Collection Time: 09/16/19  1:55 PM   Result Value Ref Range    WBC 3.1 (L) 3.6 - 11.0 K/uL    RBC 2.57 (L) 3.80 - 5.20 M/uL    HGB 8.1 (L) 11.5 - 16.0 g/dL    HCT 23.9 (L) 35.0 - 47.0 %    MCV 93.0 80.0 - 99.0 FL    MCH 31.5 26.0 - 34.0 PG    MCHC 33.9 30.0 - 36.5 g/dL    RDW 15.6 (H) 11.5 - 14.5 %    PLATELET 434 068 - 218 K/uL    MPV 9.9 8.9 - 12.9 FL    NRBC 0.0 0  WBC    ABSOLUTE NRBC 0.00 0.00 - 0.01 K/uL    NEUTROPHILS 20 (L) 32 - 75 %    LYMPHOCYTES 67 (H) 12 - 49 %    MONOCYTES 13 5 - 13 %    EOSINOPHILS 0 0 - 7 %    BASOPHILS 0 0 - 1 %    IMMATURE GRANULOCYTES 0 0.0 - 0.5 %    ABS. NEUTROPHILS 0.6 (L) 1.8 - 8.0 K/UL    ABS. LYMPHOCYTES 2.1 0.8 - 3.5 K/UL    ABS. MONOCYTES 0.4 0.0 - 1.0 K/UL    ABS. EOSINOPHILS 0.0 0.0 - 0.4 K/UL    ABS. BASOPHILS 0.0 0.0 - 0.1 K/UL    ABS. IMM. GRANS. 0.0 0.00 - 0.04 K/UL    DF SMEAR SCANNED      RBC COMMENTS POLYCHROMASIA  1+        RBC COMMENTS ANISOCYTOSIS  1+         Medications received:  N/A     Port de-accessed and flushed per protocol. Positive blood return noted. 32 61 16  D/C'd from [facility] ambulatory and in no distress accompanied by a friend.      Future Appointments:  Future Appointments   Date Time Provider Misty Martinez   9/23/2019 10:30 AM SS INF5 CH3 <4H [address]   9/23/2019 10:45 AM Melville Boas, NP [facility]   9/30/2019  1:00 PM [facility]   10/7/2019  1:00 PM SS INF7 CH4 <4H [address]

## 2019-09-17 ENCOUNTER — TELEPHONE (OUTPATIENT)
Dept: ONCOLOGY | Age: 44
End: 2019-09-17

## 2019-09-17 NOTE — TELEPHONE ENCOUNTER
Patient left a voicemail and stated that she would like a call back. Patient stated that she has some questions about her treatment.  # [phone]

## 2019-09-17 NOTE — TELEPHONE ENCOUNTER
9/17/19 2:57 PM: Returned call to patient, who stated that since chemo was held this week due to low ANC, she is wondering how that will affect her treatment next week. Patient stated, \"I'm supposed to get the two drugs next week. \" Advised patient that per Kortney Hunter NP, Dr. Cole Hernandez generally does not skip a week with chemotherapy so holding Taxol this week will push the rest of her treatment plan out by one week. Reiterated that this means she will receive Taxol next week as long as her counts are good, and then Carbo/Taxol the week after. Patient verbalized understanding and denied further questions or concerns.

## 2019-09-19 RX ORDER — ONDANSETRON 2 MG/ML
8 INJECTION INTRAMUSCULAR; INTRAVENOUS ONCE
Status: CANCELLED
Start: 2019-10-14

## 2019-09-19 RX ORDER — DIPHENHYDRAMINE HCL 25 MG
50 CAPSULE ORAL ONCE
Status: CANCELLED
Start: 2019-10-14

## 2019-09-19 RX ORDER — HYDROCORTISONE SODIUM SUCCINATE 100 MG/2ML
100 INJECTION, POWDER, FOR SOLUTION INTRAMUSCULAR; INTRAVENOUS AS NEEDED
Status: CANCELLED | OUTPATIENT
Start: 2019-09-23

## 2019-09-19 RX ORDER — ONDANSETRON 2 MG/ML
8 INJECTION INTRAMUSCULAR; INTRAVENOUS ONCE
Status: CANCELLED | OUTPATIENT
Start: 2019-09-23

## 2019-09-19 RX ORDER — DIPHENHYDRAMINE HYDROCHLORIDE 50 MG/ML
50 INJECTION, SOLUTION INTRAMUSCULAR; INTRAVENOUS AS NEEDED
Status: CANCELLED
Start: 2019-09-23

## 2019-09-19 RX ORDER — DIPHENHYDRAMINE HCL 25 MG
50 CAPSULE ORAL ONCE
Status: CANCELLED | OUTPATIENT
Start: 2019-09-23

## 2019-09-19 RX ORDER — ACETAMINOPHEN 325 MG/1
650 TABLET ORAL AS NEEDED
Status: CANCELLED
Start: 2019-09-23

## 2019-09-19 RX ORDER — DEXAMETHASONE SODIUM PHOSPHATE 100 MG/10ML
10 INJECTION INTRAMUSCULAR; INTRAVENOUS ONCE
Status: CANCELLED | OUTPATIENT
Start: 2019-09-23

## 2019-09-19 RX ORDER — HEPARIN 100 UNIT/ML
300-500 SYRINGE INTRAVENOUS AS NEEDED
Status: CANCELLED | OUTPATIENT
Start: 2019-09-23

## 2019-09-19 RX ORDER — DIPHENHYDRAMINE HCL 25 MG
50 CAPSULE ORAL ONCE
Status: CANCELLED
Start: 2019-09-30

## 2019-09-19 RX ORDER — SODIUM CHLORIDE 9 MG/ML
25 INJECTION, SOLUTION INTRAVENOUS CONTINUOUS
Status: CANCELLED | OUTPATIENT
Start: 2019-09-23

## 2019-09-19 RX ORDER — ONDANSETRON 2 MG/ML
8 INJECTION INTRAMUSCULAR; INTRAVENOUS ONCE
Status: CANCELLED
Start: 2019-10-07

## 2019-09-19 RX ORDER — EPINEPHRINE 1 MG/ML
0.3 INJECTION, SOLUTION, CONCENTRATE INTRAVENOUS AS NEEDED
Status: CANCELLED | OUTPATIENT
Start: 2019-09-23

## 2019-09-19 RX ORDER — ONDANSETRON 2 MG/ML
8 INJECTION INTRAMUSCULAR; INTRAVENOUS AS NEEDED
Status: CANCELLED | OUTPATIENT
Start: 2019-09-23

## 2019-09-19 RX ORDER — SODIUM CHLORIDE 0.9 % (FLUSH) 0.9 %
10 SYRINGE (ML) INJECTION AS NEEDED
Status: CANCELLED | OUTPATIENT
Start: 2019-09-23

## 2019-09-19 RX ORDER — ALBUTEROL SULFATE 0.83 MG/ML
2.5 SOLUTION RESPIRATORY (INHALATION) AS NEEDED
Status: CANCELLED
Start: 2019-09-23

## 2019-09-19 RX ORDER — DIPHENHYDRAMINE HCL 25 MG
50 CAPSULE ORAL ONCE
Status: CANCELLED
Start: 2019-10-07

## 2019-09-19 RX ORDER — SODIUM CHLORIDE 9 MG/ML
10 INJECTION INTRAMUSCULAR; INTRAVENOUS; SUBCUTANEOUS AS NEEDED
Status: CANCELLED | OUTPATIENT
Start: 2019-09-23

## 2019-09-23 ENCOUNTER — HOSPITAL ENCOUNTER (OUTPATIENT)
Dept: INFUSION THERAPY | Age: 44
Discharge: HOME | End: 2019-09-23
Payer: OTHER GOVERNMENT

## 2019-09-23 ENCOUNTER — OFFICE VISIT (OUTPATIENT)
Dept: ONCOLOGY | Age: 44
End: 2019-09-23

## 2019-09-23 VITALS
RESPIRATION RATE: 18 BRPM | OXYGEN SATURATION: 98 % | TEMPERATURE: 97.9 F | BODY MASS INDEX: 22.95 KG/M2 | SYSTOLIC BLOOD PRESSURE: 114 MMHG | HEART RATE: 76 BPM | DIASTOLIC BLOOD PRESSURE: 76 MMHG | WEIGHT: 124.7 LBS | HEIGHT: 62 IN

## 2019-09-23 VITALS
RESPIRATION RATE: 16 BRPM | HEART RATE: 85 BPM | DIASTOLIC BLOOD PRESSURE: 71 MMHG | SYSTOLIC BLOOD PRESSURE: 113 MMHG | HEIGHT: 62 IN | WEIGHT: 124.7 LBS | BODY MASS INDEX: 22.95 KG/M2 | TEMPERATURE: 98.4 F

## 2019-09-23 DIAGNOSIS — C50.412 MALIGNANT NEOPLASM OF UPPER-OUTER QUADRANT OF LEFT BREAST IN FEMALE, ESTROGEN RECEPTOR NEGATIVE (HCC): Primary | ICD-10-CM

## 2019-09-23 DIAGNOSIS — R11.0 NAUSEA: ICD-10-CM

## 2019-09-23 DIAGNOSIS — Z51.11 CHEMOTHERAPY MANAGEMENT, ENCOUNTER FOR: ICD-10-CM

## 2019-09-23 DIAGNOSIS — Z17.1 MALIGNANT NEOPLASM OF UPPER-OUTER QUADRANT OF LEFT BREAST IN FEMALE, ESTROGEN RECEPTOR NEGATIVE (HCC): Primary | ICD-10-CM

## 2019-09-23 LAB
BASOPHILS # BLD: 0 K/UL (ref 0–0.1)
BASOPHILS NFR BLD: 0 % (ref 0–1)
DIFFERENTIAL METHOD BLD: ABNORMAL
EOSINOPHIL # BLD: 0 K/UL (ref 0–0.4)
EOSINOPHIL NFR BLD: 0 % (ref 0–7)
ERYTHROCYTE [DISTWIDTH] IN BLOOD BY AUTOMATED COUNT: 18.6 % (ref 11.5–14.5)
HCT VFR BLD AUTO: 25.2 % (ref 35–47)
HGB BLD-MCNC: 8.4 G/DL (ref 11.5–16)
IMM GRANULOCYTES # BLD AUTO: 0 K/UL (ref 0–0.04)
IMM GRANULOCYTES NFR BLD AUTO: 0 % (ref 0–0.5)
LYMPHOCYTES # BLD: 1.4 K/UL (ref 0.8–3.5)
LYMPHOCYTES NFR BLD: 45 % (ref 12–49)
MCH RBC QN AUTO: 32.3 PG (ref 26–34)
MCHC RBC AUTO-ENTMCNC: 33.3 G/DL (ref 30–36.5)
MCV RBC AUTO: 96.9 FL (ref 80–99)
MONOCYTES # BLD: 0.5 K/UL (ref 0–1)
MONOCYTES NFR BLD: 16 % (ref 5–13)
NEUTS SEG # BLD: 1.2 K/UL (ref 1.8–8)
NEUTS SEG NFR BLD: 38 % (ref 32–75)
NRBC # BLD: 0 K/UL (ref 0–0.01)
NRBC BLD-RTO: 0 PER 100 WBC
PLATELET # BLD AUTO: 93 K/UL (ref 150–400)
PMV BLD AUTO: 9.8 FL (ref 8.9–12.9)
RBC # BLD AUTO: 2.6 M/UL (ref 3.8–5.2)
WBC # BLD AUTO: 3.2 K/UL (ref 3.6–11)

## 2019-09-23 PROCEDURE — 74011000250 HC RX REV CODE- 250: Performed by: NURSE PRACTITIONER

## 2019-09-23 PROCEDURE — 85025 COMPLETE CBC W/AUTO DIFF WBC: CPT

## 2019-09-23 PROCEDURE — 96375 TX/PRO/DX INJ NEW DRUG ADDON: CPT

## 2019-09-23 PROCEDURE — 74011250636 HC RX REV CODE- 250/636: Performed by: NURSE PRACTITIONER

## 2019-09-23 PROCEDURE — 96413 CHEMO IV INFUSION 1 HR: CPT

## 2019-09-23 PROCEDURE — 77030012965 HC NDL HUBR BBMI -A

## 2019-09-23 PROCEDURE — 36415 COLL VENOUS BLD VENIPUNCTURE: CPT

## 2019-09-23 PROCEDURE — 74011250637 HC RX REV CODE- 250/637: Performed by: NURSE PRACTITIONER

## 2019-09-23 RX ORDER — SODIUM CHLORIDE 0.9 % (FLUSH) 0.9 %
10 SYRINGE (ML) INJECTION AS NEEDED
Status: ACTIVE | OUTPATIENT
Start: 2019-09-23 | End: 2019-09-23

## 2019-09-23 RX ORDER — DEXAMETHASONE SODIUM PHOSPHATE 100 MG/10ML
10 INJECTION INTRAMUSCULAR; INTRAVENOUS ONCE
Status: COMPLETED | OUTPATIENT
Start: 2019-09-23 | End: 2019-09-23

## 2019-09-23 RX ORDER — SODIUM CHLORIDE 9 MG/ML
25 INJECTION, SOLUTION INTRAVENOUS CONTINUOUS
Status: DISCONTINUED | OUTPATIENT
Start: 2019-09-23 | End: 2019-09-24 | Stop reason: HOSPADM

## 2019-09-23 RX ORDER — SODIUM CHLORIDE 9 MG/ML
10 INJECTION INTRAMUSCULAR; INTRAVENOUS; SUBCUTANEOUS AS NEEDED
Status: ACTIVE | OUTPATIENT
Start: 2019-09-23 | End: 2019-09-23

## 2019-09-23 RX ORDER — DIPHENHYDRAMINE HCL 25 MG
50 CAPSULE ORAL ONCE
Status: COMPLETED | OUTPATIENT
Start: 2019-09-23 | End: 2019-09-23

## 2019-09-23 RX ORDER — HEPARIN 100 UNIT/ML
300-500 SYRINGE INTRAVENOUS AS NEEDED
Status: ACTIVE | OUTPATIENT
Start: 2019-09-23 | End: 2019-09-23

## 2019-09-23 RX ORDER — ONDANSETRON 2 MG/ML
8 INJECTION INTRAMUSCULAR; INTRAVENOUS ONCE
Status: COMPLETED | OUTPATIENT
Start: 2019-09-23 | End: 2019-09-23

## 2019-09-23 RX ADMIN — ONDANSETRON 8 MG: 2 INJECTION, SOLUTION INTRAMUSCULAR; INTRAVENOUS at 12:12

## 2019-09-23 RX ADMIN — Medication 10 ML: at 14:35

## 2019-09-23 RX ADMIN — Medication 500 UNITS: at 14:35

## 2019-09-23 RX ADMIN — Medication 10 ML: at 10:49

## 2019-09-23 RX ADMIN — SODIUM CHLORIDE 1000 ML: 900 INJECTION, SOLUTION INTRAVENOUS at 12:07

## 2019-09-23 RX ADMIN — FAMOTIDINE 20 MG: 10 INJECTION INTRAVENOUS at 12:19

## 2019-09-23 RX ADMIN — DIPHENHYDRAMINE HYDROCHLORIDE 50 MG: 25 CAPSULE ORAL at 12:08

## 2019-09-23 RX ADMIN — SODIUM CHLORIDE 10 ML: 9 INJECTION, SOLUTION INTRAMUSCULAR; INTRAVENOUS; SUBCUTANEOUS at 10:49

## 2019-09-23 RX ADMIN — PACLITAXEL 134 MG: 6 INJECTION, SOLUTION INTRAVENOUS at 13:35

## 2019-09-23 RX ADMIN — SODIUM CHLORIDE 25 ML/HR: 900 INJECTION, SOLUTION INTRAVENOUS at 13:07

## 2019-09-23 RX ADMIN — DEXAMETHASONE SODIUM PHOSPHATE 10 MG: 10 INJECTION INTRAMUSCULAR; INTRAVENOUS at 12:15

## 2019-09-23 NOTE — PROGRESS NOTES
[facility] [place] [facility] Progress Note    Date: 2019    Name: Nelly Mederos    MRN: [id number]         : 1975    Ms. Vladimir Dumont [facility] Arrived ambulatory and in no distress for C3D15 RETRY of Taxol Regimen. Assessment was completed, pt states she has been feeling like she has a head cold. She plans to discuss this issue with her MD at the appt today  Right chest wall port accessed without difficulty, labs drawn & sent for processing. Patient proceed to appointment with Dr. Janeth Gonzales. Ms. Ni Bhandari vitals were reviewed. Patient Vitals for the past 12 hrs:   Temp Pulse Resp BP   19 1040 97.9 °F (36.6 °C) 76 16 114/76       Lab results were obtained and reviewed. Recent Results (from the past 12 hour(s))   CBC WITH AUTOMATED DIFF    Collection Time: 19 10:48 AM   Result Value Ref Range    WBC 3.2 (L) 3.6 - 11.0 K/uL    RBC 2.60 (L) 3.80 - 5.20 M/uL    HGB 8.4 (L) 11.5 - 16.0 g/dL    HCT 25.2 (L) 35.0 - 47.0 %    MCV 96.9 80.0 - 99.0 FL    MCH 32.3 26.0 - 34.0 PG    MCHC 33.3 30.0 - 36.5 g/dL    RDW 18.6 (H) 11.5 - 14.5 %    PLATELET 93 (L) 669 - 400 K/uL    MPV 9.8 8.9 - 12.9 FL    NRBC 0.0 0  WBC    ABSOLUTE NRBC 0.00 0.00 - 0.01 K/uL    NEUTROPHILS 38 32 - 75 %    LYMPHOCYTES 45 12 - 49 %    MONOCYTES 16 (H) 5 - 13 %    EOSINOPHILS 0 0 - 7 %    BASOPHILS 0 0 - 1 %    IMMATURE GRANULOCYTES 0 0.0 - 0.5 %    ABS. NEUTROPHILS 1.2 (L) 1.8 - 8.0 K/UL    ABS. LYMPHOCYTES 1.4 0.8 - 3.5 K/UL    ABS. MONOCYTES 0.5 0.0 - 1.0 K/UL    ABS. EOSINOPHILS 0.0 0.0 - 0.4 K/UL    ABS. BASOPHILS 0.0 0.0 - 0.1 K/UL    ABS. IMM.  GRANS. 0.0 0.00 - 0.04 K/UL    DF AUTOMATED         Medications:  Medications Administered     0.9% sodium chloride infusion     Admin Date  2019 Action  New Bag Dose  25 mL/hr Rate  25 mL/hr Route  IntraVENous Administered By  Jenaro Call RN          dexamethasone (DECADRON) injection 10 mg     Admin Date  2019 Action  Given Dose  10 mg Route  IntraVENous Administered By  Sonam Chapman, Maci Martin RN          diphenhydrAMINE (BENADRYL) capsule 50 mg     Admin Date  09/23/2019 Action  Given Dose  50 mg Route  Oral Administered By  Mary Marte RN          famotidine (PF) (PEPCID) 20 mg in sodium chloride 0.9% 10 mL injection     Admin Date  09/23/2019 Action  Given Dose  20 mg Route  IntraVENous Administered By  Mary Marte RN          [facility]etron [facility]) injection 8 mg     Admin Date  09/23/2019 Action  Given Dose  8 mg Route  IntraVENous Administered By  Mary Marte RN          PACLitaxel (TAXOL) 134 mg in 0.9% sodium chloride 250 mL, overfill volume 25 mL chemo infusion     Admin Date  09/23/2019 Action  New Bag Dose  134 mg Rate  297.3 mL/hr Route  IntraVENous Administered By  Mary Marte RN          saline peripheral flush soln 10 mL     Admin Date  09/23/2019 Action  Given Dose  10 mL Route  InterCATHeter Administered By  Mary Marte RN          sodium chloride 0.9 % bolus infusion 1,000 mL     Admin Date  09/23/2019 Action  New Bag Dose  1000 mL Rate  1,000 mL/hr Route  IntraVENous Administered By  Mary Marte RN          sodium chloride 0.9% injection 10 mL     Admin Date  09/23/2019 Action  Given Dose  10 mL Route  IntraVENous Administered By  Mary Marte RN                Ms. Tiesha Barrett tolerated treatment well and was discharged from [place] [id number] in stable condition. Port de-accessed, flushed & heparinized per protocol. She is to return on 9/30/10 at 1:00 for her next appointment.     Nhan Flower RN  September 23, 2019

## 2019-09-23 NOTE — PROGRESS NOTES
[facility] at [address], [address] [place] [address]  Ramsey Quinones: [phone]  F: [phone]      Reason for Visit:   Karlo Rivas is a 44 y.o. female who is seen in follow up for evaluation of therapy for breast cancer    Treatment History:   · 6/10/19 L breast core bx:  IDC, gr 3, 1.1 cm, + LVI, ER negative, [place] negative, HER 2 negative at [facility]+; DCIS present gr 3, solid with expansile necrosis  · 7/3/19 BRCA1 pathogenic mutation (invitae), c.5266dup  · 19 MRI breast bx:  Negative  · 19- carbo/taxol  · C3d15 delayed one week due to neutropenia    History of Present Illness:   She felt the L breast mass herself in May 2019, with aching pain, leading to the pathology above. Interval history:  In today for follow up and treatment. Complains of gr 1 loss of appetite, gr 1 constipation, gr 1 fatigue, gr 1 hair loss, gr 1 nausea, gr 1 sob, gr 1 libido. FH:   2 paternal aunts with post-menopausal breast cancer, one aunt with ovarian cancer; that aunt's daughter tested positive for BRCA1 (first cousin); no pancreas cancer, no prostate cancer    Past Medical History:   Diagnosis Date    Breast cancer ([address] 2019    left breast cancer      Past Surgical History:   Procedure Laterality Date    HX APPENDECTOMY      HX  SECTION      x3      Social History     Tobacco Use    Smoking status: Never Smoker    Smokeless tobacco: Never Used   Substance Use Topics    Alcohol use: Not Currently      Family History   Problem Relation Age of Onset    Diabetes Mother     Heart Disease Mother     Hypertension Mother     Heart Disease Father     Cancer Paternal Aunt         Breast    Cancer Paternal Aunt         Breast     Current Outpatient Medications   Medication Sig    cholecalciferol, vitamin D3, (VITAMIN D3 PO) Take 2,000 Units by mouth daily.  Lactobacillus acidophilus (PROBIOTIC PO) Take 1 Tab by mouth daily.     prasterone, DHEA, (DHEA PO) Take 10 mg by mouth daily.  OTHER Massage    Dx c50.412 breast cancer    NP THYROID 15 mg tablet Take 15 mg by mouth daily.  testosterone 75 mg pllt by Implant route.  LORazepam (ATIVAN) 1 mg tablet Take 1 Tab by mouth every eight (8) hours as needed (nausea). Max Daily Amount: 3 mg.  MELATONIN PO Take  by mouth nightly as needed.  prochlorperazine (COMPAZINE) 10 mg tablet Take 1 Tab by mouth every six (6) hours as needed for Nausea.  lidocaine-prilocaine (EMLA) topical cream Apply  to affected area as needed for Pain.  ondansetron hcl (ZOFRAN) 8 mg tablet Take 1 Tab by mouth every eight (8) hours as needed for Nausea.  OLANZapine (ZYPREXA) 10 mg tablet 10 mg qhs on days 1-4 following chemo     No current facility-administered medications for this visit. Facility-Administered Medications Ordered in Other Visits   Medication Dose Route Frequency    saline peripheral flush soln 10 mL  10 mL InterCATHeter PRN    sodium chloride 0.9% injection 10 mL  10 mL IntraVENous PRN    heparin (porcine) pf 300-500 Units  300-500 Units InterCATHeter PRN    sodium chloride 0.9 % bolus infusion 1,000 mL  1,000 mL IntraVENous ONCE      No Known Allergies     Review of Systems: A complete review of systems was obtained, negative except as described above. Physical Exam:     Visit Vitals  /76   Pulse 76   Temp 97.9 °F (36.6 °C) (Temporal)   Resp 18   Ht 5' 2\" (1.575 m)   Wt 124 lb 11.2 oz (56.6 kg)   SpO2 98%   BMI 22.81 kg/m²     ECOG PS: 0  General: No distress  Eyes: PERRLA, anicteric sclerae  HENT: Atraumatic, OP clear  Neck: Supple  Lymphatic: No cervical, supraclavicular adenopathy  Respiratory: CTAB, normal respiratory effort  CV: Normal rate, regular rhythm, no murmurs, no peripheral edema  GI: Soft, nontender, nondistended, no masses, no hepatomegaly, no splenomegaly; + BS  MS:  Digits without clubbing or cyanosis. Skin: No rashes, ecchymoses, or petechiae.  Normal temperature, turgor, and texture. Psych: Alert, oriented, appropriate affect, normal judgment/insight  Breasts:  L 2:00 breast, 4 cmfn, <1 cm mass, no LAD (last exam, deferred today)    Results:     Lab Results   Component Value Date/Time    WBC 3.2 (L) 09/23/2019 10:48 AM    HGB 8.4 (L) 09/23/2019 10:48 AM    HCT 25.2 (L) 09/23/2019 10:48 AM    PLATELET 93 (L) 46/09/3559 10:48 AM    MCV 96.9 09/23/2019 10:48 AM    ABS. NEUTROPHILS 1.2 (L) 09/23/2019 10:48 AM     Lab Results   Component Value Date/Time    Sodium 142 09/03/2019 11:40 AM    Potassium 3.4 (L) 09/03/2019 11:40 AM    Chloride 108 09/03/2019 11:40 AM    CO2 28 09/03/2019 11:40 AM    Glucose 88 09/03/2019 11:40 AM    BUN 8 09/03/2019 11:40 AM    Creatinine 0.56 09/03/2019 11:40 AM    GFR est AA >60 09/03/2019 11:40 AM    GFR est non-AA >60 09/03/2019 11:40 AM    Calcium 8.7 09/03/2019 11:40 AM     Lab Results   Component Value Date/Time    Bilirubin, total 0.7 09/09/2019 03:52 PM    ALT (SGPT) 95 (H) 09/09/2019 03:52 PM    AST (SGOT) 43 (H) 09/09/2019 03:52 PM    Alk. phosphatase 46 09/09/2019 03:52 PM    Protein, total 6.8 09/09/2019 03:52 PM    Albumin 3.9 09/09/2019 03:52 PM    Globulin 2.9 09/09/2019 03:52 PM       6/13/19 MRI breast  FINDINGS:     Background parenchymal enhancement: Moderate. Lymph nodes: No lymphadenopathy.     Left breast: Irregular triangular-shaped mass in the posterior third of the left  breast at 2:00 measures 2.8 x 1.8 x 4.2 cm. Posterior margin is 0.3 cm from the  left pectoralis major muscle. Biopsy clip is in the inferior margin of the mass.     In the middle and posterior thirds of the left breast at 5-6:00, segmental non  mass enhancement with heterogeneous kinetics measures 2.6 cm in oblique AP  diameter in the axial plane. Otherwise, there are foci in the left breast.     Right breast: Heterogeneous patchy enhancement in multiple locations. No  suspicious kinetics. Biopsy clip in the middle third is at 3:00.  No surrounding  abnormal enhancement or morphology.     IMPRESSION:   1. 2.8 x 1.8 x 4.2 cm biopsy-proven infiltrating ductal carcinoma in the left  breast at 2:00 is in close approximation to the chest wall. 2. Left breast 5-6:00 suspicious segmental non mass enhancement. 3. No MRI evidence of malignancy in the right breast.  4. No lymphadenopathy.     Assessment: ACR BI-RADS category   Left breast: BI-RADS Assessment Category 4: Suspicious abnormality - Biopsy  should be performed in the absence of clinical contraindication. Right breast: BI-RADS Assessment Category 2: Benign finding.     Recommendation: Bilateral mammography if not recently performed elsewhere. Second look ultrasound of the left breast at 5-6:00 to determine  ultrasound-guided or MRI guided biopsy of non mass enhancement.     A negative breast MRI examination speaks strongly against invasive cancer down  to a detection threshold of 3 to 5 mm but may not detect some lower grade or in  situ carcinomas. Therefore, routine clinical and mammographic followup are  recommended. A summary portfolio has been created in PACS.    7/12/19 TTE EF 65%    Records reviewed and summarized above. Pathology report(s) reviewed above. Radiology report(s) reviewed above. Assessment/plan:   1. L UOQ IDC, gr 3, triple negative:  cT2 cN0 cM0, stage IIA, prognostic stage IIB    We explained to the patient that the goal of systemic adjuvant therapy is to improve the chances for cure and decrease the risk of relapse. We explained why a patient can have microscopic cancer spread now even though physical examination, laboratory studies and imaging studies are negative for cancer. We explained that the same treatments used now as adjuvant or preventive treatments rarely if ever are curative in women who develop metastases. We discussed that there is no difference in overall survival between neoadjuvant and adjuvant chemotherapy.   We discussed the rationale for neoadjuvant chemotherapy, if chemotherapy is warranted, as it is in this case: to avoid any potential delays in giving chemotherapy following surgery, to be able to see the response of the tumor to chemotherapy, and to potentially downstage the tumor prior to surgery. I discussed the potential risks of dose-dense chemotherapy with the patient. (DD AC-T, adriamycin 60 mg/m2; cyclophosphamide 600 mg/m2 q 2 weeks x 4; paclitaxel 80 mg/m2 qweekly x 12). Major toxicities include nausea and vomiting, stomatitis, fatigue, and a small risk of heart damage. Anemia frequently results and occasionally requires growth factors and rarely transfusions. Neutropenic fever is uncommon, but can be a life-threatening problem. Also, there is a small but increased risk of myelodysplasia and acute leukemia. We provided the patient with detailed information concerning toxicity including frequent toxicities that only last a few days, such as nausea, vomiting, mouth sores, arthralgia, myalgia, and potentially allergic reactions to paclitaxel, as well as toxicities which can be longer lasting including total alopecia, fatigue, anemia and neuropathy. We provided the patient with detailed information concerning the toxicities of their regimen in addition to our verbal discussion. We discussed the potential addition of carboplatin auc 6 q 3 weeks during weekly paclitaxel as evaluated in CALGB 90810, showing a significant improvement in pCR for patients with stage II-III triple negative breast cancer. Additional side effects of added myelosuppression, fatigue, renal damage with dehydration, and nausea and vomiting were discussed. AUC is the dose that is currently being used in all investigations, and thus is what will be used here. Discussed oral and peripheral cryotherapy. Cold caps would not work with [facility]. MRI biopsy of 2nd breast lesion ordered by Dr. Kaur Hoffmann, negative.   Dr. Kaur Hoffmann has placed port     The patient was given the following prescriptions with written and verbal instructions on how to use each:  Compazine, zofran, olanzapine, a wig, and emla cream.  IV Basil Orf will be used with AC. Neulasta the following day (bone pain side effect discussed) with AC. She is agreeable to neoadjuvant chemotherapy, declined B-59. Initiated therapy on 7/22/19. Agreeable to carboplatin/paclitaxel followed by DD AC. She has signed informed consent     Carbo/taxol C3D15 today, this was delayed last week due to [address] 0.6. Reports IV benadryl made her feel extremely loopy and drowsy, switched to PO bendaryl, patient taking this PRN. States she has been taking zyprexa with each treatment. Discussed with patient that this is for DDAC portion of treatment. 2. Emotional well being:  She has excellent support and is coping well with her disease    3. BRCA1 pathogenic mutation:   Discussed recommendation for bilateral mastectomies and bilateral oophorectomies; risk of ovarian, fallopian or peritoneal cancer is 16-59%; risk for pancreatic cancer is 1-3%; risk for contralateral breast cancer is 10-15%    4. Fertility preservation:  Discussed that there is > 20% chance of loss of menses with chemotherapy. She and her  state that they are not interested in further children and fertility preservation     5. Constipation: Relieved with a suppository. Now she is taking fiber and stool softener, prn miralax. Ongoing but has iimproved    6. Menses:  Reports this lasted a little longer than usual.  Will continue to monitor. 7. Nausea, chemotherapy induced: Continue to take scheduled zofran and compazine, previously added ativan 1 mg tid PRN. Added 1L IV hydration with each treatment and Zofran prior to Taxol on day 8 and 15.     8. Headaches:  Due to treatment. PRN Excedrin with improvement. 9. Cough: Reports just started, had head cold last week. Recommend OTC medications. Will monitor. Encouraged patient to call if this worsens.      This patient was seen in conjunction with Guillermo Pelaez NP      Signed By: Adalid Francisco MD      No orders of the defined types were placed in this encounter.

## 2019-09-30 ENCOUNTER — HOSPITAL ENCOUNTER (OUTPATIENT)
Dept: INFUSION THERAPY | Age: 44
Discharge: HOME | End: 2019-09-30
Payer: OTHER GOVERNMENT

## 2019-09-30 VITALS
TEMPERATURE: 97.5 F | BODY MASS INDEX: 21.62 KG/M2 | WEIGHT: 122 LBS | SYSTOLIC BLOOD PRESSURE: 105 MMHG | OXYGEN SATURATION: 100 % | DIASTOLIC BLOOD PRESSURE: 67 MMHG | HEART RATE: 91 BPM | RESPIRATION RATE: 16 BRPM | HEIGHT: 63 IN

## 2019-09-30 DIAGNOSIS — Z17.1 MALIGNANT NEOPLASM OF UPPER-OUTER QUADRANT OF LEFT BREAST IN FEMALE, ESTROGEN RECEPTOR NEGATIVE (HCC): Primary | ICD-10-CM

## 2019-09-30 DIAGNOSIS — C50.412 MALIGNANT NEOPLASM OF UPPER-OUTER QUADRANT OF LEFT BREAST IN FEMALE, ESTROGEN RECEPTOR NEGATIVE (HCC): Primary | ICD-10-CM

## 2019-09-30 LAB
ALBUMIN SERPL-MCNC: 3.8 G/DL (ref 3.5–5)
ALBUMIN/GLOB SERPL: 1.3 {RATIO} (ref 1.1–2.2)
ALP SERPL-CCNC: 46 U/L (ref 45–117)
ALT SERPL-CCNC: 134 U/L (ref 12–78)
ANION GAP SERPL CALC-SCNC: 4 MMOL/L (ref 5–15)
AST SERPL-CCNC: 84 U/L (ref 15–37)
BASOPHILS # BLD: 0 K/UL (ref 0–0.1)
BASOPHILS NFR BLD: 0 % (ref 0–1)
BILIRUB SERPL-MCNC: 0.6 MG/DL (ref 0.2–1)
BUN SERPL-MCNC: 13 MG/DL (ref 6–20)
BUN/CREAT SERPL: 24 (ref 12–20)
CALCIUM SERPL-MCNC: 8.2 MG/DL (ref 8.5–10.1)
CHLORIDE SERPL-SCNC: 105 MMOL/L (ref 97–108)
CO2 SERPL-SCNC: 30 MMOL/L (ref 21–32)
CREAT SERPL-MCNC: 0.54 MG/DL (ref 0.55–1.02)
DIFFERENTIAL METHOD BLD: ABNORMAL
EOSINOPHIL # BLD: 0 K/UL (ref 0–0.4)
EOSINOPHIL NFR BLD: 0 % (ref 0–7)
ERYTHROCYTE [DISTWIDTH] IN BLOOD BY AUTOMATED COUNT: 18.5 % (ref 11.5–14.5)
GLOBULIN SER CALC-MCNC: 2.9 G/DL (ref 2–4)
GLUCOSE SERPL-MCNC: 82 MG/DL (ref 65–100)
HCT VFR BLD AUTO: 25 % (ref 35–47)
HGB BLD-MCNC: 8.5 G/DL (ref 11.5–16)
IMM GRANULOCYTES # BLD AUTO: 0 K/UL (ref 0–0.04)
IMM GRANULOCYTES NFR BLD AUTO: 1 % (ref 0–0.5)
LYMPHOCYTES # BLD: 1.5 K/UL (ref 0.8–3.5)
LYMPHOCYTES NFR BLD: 40 % (ref 12–49)
MCH RBC QN AUTO: 33.2 PG (ref 26–34)
MCHC RBC AUTO-ENTMCNC: 34 G/DL (ref 30–36.5)
MCV RBC AUTO: 97.7 FL (ref 80–99)
MONOCYTES # BLD: 0.3 K/UL (ref 0–1)
MONOCYTES NFR BLD: 8 % (ref 5–13)
NEUTS SEG # BLD: 1.9 K/UL (ref 1.8–8)
NEUTS SEG NFR BLD: 51 % (ref 32–75)
NRBC # BLD: 0 K/UL (ref 0–0.01)
NRBC BLD-RTO: 0 PER 100 WBC
PLATELET # BLD AUTO: 135 K/UL (ref 150–400)
PMV BLD AUTO: 9.8 FL (ref 8.9–12.9)
POTASSIUM SERPL-SCNC: 3.2 MMOL/L (ref 3.5–5.1)
PROT SERPL-MCNC: 6.7 G/DL (ref 6.4–8.2)
RBC # BLD AUTO: 2.56 M/UL (ref 3.8–5.2)
SODIUM SERPL-SCNC: 139 MMOL/L (ref 136–145)
WBC # BLD AUTO: 3.8 K/UL (ref 3.6–11)

## 2019-09-30 PROCEDURE — 80053 COMPREHEN METABOLIC PANEL: CPT

## 2019-09-30 PROCEDURE — 85025 COMPLETE CBC W/AUTO DIFF WBC: CPT

## 2019-09-30 PROCEDURE — 74011250636 HC RX REV CODE- 250/636: Performed by: INTERNAL MEDICINE

## 2019-09-30 PROCEDURE — 74011000258 HC RX REV CODE- 258: Performed by: INTERNAL MEDICINE

## 2019-09-30 PROCEDURE — 96375 TX/PRO/DX INJ NEW DRUG ADDON: CPT

## 2019-09-30 PROCEDURE — 74011250636 HC RX REV CODE- 250/636: Performed by: NURSE PRACTITIONER

## 2019-09-30 PROCEDURE — 74011250637 HC RX REV CODE- 250/637: Performed by: NURSE PRACTITIONER

## 2019-09-30 PROCEDURE — 77030012965 HC NDL HUBR BBMI -A

## 2019-09-30 PROCEDURE — 96413 CHEMO IV INFUSION 1 HR: CPT

## 2019-09-30 PROCEDURE — 96361 HYDRATE IV INFUSION ADD-ON: CPT

## 2019-09-30 PROCEDURE — 36415 COLL VENOUS BLD VENIPUNCTURE: CPT

## 2019-09-30 PROCEDURE — 96367 TX/PROPH/DG ADDL SEQ IV INF: CPT

## 2019-09-30 PROCEDURE — 96417 CHEMO IV INFUS EACH ADDL SEQ: CPT

## 2019-09-30 RX ORDER — PALONOSETRON 0.05 MG/ML
0.25 INJECTION, SOLUTION INTRAVENOUS ONCE
Status: COMPLETED | OUTPATIENT
Start: 2019-09-30 | End: 2019-09-30

## 2019-09-30 RX ORDER — SODIUM CHLORIDE 0.9 % (FLUSH) 0.9 %
10 SYRINGE (ML) INJECTION AS NEEDED
Status: DISCONTINUED | OUTPATIENT
Start: 2019-09-30 | End: 2019-10-01 | Stop reason: HOSPADM

## 2019-09-30 RX ORDER — SODIUM CHLORIDE 9 MG/ML
25 INJECTION, SOLUTION INTRAVENOUS CONTINUOUS
Status: DISCONTINUED | OUTPATIENT
Start: 2019-09-30 | End: 2019-10-01 | Stop reason: HOSPADM

## 2019-09-30 RX ORDER — DIPHENHYDRAMINE HCL 25 MG
50 CAPSULE ORAL ONCE
Status: COMPLETED | OUTPATIENT
Start: 2019-09-30 | End: 2019-09-30

## 2019-09-30 RX ORDER — SODIUM CHLORIDE 9 MG/ML
10 INJECTION INTRAMUSCULAR; INTRAVENOUS; SUBCUTANEOUS AS NEEDED
Status: DISCONTINUED | OUTPATIENT
Start: 2019-09-30 | End: 2019-10-01 | Stop reason: HOSPADM

## 2019-09-30 RX ORDER — HEPARIN 100 UNIT/ML
300-500 SYRINGE INTRAVENOUS AS NEEDED
Status: DISCONTINUED | OUTPATIENT
Start: 2019-09-30 | End: 2019-10-01 | Stop reason: HOSPADM

## 2019-09-30 RX ADMIN — DEXAMETHASONE SODIUM PHOSPHATE 12 MG: 4 INJECTION, SOLUTION INTRA-ARTICULAR; INTRALESIONAL; INTRAMUSCULAR; INTRAVENOUS; SOFT TISSUE at 15:00

## 2019-09-30 RX ADMIN — PACLITAXEL 134 MG: 6 INJECTION, SOLUTION INTRAVENOUS at 15:53

## 2019-09-30 RX ADMIN — Medication 10 ML: at 17:36

## 2019-09-30 RX ADMIN — PALONSETRON HYDROCHLORIDE 0.25 MG: 0.25 INJECTION, SOLUTION INTRAVENOUS at 14:52

## 2019-09-30 RX ADMIN — Medication 500 UNITS: at 17:36

## 2019-09-30 RX ADMIN — DIPHENHYDRAMINE HYDROCHLORIDE 50 MG: 25 CAPSULE ORAL at 14:50

## 2019-09-30 RX ADMIN — CARBOPLATIN 718 MG: 10 INJECTION, SOLUTION INTRAVENOUS at 17:01

## 2019-09-30 RX ADMIN — SODIUM CHLORIDE 150 MG: 900 INJECTION, SOLUTION INTRAVENOUS at 15:00

## 2019-09-30 RX ADMIN — FAMOTIDINE 20 MG: 10 INJECTION INTRAVENOUS at 14:55

## 2019-09-30 RX ADMIN — Medication 10 ML: at 13:11

## 2019-09-30 RX ADMIN — SODIUM CHLORIDE 25 ML/HR: 900 INJECTION, SOLUTION INTRAVENOUS at 14:54

## 2019-09-30 RX ADMIN — SODIUM CHLORIDE 1000 ML: 900 INJECTION, SOLUTION INTRAVENOUS at 13:15

## 2019-09-30 RX ADMIN — SODIUM CHLORIDE 10 ML: 9 INJECTION INTRAMUSCULAR; INTRAVENOUS; SUBCUTANEOUS at 13:11

## 2019-09-30 NOTE — PROGRESS NOTES
[facility] Progress Note    Date: 2019    Name: Nayely Rhoades    MRN: [id number]         : 1975    1300  Ms. [facility] Arrived ambulatory and in no distress for C4D1 of Taxol/Carbo Regimen. Assessment was completed, no acute issues at this time, no new complaints voiced. Right chest wall port accessed without difficulty, labs drawn & sent for processing. Chemotherapy Flowsheet 2019   Cycle C4D1   Date 2019   Drug / Regimen Taxol/Carbo   Pre Hydration -   Pre Meds -   Notes -       Ms. East's vitals were reviewed. Visit Vitals  /69   Pulse 78   Temp 96.2 °F (35.7 °C)   Resp 16   Ht 5' 3\" (1.6 m)   Wt 55.3 kg (122 lb)   SpO2 100%   BMI 21.61 kg/m²       Lab results were obtained and reviewed. Recent Results (from the past 12 hour(s))   CBC WITH AUTOMATED DIFF    Collection Time: 19  1:09 PM   Result Value Ref Range    WBC 3.8 3.6 - 11.0 K/uL    RBC 2.56 (L) 3.80 - 5.20 M/uL    HGB 8.5 (L) 11.5 - 16.0 g/dL    HCT 25.0 (L) 35.0 - 47.0 %    MCV 97.7 80.0 - 99.0 FL    MCH 33.2 26.0 - 34.0 PG    MCHC 34.0 30.0 - 36.5 g/dL    RDW 18.5 (H) 11.5 - 14.5 %    PLATELET 818 (L) 288 - 400 K/uL    MPV 9.8 8.9 - 12.9 FL    NRBC 0.0 0  WBC    ABSOLUTE NRBC 0.00 0.00 - 0.01 K/uL    NEUTROPHILS 51 32 - 75 %    LYMPHOCYTES 40 12 - 49 %    MONOCYTES 8 5 - 13 %    EOSINOPHILS 0 0 - 7 %    BASOPHILS 0 0 - 1 %    IMMATURE GRANULOCYTES 1 (H) 0.0 - 0.5 %    ABS. NEUTROPHILS 1.9 1.8 - 8.0 K/UL    ABS. LYMPHOCYTES 1.5 0.8 - 3.5 K/UL    ABS. MONOCYTES 0.3 0.0 - 1.0 K/UL    ABS. EOSINOPHILS 0.0 0.0 - 0.4 K/UL    ABS. BASOPHILS 0.0 0.0 - 0.1 K/UL    ABS. IMM.  GRANS. 0.0 0.00 - 0.04 K/UL    DF AUTOMATED     METABOLIC PANEL, COMPREHENSIVE    Collection Time: 19  1:09 PM   Result Value Ref Range    Sodium 139 136 - 145 mmol/L    Potassium 3.2 (L) 3.5 - 5.1 mmol/L    Chloride 105 97 - 108 mmol/L    CO2 30 21 - 32 mmol/L    Anion gap 4 (L) 5 - 15 mmol/L    Glucose 82 65 - 100 mg/dL    BUN 13 6 - 20 MG/DL    Creatinine 0.54 (L) 0.55 - 1.02 MG/DL    BUN/Creatinine ratio 24 (H) 12 - 20      GFR est AA >60 >60 ml/min/1.73m2    GFR est non-AA >60 >60 ml/min/1.73m2    Calcium 8.2 (L) 8.5 - 10.1 MG/DL    Bilirubin, total 0.6 0.2 - 1.0 MG/DL    ALT (SGPT) 134 (H) 12 - 78 U/L    AST (SGOT) 84 (H) 15 - 37 U/L    Alk. phosphatase 46 45 - 117 U/L    Protein, total 6.7 6.4 - 8.2 g/dL    Albumin 3.8 3.5 - 5.0 g/dL    Globulin 2.9 2.0 - 4.0 g/dL    A-G Ratio 1.3 1.1 - 2.2       AST result not within treatment parameters. MD office notified. Received OK to treat despite out of range lab value. Chemotherapy ordered.      Medications:  Medications Administered     0.9% sodium chloride infusion     Admin Date  09/30/2019 Action  New Bag Dose  25 mL/hr Rate  25 mL/hr Route  IntraVENous Administered By  Burgess Annie RN          dexamethasone (DECADRON) 12 mg in 0.9% sodium chloride 50 mL IVPB     Admin Date  09/30/2019 Action  Given Dose  12 mg Route  IntraVENous Administered By  Burgess Annie RN          diphenhydrAMINE (BENADRYL) capsule 50 mg     Admin Date  09/30/2019 Action  Given Dose  50 mg Route  Oral Administered By  Burgess Annie RN          famotidine (PF) (PEPCID) 20 mg in sodium chloride 0.9% 10 mL injection     Admin Date  09/30/2019 Action  Given Dose  20 mg Route  IntraVENous Administered By  Burgess Annie RN          fosaprepitant (EMEND) 150 mg in 0.9% sodium chloride 150 mL IVPB     Admin Date  09/30/2019 Action  Given Dose  150 mg Rate  450 mL/hr Route  IntraVENous Administered By  Burgess Annie RN          PACLitaxel (TAXOL) 134 mg in 0.9% sodium chloride 250 mL, overfill volume 25 mL chemo infusion     Admin Date  09/30/2019 Action  New Bag Dose  134 mg Rate  297.3 mL/hr Route  IntraVENous Administered By  Burgess Annie RN          palonosetron HCl (ALOXI) injection 0.25 mg     Admin Date  09/30/2019 Action  Given Dose  0.25 mg Route  IntraVENous Administered By  Edita Farnsworth RN          saline peripheral flush soln 10 mL     Admin Date  09/30/2019 Action  Given Dose  10 mL Route  InterCATHeter Administered By  Edita Farnsworth RN          sodium chloride 0.9 % bolus infusion 1,000 mL     Admin Date  09/30/2019 Action  New Bag Dose  1000 mL Rate  1,000 mL/hr Route  IntraVENous Administered By  Edita Farnsworth RN          sodium chloride 0.9% injection 10 mL     Admin Date  09/30/2019 Action  Given Dose  10 mL Route  IntraVENous Administered By  Edita Farnsworth RN                1796: SBAR given to Marixa العراقي RN. Discussed with patient upcoming appointment on 10/7/19 at 1300.      Manuel Orr RN  September 30, 2019

## 2019-10-07 ENCOUNTER — HOSPITAL ENCOUNTER (OUTPATIENT)
Dept: INFUSION THERAPY | Age: 44
Discharge: HOME | End: 2019-10-07
Payer: OTHER GOVERNMENT

## 2019-10-07 ENCOUNTER — OFFICE VISIT (OUTPATIENT)
Dept: ONCOLOGY | Age: 44
End: 2019-10-07

## 2019-10-07 VITALS
TEMPERATURE: 97.3 F | HEIGHT: 63 IN | WEIGHT: 119.7 LBS | BODY MASS INDEX: 21.21 KG/M2 | SYSTOLIC BLOOD PRESSURE: 108 MMHG | OXYGEN SATURATION: 100 % | HEART RATE: 72 BPM | RESPIRATION RATE: 16 BRPM | DIASTOLIC BLOOD PRESSURE: 80 MMHG

## 2019-10-07 VITALS
OXYGEN SATURATION: 100 % | WEIGHT: 119.7 LBS | BODY MASS INDEX: 21.21 KG/M2 | TEMPERATURE: 97 F | HEART RATE: 98 BPM | DIASTOLIC BLOOD PRESSURE: 83 MMHG | RESPIRATION RATE: 18 BRPM | HEIGHT: 63 IN | SYSTOLIC BLOOD PRESSURE: 112 MMHG

## 2019-10-07 DIAGNOSIS — K59.03 DRUG-INDUCED CONSTIPATION: ICD-10-CM

## 2019-10-07 DIAGNOSIS — R21 RASH: ICD-10-CM

## 2019-10-07 DIAGNOSIS — C50.412 MALIGNANT NEOPLASM OF UPPER-OUTER QUADRANT OF LEFT BREAST IN FEMALE, ESTROGEN RECEPTOR NEGATIVE (HCC): Primary | ICD-10-CM

## 2019-10-07 DIAGNOSIS — Z17.1 MALIGNANT NEOPLASM OF UPPER-OUTER QUADRANT OF LEFT BREAST IN FEMALE, ESTROGEN RECEPTOR NEGATIVE (HCC): Primary | ICD-10-CM

## 2019-10-07 DIAGNOSIS — R11.0 NAUSEA: ICD-10-CM

## 2019-10-07 DIAGNOSIS — R05.9 COUGH: ICD-10-CM

## 2019-10-07 DIAGNOSIS — Z51.11 CHEMOTHERAPY MANAGEMENT, ENCOUNTER FOR: ICD-10-CM

## 2019-10-07 LAB
BASOPHILS # BLD: 0 K/UL (ref 0–0.1)
BASOPHILS NFR BLD: 1 % (ref 0–1)
DIFFERENTIAL METHOD BLD: ABNORMAL
EOSINOPHIL # BLD: 0 K/UL (ref 0–0.4)
EOSINOPHIL NFR BLD: 1 % (ref 0–7)
ERYTHROCYTE [DISTWIDTH] IN BLOOD BY AUTOMATED COUNT: 17.2 % (ref 11.5–14.5)
HCT VFR BLD AUTO: 25.9 % (ref 35–47)
HGB BLD-MCNC: 8.8 G/DL (ref 11.5–16)
IMM GRANULOCYTES # BLD AUTO: 0 K/UL (ref 0–0.04)
IMM GRANULOCYTES NFR BLD AUTO: 0 % (ref 0–0.5)
LYMPHOCYTES # BLD: 1.5 K/UL (ref 0.8–3.5)
LYMPHOCYTES NFR BLD: 47 % (ref 12–49)
MCH RBC QN AUTO: 33 PG (ref 26–34)
MCHC RBC AUTO-ENTMCNC: 34 G/DL (ref 30–36.5)
MCV RBC AUTO: 97 FL (ref 80–99)
MONOCYTES # BLD: 0.2 K/UL (ref 0–1)
MONOCYTES NFR BLD: 7 % (ref 5–13)
NEUTS SEG # BLD: 1.4 K/UL (ref 1.8–8)
NEUTS SEG NFR BLD: 44 % (ref 32–75)
NRBC # BLD: 0 K/UL (ref 0–0.01)
NRBC BLD-RTO: 0 PER 100 WBC
PLATELET # BLD AUTO: 227 K/UL (ref 150–400)
PMV BLD AUTO: 9.6 FL (ref 8.9–12.9)
RBC # BLD AUTO: 2.67 M/UL (ref 3.8–5.2)
WBC # BLD AUTO: 3.1 K/UL (ref 3.6–11)

## 2019-10-07 PROCEDURE — 74011250636 HC RX REV CODE- 250/636: Performed by: INTERNAL MEDICINE

## 2019-10-07 PROCEDURE — 85025 COMPLETE CBC W/AUTO DIFF WBC: CPT

## 2019-10-07 PROCEDURE — 96361 HYDRATE IV INFUSION ADD-ON: CPT

## 2019-10-07 PROCEDURE — 74011250636 HC RX REV CODE- 250/636: Performed by: NURSE PRACTITIONER

## 2019-10-07 PROCEDURE — 36415 COLL VENOUS BLD VENIPUNCTURE: CPT

## 2019-10-07 PROCEDURE — 74011000250 HC RX REV CODE- 250: Performed by: INTERNAL MEDICINE

## 2019-10-07 PROCEDURE — 96375 TX/PRO/DX INJ NEW DRUG ADDON: CPT

## 2019-10-07 PROCEDURE — 77030012965 HC NDL HUBR BBMI -A

## 2019-10-07 PROCEDURE — 96413 CHEMO IV INFUSION 1 HR: CPT

## 2019-10-07 PROCEDURE — 74011250637 HC RX REV CODE- 250/637: Performed by: NURSE PRACTITIONER

## 2019-10-07 RX ORDER — SODIUM CHLORIDE 0.9 % (FLUSH) 0.9 %
10 SYRINGE (ML) INJECTION AS NEEDED
Status: DISCONTINUED | OUTPATIENT
Start: 2019-10-07 | End: 2019-10-08 | Stop reason: HOSPADM

## 2019-10-07 RX ORDER — SODIUM CHLORIDE 9 MG/ML
25 INJECTION, SOLUTION INTRAVENOUS CONTINUOUS
Status: DISCONTINUED | OUTPATIENT
Start: 2019-10-07 | End: 2019-10-08 | Stop reason: HOSPADM

## 2019-10-07 RX ORDER — DIPHENHYDRAMINE HCL 25 MG
50 CAPSULE ORAL ONCE
Status: COMPLETED | OUTPATIENT
Start: 2019-10-07 | End: 2019-10-07

## 2019-10-07 RX ORDER — DEXAMETHASONE SODIUM PHOSPHATE 100 MG/10ML
10 INJECTION INTRAMUSCULAR; INTRAVENOUS ONCE
Status: COMPLETED | OUTPATIENT
Start: 2019-10-07 | End: 2019-10-07

## 2019-10-07 RX ORDER — OLANZAPINE 10 MG/1
TABLET ORAL
Qty: 8 TAB | Refills: 1 | Status: SHIPPED | OUTPATIENT
Start: 2019-10-07 | End: 2020-01-28

## 2019-10-07 RX ORDER — HEPARIN 100 UNIT/ML
300-500 SYRINGE INTRAVENOUS AS NEEDED
Status: DISCONTINUED | OUTPATIENT
Start: 2019-10-07 | End: 2019-10-08 | Stop reason: HOSPADM

## 2019-10-07 RX ORDER — SODIUM CHLORIDE 9 MG/ML
10 INJECTION INTRAMUSCULAR; INTRAVENOUS; SUBCUTANEOUS AS NEEDED
Status: DISCONTINUED | OUTPATIENT
Start: 2019-10-07 | End: 2019-10-08 | Stop reason: HOSPADM

## 2019-10-07 RX ORDER — ONDANSETRON 2 MG/ML
8 INJECTION INTRAMUSCULAR; INTRAVENOUS ONCE
Status: COMPLETED | OUTPATIENT
Start: 2019-10-07 | End: 2019-10-07

## 2019-10-07 RX ORDER — DIPHENHYDRAMINE HCL 25 MG
25 CAPSULE ORAL ONCE
Status: COMPLETED | OUTPATIENT
Start: 2019-10-07 | End: 2019-10-07

## 2019-10-07 RX ORDER — ACETAMINOPHEN 325 MG/1
650 TABLET ORAL
Status: COMPLETED | OUTPATIENT
Start: 2019-10-07 | End: 2019-10-07

## 2019-10-07 RX ADMIN — SODIUM CHLORIDE 10 ML: 9 INJECTION INTRAMUSCULAR; INTRAVENOUS; SUBCUTANEOUS at 17:17

## 2019-10-07 RX ADMIN — PACLITAXEL 134 MG: 6 INJECTION, SOLUTION INTRAVENOUS at 16:25

## 2019-10-07 RX ADMIN — ACETAMINOPHEN 650 MG: 325 TABLET ORAL at 14:56

## 2019-10-07 RX ADMIN — FAMOTIDINE 20 MG: 10 INJECTION INTRAVENOUS at 14:58

## 2019-10-07 RX ADMIN — DIPHENHYDRAMINE HYDROCHLORIDE 25 MG: 25 CAPSULE ORAL at 15:27

## 2019-10-07 RX ADMIN — Medication 500 UNITS: at 17:17

## 2019-10-07 RX ADMIN — DEXAMETHASONE SODIUM PHOSPHATE 10 MG: 10 INJECTION INTRAMUSCULAR; INTRAVENOUS at 14:58

## 2019-10-07 RX ADMIN — DIPHENHYDRAMINE HYDROCHLORIDE 50 MG: 25 CAPSULE ORAL at 14:57

## 2019-10-07 RX ADMIN — ONDANSETRON 8 MG: 2 INJECTION, SOLUTION INTRAMUSCULAR; INTRAVENOUS at 15:01

## 2019-10-07 RX ADMIN — SODIUM CHLORIDE 1000 ML: 900 INJECTION, SOLUTION INTRAVENOUS at 14:56

## 2019-10-07 NOTE — PROGRESS NOTES
[facility] - Chemotherapy Progress Note    7562 Pt admit to [facility] for Taxol ambulatory in stable condition. Assessment completed. No new concerns voiced. PAC with positive blood return. Chemotherapy Flowsheet 10/7/2019   Cycle C 4 D 8   Date 10/7/2019   Drug / Regimen TAXOL   Pre Hydration -   Pre Meds -   Notes -       Visit Vitals  Ht 5' 3\" (1.6 m)   Wt 54.3 kg (119 lb 11.2 oz)   Breastfeeding? No   BMI 21.20 kg/m²     Pt declines pregnancy  Labs obtained and patient proceeded to scheduled MD appointment    Medications:  zofran   tylenol  (Please give zofran first and wait al least 15-30  mins prior to administer dexamethasone and pepcid to prevent nausea). Bolus NS   Benadryl po  Dexamethasone ivp  pepcid ivp     2 nd dose benadryl (due to emesis)    Taxol      As RN was administering pre meds, pt experienced acute nausea and vomited. RN notified MD- additional benadryl ordered. Nausea resolved after the zofran absorbed. 1730 Pt tolerated treatment well. PAC maintained positive blood return throughout treatment, flushed with positive blood return at conclusion and throughout treatment. D/c home ambulatory in no distress. Pt aware of next appointment scheduled for. 10/14/19 @ 1 pm    Recent Results (from the past 12 hour(s))   CBC WITH AUTOMATED DIFF    Collection Time: 10/07/19  1:24 PM   Result Value Ref Range    WBC 3.1 (L) 3.6 - 11.0 K/uL    RBC 2.67 (L) 3.80 - 5.20 M/uL    HGB 8.8 (L) 11.5 - 16.0 g/dL    HCT 25.9 (L) 35.0 - 47.0 %    MCV 97.0 80.0 - 99.0 FL    MCH 33.0 26.0 - 34.0 PG    MCHC 34.0 30.0 - 36.5 g/dL    RDW 17.2 (H) 11.5 - 14.5 %    PLATELET 875 762 - 741 K/uL    MPV 9.6 8.9 - 12.9 FL    NRBC 0.0 0  WBC    ABSOLUTE NRBC 0.00 0.00 - 0.01 K/uL    NEUTROPHILS 44 32 - 75 %    LYMPHOCYTES 47 12 - 49 %    MONOCYTES 7 5 - 13 %    EOSINOPHILS 1 0 - 7 %    BASOPHILS 1 0 - 1 %    IMMATURE GRANULOCYTES 0 0.0 - 0.5 %    ABS. NEUTROPHILS 1.4 (L) 1.8 - 8.0 K/UL    ABS. LYMPHOCYTES 1.5 0.8 - 3.5 K/UL    ABS. MONOCYTES 0.2 0.0 - 1.0 K/UL    ABS. EOSINOPHILS 0.0 0.0 - 0.4 K/UL    ABS. BASOPHILS 0.0 0.0 - 0.1 K/UL    ABS. IMM.  GRANS. 0.0 0.00 - 0.04 K/UL    DF AUTOMATED

## 2019-10-07 NOTE — PROGRESS NOTES
[facility] [place] at [address], [address] [facility]  Cammy Drop: [phone]  F: [phone]      Reason for Visit:   Devon Munoz is a 44 y.o. female who is seen in follow up for evaluation of therapy for breast cancer    Treatment History:   · 6/10/19 L breast core bx:  IDC, gr 3, 1.1 cm, + LVI, ER negative, MO negative, HER 2 negative at [facility] 1+; DCIS present gr 3, solid with expansile necrosis  · 7/3/19 BRCA1 pathogenic mutation (invitae), c.5266dup  · 19 MRI breast bx:  Negative  · 19- carbo/taxol  · C3d15 delayed one week due to neutropenia    History of Present Illness:   She felt the L breast mass herself in May 2019, with aching pain, leading to the pathology above. Interval history:  In today for follow up and treatment. Complains of gr 2 loss of appetite, gr 1 constipation, gr 1-2 fatigue, gr 1 hair loss, gr 1 nausea, gr 1 anxiety, gr 2 shortness of breath, gr 1 libido.  .    FH:   2 paternal aunts with post-menopausal breast cancer, one aunt with ovarian cancer; that aunt's daughter tested positive for BRCA1 (first cousin); no pancreas cancer, no prostate cancer    Past Medical History:   Diagnosis Date    Breast cancer ([facility] [address] 2019    left breast cancer      Past Surgical History:   Procedure Laterality Date    HX APPENDECTOMY      HX  SECTION      x3      Social History     Tobacco Use    Smoking status: Never Smoker    Smokeless tobacco: Never Used   Substance Use Topics    Alcohol use: Not Currently      Family History   Problem Relation Age of Onset    Diabetes Mother     Heart Disease Mother     Hypertension Mother     Heart Disease Father     Cancer Paternal Aunt         Breast    Cancer Paternal Aunt         Breast     Current Outpatient Medications   Medication Sig    OLANZapine (ZYPREXA) 10 mg tablet 10 mg qhs on days 1-4 following chemo    cholecalciferol, vitamin D3, (VITAMIN D3 PO) Take 2,000 Units by mouth daily.    Lactobacillus acidophilus (PROBIOTIC PO) Take 1 Tab by mouth daily.  prasterone, DHEA, (DHEA PO) Take 10 mg by mouth daily.  OTHER Massage    Dx c50.412 breast cancer    NP THYROID 15 mg tablet Take 15 mg by mouth daily.  testosterone 75 mg pllt by Implant route.  LORazepam (ATIVAN) 1 mg tablet Take 1 Tab by mouth every eight (8) hours as needed (nausea). Max Daily Amount: 3 mg.  MELATONIN PO Take  by mouth nightly as needed.  prochlorperazine (COMPAZINE) 10 mg tablet Take 1 Tab by mouth every six (6) hours as needed for Nausea.  lidocaine-prilocaine (EMLA) topical cream Apply  to affected area as needed for Pain.  ondansetron hcl (ZOFRAN) 8 mg tablet Take 1 Tab by mouth every eight (8) hours as needed for Nausea. No current facility-administered medications for this visit. No Known Allergies     Review of Systems: A complete review of systems was obtained, negative except as described above. Physical Exam:     Visit Vitals  /83   Pulse 98   Temp 97 °F (36.1 °C) (Temporal)   Resp 18   Ht 5' 3\" (1.6 m)   Wt 119 lb 11.2 oz (54.3 kg)   SpO2 100%   BMI 21.20 kg/m²     ECOG PS: 0  General: No distress  Eyes: PERRLA, anicteric sclerae  HENT: Atraumatic, OP clear  Neck: Supple  Lymphatic: No cervical, supraclavicular adenopathy  Respiratory: CTAB, normal respiratory effort  CV: Normal rate, regular rhythm, no murmurs, no peripheral edema  GI: Soft, nontender, nondistended, no masses, no hepatomegaly, no splenomegaly; + BS  MS:  Digits without clubbing or cyanosis. Skin: No rashes, ecchymoses, or petechiae. Normal temperature, turgor, and texture.   Psych: Alert, oriented, appropriate affect, normal judgment/insight  Breasts:  L 2:00 breast, 4 cmfn, <1 cm mass, no LAD     Results:     Lab Results   Component Value Date/Time    WBC 3.8 09/30/2019 01:09 PM    HGB 8.5 (L) 09/30/2019 01:09 PM    HCT 25.0 (L) 09/30/2019 01:09 PM    PLATELET 649 (L) 29/86/5392 01:09 PM    MCV 97.7 09/30/2019 01:09 PM    ABS. NEUTROPHILS 1.9 09/30/2019 01:09 PM     Lab Results   Component Value Date/Time    Sodium 139 09/30/2019 01:09 PM    Potassium 3.2 (L) 09/30/2019 01:09 PM    Chloride 105 09/30/2019 01:09 PM    CO2 30 09/30/2019 01:09 PM    Glucose 82 09/30/2019 01:09 PM    BUN 13 09/30/2019 01:09 PM    Creatinine 0.54 (L) 09/30/2019 01:09 PM    GFR est AA >60 09/30/2019 01:09 PM    GFR est non-AA >60 09/30/2019 01:09 PM    Calcium 8.2 (L) 09/30/2019 01:09 PM     Lab Results   Component Value Date/Time    Bilirubin, total 0.6 09/30/2019 01:09 PM    ALT (SGPT) 134 (H) 09/30/2019 01:09 PM    AST (SGOT) 84 (H) 09/30/2019 01:09 PM    Alk. phosphatase 46 09/30/2019 01:09 PM    Protein, total 6.7 09/30/2019 01:09 PM    Albumin 3.8 09/30/2019 01:09 PM    Globulin 2.9 09/30/2019 01:09 PM       6/13/19 MRI breast  FINDINGS:     Background parenchymal enhancement: Moderate. Lymph nodes: No lymphadenopathy.     Left breast: Irregular triangular-shaped mass in the posterior third of the left  breast at 2:00 measures 2.8 x 1.8 x 4.2 cm. Posterior margin is 0.3 cm from the  left pectoralis major muscle. Biopsy clip is in the inferior margin of the mass.     In the middle and posterior thirds of the left breast at 5-6:00, segmental non  mass enhancement with heterogeneous kinetics measures 2.6 cm in oblique AP  diameter in the axial plane. Otherwise, there are foci in the left breast.     Right breast: Heterogeneous patchy enhancement in multiple locations. No  suspicious kinetics. Biopsy clip in the middle third is at 3:00. No surrounding  abnormal enhancement or morphology.     IMPRESSION:   1. 2.8 x 1.8 x 4.2 cm biopsy-proven infiltrating ductal carcinoma in the left  breast at 2:00 is in close approximation to the chest wall. 2. Left breast 5-6:00 suspicious segmental non mass enhancement. 3.  No MRI evidence of malignancy in the right breast.  4. No lymphadenopathy.     Assessment: ACR BI-RADS category   Left breast: BI-RADS Assessment Category 4: Suspicious abnormality - Biopsy  should be performed in the absence of clinical contraindication. Right breast: BI-RADS Assessment Category 2: Benign finding.     Recommendation: Bilateral mammography if not recently performed elsewhere. Second look ultrasound of the left breast at 5-6:00 to determine  ultrasound-guided or MRI guided biopsy of non mass enhancement.     A negative breast MRI examination speaks strongly against invasive cancer down  to a detection threshold of 3 to 5 mm but may not detect some lower grade or in  situ carcinomas. Therefore, routine clinical and mammographic followup are  recommended. A summary portfolio has been created in PACS.    7/12/19 TTE EF 65%    Records reviewed and summarized above. Pathology report(s) reviewed above. Radiology report(s) reviewed above. Assessment/plan:   1. L UOQ IDC, gr 3, triple negative:  cT2 cN0 cM0, stage IIA, prognostic stage IIB    We explained to the patient that the goal of systemic adjuvant therapy is to improve the chances for cure and decrease the risk of relapse. We explained why a patient can have microscopic cancer spread now even though physical examination, laboratory studies and imaging studies are negative for cancer. We explained that the same treatments used now as adjuvant or preventive treatments rarely if ever are curative in women who develop metastases. We discussed that there is no difference in overall survival between neoadjuvant and adjuvant chemotherapy. We discussed the rationale for neoadjuvant chemotherapy, if chemotherapy is warranted, as it is in this case: to avoid any potential delays in giving chemotherapy following surgery, to be able to see the response of the tumor to chemotherapy, and to potentially downstage the tumor prior to surgery. I discussed the potential risks of dose-dense chemotherapy with the patient.   (DD AC-T, adriamycin 60 mg/m2; cyclophosphamide 600 mg/m2 q 2 weeks x 4; paclitaxel 80 mg/m2 qweekly x 12). Major toxicities include nausea and vomiting, stomatitis, fatigue, and a small risk of heart damage. Anemia frequently results and occasionally requires growth factors and rarely transfusions. Neutropenic fever is uncommon, but can be a life-threatening problem. Also, there is a small but increased risk of myelodysplasia and acute leukemia. We provided the patient with detailed information concerning toxicity including frequent toxicities that only last a few days, such as nausea, vomiting, mouth sores, arthralgia, myalgia, and potentially allergic reactions to paclitaxel, as well as toxicities which can be longer lasting including total alopecia, fatigue, anemia and neuropathy. We provided the patient with detailed information concerning the toxicities of their regimen in addition to our verbal discussion. We discussed the potential addition of carboplatin auc 6 q 3 weeks during weekly paclitaxel as evaluated in CALGB 82467, showing a significant improvement in pCR for patients with stage II-III triple negative breast cancer. Additional side effects of added myelosuppression, fatigue, renal damage with dehydration, and nausea and vomiting were discussed. AUC is the dose that is currently being used in all investigations, and thus is what will be used here. Discussed oral and peripheral cryotherapy. Cold caps would not work with [facility]. MRI biopsy of 2nd breast lesion ordered by Dr. Yogesh Hathaway, negative. Dr. Yogesh Hathaway has placed port     The patient was given the following prescriptions with written and verbal instructions on how to use each:  Compazine, zofran, olanzapine, a wig, and emla cream.  IV Gillermina Vaishali will be used with [facility]. Neulasta the following day (bone pain side effect discussed) with AC. She is agreeable to neoadjuvant chemotherapy, declined B-59. Initiated therapy on 7/22/19. Agreeable to carboplatin/paclitaxel followed by DD AC.   She has signed informed consent     Carbo/taxol C4D8 today. Reports IV benadryl made her feel extremely loopy and drowsy, switched to PO bendaryl, patient taking this PRN. States she has been taking zyprexa with each treatment. Discussed with patient that this is for DDAC portion of treatment. 2. Emotional well being:  She has excellent support and is coping well with her disease    3. BRCA1 pathogenic mutation:   Discussed recommendation for bilateral mastectomies and bilateral oophorectomies; risk of ovarian, fallopian or peritoneal cancer is 16-59%; risk for pancreatic cancer is 1-3%; risk for contralateral breast cancer is 10-15%    4. Fertility preservation:  Discussed that there is > 20% chance of loss of menses with chemotherapy. She and her  state that they are not interested in further children and fertility preservation     5. Constipation: Relieved with a suppository. Now she is taking fiber and stool softener, prn miralax. Ongoing but has iimproved    6. Menses:  Reports this lasted a little longer than usual.  Will continue to monitor. 7. Nausea, chemotherapy induced: Continue to take scheduled zofran and compazine, as well as ativan 1 mg tid PRN. Added 1L IV hydration with each treatment and Zofran prior to Taxol on day 8 and 15.     8. Headaches:  Due to treatment. PRN Excedrin with improvement. Tylenol for headache today    9. Cough: Resolved. This patient was seen in conjunction with Nehemias Jon NP      Signed By: Polo Payton MD      No orders of the defined types were placed in this encounter.

## 2019-10-14 ENCOUNTER — HOSPITAL ENCOUNTER (OUTPATIENT)
Dept: INFUSION THERAPY | Age: 44
Discharge: HOME | End: 2019-10-14
Payer: OTHER GOVERNMENT

## 2019-10-14 VITALS
DIASTOLIC BLOOD PRESSURE: 58 MMHG | BODY MASS INDEX: 21.93 KG/M2 | TEMPERATURE: 98.2 F | HEIGHT: 63 IN | RESPIRATION RATE: 16 BRPM | SYSTOLIC BLOOD PRESSURE: 116 MMHG | OXYGEN SATURATION: 100 % | WEIGHT: 123.8 LBS | HEART RATE: 76 BPM

## 2019-10-14 DIAGNOSIS — C50.412 MALIGNANT NEOPLASM OF UPPER-OUTER QUADRANT OF LEFT BREAST IN FEMALE, ESTROGEN RECEPTOR NEGATIVE (HCC): Primary | ICD-10-CM

## 2019-10-14 DIAGNOSIS — Z17.1 MALIGNANT NEOPLASM OF UPPER-OUTER QUADRANT OF LEFT BREAST IN FEMALE, ESTROGEN RECEPTOR NEGATIVE (HCC): Primary | ICD-10-CM

## 2019-10-14 LAB
BASOPHILS # BLD: 0 K/UL (ref 0–0.1)
BASOPHILS NFR BLD: 0 % (ref 0–1)
DIFFERENTIAL METHOD BLD: ABNORMAL
EOSINOPHIL # BLD: 0 K/UL (ref 0–0.4)
EOSINOPHIL NFR BLD: 0 % (ref 0–7)
ERYTHROCYTE [DISTWIDTH] IN BLOOD BY AUTOMATED COUNT: 17.3 % (ref 11.5–14.5)
HCT VFR BLD AUTO: 23 % (ref 35–47)
HGB BLD-MCNC: 7.9 G/DL (ref 11.5–16)
IMM GRANULOCYTES # BLD AUTO: 0 K/UL (ref 0–0.04)
IMM GRANULOCYTES NFR BLD AUTO: 0 % (ref 0–0.5)
LYMPHOCYTES # BLD: 2.1 K/UL (ref 0.8–3.5)
LYMPHOCYTES NFR BLD: 65 % (ref 12–49)
MCH RBC QN AUTO: 33.9 PG (ref 26–34)
MCHC RBC AUTO-ENTMCNC: 34.3 G/DL (ref 30–36.5)
MCV RBC AUTO: 98.7 FL (ref 80–99)
MONOCYTES # BLD: 0.4 K/UL (ref 0–1)
MONOCYTES NFR BLD: 13 % (ref 5–13)
NEUTS SEG # BLD: 0.7 K/UL (ref 1.8–8)
NEUTS SEG NFR BLD: 22 % (ref 32–75)
NRBC # BLD: 0 K/UL (ref 0–0.01)
NRBC BLD-RTO: 0 PER 100 WBC
PLATELET # BLD AUTO: 113 K/UL (ref 150–400)
PMV BLD AUTO: 10.5 FL (ref 8.9–12.9)
RBC # BLD AUTO: 2.33 M/UL (ref 3.8–5.2)
RBC MORPH BLD: ABNORMAL
WBC # BLD AUTO: 3.2 K/UL (ref 3.6–11)

## 2019-10-14 PROCEDURE — 74011250636 HC RX REV CODE- 250/636: Performed by: INTERNAL MEDICINE

## 2019-10-14 PROCEDURE — 96413 CHEMO IV INFUSION 1 HR: CPT

## 2019-10-14 PROCEDURE — 74011250636 HC RX REV CODE- 250/636: Performed by: NURSE PRACTITIONER

## 2019-10-14 PROCEDURE — 74011000250 HC RX REV CODE- 250: Performed by: INTERNAL MEDICINE

## 2019-10-14 PROCEDURE — 96361 HYDRATE IV INFUSION ADD-ON: CPT

## 2019-10-14 PROCEDURE — 36415 COLL VENOUS BLD VENIPUNCTURE: CPT

## 2019-10-14 PROCEDURE — 96375 TX/PRO/DX INJ NEW DRUG ADDON: CPT

## 2019-10-14 PROCEDURE — 85025 COMPLETE CBC W/AUTO DIFF WBC: CPT

## 2019-10-14 PROCEDURE — 77030012965 HC NDL HUBR BBMI -A

## 2019-10-14 PROCEDURE — 74011250637 HC RX REV CODE- 250/637: Performed by: NURSE PRACTITIONER

## 2019-10-14 RX ORDER — DEXAMETHASONE SODIUM PHOSPHATE 100 MG/10ML
10 INJECTION INTRAMUSCULAR; INTRAVENOUS ONCE
Status: COMPLETED | OUTPATIENT
Start: 2019-10-14 | End: 2019-10-14

## 2019-10-14 RX ORDER — DIPHENHYDRAMINE HCL 25 MG
50 CAPSULE ORAL ONCE
Status: COMPLETED | OUTPATIENT
Start: 2019-10-14 | End: 2019-10-14

## 2019-10-14 RX ORDER — HEPARIN 100 UNIT/ML
300-500 SYRINGE INTRAVENOUS AS NEEDED
Status: DISCONTINUED | OUTPATIENT
Start: 2019-10-14 | End: 2019-10-15 | Stop reason: HOSPADM

## 2019-10-14 RX ORDER — SODIUM CHLORIDE 9 MG/ML
25 INJECTION, SOLUTION INTRAVENOUS CONTINUOUS
Status: DISCONTINUED | OUTPATIENT
Start: 2019-10-14 | End: 2019-10-15 | Stop reason: HOSPADM

## 2019-10-14 RX ORDER — ONDANSETRON 2 MG/ML
8 INJECTION INTRAMUSCULAR; INTRAVENOUS ONCE
Status: COMPLETED | OUTPATIENT
Start: 2019-10-14 | End: 2019-10-14

## 2019-10-14 RX ORDER — SODIUM CHLORIDE 0.9 % (FLUSH) 0.9 %
10 SYRINGE (ML) INJECTION AS NEEDED
Status: DISCONTINUED | OUTPATIENT
Start: 2019-10-14 | End: 2019-10-15 | Stop reason: HOSPADM

## 2019-10-14 RX ORDER — SODIUM CHLORIDE 9 MG/ML
10 INJECTION INTRAMUSCULAR; INTRAVENOUS; SUBCUTANEOUS AS NEEDED
Status: DISCONTINUED | OUTPATIENT
Start: 2019-10-14 | End: 2019-10-15 | Stop reason: HOSPADM

## 2019-10-14 RX ADMIN — Medication 500 UNITS: at 18:59

## 2019-10-14 RX ADMIN — Medication 10 ML: at 18:58

## 2019-10-14 RX ADMIN — DIPHENHYDRAMINE HYDROCHLORIDE 50 MG: 25 CAPSULE ORAL at 15:57

## 2019-10-14 RX ADMIN — SODIUM CHLORIDE 25 ML/HR: 900 INJECTION, SOLUTION INTRAVENOUS at 15:53

## 2019-10-14 RX ADMIN — ONDANSETRON 8 MG: 2 INJECTION, SOLUTION INTRAMUSCULAR; INTRAVENOUS at 15:58

## 2019-10-14 RX ADMIN — PACLITAXEL 134 MG: 6 INJECTION, SOLUTION, CONCENTRATE INTRAVENOUS at 17:55

## 2019-10-14 RX ADMIN — DEXAMETHASONE SODIUM PHOSPHATE 10 MG: 10 INJECTION INTRAMUSCULAR; INTRAVENOUS at 16:35

## 2019-10-14 RX ADMIN — FAMOTIDINE 20 MG: 10 INJECTION, SOLUTION INTRAVENOUS at 16:42

## 2019-10-14 RX ADMIN — SODIUM CHLORIDE 1000 ML: 900 INJECTION, SOLUTION INTRAVENOUS at 14:16

## 2019-10-14 NOTE — PROGRESS NOTES
[facility] Progress Note    Date: 2019    Name: Cheyenne Galvan    MRN: [id number]         : 1975    1335:  Ms. Marta Flowers Arrived ambulatory and in no distress for C4D15 of Taxol Regimen. Assessment was completed, no acute issues at this time, no new complaints voiced. Right chest wall port accessed without difficulty using 0.75 inch hernandez needle, labs drawn & sent for processing. Chemotherapy Flowsheet 10/14/2019   Cycle C4D15   Date 10/14/2019   Drug / Regimen Taxol   Pre Hydration given   Pre Meds given   Notes given         Ms. East's vitals were reviewed. Patient Vitals for the past 24 hrs:   Temp Pulse Resp BP SpO2   10/14/19 1857 98.2 °F (36.8 °C) 76 16 116/58 --   10/14/19 1403 98.3 °F (36.8 °C) 78 16 107/77 100 %       Lab results were obtained and reviewed. Recent Results (from the past 12 hour(s))   CBC WITH AUTOMATED DIFF    Collection Time: 10/14/19  1:46 PM   Result Value Ref Range    WBC 3.2 (L) 3.6 - 11.0 K/uL    RBC 2.33 (L) 3.80 - 5.20 M/uL    HGB 7.9 (L) 11.5 - 16.0 g/dL    HCT 23.0 (L) 35.0 - 47.0 %    MCV 98.7 80.0 - 99.0 FL    MCH 33.9 26.0 - 34.0 PG    MCHC 34.3 30.0 - 36.5 g/dL    RDW 17.3 (H) 11.5 - 14.5 %    PLATELET 963 (L) 993 - 400 K/uL    MPV 10.5 8.9 - 12.9 FL    NRBC 0.0 0  WBC    ABSOLUTE NRBC 0.00 0.00 - 0.01 K/uL    NEUTROPHILS 22 (L) 32 - 75 %    LYMPHOCYTES 65 (H) 12 - 49 %    MONOCYTES 13 5 - 13 %    EOSINOPHILS 0 0 - 7 %    BASOPHILS 0 0 - 1 %    IMMATURE GRANULOCYTES 0 0.0 - 0.5 %    ABS. NEUTROPHILS 0.7 (L) 1.8 - 8.0 K/UL    ABS. LYMPHOCYTES 2.1 0.8 - 3.5 K/UL    ABS. MONOCYTES 0.4 0.0 - 1.0 K/UL    ABS. EOSINOPHILS 0.0 0.0 - 0.4 K/UL    ABS. BASOPHILS 0.0 0.0 - 0.1 K/UL    ABS. IMM. GRANS. 0.0 0.00 - 0.04 K/UL    DF SMEAR SCANNED      RBC COMMENTS ANISOCYTOSIS  1+           Medications:      Ms. Marta Flowers tolerated treatment well and was discharged from [place] [address] in stable condition at 1905.    Port de-accessed, flushed & heparinized per protocol. She is to return on October 21 at 1300 for her next appointment.     Shekhar Herbert RN  October 14, 2019

## 2019-10-16 ENCOUNTER — HOSPITAL ENCOUNTER (OUTPATIENT)
Dept: MAMMOGRAPHY | Age: 44
Discharge: HOME | End: 2019-10-16
Attending: NURSE PRACTITIONER
Payer: OTHER GOVERNMENT

## 2019-10-16 DIAGNOSIS — C50.412 MALIGNANT NEOPLASM OF UPPER-OUTER QUADRANT OF LEFT BREAST IN FEMALE, ESTROGEN RECEPTOR NEGATIVE (HCC): ICD-10-CM

## 2019-10-16 DIAGNOSIS — Z17.1 MALIGNANT NEOPLASM OF UPPER-OUTER QUADRANT OF LEFT BREAST IN FEMALE, ESTROGEN RECEPTOR NEGATIVE (HCC): ICD-10-CM

## 2019-10-16 DIAGNOSIS — C50.919 BREAST CANCER (HCC): ICD-10-CM

## 2019-10-16 PROCEDURE — 77065 DX MAMMO INCL CAD UNI: CPT

## 2019-10-16 PROCEDURE — 76642 ULTRASOUND BREAST LIMITED: CPT

## 2019-10-21 ENCOUNTER — HOSPITAL ENCOUNTER (OUTPATIENT)
Dept: INFUSION THERAPY | Age: 44
Discharge: HOME | End: 2019-10-21
Payer: OTHER GOVERNMENT

## 2019-10-21 ENCOUNTER — OFFICE VISIT (OUTPATIENT)
Dept: ONCOLOGY | Age: 44
End: 2019-10-21

## 2019-10-21 ENCOUNTER — TELEPHONE (OUTPATIENT)
Dept: ONCOLOGY | Age: 44
End: 2019-10-21

## 2019-10-21 VITALS
TEMPERATURE: 97 F | HEART RATE: 115 BPM | OXYGEN SATURATION: 100 % | DIASTOLIC BLOOD PRESSURE: 79 MMHG | BODY MASS INDEX: 22.06 KG/M2 | RESPIRATION RATE: 16 BRPM | HEIGHT: 63 IN | SYSTOLIC BLOOD PRESSURE: 110 MMHG | WEIGHT: 124.5 LBS

## 2019-10-21 VITALS
OXYGEN SATURATION: 100 % | TEMPERATURE: 97 F | WEIGHT: 124.5 LBS | HEART RATE: 115 BPM | SYSTOLIC BLOOD PRESSURE: 110 MMHG | DIASTOLIC BLOOD PRESSURE: 79 MMHG | RESPIRATION RATE: 18 BRPM | HEIGHT: 63 IN | BODY MASS INDEX: 22.06 KG/M2

## 2019-10-21 DIAGNOSIS — C50.412 MALIGNANT NEOPLASM OF UPPER-OUTER QUADRANT OF LEFT BREAST IN FEMALE, ESTROGEN RECEPTOR NEGATIVE (HCC): Primary | ICD-10-CM

## 2019-10-21 DIAGNOSIS — Z51.11 CHEMOTHERAPY MANAGEMENT, ENCOUNTER FOR: ICD-10-CM

## 2019-10-21 DIAGNOSIS — Z17.1 MALIGNANT NEOPLASM OF UPPER-OUTER QUADRANT OF LEFT BREAST IN FEMALE, ESTROGEN RECEPTOR NEGATIVE (HCC): Primary | ICD-10-CM

## 2019-10-21 DIAGNOSIS — R05.9 COUGH: ICD-10-CM

## 2019-10-21 DIAGNOSIS — R11.0 NAUSEA: ICD-10-CM

## 2019-10-21 DIAGNOSIS — R21 RASH: ICD-10-CM

## 2019-10-21 DIAGNOSIS — D69.6 THROMBOCYTOPENIA (HCC): ICD-10-CM

## 2019-10-21 DIAGNOSIS — K59.03 DRUG-INDUCED CONSTIPATION: ICD-10-CM

## 2019-10-21 LAB
ALBUMIN SERPL-MCNC: 3.6 G/DL (ref 3.5–5)
ALBUMIN/GLOB SERPL: 1.2 {RATIO} (ref 1.1–2.2)
ALP SERPL-CCNC: 46 U/L (ref 45–117)
ALT SERPL-CCNC: 114 U/L (ref 12–78)
ANION GAP SERPL CALC-SCNC: 7 MMOL/L (ref 5–15)
AST SERPL-CCNC: 61 U/L (ref 15–37)
BASOPHILS # BLD: 0 K/UL (ref 0–0.1)
BASOPHILS NFR BLD: 0 % (ref 0–1)
BILIRUB SERPL-MCNC: 0.6 MG/DL (ref 0.2–1)
BUN SERPL-MCNC: 12 MG/DL (ref 6–20)
BUN/CREAT SERPL: 23 (ref 12–20)
CALCIUM SERPL-MCNC: 7.2 MG/DL (ref 8.5–10.1)
CHLORIDE SERPL-SCNC: 105 MMOL/L (ref 97–108)
CO2 SERPL-SCNC: 26 MMOL/L (ref 21–32)
CREAT SERPL-MCNC: 0.53 MG/DL (ref 0.55–1.02)
DIFFERENTIAL METHOD BLD: ABNORMAL
EOSINOPHIL # BLD: 0 K/UL (ref 0–0.4)
EOSINOPHIL NFR BLD: 0 % (ref 0–7)
ERYTHROCYTE [DISTWIDTH] IN BLOOD BY AUTOMATED COUNT: 17.4 % (ref 11.5–14.5)
GLOBULIN SER CALC-MCNC: 3 G/DL (ref 2–4)
GLUCOSE SERPL-MCNC: 97 MG/DL (ref 65–100)
HCT VFR BLD AUTO: 22.4 % (ref 35–47)
HGB BLD-MCNC: 7.6 G/DL (ref 11.5–16)
IMM GRANULOCYTES # BLD AUTO: 0 K/UL (ref 0–0.04)
IMM GRANULOCYTES NFR BLD AUTO: 0 % (ref 0–0.5)
LYMPHOCYTES # BLD: 1.5 K/UL (ref 0.8–3.5)
LYMPHOCYTES NFR BLD: 49 % (ref 12–49)
MCH RBC QN AUTO: 34.2 PG (ref 26–34)
MCHC RBC AUTO-ENTMCNC: 33.9 G/DL (ref 30–36.5)
MCV RBC AUTO: 100.9 FL (ref 80–99)
MONOCYTES # BLD: 0.3 K/UL (ref 0–1)
MONOCYTES NFR BLD: 10 % (ref 5–13)
NEUTS SEG # BLD: 1.2 K/UL (ref 1.8–8)
NEUTS SEG NFR BLD: 41 % (ref 32–75)
NRBC # BLD: 0.02 K/UL (ref 0–0.01)
NRBC BLD-RTO: 0.7 PER 100 WBC
PLATELET # BLD AUTO: 65 K/UL (ref 150–400)
PMV BLD AUTO: 10.6 FL (ref 8.9–12.9)
POTASSIUM SERPL-SCNC: 2.9 MMOL/L (ref 3.5–5.1)
PROT SERPL-MCNC: 6.6 G/DL (ref 6.4–8.2)
RBC # BLD AUTO: 2.22 M/UL (ref 3.8–5.2)
RBC MORPH BLD: ABNORMAL
SODIUM SERPL-SCNC: 138 MMOL/L (ref 136–145)
TROPONIN I SERPL-MCNC: <0.05 NG/ML
WBC # BLD AUTO: 3 K/UL (ref 3.6–11)
WBC MORPH BLD: ABNORMAL

## 2019-10-21 PROCEDURE — 74011250636 HC RX REV CODE- 250/636: Performed by: INTERNAL MEDICINE

## 2019-10-21 PROCEDURE — 36415 COLL VENOUS BLD VENIPUNCTURE: CPT

## 2019-10-21 PROCEDURE — 77030012965 HC NDL HUBR BBMI -A

## 2019-10-21 PROCEDURE — 80053 COMPREHEN METABOLIC PANEL: CPT

## 2019-10-21 PROCEDURE — 85025 COMPLETE CBC W/AUTO DIFF WBC: CPT

## 2019-10-21 PROCEDURE — 84484 ASSAY OF TROPONIN QUANT: CPT

## 2019-10-21 RX ORDER — POTASSIUM CHLORIDE 20 MEQ/1
TABLET, EXTENDED RELEASE ORAL
Qty: 3 TAB | Refills: 0 | Status: SHIPPED | OUTPATIENT
Start: 2019-10-21 | End: 2019-11-11 | Stop reason: ALTCHOICE

## 2019-10-21 RX ORDER — SODIUM CHLORIDE 0.9 % (FLUSH) 0.9 %
10 SYRINGE (ML) INJECTION AS NEEDED
Status: ACTIVE | OUTPATIENT
Start: 2019-10-21 | End: 2019-10-21

## 2019-10-21 RX ORDER — HEPARIN 100 UNIT/ML
300-500 SYRINGE INTRAVENOUS AS NEEDED
Status: ACTIVE | OUTPATIENT
Start: 2019-10-21 | End: 2019-10-21

## 2019-10-21 RX ORDER — SODIUM CHLORIDE 9 MG/ML
10 INJECTION INTRAMUSCULAR; INTRAVENOUS; SUBCUTANEOUS AS NEEDED
Status: ACTIVE | OUTPATIENT
Start: 2019-10-21 | End: 2019-10-21

## 2019-10-21 RX ADMIN — Medication 10 ML: at 12:39

## 2019-10-21 RX ADMIN — SODIUM CHLORIDE 10 ML: 9 INJECTION INTRAMUSCULAR; INTRAVENOUS; SUBCUTANEOUS at 11:20

## 2019-10-21 RX ADMIN — HEPARIN 500 UNITS: 100 SYRINGE at 12:39

## 2019-10-21 NOTE — PROGRESS NOTES
[facility] VISIT NOTE  Date: 2019    Name: Ashleigh Herrera    MRN: [id number]         : 1975    1110  Ms. [address] [facility] Arrived ambulatory and in no distress for C1 D1 of DDAC Regimen. Assessment was completed, no acute issues at this time, no new complaints voiced. Right chest wall port accessed without difficulty, labs drawn & sent for processing. Patient to see MD    Chemotherapy Flowsheet 10/21/2019   Cycle C1D1   Date 10/21/2019   Drug / Regimen DDAC   Pre Hydration -   Pre Meds -   Notes HELD       Vitals:  /79   Pulse (!) 115   Temp 97 °F (36.1 °C)   Resp 16   Ht 5' 3\" (1.6 m)   Wt 56.5 kg (124 lb 8 oz)   SpO2 100%   BMI 22.05 kg/m²      Lab results were obtained and reviewed. Recent Results (from the past 12 hour(s))   CBC WITH AUTOMATED DIFF    Collection Time: 10/21/19 11:21 AM   Result Value Ref Range    WBC 3.0 (L) 3.6 - 11.0 K/uL    RBC 2.22 (L) 3.80 - 5.20 M/uL    HGB 7.6 (L) 11.5 - 16.0 g/dL    HCT 22.4 (L) 35.0 - 47.0 %    .9 (H) 80.0 - 99.0 FL    MCH 34.2 (H) 26.0 - 34.0 PG    MCHC 33.9 30.0 - 36.5 g/dL    RDW 17.4 (H) 11.5 - 14.5 %    PLATELET 65 (L) 968 - 400 K/uL    MPV 10.6 8.9 - 12.9 FL    NRBC 0.7 (H) 0  WBC    ABSOLUTE NRBC 0.02 (H) 0.00 - 0.01 K/uL    NEUTROPHILS 41 32 - 75 %    LYMPHOCYTES 49 12 - 49 %    MONOCYTES 10 5 - 13 %    EOSINOPHILS 0 0 - 7 %    BASOPHILS 0 0 - 1 %    IMMATURE GRANULOCYTES 0 0.0 - 0.5 %    ABS. NEUTROPHILS 1.2 (L) 1.8 - 8.0 K/UL    ABS. LYMPHOCYTES 1.5 0.8 - 3.5 K/UL    ABS. MONOCYTES 0.3 0.0 - 1.0 K/UL    ABS. EOSINOPHILS 0.0 0.0 - 0.4 K/UL    ABS. BASOPHILS 0.0 0.0 - 0.1 K/UL    ABS. IMM.  GRANS. 0.0 0.00 - 0.04 K/UL    DF SMEAR SCANNED      RBC COMMENTS ANISOCYTOSIS  1+        RBC COMMENTS OVALOCYTES  PRESENT        RBC COMMENTS POLYCHROMASIA  PRESENT        WBC COMMENTS TOXIC GRANULATION     METABOLIC PANEL, COMPREHENSIVE    Collection Time: 10/21/19 11:21 AM   Result Value Ref Range    Sodium 138 136 - 145 mmol/L    Potassium 2.9 (L) 3.5 - 5.1 mmol/L    Chloride 105 97 - 108 mmol/L    CO2 26 21 - 32 mmol/L    Anion gap 7 5 - 15 mmol/L    Glucose 97 65 - 100 mg/dL    BUN 12 6 - 20 MG/DL    Creatinine 0.53 (L) 0.55 - 1.02 MG/DL    BUN/Creatinine ratio 23 (H) 12 - 20      GFR est AA >60 >60 ml/min/1.73m2    GFR est non-AA >60 >60 ml/min/1.73m2    Calcium 7.2 (L) 8.5 - 10.1 MG/DL    Bilirubin, total 0.6 0.2 - 1.0 MG/DL    ALT (SGPT) 114 (H) 12 - 78 U/L    AST (SGOT) 61 (H) 15 - 37 U/L    Alk. phosphatase 46 45 - 117 U/L    Protein, total 6.6 6.4 - 8.2 g/dL    Albumin 3.6 3.5 - 5.0 g/dL    Globulin 3.0 2.0 - 4.0 g/dL    A-G Ratio 1.2 1.1 - 2.2     TROPONIN I    Collection Time: 10/21/19 11:21 AM   Result Value Ref Range    Troponin-I, Qt. <0.05 <0.05 ng/mL       Medications received:  Treatment Held    Ms. East's treatment was held and was discharged from [place] [address] in stable condition at 1240. Port de-accessed, flushed & heparinized per protocol. She is to return on 10/28/19 at 10:00 for her next appointment.     Jamil Clemens RN  October 21, 2019    Future Appointments:  Future Appointments   Date Time Provider Misty Martinez   10/28/2019 10:00 AM SS INF2 CH3 <4H [facility]   11/11/2019 10:00 AM SS INF3 CH3 <4H [facility]   11/11/2019 10:30 AM Magan Viveros NP [facility]   11/25/2019 10:00 AM SS INF3 CH3 <4H [facility]   12/9/2019 10:00 AM [facility]

## 2019-10-21 NOTE — PROGRESS NOTES
[facility] [place] [facility]  [address] [facility], [address] [facility] [address] [facility]  [place] Ready: [phone]  F: [phone]      Reason for Visit:   Briana Kaur is a 38 y.o. female who is seen in follow up for evaluation of therapy for breast cancer    Treatment History:   · 6/10/19 L breast core bx:  IDC, gr 3, 1.1 cm, + LVI, ER negative, FL negative, HER 2 negative at [facility] 1+; DCIS present gr 3, solid with expansile necrosis  · 7/3/19 BRCA1 pathogenic mutation (invitae), [id number].5266dup  · 19 MRI breast bx:  Negative  · Carbo/taxol 19-10/14/19  · C3d15 delayed one week due to neutropenia    DD [facility] 10/28/19-   c1 held one week due to thrombocytopenia    History of Present Illness:   She felt the L breast mass herself in May 2019, with aching pain, leading to the pathology above. Interval history:  In today for follow up and treatment. Complains of gr 1 constipation, gr 1 fatigue, gr 2 hair loss, gr 1 nausea, gr 1 insomnia, gr 1 sob, gr 1 libido.      FH:   2 paternal aunts with post-menopausal breast cancer, one aunt with ovarian cancer; that aunt's daughter tested positive for BRCA1 (first cousin); no pancreas cancer, no prostate cancer    Past Medical History:   Diagnosis Date    Breast cancer ([facility] [address] 2019    left breast cancer      Past Surgical History:   Procedure Laterality Date    HX APPENDECTOMY      HX  SECTION      x3      Social History     Tobacco Use    Smoking status: Never Smoker    Smokeless tobacco: Never Used   Substance Use Topics    Alcohol use: Not Currently      Family History   Problem Relation Age of Onset    Diabetes Mother     Heart Disease Mother     Hypertension Mother     Heart Disease Father     Cancer Paternal Aunt         Breast    Breast Cancer Paternal Aunt     Cancer Paternal Aunt         Breast    Breast Cancer Paternal Aunt      Current Outpatient Medications   Medication Sig    OLANZapine (ZYPREXA) 10 mg tablet 10 mg qhs on days 1-4 following chemo    cholecalciferol, vitamin D3, (VITAMIN D3 PO) Take 2,000 Units by mouth daily.  Lactobacillus acidophilus (PROBIOTIC PO) Take 1 Tab by mouth daily.  prasterone, DHEA, (DHEA PO) Take 10 mg by mouth daily.  OTHER Massage    Dx c50.412 breast cancer    NP THYROID 15 mg tablet Take 15 mg by mouth daily.  testosterone 75 mg pllt by Implant route.  LORazepam (ATIVAN) 1 mg tablet Take 1 Tab by mouth every eight (8) hours as needed (nausea). Max Daily Amount: 3 mg.  MELATONIN PO Take  by mouth nightly as needed.  prochlorperazine (COMPAZINE) 10 mg tablet Take 1 Tab by mouth every six (6) hours as needed for Nausea.  lidocaine-prilocaine (EMLA) topical cream Apply  to affected area as needed for Pain.  ondansetron hcl (ZOFRAN) 8 mg tablet Take 1 Tab by mouth every eight (8) hours as needed for Nausea. No current facility-administered medications for this visit. Facility-Administered Medications Ordered in Other Visits   Medication Dose Route Frequency    saline peripheral flush soln 10 mL  10 mL InterCATHeter PRN    sodium chloride 0.9% injection 10 mL  10 mL IntraVENous PRN    heparin (porcine) pf 300-500 Units  300-500 Units InterCATHeter PRN      No Known Allergies     Review of Systems: A complete review of systems was obtained, negative except as described above. Physical Exam:     Visit Vitals  /79   Pulse (!) 115   Temp 97 °F (36.1 °C) (Temporal)   Resp 18   Ht 5' 3\" (1.6 m)   Wt 124 lb 8 oz (56.5 kg)   SpO2 100%   BMI 22.05 kg/m²     ECOG PS: 0  General: No distress  Eyes: PERRLA, anicteric sclerae  HENT: Atraumatic, OP clear  Neck: Supple  Lymphatic: No cervical, supraclavicular adenopathy  Respiratory: CTAB, normal respiratory effort  CV: Normal rate, regular rhythm, no murmurs, no peripheral edema  GI: Soft, nontender, nondistended, no masses, no hepatomegaly, no splenomegaly; + BS  MS:  Digits without clubbing or cyanosis.   Skin: No rashes, ecchymoses, or petechiae. Normal temperature, turgor, and texture. Psych: Alert, oriented, appropriate affect, normal judgment/insight  Breasts:  L 2:00 breast, 4 cmfn, <1 cm mass, no LAD     Results:     Lab Results   Component Value Date/Time    WBC 3.0 (L) 10/21/2019 11:21 AM    HGB 7.6 (L) 10/21/2019 11:21 AM    HCT 22.4 (L) 10/21/2019 11:21 AM    PLATELET 65 (L) 47/88/3154 11:21 AM    .9 (H) 10/21/2019 11:21 AM    ABS. NEUTROPHILS PENDING 10/21/2019 11:21 AM     Lab Results   Component Value Date/Time    Sodium 139 09/30/2019 01:09 PM    Potassium 3.2 (L) 09/30/2019 01:09 PM    Chloride 105 09/30/2019 01:09 PM    CO2 30 09/30/2019 01:09 PM    Glucose 82 09/30/2019 01:09 PM    BUN 13 09/30/2019 01:09 PM    Creatinine 0.54 (L) 09/30/2019 01:09 PM    GFR est AA >60 09/30/2019 01:09 PM    GFR est non-AA >60 09/30/2019 01:09 PM    Calcium 8.2 (L) 09/30/2019 01:09 PM     Lab Results   Component Value Date/Time    Bilirubin, total 0.6 09/30/2019 01:09 PM    ALT (SGPT) 134 (H) 09/30/2019 01:09 PM    AST (SGOT) 84 (H) 09/30/2019 01:09 PM    Alk. phosphatase 46 09/30/2019 01:09 PM    Protein, total 6.7 09/30/2019 01:09 PM    Albumin 3.8 09/30/2019 01:09 PM    Globulin 2.9 09/30/2019 01:09 PM       6/13/19 MRI breast  FINDINGS:     Background parenchymal enhancement: Moderate. Lymph nodes: No lymphadenopathy.     Left breast: Irregular triangular-shaped mass in the posterior third of the left  breast at 2:00 measures 2.8 x 1.8 x 4.2 cm. Posterior margin is 0.3 cm from the  left pectoralis major muscle. Biopsy clip is in the inferior margin of the mass.     In the middle and posterior thirds of the left breast at 5-6:00, segmental non  mass enhancement with heterogeneous kinetics measures 2.6 cm in oblique AP  diameter in the axial plane. Otherwise, there are foci in the left breast.     Right breast: Heterogeneous patchy enhancement in multiple locations. No  suspicious kinetics.  Biopsy clip in the middle third is at 3:00. No surrounding  abnormal enhancement or morphology.     IMPRESSION:   1. 2.8 x 1.8 x 4.2 cm biopsy-proven infiltrating ductal carcinoma in the left  breast at 2:00 is in close approximation to the chest wall. 2. Left breast 5-6:00 suspicious segmental non mass enhancement. 3. No MRI evidence of malignancy in the right breast.  4. No lymphadenopathy.     Assessment: ACR BI-RADS category   Left breast: BI-RADS Assessment Category 4: Suspicious abnormality - Biopsy  should be performed in the absence of clinical contraindication. Right breast: BI-RADS Assessment Category 2: Benign finding.     Recommendation: Bilateral mammography if not recently performed elsewhere. Second look ultrasound of the left breast at 5-6:00 to determine  ultrasound-guided or MRI guided biopsy of non mass enhancement.     A negative breast MRI examination speaks strongly against invasive cancer down  to a detection threshold of 3 to 5 mm but may not detect some lower grade or in  situ carcinomas. Therefore, routine clinical and mammographic followup are  recommended. A summary portfolio has been created in PACS.    7/12/19 TTE EF 65%    10/16/19 Left mammo/US:  ULTRASOUND:  Corresponding with the mammographic density and previously seen  mass is a 1.2 x 1.2 x 1.4 cm hypoechoic mass lesion, which previously measured  1.9 x 2.7 cm on diagnostic MRI. Note is made of an adjacent biopsy marker clip. No other suspicious cystic or solid lesions identified.     IMPRESSION: Response to therapy with apparent reduction in size of left breast  mass compared to prior MRI. BI-RADS Assessment Category 6: Known biopsy proven  malignancy- Appropriate action should be taken.     RECOMMENDATION:  Appropriate clinical management of known left breast  malignancy. Records reviewed and summarized above. Pathology report(s) reviewed above. Radiology report(s) reviewed above.     Assessment/plan:   1. L UOQ IDC, gr 3, triple negative:  cT2 cN0 cM0, stage IIA, prognostic stage IIB    We explained to the patient that the goal of systemic adjuvant therapy is to improve the chances for cure and decrease the risk of relapse. We explained why a patient can have microscopic cancer spread now even though physical examination, laboratory studies and imaging studies are negative for cancer. We explained that the same treatments used now as adjuvant or preventive treatments rarely if ever are curative in women who develop metastases. We discussed that there is no difference in overall survival between neoadjuvant and adjuvant chemotherapy. We discussed the rationale for neoadjuvant chemotherapy, if chemotherapy is warranted, as it is in this case: to avoid any potential delays in giving chemotherapy following surgery, to be able to see the response of the tumor to chemotherapy, and to potentially downstage the tumor prior to surgery. I discussed the potential risks of dose-dense chemotherapy with the patient. (DD AC-T, adriamycin 60 mg/m2; cyclophosphamide 600 mg/m2 q 2 weeks x 4; paclitaxel 80 mg/m2 qweekly x 12). Major toxicities include nausea and vomiting, stomatitis, fatigue, and a small risk of heart damage. Anemia frequently results and occasionally requires growth factors and rarely transfusions. Neutropenic fever is uncommon, but can be a life-threatening problem. Also, there is a small but increased risk of myelodysplasia and acute leukemia. We provided the patient with detailed information concerning toxicity including frequent toxicities that only last a few days, such as nausea, vomiting, mouth sores, arthralgia, myalgia, and potentially allergic reactions to paclitaxel, as well as toxicities which can be longer lasting including total alopecia, fatigue, anemia and neuropathy. We provided the patient with detailed information concerning the toxicities of their regimen in addition to our verbal discussion.     We discussed the potential addition of carboplatin auc 6 q 3 weeks during weekly paclitaxel as evaluated in CALGB 31008, showing a significant improvement in pCR for patients with stage II-III triple negative breast cancer. Additional side effects of added myelosuppression, fatigue, renal damage with dehydration, and nausea and vomiting were discussed. AUC is the dose that is currently being used in all investigations, and thus is what will be used here. Discussed oral and peripheral cryotherapy. Cold caps would not work with [facility]. MRI biopsy of 2nd breast lesion ordered by Dr. Augusto Dorantes, negative. Dr. Augusto Dorantes has placed port     The patient was given the following prescriptions with written and verbal instructions on how to use each:  Compazine, zofran, olanzapine, a wig, and emla cream.  IV Shahbaz Jaegers will be used with [facility]. Neulasta the following day (bone pain side effect discussed) with AC. She is agreeable to neoadjuvant chemotherapy, declined B-59. Initiated therapy on 7/22/19. Agreeable to carboplatin/paclitaxel followed by DD AC. She has signed informed consent     S/p Carbo/taxol, completed on 10/14/19. [facility] mammo and US show response to treatment. Plan to start DDAC next week on 10/28/19 due to thrombocytopenia. 2. Emotional well being:  She has excellent support and is coping well with her disease    3. BRCA1 pathogenic mutation:   Discussed recommendation for bilateral mastectomies and bilateral oophorectomies; risk of ovarian, fallopian or peritoneal cancer is 16-59%; risk for pancreatic cancer is 1-3%; risk for contralateral breast cancer is 10-15%    4. Fertility preservation:  Discussed that there is > 20% chance of loss of menses with chemotherapy. She and her  state that they are not interested in further children and fertility preservation     5. Constipation: Relieved with a suppository. Now she is taking fiber and stool softener, prn miralax. Ongoing but has improved    6.  Menses:  Reports this lasted a little longer than usual.  Will continue to monitor. 7. Nausea, chemotherapy induced: Continue to take scheduled zofran and compazine, as well as ativan 1 mg tid PRN. Added 1L IV hydration with each treatment and Zofran prior to Taxol on day 8 and 15.     8. Headaches:  Due to treatment. PRN Excedrin with improvement. Tylenol for headache today    9. Cough: Resolved. 10. Thrombocytopenia: due to chemo. Will monitor    11. Anemia: due to chemo. Will monitor. This patient was seen in conjunction with Frances Lyles NP      Signed By: Zainab Leonard MD      No orders of the defined types were placed in this encounter.

## 2019-10-21 NOTE — TELEPHONE ENCOUNTER
10/21/19 12:59 PM: Called patient and advised that per Julia Huntley NP, her potassium is low at 2.9 and NP sent a prescription for oral potassium to patient's preferred [facility] pharmacy. Patient verbalized understanding and denied questions or concerns.

## 2019-10-23 RX ORDER — HYDROCORTISONE SODIUM SUCCINATE 100 MG/2ML
100 INJECTION, POWDER, FOR SOLUTION INTRAMUSCULAR; INTRAVENOUS AS NEEDED
Status: CANCELLED | OUTPATIENT
Start: 2019-10-28

## 2019-10-23 RX ORDER — SODIUM CHLORIDE 0.9 % (FLUSH) 0.9 %
10 SYRINGE (ML) INJECTION AS NEEDED
Status: CANCELLED | OUTPATIENT
Start: 2019-10-28

## 2019-10-23 RX ORDER — EPINEPHRINE 1 MG/ML
0.3 INJECTION, SOLUTION, CONCENTRATE INTRAVENOUS AS NEEDED
Status: CANCELLED | OUTPATIENT
Start: 2019-10-28

## 2019-10-23 RX ORDER — ACETAMINOPHEN 325 MG/1
650 TABLET ORAL AS NEEDED
Status: CANCELLED
Start: 2019-10-28

## 2019-10-23 RX ORDER — DOXORUBICIN HYDROCHLORIDE 2 MG/ML
30 INJECTION, SOLUTION INTRAVENOUS ONCE
Status: CANCELLED
Start: 2019-12-17 | End: 2019-12-17

## 2019-10-23 RX ORDER — DOXORUBICIN HYDROCHLORIDE 2 MG/ML
30 INJECTION, SOLUTION INTRAVENOUS ONCE
Status: CANCELLED
Start: 2019-12-03 | End: 2019-12-03

## 2019-10-23 RX ORDER — SODIUM CHLORIDE 9 MG/ML
25 INJECTION, SOLUTION INTRAVENOUS CONTINUOUS
Status: CANCELLED | OUTPATIENT
Start: 2019-10-28

## 2019-10-23 RX ORDER — DOXORUBICIN HYDROCHLORIDE 2 MG/ML
60 INJECTION, SOLUTION INTRAVENOUS ONCE
Status: CANCELLED
Start: 2019-10-28

## 2019-10-23 RX ORDER — ONDANSETRON 2 MG/ML
8 INJECTION INTRAMUSCULAR; INTRAVENOUS AS NEEDED
Status: CANCELLED | OUTPATIENT
Start: 2019-10-28

## 2019-10-23 RX ORDER — DIPHENHYDRAMINE HYDROCHLORIDE 50 MG/ML
50 INJECTION, SOLUTION INTRAMUSCULAR; INTRAVENOUS AS NEEDED
Status: CANCELLED
Start: 2019-10-28

## 2019-10-23 RX ORDER — HEPARIN 100 UNIT/ML
300-500 SYRINGE INTRAVENOUS AS NEEDED
Status: CANCELLED | OUTPATIENT
Start: 2019-10-28

## 2019-10-23 RX ORDER — PALONOSETRON 0.05 MG/ML
0.25 INJECTION, SOLUTION INTRAVENOUS ONCE
Status: CANCELLED | OUTPATIENT
Start: 2019-10-28

## 2019-10-23 RX ORDER — ALBUTEROL SULFATE 0.83 MG/ML
2.5 SOLUTION RESPIRATORY (INHALATION) AS NEEDED
Status: CANCELLED
Start: 2019-10-28

## 2019-10-23 RX ORDER — DOXORUBICIN HYDROCHLORIDE 2 MG/ML
30 INJECTION, SOLUTION INTRAVENOUS ONCE
Status: CANCELLED
Start: 2019-11-11 | End: 2019-11-11

## 2019-10-23 RX ORDER — SODIUM CHLORIDE 9 MG/ML
10 INJECTION INTRAMUSCULAR; INTRAVENOUS; SUBCUTANEOUS AS NEEDED
Status: CANCELLED | OUTPATIENT
Start: 2019-10-28

## 2019-10-28 ENCOUNTER — HOSPITAL ENCOUNTER (OUTPATIENT)
Dept: INFUSION THERAPY | Age: 44
Discharge: HOME | End: 2019-10-28
Payer: OTHER GOVERNMENT

## 2019-10-28 VITALS
OXYGEN SATURATION: 100 % | DIASTOLIC BLOOD PRESSURE: 62 MMHG | RESPIRATION RATE: 16 BRPM | HEIGHT: 63 IN | TEMPERATURE: 97.8 F | BODY MASS INDEX: 22.01 KG/M2 | SYSTOLIC BLOOD PRESSURE: 113 MMHG | HEART RATE: 82 BPM | WEIGHT: 124.2 LBS

## 2019-10-28 DIAGNOSIS — C50.412 MALIGNANT NEOPLASM OF UPPER-OUTER QUADRANT OF LEFT BREAST IN FEMALE, ESTROGEN RECEPTOR NEGATIVE (HCC): Primary | ICD-10-CM

## 2019-10-28 DIAGNOSIS — Z17.1 MALIGNANT NEOPLASM OF UPPER-OUTER QUADRANT OF LEFT BREAST IN FEMALE, ESTROGEN RECEPTOR NEGATIVE (HCC): Primary | ICD-10-CM

## 2019-10-28 LAB
ALBUMIN SERPL-MCNC: 3.7 G/DL (ref 3.5–5)
ALBUMIN/GLOB SERPL: 1.2 {RATIO} (ref 1.1–2.2)
ALP SERPL-CCNC: 45 U/L (ref 45–117)
ALT SERPL-CCNC: 70 U/L (ref 12–78)
ANION GAP SERPL CALC-SCNC: 7 MMOL/L (ref 5–15)
AST SERPL-CCNC: 36 U/L (ref 15–37)
BASOPHILS # BLD: 0 K/UL (ref 0–0.1)
BASOPHILS NFR BLD: 0 % (ref 0–1)
BILIRUB SERPL-MCNC: 0.6 MG/DL (ref 0.2–1)
BUN SERPL-MCNC: 14 MG/DL (ref 6–20)
BUN/CREAT SERPL: 25 (ref 12–20)
CALCIUM SERPL-MCNC: 8.1 MG/DL (ref 8.5–10.1)
CHLORIDE SERPL-SCNC: 106 MMOL/L (ref 97–108)
CO2 SERPL-SCNC: 28 MMOL/L (ref 21–32)
CREAT SERPL-MCNC: 0.56 MG/DL (ref 0.55–1.02)
DIFFERENTIAL METHOD BLD: ABNORMAL
EOSINOPHIL # BLD: 0 K/UL (ref 0–0.4)
EOSINOPHIL NFR BLD: 1 % (ref 0–7)
ERYTHROCYTE [DISTWIDTH] IN BLOOD BY AUTOMATED COUNT: 18.8 % (ref 11.5–14.5)
GLOBULIN SER CALC-MCNC: 3.1 G/DL (ref 2–4)
GLUCOSE SERPL-MCNC: 84 MG/DL (ref 65–100)
HCT VFR BLD AUTO: 25.5 % (ref 35–47)
HGB BLD-MCNC: 8.6 G/DL (ref 11.5–16)
IMM GRANULOCYTES # BLD AUTO: 0 K/UL (ref 0–0.04)
IMM GRANULOCYTES NFR BLD AUTO: 0 % (ref 0–0.5)
LYMPHOCYTES # BLD: 1.3 K/UL (ref 0.8–3.5)
LYMPHOCYTES NFR BLD: 41 % (ref 12–49)
MCH RBC QN AUTO: 35.1 PG (ref 26–34)
MCHC RBC AUTO-ENTMCNC: 33.7 G/DL (ref 30–36.5)
MCV RBC AUTO: 104.1 FL (ref 80–99)
MONOCYTES # BLD: 0.3 K/UL (ref 0–1)
MONOCYTES NFR BLD: 11 % (ref 5–13)
NEUTS SEG # BLD: 1.4 K/UL (ref 1.8–8)
NEUTS SEG NFR BLD: 47 % (ref 32–75)
NRBC # BLD: 0 K/UL (ref 0–0.01)
NRBC BLD-RTO: 0 PER 100 WBC
PLATELET # BLD AUTO: 110 K/UL (ref 150–400)
PMV BLD AUTO: 9.9 FL (ref 8.9–12.9)
POTASSIUM SERPL-SCNC: 3.1 MMOL/L (ref 3.5–5.1)
PROT SERPL-MCNC: 6.8 G/DL (ref 6.4–8.2)
RBC # BLD AUTO: 2.45 M/UL (ref 3.8–5.2)
SODIUM SERPL-SCNC: 141 MMOL/L (ref 136–145)
WBC # BLD AUTO: 3.1 K/UL (ref 3.6–11)

## 2019-10-28 PROCEDURE — 96411 CHEMO IV PUSH ADDL DRUG: CPT

## 2019-10-28 PROCEDURE — 74011250636 HC RX REV CODE- 250/636: Performed by: INTERNAL MEDICINE

## 2019-10-28 PROCEDURE — 74011000258 HC RX REV CODE- 258: Performed by: NURSE PRACTITIONER

## 2019-10-28 PROCEDURE — 96375 TX/PRO/DX INJ NEW DRUG ADDON: CPT

## 2019-10-28 PROCEDURE — 85025 COMPLETE CBC W/AUTO DIFF WBC: CPT

## 2019-10-28 PROCEDURE — 77030012965 HC NDL HUBR BBMI -A

## 2019-10-28 PROCEDURE — 36415 COLL VENOUS BLD VENIPUNCTURE: CPT

## 2019-10-28 PROCEDURE — 74011000250 HC RX REV CODE- 250: Performed by: NURSE PRACTITIONER

## 2019-10-28 PROCEDURE — 96413 CHEMO IV INFUSION 1 HR: CPT

## 2019-10-28 PROCEDURE — 96367 TX/PROPH/DG ADDL SEQ IV INF: CPT

## 2019-10-28 PROCEDURE — 96377 APPLICATON ON-BODY INJECTOR: CPT

## 2019-10-28 PROCEDURE — 80053 COMPREHEN METABOLIC PANEL: CPT

## 2019-10-28 PROCEDURE — 74011250636 HC RX REV CODE- 250/636: Performed by: NURSE PRACTITIONER

## 2019-10-28 RX ORDER — SODIUM CHLORIDE 9 MG/ML
10 INJECTION INTRAMUSCULAR; INTRAVENOUS; SUBCUTANEOUS AS NEEDED
Status: ACTIVE | OUTPATIENT
Start: 2019-10-28 | End: 2019-10-28

## 2019-10-28 RX ORDER — SODIUM CHLORIDE 9 MG/ML
25 INJECTION, SOLUTION INTRAVENOUS CONTINUOUS
Status: DISPENSED | OUTPATIENT
Start: 2019-10-28 | End: 2019-10-28

## 2019-10-28 RX ORDER — DOXORUBICIN HYDROCHLORIDE 2 MG/ML
30 INJECTION, SOLUTION INTRAVENOUS ONCE
Status: COMPLETED | OUTPATIENT
Start: 2019-10-28 | End: 2019-10-28

## 2019-10-28 RX ORDER — HEPARIN 100 UNIT/ML
300-500 SYRINGE INTRAVENOUS AS NEEDED
Status: ACTIVE | OUTPATIENT
Start: 2019-10-28 | End: 2019-10-28

## 2019-10-28 RX ORDER — PALONOSETRON 0.05 MG/ML
0.25 INJECTION, SOLUTION INTRAVENOUS ONCE
Status: COMPLETED | OUTPATIENT
Start: 2019-10-28 | End: 2019-10-28

## 2019-10-28 RX ORDER — SODIUM CHLORIDE 0.9 % (FLUSH) 0.9 %
10 SYRINGE (ML) INJECTION AS NEEDED
Status: ACTIVE | OUTPATIENT
Start: 2019-10-28 | End: 2019-10-28

## 2019-10-28 RX ORDER — ONDANSETRON 2 MG/ML
8 INJECTION INTRAMUSCULAR; INTRAVENOUS AS NEEDED
Status: DISPENSED | OUTPATIENT
Start: 2019-10-28 | End: 2019-10-28

## 2019-10-28 RX ADMIN — SODIUM CHLORIDE 25 ML/HR: 900 INJECTION, SOLUTION INTRAVENOUS at 11:34

## 2019-10-28 RX ADMIN — CYCLOPHOSPHAMIDE 1002 MG: 1 INJECTION, POWDER, FOR SOLUTION INTRAVENOUS; ORAL at 13:34

## 2019-10-28 RX ADMIN — DOXORUBICIN HYDROCHLORIDE 50.2 MG: 2 INJECTION, SOLUTION INTRAVENOUS at 13:05

## 2019-10-28 RX ADMIN — SODIUM CHLORIDE 10 ML: 9 INJECTION, SOLUTION INTRAMUSCULAR; INTRAVENOUS; SUBCUTANEOUS at 10:30

## 2019-10-28 RX ADMIN — DOXORUBICIN HYDROCHLORIDE 50.2 MG: 2 INJECTION, SOLUTION INTRAVENOUS at 13:15

## 2019-10-28 RX ADMIN — ONDANSETRON 8 MG: 2 INJECTION, SOLUTION INTRAMUSCULAR; INTRAVENOUS at 11:34

## 2019-10-28 RX ADMIN — Medication 10 ML: at 10:30

## 2019-10-28 RX ADMIN — PALONOSETRON 0.25 MG: 0.25 INJECTION, SOLUTION INTRAVENOUS at 12:09

## 2019-10-28 RX ADMIN — PEGFILGRASTIM 6 MG: KIT SUBCUTANEOUS at 14:25

## 2019-10-28 RX ADMIN — SODIUM CHLORIDE 150 MG: 900 INJECTION, SOLUTION INTRAVENOUS at 12:13

## 2019-10-28 RX ADMIN — Medication 500 UNITS: at 14:20

## 2019-10-28 RX ADMIN — DEXAMETHASONE SODIUM PHOSPHATE 12 MG: 4 INJECTION, SOLUTION INTRA-ARTICULAR; INTRALESIONAL; INTRAMUSCULAR; INTRAVENOUS; SOFT TISSUE at 12:16

## 2019-10-28 RX ADMIN — Medication 10 ML: at 14:20

## 2019-10-28 NOTE — PROGRESS NOTES
[facility] VISIT NOTE  Date: 2019    Name: German Tang    MRN: [id number]         : 1975     [address]5  Ms. [facility] Arrived ambulatory and in no distress for C5D1 of DDAC Regimen. Assessment was completed, no acute issues at this time, no new complaints voiced. Right chest wall port accessed without difficulty, labs drawn & sent for processing. Chemotherapy Flowsheet 10/28/2019   Cycle C5D1   Date 10/28/2019   Drug / Regimen DDAC   Pre Hydration -   Pre Meds gven   Notes given       Vitals:  Patient Vitals for the past 12 hrs:   Temp Pulse Resp BP SpO2   10/28/19 1419 97.8 °F (36.6 °C) 82 16 113/62 100 %   10/28/19 1017 97.7 °F (36.5 °C) 72 16 100/69 100 %        Lab results were obtained and reviewed. Recent Results (from the past 12 hour(s))   CBC WITH AUTOMATED DIFF    Collection Time: 10/28/19 10:29 AM   Result Value Ref Range    WBC 3.1 (L) 3.6 - 11.0 K/uL    RBC 2.45 (L) 3.80 - 5.20 M/uL    HGB 8.6 (L) 11.5 - 16.0 g/dL    HCT 25.5 (L) 35.0 - 47.0 %    .1 (H) 80.0 - 99.0 FL    MCH 35.1 (H) 26.0 - 34.0 PG    MCHC 33.7 30.0 - 36.5 g/dL    RDW 18.8 (H) 11.5 - 14.5 %    PLATELET 341 (L) 383 - 400 K/uL    MPV 9.9 8.9 - 12.9 FL    NRBC 0.0 0  WBC    ABSOLUTE NRBC 0.00 0.00 - 0.01 K/uL    NEUTROPHILS 47 32 - 75 %    LYMPHOCYTES 41 12 - 49 %    MONOCYTES 11 5 - 13 %    EOSINOPHILS 1 0 - 7 %    BASOPHILS 0 0 - 1 %    IMMATURE GRANULOCYTES 0 0.0 - 0.5 %    ABS. NEUTROPHILS 1.4 (L) 1.8 - 8.0 K/UL    ABS. LYMPHOCYTES 1.3 0.8 - 3.5 K/UL    ABS. MONOCYTES 0.3 0.0 - 1.0 K/UL    ABS. EOSINOPHILS 0.0 0.0 - 0.4 K/UL    ABS. BASOPHILS 0.0 0.0 - 0.1 K/UL    ABS. IMM.  GRANS. 0.0 0.00 - 0.04 K/UL    DF AUTOMATED     METABOLIC PANEL, COMPREHENSIVE    Collection Time: 10/28/19 10:29 AM   Result Value Ref Range    Sodium 141 136 - 145 mmol/L    Potassium 3.1 (L) 3.5 - 5.1 mmol/L    Chloride 106 97 - 108 mmol/L    CO2 28 21 - 32 mmol/L    Anion gap 7 5 - 15 mmol/L    Glucose 84 65 - 100 mg/dL BUN 14 6 - 20 MG/DL    Creatinine 0.56 0.55 - 1.02 MG/DL    BUN/Creatinine ratio 25 (H) 12 - 20      GFR est AA >60 >60 ml/min/1.73m2    GFR est non-AA >60 >60 ml/min/1.73m2    Calcium 8.1 (L) 8.5 - 10.1 MG/DL    Bilirubin, total 0.6 0.2 - 1.0 MG/DL    ALT (SGPT) 70 12 - 78 U/L    AST (SGOT) 36 15 - 37 U/L    Alk. phosphatase 45 45 - 117 U/L    Protein, total 6.8 6.4 - 8.2 g/dL    Albumin 3.7 3.5 - 5.0 g/dL    Globulin 3.1 2.0 - 4.0 g/dL    A-G Ratio 1.2 1.1 - 2.2         Medications received:  Medications Administered     0.9% sodium chloride infusion     Admin Date  10/28/2019 Action  New Bag Dose  25 mL/hr Rate  25 mL/hr Route  IntraVENous Administered By  Cirilo Wilson RN          cyclophosphamide (CYTOXAN) 1,002 mg in 0.9% sodium chloride 250 mL, overfill volume 25 mL chemo infusion     Admin Date  10/28/2019 Action  New Bag Dose  1002 mg Rate  650.2 mL/hr Route  IntraVENous Administered By  Cirilo Wilson RN          dexamethasone (DECADRON) 12 mg in 0.9% sodium chloride 50 mL IVPB     Admin Date  10/28/2019 Action  Given Dose  12 mg Route  IntraVENous Administered By  Cirilo Wilson RN          DOXOrubicin (ADRIAMYCIN) chemo syringe 50.2 mg (Syringe 1 of 2. Total Dose = 100.4 mg)     Admin Date  10/28/2019 Action  Given Dose  50.2 mg Route  IntraVENous Administered By  Cirilo Wilson RN          DOXOrubicin (ADRIAMYCIN) chemo syringe 50.2 mg (Syringe 2 of 2.   Total Dose = 100.4 mg)     Admin Date  10/28/2019 Action  Given Dose  50.2 mg Route  IntraVENous Administered By  Cirilo Wilson RN          fosaprepitant (EMEND) 150 mg in 0.9% sodium chloride 150 mL IVPB     Admin Date  10/28/2019 Action  Given Dose  150 mg Rate  450 mL/hr Route  IntraVENous Administered By  Cirilo Wilson RN          heparin (porcine) pf 300-500 Units     Admin Date  10/28/2019 Action  Given Dose  500 Units Route  InterCATHeter Administered By  Cirilo Wilson RN          ondansetron [facility]) injection 8 mg     Admin Date  10/28/2019 Action  Given Dose  8 mg Route  IntraVENous Administered By  Marianne Vasquez RN          palonosetron HCl (ALOXI) injection 0.25 mg     Admin Date  10/28/2019 Action  Given Dose  0.25 mg Route  IntraVENous Administered By  Marianne Vasquez RN          pegfilgrastim (NEULASTA) wearable SQ injector 6 mg     Admin Date  10/28/2019 Action  Given Dose  6 mg Route  SubCUTAneous Administered By  Marianne Vasquez RN          saline peripheral flush soln 10 mL     Admin Date  10/28/2019 Action  Given Dose  10 mL Route  InterCATHeter Administered By  Marianne Vasquez RN           Admin Date  10/28/2019 Action  Given Dose  10 mL Route  InterCATHeter Administered By  Marianne Vasquez RN          sodium chloride 0.9% injection 10 mL     Admin Date  10/28/2019 Action  Given Dose  10 mL Route  IntraVENous Administered By  Marianne Vasquez RN                Ms. Desi Neal tolerated treatment well and was discharged from [place] [address] in stable condition at 1430. Port de-accessed, flushed & heparinized per protocol. She is to return on 11/11/19 at 10:00 for her next appointment.     Jamil Clemens RN  October 28, 2019    Future Appointments:  Future Appointments   Date Time Provider Misty Martinez   11/11/2019 10:00 AM [facility]   11/11/2019 10:30 AM Magan Viveros NP [facility]   11/25/2019 10:00 AM SS INF3 CH3 <4H [facility]   12/9/2019 10:00 AM [facility]

## 2019-11-11 ENCOUNTER — HOSPITAL ENCOUNTER (OUTPATIENT)
Dept: INFUSION THERAPY | Age: 44
Discharge: HOME | End: 2019-11-11
Payer: OTHER GOVERNMENT

## 2019-11-11 ENCOUNTER — OFFICE VISIT (OUTPATIENT)
Dept: ONCOLOGY | Age: 44
End: 2019-11-11

## 2019-11-11 VITALS
HEIGHT: 63 IN | WEIGHT: 122.9 LBS | OXYGEN SATURATION: 99 % | DIASTOLIC BLOOD PRESSURE: 68 MMHG | SYSTOLIC BLOOD PRESSURE: 100 MMHG | TEMPERATURE: 98 F | BODY MASS INDEX: 21.78 KG/M2 | RESPIRATION RATE: 18 BRPM | HEART RATE: 85 BPM

## 2019-11-11 VITALS
OXYGEN SATURATION: 99 % | HEIGHT: 63 IN | TEMPERATURE: 96.3 F | HEART RATE: 82 BPM | SYSTOLIC BLOOD PRESSURE: 110 MMHG | DIASTOLIC BLOOD PRESSURE: 63 MMHG | BODY MASS INDEX: 21.78 KG/M2 | RESPIRATION RATE: 18 BRPM | WEIGHT: 122.9 LBS

## 2019-11-11 DIAGNOSIS — C50.412 MALIGNANT NEOPLASM OF UPPER-OUTER QUADRANT OF LEFT BREAST IN FEMALE, ESTROGEN RECEPTOR NEGATIVE (HCC): Primary | ICD-10-CM

## 2019-11-11 DIAGNOSIS — R11.0 NAUSEA: ICD-10-CM

## 2019-11-11 DIAGNOSIS — D69.6 THROMBOCYTOPENIA (HCC): ICD-10-CM

## 2019-11-11 DIAGNOSIS — Z17.1 MALIGNANT NEOPLASM OF UPPER-OUTER QUADRANT OF LEFT BREAST IN FEMALE, ESTROGEN RECEPTOR NEGATIVE (HCC): Primary | ICD-10-CM

## 2019-11-11 DIAGNOSIS — Z51.11 CHEMOTHERAPY MANAGEMENT, ENCOUNTER FOR: ICD-10-CM

## 2019-11-11 DIAGNOSIS — K59.03 DRUG-INDUCED CONSTIPATION: ICD-10-CM

## 2019-11-11 LAB
ALBUMIN SERPL-MCNC: 3.6 G/DL (ref 3.5–5)
ALBUMIN/GLOB SERPL: 1.2 {RATIO} (ref 1.1–2.2)
ALP SERPL-CCNC: 63 U/L (ref 45–117)
ALT SERPL-CCNC: 35 U/L (ref 12–78)
ANION GAP SERPL CALC-SCNC: 2 MMOL/L (ref 5–15)
AST SERPL-CCNC: 29 U/L (ref 15–37)
BASOPHILS # BLD: 0 K/UL (ref 0–0.1)
BASOPHILS NFR BLD: 0 % (ref 0–1)
BILIRUB SERPL-MCNC: 0.2 MG/DL (ref 0.2–1)
BUN SERPL-MCNC: 14 MG/DL (ref 6–20)
BUN/CREAT SERPL: 22 (ref 12–20)
CALCIUM SERPL-MCNC: 8.2 MG/DL (ref 8.5–10.1)
CHLORIDE SERPL-SCNC: 106 MMOL/L (ref 97–108)
CO2 SERPL-SCNC: 32 MMOL/L (ref 21–32)
CREAT SERPL-MCNC: 0.64 MG/DL (ref 0.55–1.02)
DIFFERENTIAL METHOD BLD: ABNORMAL
EOSINOPHIL # BLD: 0 K/UL (ref 0–0.4)
EOSINOPHIL NFR BLD: 0 % (ref 0–7)
ERYTHROCYTE [DISTWIDTH] IN BLOOD BY AUTOMATED COUNT: 14.9 % (ref 11.5–14.5)
GLOBULIN SER CALC-MCNC: 3 G/DL (ref 2–4)
GLUCOSE SERPL-MCNC: 89 MG/DL (ref 65–100)
HCT VFR BLD AUTO: 23.7 % (ref 35–47)
HGB BLD-MCNC: 7.6 G/DL (ref 11.5–16)
IMM GRANULOCYTES # BLD AUTO: 0 K/UL
IMM GRANULOCYTES NFR BLD AUTO: 0 %
LYMPHOCYTES # BLD: 1.9 K/UL (ref 0.8–3.5)
LYMPHOCYTES NFR BLD: 21 % (ref 12–49)
MCH RBC QN AUTO: 34.4 PG (ref 26–34)
MCHC RBC AUTO-ENTMCNC: 32.1 G/DL (ref 30–36.5)
MCV RBC AUTO: 107.2 FL (ref 80–99)
METAMYELOCYTES NFR BLD MANUAL: 5 %
MONOCYTES # BLD: 0.6 K/UL (ref 0–1)
MONOCYTES NFR BLD: 6 % (ref 5–13)
MYELOCYTES NFR BLD MANUAL: 2 %
NEUTS BAND NFR BLD MANUAL: 4 % (ref 0–6)
NEUTS SEG # BLD: 6.1 K/UL (ref 1.8–8)
NEUTS SEG NFR BLD: 62 % (ref 32–75)
NRBC # BLD: 0.07 K/UL (ref 0–0.01)
NRBC BLD-RTO: 0.8 PER 100 WBC
PLATELET # BLD AUTO: 196 K/UL (ref 150–400)
PMV BLD AUTO: 9.8 FL (ref 8.9–12.9)
POTASSIUM SERPL-SCNC: 3.1 MMOL/L (ref 3.5–5.1)
PROT SERPL-MCNC: 6.6 G/DL (ref 6.4–8.2)
RBC # BLD AUTO: 2.21 M/UL (ref 3.8–5.2)
RBC MORPH BLD: ABNORMAL
SODIUM SERPL-SCNC: 140 MMOL/L (ref 136–145)
TROPONIN I SERPL-MCNC: <0.05 NG/ML
TROPONIN I SERPL-MCNC: <0.05 NG/ML
WBC # BLD AUTO: 9.2 K/UL (ref 3.6–11)
WBC MORPH BLD: ABNORMAL

## 2019-11-11 PROCEDURE — 74011250637 HC RX REV CODE- 250/637: Performed by: NURSE PRACTITIONER

## 2019-11-11 PROCEDURE — 85025 COMPLETE CBC W/AUTO DIFF WBC: CPT

## 2019-11-11 PROCEDURE — 96367 TX/PROPH/DG ADDL SEQ IV INF: CPT

## 2019-11-11 PROCEDURE — 96411 CHEMO IV PUSH ADDL DRUG: CPT

## 2019-11-11 PROCEDURE — 74011250636 HC RX REV CODE- 250/636: Performed by: INTERNAL MEDICINE

## 2019-11-11 PROCEDURE — 74011000250 HC RX REV CODE- 250: Performed by: INTERNAL MEDICINE

## 2019-11-11 PROCEDURE — 36415 COLL VENOUS BLD VENIPUNCTURE: CPT

## 2019-11-11 PROCEDURE — 80053 COMPREHEN METABOLIC PANEL: CPT

## 2019-11-11 PROCEDURE — 74011000258 HC RX REV CODE- 258: Performed by: INTERNAL MEDICINE

## 2019-11-11 PROCEDURE — 96413 CHEMO IV INFUSION 1 HR: CPT

## 2019-11-11 PROCEDURE — 84484 ASSAY OF TROPONIN QUANT: CPT

## 2019-11-11 PROCEDURE — 96377 APPLICATON ON-BODY INJECTOR: CPT

## 2019-11-11 PROCEDURE — 74011250636 HC RX REV CODE- 250/636: Performed by: NURSE PRACTITIONER

## 2019-11-11 PROCEDURE — 96375 TX/PRO/DX INJ NEW DRUG ADDON: CPT

## 2019-11-11 RX ORDER — PALONOSETRON 0.05 MG/ML
0.25 INJECTION, SOLUTION INTRAVENOUS ONCE
Status: COMPLETED | OUTPATIENT
Start: 2019-11-11 | End: 2019-11-11

## 2019-11-11 RX ORDER — SODIUM CHLORIDE 0.9 % (FLUSH) 0.9 %
10 SYRINGE (ML) INJECTION AS NEEDED
Status: ACTIVE | OUTPATIENT
Start: 2019-11-11 | End: 2019-11-11

## 2019-11-11 RX ORDER — DOXORUBICIN HYDROCHLORIDE 2 MG/ML
30 INJECTION, SOLUTION INTRAVENOUS ONCE
Status: COMPLETED | OUTPATIENT
Start: 2019-11-11 | End: 2019-11-11

## 2019-11-11 RX ORDER — SODIUM CHLORIDE 9 MG/ML
10 INJECTION INTRAMUSCULAR; INTRAVENOUS; SUBCUTANEOUS AS NEEDED
Status: ACTIVE | OUTPATIENT
Start: 2019-11-11 | End: 2019-11-11

## 2019-11-11 RX ORDER — SODIUM CHLORIDE 9 MG/ML
25 INJECTION, SOLUTION INTRAVENOUS CONTINUOUS
Status: DISCONTINUED | OUTPATIENT
Start: 2019-11-11 | End: 2019-11-12 | Stop reason: HOSPADM

## 2019-11-11 RX ORDER — ONDANSETRON 2 MG/ML
4 INJECTION INTRAMUSCULAR; INTRAVENOUS ONCE
Status: COMPLETED | OUTPATIENT
Start: 2019-11-11 | End: 2019-11-11

## 2019-11-11 RX ORDER — HEPARIN 100 UNIT/ML
300-500 SYRINGE INTRAVENOUS AS NEEDED
Status: ACTIVE | OUTPATIENT
Start: 2019-11-11 | End: 2019-11-11

## 2019-11-11 RX ORDER — POTASSIUM CHLORIDE 750 MG/1
40 TABLET, FILM COATED, EXTENDED RELEASE ORAL
Status: COMPLETED | OUTPATIENT
Start: 2019-11-11 | End: 2019-11-11

## 2019-11-11 RX ADMIN — DOXORUBICIN HYDROCHLORIDE 50.2 MG: 2 INJECTION, SOLUTION INTRAVENOUS at 14:06

## 2019-11-11 RX ADMIN — ONDANSETRON 4 MG: 2 INJECTION, SOLUTION INTRAMUSCULAR; INTRAVENOUS at 12:29

## 2019-11-11 RX ADMIN — Medication 500 UNITS: at 14:55

## 2019-11-11 RX ADMIN — Medication 10 ML: at 14:55

## 2019-11-11 RX ADMIN — SODIUM CHLORIDE 12 MG: 900 INJECTION, SOLUTION INTRAVENOUS at 13:11

## 2019-11-11 RX ADMIN — SODIUM CHLORIDE 10 ML: 9 INJECTION, SOLUTION INTRAMUSCULAR; INTRAVENOUS; SUBCUTANEOUS at 10:26

## 2019-11-11 RX ADMIN — CYCLOPHOSPHAMIDE 1002 MG: 1 INJECTION, POWDER, FOR SOLUTION INTRAVENOUS; ORAL at 14:21

## 2019-11-11 RX ADMIN — PEGFILGRASTIM 6 MG: KIT SUBCUTANEOUS at 14:55

## 2019-11-11 RX ADMIN — POTASSIUM CHLORIDE 40 MEQ: 750 TABLET, FILM COATED, EXTENDED RELEASE ORAL at 13:06

## 2019-11-11 RX ADMIN — SODIUM CHLORIDE 150 MG: 900 INJECTION, SOLUTION INTRAVENOUS at 13:11

## 2019-11-11 RX ADMIN — PALONOSETRON 0.25 MG: 0.25 INJECTION, SOLUTION INTRAVENOUS at 13:08

## 2019-11-11 RX ADMIN — SODIUM CHLORIDE 25 ML/HR: 900 INJECTION, SOLUTION INTRAVENOUS at 13:11

## 2019-11-11 NOTE — PROGRESS NOTES
[facility] [place] [facility]  [address] [facility] Drop: [phone]  F: [phone]      Reason for Visit:   Devon Munoz is a 38 y.o. female who is seen in follow up for evaluation of therapy for breast cancer    Treatment History:   · 6/10/19 L breast core bx:  IDC, gr 3, 1.1 cm, + LVI, ER negative, ND negative, HER 2 negative at [facility] 1+; DCIS present gr 3, solid with expansile necrosis  · 7/3/19 BRCA1 pathogenic mutation ([facility]ae), [id number]  · 19 MRI breast bx:  Negative  · Carbo/taxol 19-10/14/19  · C3d15 delayed one week due to neutropenia    DD [facility] 10/28/19-   c1 held one week due to thrombocytopenia    History of Present Illness:   She felt the L breast mass herself in May 2019, with aching pain, leading to the pathology above. Interval history:  In today for follow up and treatment. Complains of gr 1 constipation, gr 1 fatigue, gr 2 hair loss, gr 1 nausea, gr 1 mucositis, gr 1 sob, gr 1 libido.      FH:   2 paternal aunts with post-menopausal breast cancer, one aunt with ovarian cancer; that aunt's daughter tested positive for BRCA1 (first cousin); no pancreas cancer, no prostate cancer    Past Medical History:   Diagnosis Date    Breast cancer ([facility] [address] 2019    left breast cancer      Past Surgical History:   Procedure Laterality Date    HX APPENDECTOMY      HX  SECTION      x3      Social History     Tobacco Use    Smoking status: Never Smoker    Smokeless tobacco: Never Used   Substance Use Topics    Alcohol use: Not Currently      Family History   Problem Relation Age of Onset    Diabetes Mother     Heart Disease Mother     Hypertension Mother     Heart Disease Father     Cancer Paternal Aunt         Breast    Breast Cancer Paternal Aunt     Cancer Paternal Aunt         Breast    Breast Cancer Paternal Aunt      Current Outpatient Medications   Medication Sig    OLANZapine (ZYPREXA) 10 mg tablet 10 mg qhs on days 1-4 following chemo    cholecalciferol, vitamin D3, (VITAMIN D3 PO) Take 2,000 Units by mouth daily.  Lactobacillus acidophilus (PROBIOTIC PO) Take 1 Tab by mouth daily.  prasterone, DHEA, (DHEA PO) Take 10 mg by mouth daily.  OTHER Massage    Dx c50.412 breast cancer    NP THYROID 15 mg tablet Take 15 mg by mouth daily.  testosterone 75 mg pllt by Implant route.  LORazepam (ATIVAN) 1 mg tablet Take 1 Tab by mouth every eight (8) hours as needed (nausea). Max Daily Amount: 3 mg.  MELATONIN PO Take  by mouth nightly as needed.  prochlorperazine (COMPAZINE) 10 mg tablet Take 1 Tab by mouth every six (6) hours as needed for Nausea.  lidocaine-prilocaine (EMLA) topical cream Apply  to affected area as needed for Pain.  ondansetron hcl (ZOFRAN) 8 mg tablet Take 1 Tab by mouth every eight (8) hours as needed for Nausea. No current facility-administered medications for this visit. Facility-Administered Medications Ordered in Other Visits   Medication Dose Route Frequency    saline peripheral flush soln 10 mL  10 mL InterCATHeter PRN    sodium chloride 0.9% injection 10 mL  10 mL IntraVENous PRN    heparin (porcine) pf 300-500 Units  300-500 Units InterCATHeter PRN      No Known Allergies     Review of Systems: A complete review of systems was obtained, negative except as described above. Physical Exam:     Visit Vitals  /68   Pulse 85   Temp 98 °F (36.7 °C) (Temporal)   Resp 18   Ht 5' 3\" (1.6 m)   Wt 122 lb 14.4 oz (55.7 kg)   SpO2 99%   BMI 21.77 kg/m²     ECOG PS: 0  General: No distress  Eyes: PERRLA, anicteric sclerae  HENT: Atraumatic, OP clear  Neck: Supple  Lymphatic: No cervical, supraclavicular adenopathy  Respiratory: CTAB, normal respiratory effort  CV: Normal rate, regular rhythm, no murmurs, no peripheral edema  GI: Soft, nontender, nondistended, no masses, no hepatomegaly, no splenomegaly; + BS  MS:  Digits without clubbing or cyanosis.   Skin: No rashes, ecchymoses, or petechiae. Normal temperature, turgor, and texture. Psych: Alert, oriented, appropriate affect, normal judgment/insight  Breasts:  L 2:00 breast, 4 cmfn, <1 cm mass, no LAD (last exam, deferred today)    Results:     Lab Results   Component Value Date/Time    WBC 9.2 11/11/2019 10:24 AM    HGB 7.6 (L) 11/11/2019 10:24 AM    HCT 23.7 (L) 11/11/2019 10:24 AM    PLATELET 962 53/31/3193 10:24 AM    .2 (H) 11/11/2019 10:24 AM    ABS. NEUTROPHILS 6.1 11/11/2019 10:24 AM     Lab Results   Component Value Date/Time    Sodium 140 11/11/2019 10:24 AM    Potassium 3.1 (L) 11/11/2019 10:24 AM    Chloride 106 11/11/2019 10:24 AM    CO2 32 11/11/2019 10:24 AM    Glucose 89 11/11/2019 10:24 AM    BUN 14 11/11/2019 10:24 AM    Creatinine 0.64 11/11/2019 10:24 AM    GFR est AA >60 11/11/2019 10:24 AM    GFR est non-AA >60 11/11/2019 10:24 AM    Calcium 8.2 (L) 11/11/2019 10:24 AM     Lab Results   Component Value Date/Time    Bilirubin, total 0.2 11/11/2019 10:24 AM    ALT (SGPT) 35 11/11/2019 10:24 AM    AST (SGOT) 29 11/11/2019 10:24 AM    Alk. phosphatase 63 11/11/2019 10:24 AM    Protein, total 6.6 11/11/2019 10:24 AM    Albumin 3.6 11/11/2019 10:24 AM    Globulin 3.0 11/11/2019 10:24 AM       6/13/19 MRI breast  FINDINGS:     Background parenchymal enhancement: Moderate. Lymph nodes: No lymphadenopathy.     Left breast: Irregular triangular-shaped mass in the posterior third of the left  breast at 2:00 measures 2.8 x 1.8 x 4.2 cm. Posterior margin is 0.3 cm from the  left pectoralis major muscle. Biopsy clip is in the inferior margin of the mass.     In the middle and posterior thirds of the left breast at 5-6:00, segmental non  mass enhancement with heterogeneous kinetics measures 2.6 cm in oblique AP  diameter in the axial plane. Otherwise, there are foci in the left breast.     Right breast: Heterogeneous patchy enhancement in multiple locations. No  suspicious kinetics.  Biopsy clip in the middle third is at 3:00. No surrounding  abnormal enhancement or morphology.     IMPRESSION:   1. 2.8 x 1.8 x 4.2 cm biopsy-proven infiltrating ductal carcinoma in the left  breast at 2:00 is in close approximation to the chest wall. 2. Left breast 5-6:00 suspicious segmental non mass enhancement. 3. No MRI evidence of malignancy in the right breast.  4. No lymphadenopathy.     Assessment: ACR BI-RADS category   Left breast: BI-RADS Assessment Category 4: Suspicious abnormality - Biopsy  should be performed in the absence of clinical contraindication. Right breast: BI-RADS Assessment Category 2: Benign finding.     Recommendation: Bilateral mammography if not recently performed elsewhere. Second look ultrasound of the left breast at 5-6:00 to determine  ultrasound-guided or MRI guided biopsy of non mass enhancement.     A negative breast MRI examination speaks strongly against invasive cancer down  to a detection threshold of 3 to 5 mm but may not detect some lower grade or in  situ carcinomas. Therefore, routine clinical and mammographic followup are  recommended. A summary portfolio has been created in PACS.    7/12/19 TTE EF 65%    10/16/19 Left mammo/US:  ULTRASOUND:  Corresponding with the mammographic density and previously seen  mass is a 1.2 x 1.2 x 1.4 cm hypoechoic mass lesion, which previously measured  1.9 x 2.7 cm on diagnostic MRI. Note is made of an adjacent biopsy marker clip. No other suspicious cystic or solid lesions identified.     IMPRESSION: Response to therapy with apparent reduction in size of left breast  mass compared to prior MRI. BI-RADS Assessment Category 6: Known biopsy proven  malignancy- Appropriate action should be taken.     RECOMMENDATION:  Appropriate clinical management of known left breast  malignancy. Records reviewed and summarized above. Pathology report(s) reviewed above. Radiology report(s) reviewed above.     Assessment/plan:   1. L UOQ IDC, gr 3, triple negative:  cT2 cN0 cM0, stage IIA, prognostic stage IIB    We explained to the patient that the goal of systemic adjuvant therapy is to improve the chances for cure and decrease the risk of relapse. We explained why a patient can have microscopic cancer spread now even though physical examination, laboratory studies and imaging studies are negative for cancer. We explained that the same treatments used now as adjuvant or preventive treatments rarely if ever are curative in women who develop metastases. We discussed that there is no difference in overall survival between neoadjuvant and adjuvant chemotherapy. We discussed the rationale for neoadjuvant chemotherapy, if chemotherapy is warranted, as it is in this case: to avoid any potential delays in giving chemotherapy following surgery, to be able to see the response of the tumor to chemotherapy, and to potentially downstage the tumor prior to surgery. I discussed the potential risks of dose-dense chemotherapy with the patient. (DD AC-T, adriamycin 60 mg/m2; cyclophosphamide 600 mg/m2 q 2 weeks x 4; paclitaxel 80 mg/m2 qweekly x 12). Major toxicities include nausea and vomiting, stomatitis, fatigue, and a small risk of heart damage. Anemia frequently results and occasionally requires growth factors and rarely transfusions. Neutropenic fever is uncommon, but can be a life-threatening problem. Also, there is a small but increased risk of myelodysplasia and acute leukemia. We provided the patient with detailed information concerning toxicity including frequent toxicities that only last a few days, such as nausea, vomiting, mouth sores, arthralgia, myalgia, and potentially allergic reactions to paclitaxel, as well as toxicities which can be longer lasting including total alopecia, fatigue, anemia and neuropathy. We provided the patient with detailed information concerning the toxicities of their regimen in addition to our verbal discussion.     We discussed the potential addition of carboplatin auc 6 q 3 weeks during weekly paclitaxel as evaluated in CALGB 09251, showing a significant improvement in pCR for patients with stage II-III triple negative breast cancer. Additional side effects of added myelosuppression, fatigue, renal damage with dehydration, and nausea and vomiting were discussed. AUC is the dose that is currently being used in all investigations, and thus is what will be used here. Discussed oral and peripheral cryotherapy. Cold caps would not work with [facility]. MRI biopsy of 2nd breast lesion ordered by Dr. Soco Muse, negative. Dr. Soco Muse has placed port     The patient was given the following prescriptions with written and verbal instructions on how to use each:  Compazine, zofran, olanzapine, a wig, and emla cream.  IV Leary Goodie will be used with [facility]. Neulasta the following day (bone pain side effect discussed) with AC. She is agreeable to neoadjuvant chemotherapy, declined B-59. Initiated therapy on 7/22/19. Agreeable to carboplatin/paclitaxel followed by DD AC. She has signed informed consent     S/p Carbo/taxol, completed on 10/14/19. [facility] and US show response to treatment. DDAC #2 today. She is going out of town for Thanksgiving. Will plan for [facility] #3 on 12/3/19. 2. Emotional well being:  She has excellent support and is coping well with her disease    3. BRCA1 pathogenic mutation:   Discussed recommendation for bilateral mastectomies and bilateral oophorectomies; risk of ovarian, fallopian or peritoneal cancer is 16-59%; risk for pancreatic cancer is 1-3%; risk for contralateral breast cancer is 10-15%    4. Fertility preservation:  Discussed that there is > 20% chance of loss of menses with chemotherapy. She and her  state that they are not interested in further children and fertility preservation     5. Constipation: Relieved with a suppository. Now she is taking fiber and stool softener, prn miralax.  Ongoing but has improved    6. Menses:  Reports this lasted a little longer than usual.  Will continue to monitor. 7. Nausea, chemotherapy induced: Better with AC. PRN zofran, compazine, and ativan. 8. Headaches:  Due to treatment. PRN Excedrin with improvement. Stable. 9. Cough: Resolved. 10. Thrombocytopenia: due to chemo. Will monitor    11. Anemia: due to chemo. Will monitor. 12.  Back pain: Occurred a few days a week after treatment. May be due to neulasta. Recommend PRN ibuprofen and claritin. This patient was seen in conjunction with Gina Olmedo NP      Signed By: Edy Villegas MD      No orders of the defined types were placed in this encounter.

## 2019-11-11 NOTE — PROGRESS NOTES
[facility] VISIT NOTE    5027. Pt arrived at [facility] ambulatory and in no distress for DDAC C6D1. Assessment completed, pt c/o mouth sores and constipation. LCW chest port accessed with . 75 in hernandez with no difficulty. Positive blood return noted and labs drawn. Pt proceeded to MD apt. Labs resulted and are within parameters to treat. Patient requesting zofran prior to premeds because she states the premeds make her nauseous. Called MD office for zofran order. Medications received:  NS KVO  Zofran IV  Aloxi IV  Emend IV  Dexamethasone IV  Potasium Chloride PO  Doxorubicin IVP  Cytoxan IV  Neulasta OBI    Education provided to patient about Neulasta On Body Injector including: side effects, how/when to remove the device, as well as what to do in the event of device malfunction. Opportunity for questions was provided and all questions were answered. Patient verbalized understanding. Tolerated treatment well, no adverse reaction noted. Port de-accessed and flushed per protocol. Positive blood return noted.     Patient Vitals for the past 12 hrs:   Temp Pulse Resp BP SpO2   11/11/19 1452 96.3 °F (35.7 °C) 82 18 110/63 --   11/11/19 1017 98 °F (36.7 °C) 85 18 100/68 99 %     Recent Results (from the past 12 hour(s))   CBC WITH AUTOMATED DIFF    Collection Time: 11/11/19 10:24 AM   Result Value Ref Range    WBC 9.2 3.6 - 11.0 K/uL    RBC 2.21 (L) 3.80 - 5.20 M/uL    HGB 7.6 (L) 11.5 - 16.0 g/dL    HCT 23.7 (L) 35.0 - 47.0 %    .2 (H) 80.0 - 99.0 FL    MCH 34.4 (H) 26.0 - 34.0 PG    MCHC 32.1 30.0 - 36.5 g/dL    RDW 14.9 (H) 11.5 - 14.5 %    PLATELET 515 535 - 837 K/uL    MPV 9.8 8.9 - 12.9 FL    NRBC 0.8 (H) 0  WBC    ABSOLUTE NRBC 0.07 (H) 0.00 - 0.01 K/uL    NEUTROPHILS 62 32 - 75 %    BAND NEUTROPHILS 4 0 - 6 %    LYMPHOCYTES 21 12 - 49 %    MONOCYTES 6 5 - 13 %    EOSINOPHILS 0 0 - 7 %    BASOPHILS 0 0 - 1 %    METAMYELOCYTES 5 (H) 0 %    MYELOCYTES 2 (H) 0 %    IMMATURE GRANULOCYTES 0 % ABS. NEUTROPHILS 6.1 1.8 - 8.0 K/UL    ABS. LYMPHOCYTES 1.9 0.8 - 3.5 K/UL    ABS. MONOCYTES 0.6 0.0 - 1.0 K/UL    ABS. EOSINOPHILS 0.0 0.0 - 0.4 K/UL    ABS. BASOPHILS 0.0 0.0 - 0.1 K/UL    ABS. IMM. GRANS. 0.0 K/UL    DF MANUAL      RBC COMMENTS OVALOCYTES  PRESENT        WBC COMMENTS TOXIC GRANULATION     METABOLIC PANEL, COMPREHENSIVE    Collection Time: 11/11/19 10:24 AM   Result Value Ref Range    Sodium 140 136 - 145 mmol/L    Potassium 3.1 (L) 3.5 - 5.1 mmol/L    Chloride 106 97 - 108 mmol/L    CO2 32 21 - 32 mmol/L    Anion gap 2 (L) 5 - 15 mmol/L    Glucose 89 65 - 100 mg/dL    BUN 14 6 - 20 MG/DL    Creatinine 0.64 0.55 - 1.02 MG/DL    BUN/Creatinine ratio 22 (H) 12 - 20      GFR est AA >60 >60 ml/min/1.73m2    GFR est non-AA >60 >60 ml/min/1.73m2    Calcium 8.2 (L) 8.5 - 10.1 MG/DL    Bilirubin, total 0.2 0.2 - 1.0 MG/DL    ALT (SGPT) 35 12 - 78 U/L    AST (SGOT) 29 15 - 37 U/L    Alk. phosphatase 63 45 - 117 U/L    Protein, total 6.6 6.4 - 8.2 g/dL    Albumin 3.6 3.5 - 5.0 g/dL    Globulin 3.0 2.0 - 4.0 g/dL    A-G Ratio 1.2 1.1 - 2.2     TROPONIN I    Collection Time: 11/11/19 10:24 AM   Result Value Ref Range    Troponin-I, Qt. <0.05 <0.05 ng/mL     1455. D/C'd from [facility] ambulatory and in no distress accompanied by friend.  Next appointment is 11/25/19 at 10:00 am.

## 2019-12-03 ENCOUNTER — HOSPITAL ENCOUNTER (OUTPATIENT)
Dept: INFUSION THERAPY | Age: 44
Discharge: HOME | End: 2019-12-03
Payer: OTHER GOVERNMENT

## 2019-12-03 ENCOUNTER — OFFICE VISIT (OUTPATIENT)
Dept: ONCOLOGY | Age: 44
End: 2019-12-03

## 2019-12-03 VITALS
BODY MASS INDEX: 22.25 KG/M2 | RESPIRATION RATE: 16 BRPM | WEIGHT: 125.6 LBS | SYSTOLIC BLOOD PRESSURE: 99 MMHG | OXYGEN SATURATION: 99 % | HEART RATE: 75 BPM | TEMPERATURE: 96.4 F | HEIGHT: 63 IN | DIASTOLIC BLOOD PRESSURE: 56 MMHG

## 2019-12-03 VITALS
RESPIRATION RATE: 16 BRPM | DIASTOLIC BLOOD PRESSURE: 63 MMHG | OXYGEN SATURATION: 99 % | WEIGHT: 125 LBS | HEIGHT: 62 IN | SYSTOLIC BLOOD PRESSURE: 103 MMHG | BODY MASS INDEX: 23 KG/M2 | TEMPERATURE: 97.9 F | HEART RATE: 80 BPM

## 2019-12-03 DIAGNOSIS — Z15.09 BRCA1 GENE MUTATION POSITIVE: Primary | ICD-10-CM

## 2019-12-03 DIAGNOSIS — C50.412 MALIGNANT NEOPLASM OF UPPER-OUTER QUADRANT OF LEFT BREAST IN FEMALE, ESTROGEN RECEPTOR NEGATIVE (HCC): Primary | ICD-10-CM

## 2019-12-03 DIAGNOSIS — Z15.01 BRCA1 GENE MUTATION POSITIVE: Primary | ICD-10-CM

## 2019-12-03 DIAGNOSIS — Z17.1 MALIGNANT NEOPLASM OF UPPER-OUTER QUADRANT OF LEFT BREAST IN FEMALE, ESTROGEN RECEPTOR NEGATIVE (HCC): Primary | ICD-10-CM

## 2019-12-03 LAB
ALBUMIN SERPL-MCNC: 3.6 G/DL (ref 3.5–5)
ALBUMIN/GLOB SERPL: 1.2 {RATIO} (ref 1.1–2.2)
ALP SERPL-CCNC: 54 U/L (ref 45–117)
ALT SERPL-CCNC: 39 U/L (ref 12–78)
ANION GAP SERPL CALC-SCNC: 5 MMOL/L (ref 5–15)
AST SERPL-CCNC: 22 U/L (ref 15–37)
BASOPHILS # BLD: 0.1 K/UL (ref 0–0.1)
BASOPHILS NFR BLD: 1 % (ref 0–1)
BILIRUB SERPL-MCNC: 0.3 MG/DL (ref 0.2–1)
BUN SERPL-MCNC: 13 MG/DL (ref 6–20)
BUN/CREAT SERPL: 25 (ref 12–20)
CALCIUM SERPL-MCNC: 8.3 MG/DL (ref 8.5–10.1)
CHLORIDE SERPL-SCNC: 108 MMOL/L (ref 97–108)
CO2 SERPL-SCNC: 28 MMOL/L (ref 21–32)
CREAT SERPL-MCNC: 0.51 MG/DL (ref 0.55–1.02)
DIFFERENTIAL METHOD BLD: ABNORMAL
EOSINOPHIL # BLD: 0 K/UL (ref 0–0.4)
EOSINOPHIL NFR BLD: 0 % (ref 0–7)
ERYTHROCYTE [DISTWIDTH] IN BLOOD BY AUTOMATED COUNT: 15.6 % (ref 11.5–14.5)
GLOBULIN SER CALC-MCNC: 2.9 G/DL (ref 2–4)
GLUCOSE SERPL-MCNC: 96 MG/DL (ref 65–100)
HCT VFR BLD AUTO: 26.4 % (ref 35–47)
HGB BLD-MCNC: 8.4 G/DL (ref 11.5–16)
IMM GRANULOCYTES # BLD AUTO: 0.1 K/UL (ref 0–0.04)
IMM GRANULOCYTES NFR BLD AUTO: 1 % (ref 0–0.5)
LYMPHOCYTES # BLD: 1.1 K/UL (ref 0.8–3.5)
LYMPHOCYTES NFR BLD: 17 % (ref 12–49)
MCH RBC QN AUTO: 35 PG (ref 26–34)
MCHC RBC AUTO-ENTMCNC: 31.8 G/DL (ref 30–36.5)
MCV RBC AUTO: 110 FL (ref 80–99)
MONOCYTES # BLD: 0.8 K/UL (ref 0–1)
MONOCYTES NFR BLD: 13 % (ref 5–13)
NEUTS SEG # BLD: 4.1 K/UL (ref 1.8–8)
NEUTS SEG NFR BLD: 68 % (ref 32–75)
NRBC # BLD: 0.02 K/UL (ref 0–0.01)
NRBC BLD-RTO: 0.3 PER 100 WBC
PLATELET # BLD AUTO: 270 K/UL (ref 150–400)
PMV BLD AUTO: 9.9 FL (ref 8.9–12.9)
POTASSIUM SERPL-SCNC: 3.4 MMOL/L (ref 3.5–5.1)
PROT SERPL-MCNC: 6.5 G/DL (ref 6.4–8.2)
RBC # BLD AUTO: 2.4 M/UL (ref 3.8–5.2)
RBC MORPH BLD: ABNORMAL
SODIUM SERPL-SCNC: 141 MMOL/L (ref 136–145)
TROPONIN I SERPL-MCNC: <0.05 NG/ML
WBC # BLD AUTO: 6.2 K/UL (ref 3.6–11)

## 2019-12-03 PROCEDURE — 80053 COMPREHEN METABOLIC PANEL: CPT

## 2019-12-03 PROCEDURE — 36415 COLL VENOUS BLD VENIPUNCTURE: CPT

## 2019-12-03 PROCEDURE — 96377 APPLICATON ON-BODY INJECTOR: CPT

## 2019-12-03 PROCEDURE — 96367 TX/PROPH/DG ADDL SEQ IV INF: CPT

## 2019-12-03 PROCEDURE — 74011000258 HC RX REV CODE- 258: Performed by: INTERNAL MEDICINE

## 2019-12-03 PROCEDURE — 74011250637 HC RX REV CODE- 250/637: Performed by: NURSE PRACTITIONER

## 2019-12-03 PROCEDURE — 96411 CHEMO IV PUSH ADDL DRUG: CPT

## 2019-12-03 PROCEDURE — 74011250636 HC RX REV CODE- 250/636: Performed by: INTERNAL MEDICINE

## 2019-12-03 PROCEDURE — 74011000250 HC RX REV CODE- 250: Performed by: INTERNAL MEDICINE

## 2019-12-03 PROCEDURE — 96413 CHEMO IV INFUSION 1 HR: CPT

## 2019-12-03 PROCEDURE — 96375 TX/PRO/DX INJ NEW DRUG ADDON: CPT

## 2019-12-03 PROCEDURE — 84484 ASSAY OF TROPONIN QUANT: CPT

## 2019-12-03 PROCEDURE — 74011250636 HC RX REV CODE- 250/636: Performed by: NURSE PRACTITIONER

## 2019-12-03 PROCEDURE — 85025 COMPLETE CBC W/AUTO DIFF WBC: CPT

## 2019-12-03 RX ORDER — SODIUM CHLORIDE 9 MG/ML
25 INJECTION, SOLUTION INTRAVENOUS CONTINUOUS
Status: DISPENSED | OUTPATIENT
Start: 2019-12-03 | End: 2019-12-03

## 2019-12-03 RX ORDER — DOXORUBICIN HYDROCHLORIDE 2 MG/ML
30 INJECTION, SOLUTION INTRAVENOUS ONCE
Status: COMPLETED | OUTPATIENT
Start: 2019-12-03 | End: 2019-12-03

## 2019-12-03 RX ORDER — POTASSIUM CHLORIDE 750 MG/1
40 TABLET, FILM COATED, EXTENDED RELEASE ORAL
Status: COMPLETED | OUTPATIENT
Start: 2019-12-03 | End: 2019-12-03

## 2019-12-03 RX ORDER — ONDANSETRON 2 MG/ML
4 INJECTION INTRAMUSCULAR; INTRAVENOUS ONCE
Status: COMPLETED | OUTPATIENT
Start: 2019-12-03 | End: 2019-12-03

## 2019-12-03 RX ORDER — PALONOSETRON 0.05 MG/ML
0.25 INJECTION, SOLUTION INTRAVENOUS ONCE
Status: COMPLETED | OUTPATIENT
Start: 2019-12-03 | End: 2019-12-03

## 2019-12-03 RX ORDER — SODIUM CHLORIDE 0.9 % (FLUSH) 0.9 %
10 SYRINGE (ML) INJECTION AS NEEDED
Status: ACTIVE | OUTPATIENT
Start: 2019-12-03 | End: 2019-12-03

## 2019-12-03 RX ORDER — HEPARIN 100 UNIT/ML
300-500 SYRINGE INTRAVENOUS AS NEEDED
Status: ACTIVE | OUTPATIENT
Start: 2019-12-03 | End: 2019-12-03

## 2019-12-03 RX ORDER — SODIUM CHLORIDE 9 MG/ML
10 INJECTION INTRAMUSCULAR; INTRAVENOUS; SUBCUTANEOUS AS NEEDED
Status: ACTIVE | OUTPATIENT
Start: 2019-12-03 | End: 2019-12-03

## 2019-12-03 RX ADMIN — CYCLOPHOSPHAMIDE 1002 MG: 500 INJECTION, POWDER, FOR SOLUTION INTRAVENOUS; ORAL at 14:11

## 2019-12-03 RX ADMIN — ONDANSETRON 4 MG: 2 INJECTION, SOLUTION INTRAMUSCULAR; INTRAVENOUS at 12:23

## 2019-12-03 RX ADMIN — DEXAMETHASONE SODIUM PHOSPHATE 12 MG: 4 INJECTION, SOLUTION INTRA-ARTICULAR; INTRALESIONAL; INTRAMUSCULAR; INTRAVENOUS; SOFT TISSUE at 13:01

## 2019-12-03 RX ADMIN — Medication 10 ML: at 14:50

## 2019-12-03 RX ADMIN — Medication 500 UNITS: at 14:51

## 2019-12-03 RX ADMIN — PALONOSETRON 0.25 MG: 0.05 INJECTION, SOLUTION INTRAVENOUS at 12:58

## 2019-12-03 RX ADMIN — DOXORUBICIN HYDROCHLORIDE 50.2 MG: 2 INJECTION, SOLUTION INTRAVENOUS at 13:57

## 2019-12-03 RX ADMIN — SODIUM CHLORIDE 25 ML/HR: 900 INJECTION, SOLUTION INTRAVENOUS at 12:04

## 2019-12-03 RX ADMIN — PEGFILGRASTIM 6 MG: KIT SUBCUTANEOUS at 14:45

## 2019-12-03 RX ADMIN — SODIUM CHLORIDE 10 ML: 9 INJECTION, SOLUTION INTRAMUSCULAR; INTRAVENOUS; SUBCUTANEOUS at 10:26

## 2019-12-03 RX ADMIN — POTASSIUM CHLORIDE 40 MEQ: 750 TABLET, FILM COATED, EXTENDED RELEASE ORAL at 12:21

## 2019-12-03 RX ADMIN — SODIUM CHLORIDE 150 MG: 900 INJECTION, SOLUTION INTRAVENOUS at 13:01

## 2019-12-03 NOTE — PROGRESS NOTES
[facility] [place] [facility] [place] [address] [address] [facility] [address] Cost: [phone]  F: [phone]      Reason for Visit:   Howard Valladares is a 38 y.o. female who is seen in follow up for evaluation of therapy for breast cancer    Breast surgeon:  Dr. Meggan Quintero    Treatment History:   · 6/10/19 L breast core bx:  IDC, gr 3, 1.1 cm, + LVI, ER negative, UT negative, HER 2 negative at [facility] 1+; DCIS present gr 3, solid with expansile necrosis  · 7/3/19 BRCA1 pathogenic mutation (invitae), c.5266dup  · 19 MRI breast bx:  Negative  · Carbo/taxol 19-10/14/19  · C3d15 delayed one week due to neutropenia    DD [facility] 10/28/19-   c1 held one week due to thrombocytopenia  c3 delayed 1 week due to pt trip    History of Present Illness:   She felt the L breast mass herself in May 2019, with aching pain, leading to the pathology above. Interval history:  In today for follow up and treatment. Complains of gr 1 constipation, gr 1 fatigue, gr 2 hair loss, gr 1 libido.      FH:   2 paternal aunts with post-menopausal breast cancer, one aunt with ovarian cancer; that aunt's daughter tested positive for BRCA1 (first cousin); no pancreas cancer, no prostate cancer    Past Medical History:   Diagnosis Date    Breast cancer ([facility] [address] 2019    left breast cancer      Past Surgical History:   Procedure Laterality Date    HX APPENDECTOMY      HX  SECTION      x3      Social History     Tobacco Use    Smoking status: Never Smoker    Smokeless tobacco: Never Used   Substance Use Topics    Alcohol use: Not Currently      Family History   Problem Relation Age of Onset    Diabetes Mother     Heart Disease Mother     Hypertension Mother     Heart Disease Father     Cancer Paternal Aunt         Breast    Breast Cancer Paternal Aunt     Cancer Paternal Aunt         Breast    Breast Cancer Paternal Aunt      Current Outpatient Medications   Medication Sig    OLANZapine (Thierno Cathie) 10 mg tablet 10 mg qhs on days 1-4 following chemo    LORazepam (ATIVAN) 1 mg tablet Take 1 Tab by mouth every eight (8) hours as needed (nausea). Max Daily Amount: 3 mg.  cholecalciferol, vitamin D3, (VITAMIN D3 PO) Take 2,000 Units by mouth daily.  Lactobacillus acidophilus (PROBIOTIC PO) Take 1 Tab by mouth daily.  prasterone, DHEA, (DHEA PO) Take 10 mg by mouth daily.  MELATONIN PO Take  by mouth nightly as needed.  prochlorperazine (COMPAZINE) 10 mg tablet Take 1 Tab by mouth every six (6) hours as needed for Nausea.  lidocaine-prilocaine (EMLA) topical cream Apply  to affected area as needed for Pain.  ondansetron hcl (ZOFRAN) 8 mg tablet Take 1 Tab by mouth every eight (8) hours as needed for Nausea.  OTHER Massage    Dx c50.412 breast cancer    testosterone 75 mg pllt by Implant route.  NP THYROID 15 mg tablet Take 15 mg by mouth daily. No current facility-administered medications for this visit. Facility-Administered Medications Ordered in Other Visits   Medication Dose Route Frequency    saline peripheral flush soln 10 mL  10 mL InterCATHeter PRN    0.9% sodium chloride injection 10 mL  10 mL IntraVENous PRN    heparin (porcine) pf 300-500 Units  300-500 Units InterCATHeter PRN      No Known Allergies     Review of Systems: A complete review of systems was obtained, negative except as described above.     Physical Exam:     Visit Vitals  /63   Pulse 80   Temp 97.9 °F (36.6 °C) (Oral)   Resp 16   Ht 5' 2\" (1.575 m)   Wt 125 lb (56.7 kg)   SpO2 99%   BMI 22.86 kg/m²     ECOG PS: 0  General: No distress  Eyes: PERRLA, anicteric sclerae  HENT: Atraumatic, OP clear  Neck: Supple  Lymphatic: No cervical, supraclavicular adenopathy  Respiratory: CTAB, normal respiratory effort  CV: Normal rate, regular rhythm, no murmurs, no peripheral edema  GI: Soft, nontender, nondistended, no masses, no hepatomegaly, no splenomegaly; + BS  MS:  Digits without clubbing or cyanosis. Skin: No rashes, ecchymoses, or petechiae. Normal temperature, turgor, and texture. Psych: Alert, oriented, appropriate affect, normal judgment/insight  Breasts:  L 2:00 breast, 4 cmfn, <1 cm mass, no LAD     Results:     Lab Results   Component Value Date/Time    WBC 6.2 12/03/2019 10:23 AM    HGB 8.4 (L) 12/03/2019 10:23 AM    HCT 26.4 (L) 12/03/2019 10:23 AM    PLATELET 687 74/85/5536 10:23 AM    .0 (H) 12/03/2019 10:23 AM    ABS. NEUTROPHILS PENDING 12/03/2019 10:23 AM     Lab Results   Component Value Date/Time    Sodium 140 11/11/2019 10:24 AM    Potassium 3.1 (L) 11/11/2019 10:24 AM    Chloride 106 11/11/2019 10:24 AM    CO2 32 11/11/2019 10:24 AM    Glucose 89 11/11/2019 10:24 AM    BUN 14 11/11/2019 10:24 AM    Creatinine 0.64 11/11/2019 10:24 AM    GFR est AA >60 11/11/2019 10:24 AM    GFR est non-AA >60 11/11/2019 10:24 AM    Calcium 8.2 (L) 11/11/2019 10:24 AM     Lab Results   Component Value Date/Time    Bilirubin, total 0.2 11/11/2019 10:24 AM    ALT (SGPT) 35 11/11/2019 10:24 AM    AST (SGOT) 29 11/11/2019 10:24 AM    Alk. phosphatase 63 11/11/2019 10:24 AM    Protein, total 6.6 11/11/2019 10:24 AM    Albumin 3.6 11/11/2019 10:24 AM    Globulin 3.0 11/11/2019 10:24 AM       6/13/19 MRI breast  FINDINGS:     Background parenchymal enhancement: Moderate. Lymph nodes: No lymphadenopathy.     Left breast: Irregular triangular-shaped mass in the posterior third of the left  breast at 2:00 measures 2.8 x 1.8 x 4.2 cm. Posterior margin is 0.3 cm from the  left pectoralis major muscle. Biopsy clip is in the inferior margin of the mass.     In the middle and posterior thirds of the left breast at 5-6:00, segmental non  mass enhancement with heterogeneous kinetics measures 2.6 cm in oblique AP  diameter in the axial plane. Otherwise, there are foci in the left breast.     Right breast: Heterogeneous patchy enhancement in multiple locations. No  suspicious kinetics.  Biopsy clip in the middle third is at 3:00. No surrounding  abnormal enhancement or morphology.     IMPRESSION:   1. 2.8 x 1.8 x 4.2 cm biopsy-proven infiltrating ductal carcinoma in the left  breast at 2:00 is in close approximation to the chest wall. 2. Left breast 5-6:00 suspicious segmental non mass enhancement. 3. No MRI evidence of malignancy in the right breast.  4. No lymphadenopathy.     Assessment: ACR BI-RADS category   Left breast: BI-RADS Assessment Category 4: Suspicious abnormality - Biopsy  should be performed in the absence of clinical contraindication. Right breast: BI-RADS Assessment Category 2: Benign finding.     Recommendation: Bilateral mammography if not recently performed elsewhere. Second look ultrasound of the left breast at 5-6:00 to determine  ultrasound-guided or MRI guided biopsy of non mass enhancement.     A negative breast MRI examination speaks strongly against invasive cancer down  to a detection threshold of 3 to 5 mm but may not detect some lower grade or in  situ carcinomas. Therefore, routine clinical and mammographic followup are  recommended. A summary portfolio has been created in PACS.    7/12/19 TTE EF 65%    10/16/19 Left mammo/US:  ULTRASOUND:  Corresponding with the mammographic density and previously seen  mass is a 1.2 x 1.2 x 1.4 cm hypoechoic mass lesion, which previously measured  1.9 x 2.7 cm on diagnostic MRI. Note is made of an adjacent biopsy marker clip. No other suspicious cystic or solid lesions identified.     IMPRESSION: Response to therapy with apparent reduction in size of left breast  mass compared to prior MRI. BI-RADS Assessment Category 6: Known biopsy proven  malignancy- Appropriate action should be taken.     RECOMMENDATION:  Appropriate clinical management of known left breast  malignancy. Records reviewed and summarized above. Pathology report(s) reviewed above. Radiology report(s) reviewed above.     Assessment/plan:   1. L UOQ IDC, gr 3, triple negative: cT2 cN0 cM0, stage IIA, prognostic stage IIB    We explained to the patient that the goal of systemic adjuvant therapy is to improve the chances for cure and decrease the risk of relapse. We explained why a patient can have microscopic cancer spread now even though physical examination, laboratory studies and imaging studies are negative for cancer. We explained that the same treatments used now as adjuvant or preventive treatments rarely if ever are curative in women who develop metastases. We discussed that there is no difference in overall survival between neoadjuvant and adjuvant chemotherapy. We discussed the rationale for neoadjuvant chemotherapy, if chemotherapy is warranted, as it is in this case: to avoid any potential delays in giving chemotherapy following surgery, to be able to see the response of the tumor to chemotherapy, and to potentially downstage the tumor prior to surgery. I discussed the potential risks of dose-dense chemotherapy with the patient. (DD AC-T, adriamycin 60 mg/m2; cyclophosphamide 600 mg/m2 q 2 weeks x 4; paclitaxel 80 mg/m2 qweekly x 12). Major toxicities include nausea and vomiting, stomatitis, fatigue, and a small risk of heart damage. Anemia frequently results and occasionally requires growth factors and rarely transfusions. Neutropenic fever is uncommon, but can be a life-threatening problem. Also, there is a small but increased risk of myelodysplasia and acute leukemia. We provided the patient with detailed information concerning toxicity including frequent toxicities that only last a few days, such as nausea, vomiting, mouth sores, arthralgia, myalgia, and potentially allergic reactions to paclitaxel, as well as toxicities which can be longer lasting including total alopecia, fatigue, anemia and neuropathy. We provided the patient with detailed information concerning the toxicities of their regimen in addition to our verbal discussion.     We discussed the potential addition of carboplatin auc 6 q 3 weeks during weekly paclitaxel as evaluated in CALGB 48699, showing a significant improvement in pCR for patients with stage II-III triple negative breast cancer. Additional side effects of added myelosuppression, fatigue, renal damage with dehydration, and nausea and vomiting were discussed. AUC is the dose that is currently being used in all investigations, and thus is what will be used here. Discussed oral and peripheral cryotherapy. Cold caps would not work with [facility]. MRI biopsy of 2nd breast lesion ordered by Dr. Corey Palacio, negative. Dr. Corey Palacio has placed port     The patient was given the following prescriptions with written and verbal instructions on how to use each:  Compazine, zofran, olanzapine, a wig, and emla cream.  IV Treva Lions will be used with [facility]. Neulasta the following day (bone pain side effect discussed) with AC. She is agreeable to neoadjuvant chemotherapy, declined B-59. Initiated therapy on 7/22/19. Agreeable to carboplatin/paclitaxel followed by DD AC. She has signed informed consent     S/p Carbo/taxol, completed on 10/14/19. [facility] mammo and US show response to treatment. DDAC #3 today. She needs to see plastics -- will reach out to Dr. Corey Palacio to coordinate    2. Emotional well being:  She has excellent support and is coping well with her disease    3. BRCA1 pathogenic mutation:   Discussed recommendation for bilateral mastectomies and bilateral oophorectomies; risk of ovarian, fallopian or peritoneal cancer is 16-59%; risk for pancreatic cancer is 1-3%; risk for contralateral breast cancer is 10-15%    Will refer to gyn onc    4. Fertility preservation:  Discussed that there is > 20% chance of loss of menses with chemotherapy. She and her  state that they are not interested in further children and fertility preservation     5. Constipation: Relieved with a suppository.   Now she is taking fiber and stool softener, prn miralax. Ongoing but has improved    6. Nausea, chemotherapy induced: Better with AC. PRN zofran, compazine, and ativan. 7. Headaches:  Due to treatment. PRN Excedrin with improvement. Stable. 8. Anemia: due to chemo. Will monitor. Signed By: Praveena Pierre MD      No orders of the defined types were placed in this encounter.

## 2019-12-03 NOTE — PROGRESS NOTES
[facility] VISIT NOTE    1010. Pt arrived at [facility] ambulatory and in no distress for DDAC C7D1. Assessment completed, pt c/o continued issues with constipation however patient just returned from trip to [place]. Patient stated she felt good and enjoyed time with family. RCW chest port accessed with . 75 in hernandez with no difficulty. Positive blood return noted and labs drawn. Pt to see MD. Juan Miguel Perla resulted and are within parameters to treat. Patient requesting zofran prior to pre-meds. Johnnie Jessica NP placed order in [facility]. Medications received:  NS KVO  Zofran IV  Potassium Chloride PO  Aloxi IV  Dexamethasone IV  Emend IV  Doxorubicin IVP  Cytoxan IV  Neulasta OBI    Education provided to patient about Neulasta On Body Injector including: side effects, how/when to remove the device, as well as what to do in the event of device malfunction. Opportunity for questions was provided and all questions were answered. Patient verbalized understanding. Tolerated treatment well, no adverse reaction noted. Port de-accessed and flushed per protocol. Positive blood return noted. Patient Vitals for the past 12 hrs:   Temp Pulse Resp BP SpO2   12/03/19 1443 96.4 °F (35.8 °C) 75 16 99/56 --   12/03/19 1012 96.9 °F (36.1 °C) 80 18 103/63 99 %     Recent Results (from the past 12 hour(s))   CBC WITH AUTOMATED DIFF    Collection Time: 12/03/19 10:23 AM   Result Value Ref Range    WBC 6.2 3.6 - 11.0 K/uL    RBC 2.40 (L) 3.80 - 5.20 M/uL    HGB 8.4 (L) 11.5 - 16.0 g/dL    HCT 26.4 (L) 35.0 - 47.0 %    .0 (H) 80.0 - 99.0 FL    MCH 35.0 (H) 26.0 - 34.0 PG    MCHC 31.8 30.0 - 36.5 g/dL    RDW 15.6 (H) 11.5 - 14.5 %    PLATELET 812 880 - 305 K/uL    MPV 9.9 8.9 - 12.9 FL    NRBC 0.3 (H) 0  WBC    ABSOLUTE NRBC 0.02 (H) 0.00 - 0.01 K/uL    NEUTROPHILS 68 32 - 75 %    LYMPHOCYTES 17 12 - 49 %    MONOCYTES 13 5 - 13 %    EOSINOPHILS 0 0 - 7 %    BASOPHILS 1 0 - 1 %    IMMATURE GRANULOCYTES 1 (H) 0.0 - 0.5 %    ABS. NEUTROPHILS 4.1 1.8 - 8.0 K/UL    ABS. LYMPHOCYTES 1.1 0.8 - 3.5 K/UL    ABS. MONOCYTES 0.8 0.0 - 1.0 K/UL    ABS. EOSINOPHILS 0.0 0.0 - 0.4 K/UL    ABS. BASOPHILS 0.1 0.0 - 0.1 K/UL    ABS. IMM. GRANS. 0.1 (H) 0.00 - 0.04 K/UL    DF SMEAR SCANNED      RBC COMMENTS NORMOCYTIC, NORMOCHROMIC     METABOLIC PANEL, COMPREHENSIVE    Collection Time: 12/03/19 10:23 AM   Result Value Ref Range    Sodium 141 136 - 145 mmol/L    Potassium 3.4 (L) 3.5 - 5.1 mmol/L    Chloride 108 97 - 108 mmol/L    CO2 28 21 - 32 mmol/L    Anion gap 5 5 - 15 mmol/L    Glucose 96 65 - 100 mg/dL    BUN 13 6 - 20 MG/DL    Creatinine 0.51 (L) 0.55 - 1.02 MG/DL    BUN/Creatinine ratio 25 (H) 12 - 20      GFR est AA >60 >60 ml/min/1.73m2    GFR est non-AA >60 >60 ml/min/1.73m2    Calcium 8.3 (L) 8.5 - 10.1 MG/DL    Bilirubin, total 0.3 0.2 - 1.0 MG/DL    ALT (SGPT) 39 12 - 78 U/L    AST (SGOT) 22 15 - 37 U/L    Alk. phosphatase 54 45 - 117 U/L    Protein, total 6.5 6.4 - 8.2 g/dL    Albumin 3.6 3.5 - 5.0 g/dL    Globulin 2.9 2.0 - 4.0 g/dL    A-G Ratio 1.2 1.1 - 2.2       1455. D/C'd from [facility] ambulatory and in no distress accompanied by friend.  Next appointment is 12/17/19 at 10:00 am.

## 2019-12-17 ENCOUNTER — OFFICE VISIT (OUTPATIENT)
Dept: ONCOLOGY | Age: 44
End: 2019-12-17

## 2019-12-17 ENCOUNTER — HOSPITAL ENCOUNTER (OUTPATIENT)
Dept: INFUSION THERAPY | Age: 44
Discharge: HOME | End: 2019-12-17
Payer: OTHER GOVERNMENT

## 2019-12-17 VITALS
DIASTOLIC BLOOD PRESSURE: 66 MMHG | RESPIRATION RATE: 16 BRPM | WEIGHT: 125.2 LBS | SYSTOLIC BLOOD PRESSURE: 111 MMHG | TEMPERATURE: 97.2 F | OXYGEN SATURATION: 98 % | BODY MASS INDEX: 23.04 KG/M2 | HEIGHT: 62 IN | HEART RATE: 84 BPM

## 2019-12-17 VITALS
HEIGHT: 62 IN | RESPIRATION RATE: 16 BRPM | WEIGHT: 125.2 LBS | DIASTOLIC BLOOD PRESSURE: 66 MMHG | BODY MASS INDEX: 23.04 KG/M2 | HEART RATE: 84 BPM | TEMPERATURE: 97.2 F | SYSTOLIC BLOOD PRESSURE: 111 MMHG

## 2019-12-17 DIAGNOSIS — Z17.1 MALIGNANT NEOPLASM OF UPPER-OUTER QUADRANT OF LEFT BREAST IN FEMALE, ESTROGEN RECEPTOR NEGATIVE (HCC): Primary | ICD-10-CM

## 2019-12-17 DIAGNOSIS — C50.412 MALIGNANT NEOPLASM OF UPPER-OUTER QUADRANT OF LEFT BREAST IN FEMALE, ESTROGEN RECEPTOR NEGATIVE (HCC): Primary | ICD-10-CM

## 2019-12-17 LAB
ALBUMIN SERPL-MCNC: 3.6 G/DL (ref 3.5–5)
ALBUMIN/GLOB SERPL: 1.2 {RATIO} (ref 1.1–2.2)
ALP SERPL-CCNC: 43 U/L (ref 45–117)
ALT SERPL-CCNC: 33 U/L (ref 12–78)
ANION GAP SERPL CALC-SCNC: 2 MMOL/L (ref 5–15)
AST SERPL-CCNC: 21 U/L (ref 15–37)
BASOPHILS # BLD: 0 K/UL (ref 0–0.1)
BASOPHILS NFR BLD: 2 % (ref 0–1)
BILIRUB SERPL-MCNC: 0.3 MG/DL (ref 0.2–1)
BUN SERPL-MCNC: 13 MG/DL (ref 6–20)
BUN/CREAT SERPL: 31 (ref 12–20)
CALCIUM SERPL-MCNC: 8.9 MG/DL (ref 8.5–10.1)
CHLORIDE SERPL-SCNC: 107 MMOL/L (ref 97–108)
CO2 SERPL-SCNC: 31 MMOL/L (ref 21–32)
CREAT SERPL-MCNC: 0.42 MG/DL (ref 0.55–1.02)
DIFFERENTIAL METHOD BLD: ABNORMAL
EOSINOPHIL # BLD: 0.1 K/UL (ref 0–0.4)
EOSINOPHIL NFR BLD: 4 % (ref 0–7)
ERYTHROCYTE [DISTWIDTH] IN BLOOD BY AUTOMATED COUNT: 14.6 % (ref 11.5–14.5)
GLOBULIN SER CALC-MCNC: 3 G/DL (ref 2–4)
GLUCOSE SERPL-MCNC: 84 MG/DL (ref 65–100)
HCT VFR BLD AUTO: 26.7 % (ref 35–47)
HGB BLD-MCNC: 8.7 G/DL (ref 11.5–16)
IMM GRANULOCYTES # BLD AUTO: 0 K/UL (ref 0–0.04)
IMM GRANULOCYTES NFR BLD AUTO: 0 % (ref 0–0.5)
LYMPHOCYTES # BLD: 0.6 K/UL (ref 0.8–3.5)
LYMPHOCYTES NFR BLD: 50 % (ref 12–49)
MCH RBC QN AUTO: 34.1 PG (ref 26–34)
MCHC RBC AUTO-ENTMCNC: 32.6 G/DL (ref 30–36.5)
MCV RBC AUTO: 104.7 FL (ref 80–99)
MONOCYTES # BLD: 0.3 K/UL (ref 0–1)
MONOCYTES NFR BLD: 21 % (ref 5–13)
NEUTS SEG # BLD: 0.3 K/UL (ref 1.8–8)
NEUTS SEG NFR BLD: 23 % (ref 32–75)
NRBC # BLD: 0 K/UL (ref 0–0.01)
NRBC BLD-RTO: 0 PER 100 WBC
PLATELET # BLD AUTO: 146 K/UL (ref 150–400)
PMV BLD AUTO: 10.3 FL (ref 8.9–12.9)
POTASSIUM SERPL-SCNC: 3.3 MMOL/L (ref 3.5–5.1)
PROT SERPL-MCNC: 6.6 G/DL (ref 6.4–8.2)
RBC # BLD AUTO: 2.55 M/UL (ref 3.8–5.2)
RBC MORPH BLD: ABNORMAL
SODIUM SERPL-SCNC: 140 MMOL/L (ref 136–145)
WBC # BLD AUTO: 1.3 K/UL (ref 3.6–11)

## 2019-12-17 PROCEDURE — 74011250636 HC RX REV CODE- 250/636: Performed by: INTERNAL MEDICINE

## 2019-12-17 PROCEDURE — 74011000250 HC RX REV CODE- 250: Performed by: INTERNAL MEDICINE

## 2019-12-17 PROCEDURE — 85025 COMPLETE CBC W/AUTO DIFF WBC: CPT

## 2019-12-17 PROCEDURE — 36415 COLL VENOUS BLD VENIPUNCTURE: CPT

## 2019-12-17 PROCEDURE — 36591 DRAW BLOOD OFF VENOUS DEVICE: CPT

## 2019-12-17 PROCEDURE — 80053 COMPREHEN METABOLIC PANEL: CPT

## 2019-12-17 PROCEDURE — 77030012965 HC NDL HUBR BBMI -A

## 2019-12-17 RX ORDER — SODIUM CHLORIDE 0.9 % (FLUSH) 0.9 %
10 SYRINGE (ML) INJECTION AS NEEDED
Status: ACTIVE | OUTPATIENT
Start: 2019-12-17 | End: 2019-12-17

## 2019-12-17 RX ORDER — SODIUM CHLORIDE 9 MG/ML
10 INJECTION INTRAMUSCULAR; INTRAVENOUS; SUBCUTANEOUS AS NEEDED
Status: ACTIVE | OUTPATIENT
Start: 2019-12-17 | End: 2019-12-17

## 2019-12-17 RX ORDER — HEPARIN 100 UNIT/ML
300-500 SYRINGE INTRAVENOUS AS NEEDED
Status: ACTIVE | OUTPATIENT
Start: 2019-12-17 | End: 2019-12-17

## 2019-12-17 RX ORDER — ONDANSETRON 2 MG/ML
4 INJECTION INTRAMUSCULAR; INTRAVENOUS ONCE
Status: CANCELLED
Start: 2019-12-17

## 2019-12-17 RX ADMIN — SODIUM CHLORIDE 10 ML: 9 INJECTION, SOLUTION INTRAMUSCULAR; INTRAVENOUS; SUBCUTANEOUS at 10:43

## 2019-12-17 RX ADMIN — Medication 10 ML: at 12:30

## 2019-12-17 RX ADMIN — Medication 500 UNITS: at 12:30

## 2019-12-17 NOTE — PROGRESS NOTES
Sasha Lopez is a 38 y.o. female Follow up for the Evaluation for Breast Cancer. 1. Have you been to the ER, urgent care clinic since your last visit? Hospitalized since your last visit? No    2. Have you seen or consulted any other health care providers outside of the [address] [place] [address] since your last visit? Include any pap smears or colon screening.  No

## 2019-12-17 NOTE — PROGRESS NOTES
[facility] VISIT NOTE    1010. Pt arrived at [facility] ambulatory and in no distress for C8D1 DDAC (HELD). Assessment completed by Koby Schwarz RN, pt stated no acute complaints. RCW chest port accessed with . 75 in hernandez with no difficulty. Positive blood return noted and labs drawn. Pt proceeded to MD doran. Labs resulted. ANC 0.3. Notified Jose Luis Stewart NP. Orders to hold and retry next week given. Port de-accessed and flushed per protocol. Positive blood return noted. Patient Vitals for the past 12 hrs:   Temp Pulse Resp BP   12/17/19 1028 97.2 °F (36.2 °C) 84 16 111/66     Recent Results (from the past 12 hour(s))   CBC WITH AUTOMATED DIFF    Collection Time: 12/17/19 10:39 AM   Result Value Ref Range    WBC 1.3 (L) 3.6 - 11.0 K/uL    RBC 2.55 (L) 3.80 - 5.20 M/uL    HGB 8.7 (L) 11.5 - 16.0 g/dL    HCT 26.7 (L) 35.0 - 47.0 %    .7 (H) 80.0 - 99.0 FL    MCH 34.1 (H) 26.0 - 34.0 PG    MCHC 32.6 30.0 - 36.5 g/dL    RDW 14.6 (H) 11.5 - 14.5 %    PLATELET 960 (L) 929 - 400 K/uL    MPV 10.3 8.9 - 12.9 FL    NRBC 0.0 0  WBC    ABSOLUTE NRBC 0.00 0.00 - 0.01 K/uL    NEUTROPHILS 23 (L) 32 - 75 %    LYMPHOCYTES 50 (H) 12 - 49 %    MONOCYTES 21 (H) 5 - 13 %    EOSINOPHILS 4 0 - 7 %    BASOPHILS 2 (H) 0 - 1 %    IMMATURE GRANULOCYTES 0 0.0 - 0.5 %    ABS. NEUTROPHILS 0.3 (L) 1.8 - 8.0 K/UL    ABS. LYMPHOCYTES 0.6 (L) 0.8 - 3.5 K/UL    ABS. MONOCYTES 0.3 0.0 - 1.0 K/UL    ABS. EOSINOPHILS 0.1 0.0 - 0.4 K/UL    ABS. BASOPHILS 0.0 0.0 - 0.1 K/UL    ABS. IMM.  GRANS. 0.0 0.00 - 0.04 K/UL    DF SMEAR SCANNED      RBC COMMENTS OVALOCYTES  PRESENT       METABOLIC PANEL, COMPREHENSIVE    Collection Time: 12/17/19 10:39 AM   Result Value Ref Range    Sodium 140 136 - 145 mmol/L    Potassium 3.3 (L) 3.5 - 5.1 mmol/L    Chloride 107 97 - 108 mmol/L    CO2 31 21 - 32 mmol/L    Anion gap 2 (L) 5 - 15 mmol/L    Glucose 84 65 - 100 mg/dL    BUN 13 6 - 20 MG/DL    Creatinine 0.42 (L) 0.55 - 1.02 MG/DL BUN/Creatinine ratio 31 (H) 12 - 20      GFR est AA >60 >60 ml/min/1.73m2    GFR est non-AA >60 >60 ml/min/1.73m2    Calcium 8.9 8.5 - 10.1 MG/DL    Bilirubin, total 0.3 0.2 - 1.0 MG/DL    ALT (SGPT) 33 12 - 78 U/L    AST (SGOT) 21 15 - 37 U/L    Alk. phosphatase 43 (L) 45 - 117 U/L    Protein, total 6.6 6.4 - 8.2 g/dL    Albumin 3.6 3.5 - 5.0 g/dL    Globulin 3.0 2.0 - 4.0 g/dL    A-G Ratio 1.2 1.1 - 2.2       1230. D/C'd from [facility] ambulatory and in no distress accompanied by friend.  12/26/19 at 10:00 am

## 2019-12-17 NOTE — PROGRESS NOTES
[facility] [place] [facility] [place] [address] [address] [facility] [address] [facility]  Nicko Sovereign: [phone]  F: [phone]      Reason for Visit:   Kristin Coker is a 38 y.o. female who is seen in follow up for evaluation of therapy for breast cancer    Breast surgeon:  Dr. Corey Palacio    Treatment History:   · 6/10/19 L breast core bx:  IDC, gr 3, 1.1 cm, + LVI, ER negative, RI negative, HER 2 negative at [facility] 1+; DCIS present gr 3, solid with expansile necrosis  · 7/3/19 BRCA1 pathogenic mutation ([facility]), c.5266dup  · 19 MRI breast bx:  Negative  · Carbo/taxol 19-10/14/19  · C3d15 delayed one week due to neutropenia    DD [facility] 10/28/19-19   c1 held one week due to thrombocytopenia  c3 delayed 1 week due to pt trip    History of Present Illness:   She felt the L breast mass herself in May 2019, with aching pain, leading to the pathology above. Interval history:  In today for follow up and treatment.  Complains of gr 1 constipation, gr 1 fatigue, gr 1 loss of libido    FH:   2 paternal aunts with post-menopausal breast cancer, one aunt with ovarian cancer; that aunt's daughter tested positive for BRCA1 (first cousin); no pancreas cancer, no prostate cancer    Past Medical History:   Diagnosis Date    Breast cancer ([facility] [address] 2019    left breast cancer      Past Surgical History:   Procedure Laterality Date    HX APPENDECTOMY      HX  SECTION      x3      Social History     Tobacco Use    Smoking status: Never Smoker    Smokeless tobacco: Never Used   Substance Use Topics    Alcohol use: Not Currently      Family History   Problem Relation Age of Onset    Diabetes Mother     Heart Disease Mother     Hypertension Mother     Heart Disease Father     Cancer Paternal Aunt         Breast    Breast Cancer Paternal Aunt     Cancer Paternal Aunt         Breast    Breast Cancer Paternal Aunt      Current Outpatient Medications   Medication Sig    OLANZapine (ZYPREXA) 10 mg tablet 10 mg qhs on days 1-4 following chemo    LORazepam (ATIVAN) 1 mg tablet Take 1 Tab by mouth every eight (8) hours as needed (nausea). Max Daily Amount: 3 mg.  cholecalciferol, vitamin D3, (VITAMIN D3 PO) Take 2,000 Units by mouth daily.  Lactobacillus acidophilus (PROBIOTIC PO) Take 1 Tab by mouth daily.  prasterone, DHEA, (DHEA PO) Take 10 mg by mouth daily.  MELATONIN PO Take  by mouth nightly as needed.  prochlorperazine (COMPAZINE) 10 mg tablet Take 1 Tab by mouth every six (6) hours as needed for Nausea.  lidocaine-prilocaine (EMLA) topical cream Apply  to affected area as needed for Pain.  ondansetron hcl (ZOFRAN) 8 mg tablet Take 1 Tab by mouth every eight (8) hours as needed for Nausea.  OTHER Massage    Dx c50.412 breast cancer    NP THYROID 15 mg tablet Take 15 mg by mouth daily.  testosterone 75 mg pllt by Implant route. No current facility-administered medications for this visit. Facility-Administered Medications Ordered in Other Visits   Medication Dose Route Frequency    saline peripheral flush soln 10 mL  10 mL InterCATHeter PRN    0.9% sodium chloride injection 10 mL  10 mL IntraVENous PRN    heparin (porcine) pf 300-500 Units  300-500 Units InterCATHeter PRN      No Known Allergies     Review of Systems: A complete review of systems was obtained, negative except as described above.     Physical Exam:     Visit Vitals  /66 (BP 1 Location: Left arm, BP Patient Position: Sitting)   Pulse 84   Temp 97.2 °F (36.2 °C) (Temporal)   Resp 16   Ht 5' 2\" (1.575 m)   Wt 125 lb 3.2 oz (56.8 kg)   SpO2 98%   BMI 22.90 kg/m²     ECOG PS: 0  General: No distress  Eyes: PERRLA, anicteric sclerae  HENT: Atraumatic, OP clear  Neck: Supple  Lymphatic: No cervical, supraclavicular adenopathy  Respiratory: CTAB, normal respiratory effort  CV: Normal rate, regular rhythm, no murmurs, no peripheral edema  GI: Soft, nontender, nondistended, no masses, no hepatomegaly, no splenomegaly; + BS  MS:  Digits without clubbing or cyanosis. Skin: No rashes, ecchymoses, or petechiae. Normal temperature, turgor, and texture. Psych: Alert, oriented, appropriate affect, normal judgment/insight  Breasts:  L 2:00 breast, 4 cmfn, not able to palpate mass, no LAD     Results:     Lab Results   Component Value Date/Time    WBC 6.2 12/03/2019 10:23 AM    HGB 8.4 (L) 12/03/2019 10:23 AM    HCT 26.4 (L) 12/03/2019 10:23 AM    PLATELET 402 59/51/7015 10:23 AM    .0 (H) 12/03/2019 10:23 AM    ABS. NEUTROPHILS 4.1 12/03/2019 10:23 AM     Lab Results   Component Value Date/Time    Sodium 141 12/03/2019 10:23 AM    Potassium 3.4 (L) 12/03/2019 10:23 AM    Chloride 108 12/03/2019 10:23 AM    CO2 28 12/03/2019 10:23 AM    Glucose 96 12/03/2019 10:23 AM    BUN 13 12/03/2019 10:23 AM    Creatinine 0.51 (L) 12/03/2019 10:23 AM    GFR est AA >60 12/03/2019 10:23 AM    GFR est non-AA >60 12/03/2019 10:23 AM    Calcium 8.3 (L) 12/03/2019 10:23 AM     Lab Results   Component Value Date/Time    Bilirubin, total 0.3 12/03/2019 10:23 AM    ALT (SGPT) 39 12/03/2019 10:23 AM    AST (SGOT) 22 12/03/2019 10:23 AM    Alk. phosphatase 54 12/03/2019 10:23 AM    Protein, total 6.5 12/03/2019 10:23 AM    Albumin 3.6 12/03/2019 10:23 AM    Globulin 2.9 12/03/2019 10:23 AM       6/13/19 MRI breast  FINDINGS:     Background parenchymal enhancement: Moderate. Lymph nodes: No lymphadenopathy.     Left breast: Irregular triangular-shaped mass in the posterior third of the left  breast at 2:00 measures 2.8 x 1.8 x 4.2 cm. Posterior margin is 0.3 cm from the  left pectoralis major muscle. Biopsy clip is in the inferior margin of the mass.     In the middle and posterior thirds of the left breast at 5-6:00, segmental non  mass enhancement with heterogeneous kinetics measures 2.6 cm in oblique AP  diameter in the axial plane.  Otherwise, there are foci in the left breast.     Right breast: Heterogeneous patchy enhancement in multiple locations. No  suspicious kinetics. Biopsy clip in the middle third is at 3:00. No surrounding  abnormal enhancement or morphology.     IMPRESSION:   1. 2.8 x 1.8 x 4.2 cm biopsy-proven infiltrating ductal carcinoma in the left  breast at 2:00 is in close approximation to the chest wall. 2. Left breast 5-6:00 suspicious segmental non mass enhancement. 3. No MRI evidence of malignancy in the right breast.  4. No lymphadenopathy.     Assessment: ACR BI-RADS category   Left breast: BI-RADS Assessment Category 4: Suspicious abnormality - Biopsy  should be performed in the absence of clinical contraindication. Right breast: BI-RADS Assessment Category 2: Benign finding.     Recommendation: Bilateral mammography if not recently performed elsewhere. Second look ultrasound of the left breast at 5-6:00 to determine  ultrasound-guided or MRI guided biopsy of non mass enhancement.     A negative breast MRI examination speaks strongly against invasive cancer down  to a detection threshold of 3 to 5 mm but may not detect some lower grade or in  situ carcinomas. Therefore, routine clinical and mammographic followup are  recommended. A summary portfolio has been created in PACS.    7/12/19 TTE EF 65%    10/16/19 Left mammo/US:  ULTRASOUND:  Corresponding with the mammographic density and previously seen  mass is a 1.2 x 1.2 x 1.4 cm hypoechoic mass lesion, which previously measured  1.9 x 2.7 cm on diagnostic MRI. Note is made of an adjacent biopsy marker clip. No other suspicious cystic or solid lesions identified.     IMPRESSION: Response to therapy with apparent reduction in size of left breast  mass compared to prior MRI. BI-RADS Assessment Category 6: Known biopsy proven  malignancy- Appropriate action should be taken.     RECOMMENDATION:  Appropriate clinical management of known left breast  malignancy. Records reviewed and summarized above. Pathology report(s) reviewed above.   Radiology report(s) reviewed above. Assessment/plan:   1. L UOQ IDC, gr 3, triple negative:  cT2 cN0 cM0, stage IIA, prognostic stage IIB    We explained to the patient that the goal of systemic adjuvant therapy is to improve the chances for cure and decrease the risk of relapse. We explained why a patient can have microscopic cancer spread now even though physical examination, laboratory studies and imaging studies are negative for cancer. We explained that the same treatments used now as adjuvant or preventive treatments rarely if ever are curative in women who develop metastases. We discussed that there is no difference in overall survival between neoadjuvant and adjuvant chemotherapy. We discussed the rationale for neoadjuvant chemotherapy, if chemotherapy is warranted, as it is in this case: to avoid any potential delays in giving chemotherapy following surgery, to be able to see the response of the tumor to chemotherapy, and to potentially downstage the tumor prior to surgery. I discussed the potential risks of dose-dense chemotherapy with the patient. (DD AC-T, adriamycin 60 mg/m2; cyclophosphamide 600 mg/m2 q 2 weeks x 4; paclitaxel 80 mg/m2 qweekly x 12). Major toxicities include nausea and vomiting, stomatitis, fatigue, and a small risk of heart damage. Anemia frequently results and occasionally requires growth factors and rarely transfusions. Neutropenic fever is uncommon, but can be a life-threatening problem. Also, there is a small but increased risk of myelodysplasia and acute leukemia. We provided the patient with detailed information concerning toxicity including frequent toxicities that only last a few days, such as nausea, vomiting, mouth sores, arthralgia, myalgia, and potentially allergic reactions to paclitaxel, as well as toxicities which can be longer lasting including total alopecia, fatigue, anemia and neuropathy.  We provided the patient with detailed information concerning the toxicities of their regimen in addition to our verbal discussion. We discussed the potential addition of carboplatin auc 6 q 3 weeks during weekly paclitaxel as evaluated in CALGB 22415, showing a significant improvement in pCR for patients with stage II-III triple negative breast cancer. Additional side effects of added myelosuppression, fatigue, renal damage with dehydration, and nausea and vomiting were discussed. AUC is the dose that is currently being used in all investigations, and thus is what will be used here. Discussed oral and peripheral cryotherapy. Cold caps would not work with [facility]. MRI biopsy of 2nd breast lesion ordered by Dr. Kenzie Benítez, negative. Dr. Kenzie Benítez has placed port     The patient was given the following prescriptions with written and verbal instructions on how to use each:  Compazine, zofran, olanzapine, a wig, and emla cream.  IV Octavio Del will be used with [facility]. Neulasta the following day (bone pain side effect discussed) with AC. She is agreeable to neoadjuvant chemotherapy, declined B-59. Initiated therapy on 7/22/19. Agreeable to carboplatin/paclitaxel followed by DD AC. She has signed informed consent     S/p Carbo/taxol, completed on 10/14/19. Midpoint mammo and US show response to treatment. Will get endpoint mammo and US    DDAC #4 today, but will hold and retry next week due to neutropenia      She needs to see plastics -- will reach out to Dr. Kenzie Benítez to coordinate    2. Emotional well being:  She has excellent support and is coping well with her disease    3. BRCA1 pathogenic mutation:   Discussed recommendation for bilateral mastectomies and bilateral oophorectomies; risk of ovarian, fallopian or peritoneal cancer is 16-59%; risk for pancreatic cancer is 1-3%; risk for contralateral breast cancer is 10-15%    Will refer to gyn onc -- has appointment on 1/7/20    4. Fertility preservation:  Discussed that there is > 20% chance of loss of menses with chemotherapy.   She and her  state that they are not interested in further children and fertility preservation     5. Constipation: Relieved with a suppository. Now she is taking fiber and stool softener, prn miralax. Ongoing but has improved    6. Nausea, chemotherapy induced: Better with AC. PRN zofran, compazine, and ativan. 7. Headaches:  Due to treatment. PRN Excedrin with improvement. Stable. 8. Anemia: due to chemo. Will monitor. 9. Neutropenia:  Will hold AC and retry next week    Signed By: Edy Villegas MD      No orders of the defined types were placed in this encounter.

## 2019-12-23 ENCOUNTER — OFFICE VISIT (OUTPATIENT)
Dept: SURGERY | Age: 44
End: 2019-12-23

## 2019-12-23 VITALS
WEIGHT: 126 LBS | SYSTOLIC BLOOD PRESSURE: 117 MMHG | DIASTOLIC BLOOD PRESSURE: 83 MMHG | BODY MASS INDEX: 23.19 KG/M2 | HEART RATE: 86 BPM | HEIGHT: 62 IN

## 2019-12-23 DIAGNOSIS — Z17.1 MALIGNANT NEOPLASM OF UPPER-OUTER QUADRANT OF LEFT BREAST IN FEMALE, ESTROGEN RECEPTOR NEGATIVE (HCC): ICD-10-CM

## 2019-12-23 DIAGNOSIS — C50.412 MALIGNANT NEOPLASM OF UPPER-OUTER QUADRANT OF LEFT BREAST IN FEMALE, ESTROGEN RECEPTOR NEGATIVE (HCC): ICD-10-CM

## 2019-12-23 RX ORDER — DOXORUBICIN HYDROCHLORIDE 2 MG/ML
30 INJECTION, SOLUTION INTRAVENOUS ONCE
Status: CANCELLED
Start: 2019-12-26 | End: 2019-12-26

## 2019-12-23 RX ORDER — ALBUTEROL SULFATE 0.83 MG/ML
2.5 SOLUTION RESPIRATORY (INHALATION) AS NEEDED
Status: CANCELLED
Start: 2019-12-26

## 2019-12-23 RX ORDER — ACETAMINOPHEN 325 MG/1
650 TABLET ORAL AS NEEDED
Status: CANCELLED
Start: 2019-12-26

## 2019-12-23 RX ORDER — DIPHENHYDRAMINE HYDROCHLORIDE 50 MG/ML
50 INJECTION, SOLUTION INTRAMUSCULAR; INTRAVENOUS AS NEEDED
Status: CANCELLED
Start: 2019-12-26

## 2019-12-23 RX ORDER — EPINEPHRINE 1 MG/ML
0.3 INJECTION, SOLUTION, CONCENTRATE INTRAVENOUS AS NEEDED
Status: CANCELLED | OUTPATIENT
Start: 2019-12-26

## 2019-12-23 RX ORDER — ONDANSETRON 2 MG/ML
4 INJECTION INTRAMUSCULAR; INTRAVENOUS ONCE
Status: CANCELLED
Start: 2019-12-26

## 2019-12-23 RX ORDER — PALONOSETRON 0.05 MG/ML
0.25 INJECTION, SOLUTION INTRAVENOUS ONCE
Status: CANCELLED | OUTPATIENT
Start: 2019-12-26

## 2019-12-23 RX ORDER — SODIUM CHLORIDE 9 MG/ML
25 INJECTION, SOLUTION INTRAVENOUS CONTINUOUS
Status: CANCELLED | OUTPATIENT
Start: 2019-12-26

## 2019-12-23 RX ORDER — HEPARIN 100 UNIT/ML
300-500 SYRINGE INTRAVENOUS AS NEEDED
Status: CANCELLED | OUTPATIENT
Start: 2019-12-26

## 2019-12-23 RX ORDER — SODIUM CHLORIDE 0.9 % (FLUSH) 0.9 %
10 SYRINGE (ML) INJECTION AS NEEDED
Status: CANCELLED | OUTPATIENT
Start: 2019-12-26

## 2019-12-23 RX ORDER — ONDANSETRON 2 MG/ML
8 INJECTION INTRAMUSCULAR; INTRAVENOUS AS NEEDED
Status: CANCELLED | OUTPATIENT
Start: 2019-12-26

## 2019-12-23 RX ORDER — HYDROCORTISONE SODIUM SUCCINATE 100 MG/2ML
100 INJECTION, POWDER, FOR SOLUTION INTRAMUSCULAR; INTRAVENOUS AS NEEDED
Status: CANCELLED | OUTPATIENT
Start: 2019-12-26

## 2019-12-23 RX ORDER — SODIUM CHLORIDE 9 MG/ML
10 INJECTION INTRAMUSCULAR; INTRAVENOUS; SUBCUTANEOUS AS NEEDED
Status: CANCELLED | OUTPATIENT
Start: 2019-12-26

## 2019-12-23 NOTE — PROGRESS NOTES
HISTORY OF PRESENT ILLNESS  Saroj Luciano is a 38 y.o. female. HPI  ESTABLISHED patient here for breast talk. She has almost completed her chemotherapy. Has one more AC treatment on 19 and will be done. She has done pretty well with her chemotherapy. Has had nausea but no vomiting. Ready to discuss surgery. Found out shortly after diagnosis that she has a BRCA1 mutation.       06/10/19: LEFT breast bx, 3:00. PATH: IDC, 11mm largest glass slide measurement, nuclear grade 3, lymphovascular invasion present, ER-/OR-/HER2-. DCIS also present. 19: [facility] genetic testing: BRCA1 positive, c.5266dup variant, heterozygous. 19: LEFT breast MRI-guided bx. PATH: Benign breast tissue with fibrocystic changes, negative for in situ or invasive carcinoma.    19 - 10/14/19: Carbo/taxol. [id number] delayed one week due to neutropenia    10/28/19 - : DD AC chemo. c1 held one week due to thrombocytopenia. c3 delayed 1 week due to pt trip. C4 held due to neutropenia.       Past Medical History:   Diagnosis Date    Breast cancer ([facility] [address] 2019    left breast cancer       Past Surgical History:   Procedure Laterality Date    HX APPENDECTOMY      HX  SECTION      x3       Social History     Socioeconomic History    Marital status:      Spouse name: Not on file    Number of children: Not on file    Years of education: Not on file    Highest education level: Not on file   Occupational History    Not on file   Social Needs    Financial resource strain: Not on file    Food insecurity:     Worry: Not on file     Inability: Not on file    Transportation needs:     Medical: Not on file     Non-medical: Not on file   Tobacco Use    Smoking status: Never Smoker    Smokeless tobacco: Never Used   Substance and Sexual Activity    Alcohol use: Not Currently    Drug use: Never    Sexual activity: Yes   Lifestyle    Physical activity:     Days per week: Not on file     Minutes per session: Not on file    Stress: Not on file   Relationships    Social connections:     Talks on phone: Not on file     Gets together: Not on file     Attends Jewish service: Not on file     Active member of club or organization: Not on file     Attends meetings of clubs or organizations: Not on file     Relationship status: Not on file    Intimate partner violence:     Fear of current or ex partner: Not on file     Emotionally abused: Not on file     Physically abused: Not on file     Forced sexual activity: Not on file   Other Topics Concern    Not on file   Social History Narrative    Not on file       Current Outpatient Medications on File Prior to Visit   Medication Sig Dispense Refill    OLANZapine (ZYPREXA) 10 mg tablet 10 mg qhs on days 1-4 following chemo 8 Tab 1    LORazepam (ATIVAN) 1 mg tablet Take 1 Tab by mouth every eight (8) hours as needed (nausea). Max Daily Amount: 3 mg. 30 Tab 1    cholecalciferol, vitamin D3, (VITAMIN D3 PO) Take 2,000 Units by mouth daily.  Lactobacillus acidophilus (PROBIOTIC PO) Take 1 Tab by mouth daily.  prasterone, DHEA, (DHEA PO) Take 10 mg by mouth daily.  MELATONIN PO Take  by mouth nightly as needed.  prochlorperazine (COMPAZINE) 10 mg tablet Take 1 Tab by mouth every six (6) hours as needed for Nausea. 50 Tab 5    lidocaine-prilocaine (EMLA) topical cream Apply  to affected area as needed for Pain. 30 g 0    ondansetron hcl (ZOFRAN) 8 mg tablet Take 1 Tab by mouth every eight (8) hours as needed for Nausea. 60 Tab 3    OTHER Massage    Dx c50.412 breast cancer 1 Each 0    NP THYROID 15 mg tablet Take 15 mg by mouth daily.  testosterone 75 mg pllt by Implant route. No current facility-administered medications on file prior to visit. No Known Allergies    OB History    No obstetric history on file.       Obstetric Comments   Menarche 7 or 16, LMP 5/29/29, # of children 3, age of 4st delivery 28, Hysterectomy/oophorectomy No/No, Breast bx Yes, ? LEFT 9-10 yrs ago, history of breast feeding Yes, BCP No, Hormone therapy No               ROS    Physical Exam  Exam conducted with a chaperone present. Cardiovascular:      Rate and Rhythm: Normal rate and regular rhythm. Heart sounds: Normal heart sounds. Pulmonary:      Breath sounds: Normal breath sounds. Chest:      Breasts: Breasts are symmetrical.         Right: Normal. No swelling, bleeding, inverted nipple, mass, nipple discharge, skin change or tenderness. Left: Normal. No swelling, bleeding, inverted nipple, mass, nipple discharge, skin change or tenderness. Lymphadenopathy:      Cervical:      Right cervical: No superficial, deep or posterior cervical adenopathy. Left cervical: No superficial, deep or posterior cervical adenopathy. Upper Body:      Right upper body: No supraclavicular or axillary adenopathy. Left upper body: No supraclavicular or axillary adenopathy. ASSESSMENT and PLAN    ICD-10-CM ICD-9-CM    1. Malignant neoplasm of upper-outer quadrant of left breast in female, estrogen receptor negative (HCC) C50.412 174.4     Z17.1 V86.1      Pt presents for consultation for surgical treatment for LEFT breast IDC, ER-/UT-/HER2-, and is doing well overall. Physical exam today normal, no palpable findings. We had a long discussion of options for treatment. A total of 45 minutes were spent face-to-face with the patient, accompanied by her sister and niece, during this encounter, and over half of that time was spent on counseling and coordination of care. Pt notes preference for BL mastectomy. She also has consultation scheduled for BL oophorectomy. Discussed skin and nipple sparing mastectomy, with surgical delay with inframammary incisions, nipple bx, and SLNBx, followed 2-3 weeks later by mastectomy and probable direct implant placement, or possible expander placement, by plastic surgeon.  Also discussed treatment with hyperbaric oxygen chamber or delayed mastectomy in the case of ischemia or wound complication following nipple delay. Reviewed loss of or change in skin and nipple sensation following skin and nipple-sparing mastectomy. We also discussed the pts questions and concerns. No increased risk of recurrence after nipple-sparing surgery. We also discussed nipple reconstruction and tattoo options in case of nipple involvement or removal.    Will plan for surgery 4 weeks after last chemo treatment. Will refer to plastic surgeon for further consultation, and plan further from there. This plan was reviewed with the patient and patient agrees. All questions were answered.     Written by Ravi Pressley, as dictated by Dr. Nathalie Maldonado MD.

## 2019-12-23 NOTE — PROGRESS NOTES
HISTORY OF PRESENT ILLNESS Andie Hewitt is a 38 y.o. female. HPI   ESTABLISHED patient here for breast talk. She has almost completed her chemotherapy. Has one more AC treatment on 12/26/19 and will be done. She has done pretty well with her chemotherapy. Has had nausea but no vomiting. Ready to discuss surgery. Found out shortly after diagnosis that she has a BRCA1 mutation. 06/10/19: LEFT breast bx, 3:00. PATH: IDC, 11mm largest glass slide measurement, nuclear grade 3, lymphovascular invasion present, ER-/PA-/HER2-. DCIS also present. 07/03/19: [facility] genetic testing: BRCA1 positive, c.5266dup variant, heterozygous. 07/18/19: LEFT breast MRI-guided bx. PATH: Benign breast tissue with fibrocystic changes, negative for in situ or invasive carcinoma. 
07/22/19 - 10/14/19: Carbo/taxol. c3d15 delayed one week due to neutropenia 10/28/19 - : DD AC chemo. c1 held one week due to thrombocytopenia. c3 delayed 1 week due to pt trip. C4 held due to neutropenia. ROS Physical Exam 
 
ASSESSMENT and PLAN 
{ASSESSMENT/PLAN:20371}

## 2019-12-26 ENCOUNTER — HOSPITAL ENCOUNTER (OUTPATIENT)
Dept: INFUSION THERAPY | Age: 44
Discharge: HOME | End: 2019-12-26
Payer: OTHER GOVERNMENT

## 2019-12-26 VITALS
RESPIRATION RATE: 16 BRPM | TEMPERATURE: 97.8 F | OXYGEN SATURATION: 98 % | WEIGHT: 128.8 LBS | DIASTOLIC BLOOD PRESSURE: 74 MMHG | SYSTOLIC BLOOD PRESSURE: 108 MMHG | BODY MASS INDEX: 23.7 KG/M2 | HEIGHT: 62 IN | HEART RATE: 82 BPM

## 2019-12-26 DIAGNOSIS — C50.412 MALIGNANT NEOPLASM OF UPPER-OUTER QUADRANT OF LEFT BREAST IN FEMALE, ESTROGEN RECEPTOR NEGATIVE (HCC): Primary | ICD-10-CM

## 2019-12-26 DIAGNOSIS — Z17.1 MALIGNANT NEOPLASM OF UPPER-OUTER QUADRANT OF LEFT BREAST IN FEMALE, ESTROGEN RECEPTOR NEGATIVE (HCC): Primary | ICD-10-CM

## 2019-12-26 LAB
ALBUMIN SERPL-MCNC: 3.6 G/DL (ref 3.5–5)
ALBUMIN/GLOB SERPL: 1.1 {RATIO} (ref 1.1–2.2)
ALP SERPL-CCNC: 50 U/L (ref 45–117)
ALT SERPL-CCNC: 61 U/L (ref 12–78)
ANION GAP SERPL CALC-SCNC: 5 MMOL/L (ref 5–15)
AST SERPL-CCNC: 38 U/L (ref 15–37)
BASO+EOS+MONOS # BLD AUTO: 1 K/UL (ref 0.2–1.2)
BASO+EOS+MONOS NFR BLD AUTO: 23 % (ref 3.2–16.9)
BILIRUB SERPL-MCNC: 0.3 MG/DL (ref 0.2–1)
BUN SERPL-MCNC: 17 MG/DL (ref 6–20)
BUN/CREAT SERPL: 29 (ref 12–20)
CALCIUM SERPL-MCNC: 9.2 MG/DL (ref 8.5–10.1)
CHLORIDE SERPL-SCNC: 107 MMOL/L (ref 97–108)
CO2 SERPL-SCNC: 28 MMOL/L (ref 21–32)
CREAT SERPL-MCNC: 0.58 MG/DL (ref 0.55–1.02)
DIFFERENTIAL METHOD BLD: ABNORMAL
ERYTHROCYTE [DISTWIDTH] IN BLOOD BY AUTOMATED COUNT: 14.4 % (ref 11.8–15.8)
GLOBULIN SER CALC-MCNC: 3.4 G/DL (ref 2–4)
GLUCOSE SERPL-MCNC: 94 MG/DL (ref 65–100)
HCT VFR BLD AUTO: 32.3 % (ref 35–47)
HGB BLD-MCNC: 10.3 G/DL (ref 11.5–16)
LYMPHOCYTES # BLD: 1.2 K/UL (ref 0.8–3.5)
LYMPHOCYTES NFR BLD: 26 % (ref 12–49)
MCH RBC QN AUTO: 32.2 PG (ref 26–34)
MCHC RBC AUTO-ENTMCNC: 31.9 G/DL (ref 30–36.5)
MCV RBC AUTO: 100.9 FL (ref 80–99)
NEUTS SEG # BLD: 2.2 K/UL (ref 1.8–8)
NEUTS SEG NFR BLD: 51 % (ref 32–75)
PLATELET # BLD AUTO: 223 K/UL (ref 150–400)
POTASSIUM SERPL-SCNC: 3.6 MMOL/L (ref 3.5–5.1)
PROT SERPL-MCNC: 7 G/DL (ref 6.4–8.2)
RBC # BLD AUTO: 3.2 M/UL (ref 3.8–5.2)
SODIUM SERPL-SCNC: 140 MMOL/L (ref 136–145)
TROPONIN I SERPL-MCNC: <0.05 NG/ML
WBC # BLD AUTO: 4.4 K/UL (ref 3.6–11)

## 2019-12-26 PROCEDURE — 77030012965 HC NDL HUBR BBMI -A

## 2019-12-26 PROCEDURE — 84484 ASSAY OF TROPONIN QUANT: CPT

## 2019-12-26 PROCEDURE — 96377 APPLICATON ON-BODY INJECTOR: CPT

## 2019-12-26 PROCEDURE — 36415 COLL VENOUS BLD VENIPUNCTURE: CPT

## 2019-12-26 PROCEDURE — 74011000258 HC RX REV CODE- 258: Performed by: NURSE PRACTITIONER

## 2019-12-26 PROCEDURE — 85025 COMPLETE CBC W/AUTO DIFF WBC: CPT

## 2019-12-26 PROCEDURE — 80053 COMPREHEN METABOLIC PANEL: CPT

## 2019-12-26 PROCEDURE — 96411 CHEMO IV PUSH ADDL DRUG: CPT

## 2019-12-26 PROCEDURE — 74011250636 HC RX REV CODE- 250/636: Performed by: NURSE PRACTITIONER

## 2019-12-26 PROCEDURE — 96413 CHEMO IV INFUSION 1 HR: CPT

## 2019-12-26 PROCEDURE — 96367 TX/PROPH/DG ADDL SEQ IV INF: CPT

## 2019-12-26 PROCEDURE — 96375 TX/PRO/DX INJ NEW DRUG ADDON: CPT

## 2019-12-26 PROCEDURE — 74011000250 HC RX REV CODE- 250: Performed by: NURSE PRACTITIONER

## 2019-12-26 RX ORDER — DOXORUBICIN HYDROCHLORIDE 2 MG/ML
30 INJECTION, SOLUTION INTRAVENOUS ONCE
Status: COMPLETED | OUTPATIENT
Start: 2019-12-26 | End: 2019-12-26

## 2019-12-26 RX ORDER — SODIUM CHLORIDE 0.9 % (FLUSH) 0.9 %
10 SYRINGE (ML) INJECTION AS NEEDED
Status: ACTIVE | OUTPATIENT
Start: 2019-12-26 | End: 2019-12-26

## 2019-12-26 RX ORDER — ONDANSETRON 2 MG/ML
4 INJECTION INTRAMUSCULAR; INTRAVENOUS ONCE
Status: COMPLETED | OUTPATIENT
Start: 2019-12-26 | End: 2019-12-26

## 2019-12-26 RX ORDER — SODIUM CHLORIDE 9 MG/ML
25 INJECTION, SOLUTION INTRAVENOUS CONTINUOUS
Status: DISPENSED | OUTPATIENT
Start: 2019-12-26 | End: 2019-12-26

## 2019-12-26 RX ORDER — PALONOSETRON 0.05 MG/ML
0.25 INJECTION, SOLUTION INTRAVENOUS ONCE
Status: COMPLETED | OUTPATIENT
Start: 2019-12-26 | End: 2019-12-26

## 2019-12-26 RX ORDER — SODIUM CHLORIDE 9 MG/ML
10 INJECTION INTRAMUSCULAR; INTRAVENOUS; SUBCUTANEOUS AS NEEDED
Status: ACTIVE | OUTPATIENT
Start: 2019-12-26 | End: 2019-12-26

## 2019-12-26 RX ORDER — HEPARIN 100 UNIT/ML
300-500 SYRINGE INTRAVENOUS AS NEEDED
Status: ACTIVE | OUTPATIENT
Start: 2019-12-26 | End: 2019-12-26

## 2019-12-26 RX ADMIN — DEXAMETHASONE SODIUM PHOSPHATE 12 MG: 4 INJECTION, SOLUTION INTRA-ARTICULAR; INTRALESIONAL; INTRAMUSCULAR; INTRAVENOUS; SOFT TISSUE at 11:39

## 2019-12-26 RX ADMIN — PEGFILGRASTIM 6 MG: KIT SUBCUTANEOUS at 13:57

## 2019-12-26 RX ADMIN — SODIUM CHLORIDE 25 ML/HR: 900 INJECTION, SOLUTION INTRAVENOUS at 10:45

## 2019-12-26 RX ADMIN — SODIUM CHLORIDE 10 ML: 9 INJECTION, SOLUTION INTRAMUSCULAR; INTRAVENOUS; SUBCUTANEOUS at 10:48

## 2019-12-26 RX ADMIN — DOXORUBICIN HYDROCHLORIDE 50.2 MG: 2 INJECTION, SOLUTION INTRAVENOUS at 12:51

## 2019-12-26 RX ADMIN — SODIUM CHLORIDE 150 MG: 900 INJECTION, SOLUTION INTRAVENOUS at 11:37

## 2019-12-26 RX ADMIN — PALONOSETRON 0.25 MG: 0.25 INJECTION, SOLUTION INTRAVENOUS at 11:33

## 2019-12-26 RX ADMIN — Medication 500 UNITS: at 13:56

## 2019-12-26 RX ADMIN — ONDANSETRON 4 MG: 2 INJECTION, SOLUTION INTRAMUSCULAR; INTRAVENOUS at 10:48

## 2019-12-26 RX ADMIN — CYCLOPHOSPHAMIDE 1002 MG: 1 INJECTION, POWDER, FOR SOLUTION INTRAVENOUS; ORAL at 13:09

## 2019-12-26 RX ADMIN — Medication 10 ML: at 13:56

## 2019-12-26 NOTE — PROGRESS NOTES
[facility] Progress Note    Date: 2019    Name: Lola Jalloh    MRN: [id number]         : 1975    1010:  Ms. Eugenia Lowry Arrived ambulatory and in no distress for C8D1 of DDAC Regimen. Assessment was completed, no acute issues at this time, no new complaints voiced. Right chest wall port accessed without difficulty using 0.75 inch hernandez needle, labs drawn & sent for processing. Chemotherapy Flowsheet 2019   Cycle C8D1   Date 2019   Drug / Regimen DDAC   Pre Hydration -   Pre Meds given   Notes given         Ms. East's vitals were reviewed. Patient Vitals for the past 24 hrs:   Temp Pulse Resp BP SpO2   19 1359 97.8 °F (36.6 °C) 82 16 108/74 --   19 1021 97.8 °F (36.6 °C) 84 16 101/69 98 %       Lab results were obtained and reviewed. Recent Results (from the past 12 hour(s))   METABOLIC PANEL, COMPREHENSIVE    Collection Time: 19 10:17 AM   Result Value Ref Range    Sodium 140 136 - 145 mmol/L    Potassium 3.6 3.5 - 5.1 mmol/L    Chloride 107 97 - 108 mmol/L    CO2 28 21 - 32 mmol/L    Anion gap 5 5 - 15 mmol/L    Glucose 94 65 - 100 mg/dL    BUN 17 6 - 20 MG/DL    Creatinine 0.58 0.55 - 1.02 MG/DL    BUN/Creatinine ratio 29 (H) 12 - 20      GFR est AA >60 >60 ml/min/1.73m2    GFR est non-AA >60 >60 ml/min/1.73m2    Calcium 9.2 8.5 - 10.1 MG/DL    Bilirubin, total 0.3 0.2 - 1.0 MG/DL    ALT (SGPT) 61 12 - 78 U/L    AST (SGOT) 38 (H) 15 - 37 U/L    Alk.  phosphatase 50 45 - 117 U/L    Protein, total 7.0 6.4 - 8.2 g/dL    Albumin 3.6 3.5 - 5.0 g/dL    Globulin 3.4 2.0 - 4.0 g/dL    A-G Ratio 1.1 1.1 - 2.2     CBC WITH 3 PART DIFF    Collection Time: 19 10:17 AM   Result Value Ref Range    WBC 4.4 3.6 - 11.0 K/uL    RBC 3.20 (L) 3.80 - 5.20 M/uL    HGB 10.3 (L) 11.5 - 16.0 g/dL    HCT 32.3 (L) 35.0 - 47.0 %    .9 (H) 80.0 - 99.0 FL    MCH 32.2 26.0 - 34.0 PG    MCHC 31.9 30.0 - 36.5 g/dL    RDW 14.4 11.8 - 15.8 %    PLATELET 856 505 - 468 K/uL NEUTROPHILS 51 32 - 75 %    MIXED CELLS 23 (H) 3.2 - 16.9 %    LYMPHOCYTES 26 12 - 49 %    ABS. NEUTROPHILS 2.2 1.8 - 8.0 K/UL    ABS. MIXED CELLS 1.0 0.2 - 1.2 K/uL    ABS. LYMPHOCYTES 1.2 0.8 - 3.5 K/UL    DF AUTOMATED     TROPONIN I    Collection Time: 12/26/19  2:05 PM   Result Value Ref Range    Troponin-I, Qt. <0.05 <0.05 ng/mL       Medications:  Medications Administered     0.9% sodium chloride infusion     Admin Date  12/26/2019 Action  New Bag Dose  25 mL/hr Rate  25 mL/hr Route  IntraVENous Administered By  Sherri Villalobos, RN          0.9% sodium chloride injection 10 mL     Admin Date  12/26/2019 Action  Given Dose  10 mL Route  IntraVENous Administered By  Sherri Villalobos, NICOLLE          cyclophosphamide (CYTOXAN) 1,002 mg in 0.9% sodium chloride 250 mL, overfill volume 25 mL chemo infusion     Admin Date  12/26/2019 Action  New Bag Dose  1002 mg Rate  650.2 mL/hr Route  IntraVENous Administered By  Sherri Villalobos, RN          dexamethasone (DECADRON) 12 mg in 0.9% sodium chloride 50 mL IVPB     Admin Date  12/26/2019 Action  Given Dose  12 mg Route  IntraVENous Administered By  Sherri Villalobos, NICOLLE          DOXOrubicin (ADRIAMYCIN) chemo syringe 50.2 mg (Syringe 1 of 2. Total Dose = 100.4 mg)     Admin Date  12/26/2019 Action  Given Dose  50.2 mg Route  IntraVENous Administered By  Sherri Villalobos, RN          DOXOrubicin (ADRIAMYCIN) chemo syringe 50.2 mg (Syringe 2 of 2.   Total Dose = 100.4 mg)     Admin Date  12/26/2019 Action  Given Dose  50.2 mg Route  IntraVENous Administered By  Sherri Villalobos, RN          fosaprepitant (EMEND) 150 mg in 0.9% sodium chloride 150 mL IVPB     Admin Date  12/26/2019 Action  Given Dose  150 mg Rate  450 mL/hr Route  IntraVENous Administered By  Sherri Villalobos, RN          heparin (porcine) pf 300-500 Units     Admin Date  12/26/2019 Action  Given Dose  500 Units Route  InterCATHeter Administered By  Sherri Villalobos, RN          ondansetron [facility]) injection 4 mg     Admin Date  12/26/2019 Action  Given Dose  4 mg Route  IntraVENous Administered By  Leonila Roldan RN          palonosetron HCl (ALOXI) injection 0.25 mg     Admin Date  12/26/2019 Action  Given Dose  0.25 mg Route  IntraVENous Administered By  Leonila Roldan RN          pegfilgrastim (NEULASTA) wearable SQ injector 6 mg     Admin Date  12/26/2019 Action  Given Dose  6 mg Route  SubCUTAneous Administered By  Leonila Roldan RN          saline peripheral flush soln 10 mL     Admin Date  12/26/2019 Action  Given Dose  10 mL Route  InterCATHeter Administered By  Leonila Roldan RN              Education provided to patient about Neulasta On Body Injector including: side effects, how/when to remove the device, as well as what to do in the event of device malfunction. Opportunity for questions was provided and all questions were answered. Patient verbalized understanding. Ms. Ivette Meza tolerated treatment well and was discharged from [place] [address] in stable condition at 1410. Port de-accessed, flushed & heparinized per protocol.  Gilberto Lu RN  December 26, 2019

## 2019-12-27 NOTE — COMMUNICATION BODY
HISTORY OF PRESENT ILLNESS  Bianca Butterfield is a 38 y.o. female. HPI  ESTABLISHED patient here for breast talk. She has almost completed her chemotherapy. Has one more AC treatment on 19 and will be done. She has done pretty well with her chemotherapy. Has had nausea but no vomiting. Ready to discuss surgery. Found out shortly after diagnosis that she has a BRCA1 mutation.       06/10/19: LEFT breast bx, 3:00. PATH: IDC, 11mm largest glass slide measurement, nuclear grade 3, lymphovascular invasion present, ER-/[place]-/HER2-. DCIS also present. 19: [facility] genetic testing: BRCA1 positive, c.5266dup variant, heterozygous. 19: LEFT breast MRI-guided bx. PATH: Benign breast tissue with fibrocystic changes, negative for in situ or invasive carcinoma.    19 - 10/14/19: Carbo/taxol. H8I60 delayed one week due to neutropenia    10/28/19 - : DD AC chemo. c1 held one week due to thrombocytopenia. c3 delayed 1 week due to pt trip. C4 held due to neutropenia.       Past Medical History:   Diagnosis Date    Breast cancer ([facility] [address] 2019    left breast cancer       Past Surgical History:   Procedure Laterality Date    HX APPENDECTOMY      HX  SECTION      x3       Social History     Socioeconomic History    Marital status:      Spouse name: Not on file    Number of children: Not on file    Years of education: Not on file    Highest education level: Not on file   Occupational History    Not on file   Social Needs    Financial resource strain: Not on file    Food insecurity:     Worry: Not on file     Inability: Not on file    Transportation needs:     Medical: Not on file     Non-medical: Not on file   Tobacco Use    Smoking status: Never Smoker    Smokeless tobacco: Never Used   Substance and Sexual Activity    Alcohol use: Not Currently    Drug use: Never    Sexual activity: Yes   Lifestyle    Physical activity:     Days per week: Not on file     Minutes per session: Not on file    Stress: Not on file   Relationships    Social connections:     Talks on phone: Not on file     Gets together: Not on file     Attends Yazidi service: Not on file     Active member of club or organization: Not on file     Attends meetings of clubs or organizations: Not on file     Relationship status: Not on file    Intimate partner violence:     Fear of current or ex partner: Not on file     Emotionally abused: Not on file     Physically abused: Not on file     Forced sexual activity: Not on file   Other Topics Concern    Not on file   Social History Narrative    Not on file       Current Outpatient Medications on File Prior to Visit   Medication Sig Dispense Refill    OLANZapine (ZYPREXA) 10 mg tablet 10 mg qhs on days 1-4 following chemo 8 Tab 1    LORazepam (ATIVAN) 1 mg tablet Take 1 Tab by mouth every eight (8) hours as needed (nausea). Max Daily Amount: 3 mg. 30 Tab 1    cholecalciferol, vitamin D3, (VITAMIN D3 PO) Take 2,000 Units by mouth daily.  Lactobacillus acidophilus (PROBIOTIC PO) Take 1 Tab by mouth daily.  prasterone, DHEA, (DHEA PO) Take 10 mg by mouth daily.  MELATONIN PO Take  by mouth nightly as needed.  prochlorperazine (COMPAZINE) 10 mg tablet Take 1 Tab by mouth every six (6) hours as needed for Nausea. 50 Tab 5    lidocaine-prilocaine (EMLA) topical cream Apply  to affected area as needed for Pain. 30 g 0    ondansetron hcl (ZOFRAN) 8 mg tablet Take 1 Tab by mouth every eight (8) hours as needed for Nausea. 60 Tab 3    OTHER Massage    Dx c50.412 breast cancer 1 Each 0    NP THYROID 15 mg tablet Take 15 mg by mouth daily.  testosterone 75 mg pllt by Implant route. No current facility-administered medications on file prior to visit. No Known Allergies    OB History    No obstetric history on file.       Obstetric Comments   Menarche 7 or 16, LMP 5/29/29, # of children 3, age of 4st delivery 28, Hysterectomy/oophorectomy No/No, Breast bx Yes, ? LEFT 9-10 yrs ago, history of breast feeding Yes, BCP No, Hormone therapy No               ROS    Physical Exam  Exam conducted with a chaperone present. Cardiovascular:      Rate and Rhythm: Normal rate and regular rhythm. Heart sounds: Normal heart sounds. Pulmonary:      Breath sounds: Normal breath sounds. Chest:      Breasts: Breasts are symmetrical.         Right: Normal. No swelling, bleeding, inverted nipple, mass, nipple discharge, skin change or tenderness. Left: Normal. No swelling, bleeding, inverted nipple, mass, nipple discharge, skin change or tenderness. Lymphadenopathy:      Cervical:      Right cervical: No superficial, deep or posterior cervical adenopathy. Left cervical: No superficial, deep or posterior cervical adenopathy. Upper Body:      Right upper body: No supraclavicular or axillary adenopathy. Left upper body: No supraclavicular or axillary adenopathy. ASSESSMENT and PLAN    ICD-10-CM ICD-9-CM    1. Malignant neoplasm of upper-outer quadrant of left breast in female, estrogen receptor negative (HCC) C50.412 174.4     Z17.1 V86.1      Pt presents for consultation for surgical treatment for LEFT breast IDC, ER-/ID-/HER2-, and is doing well overall. Physical exam today normal, no palpable findings. We had a long discussion of options for treatment. A total of 45 minutes were spent face-to-face with the patient, accompanied by her sister and niece, during this encounter, and over half of that time was spent on counseling and coordination of care. Pt notes preference for BL mastectomy. She also has consultation scheduled for BL oophorectomy. Discussed skin and nipple sparing mastectomy, with surgical delay with inframammary incisions, nipple bx, and SLNBx, followed 2-3 weeks later by mastectomy and probable direct implant placement, or possible expander placement, by plastic surgeon.  Also discussed treatment with hyperbaric oxygen chamber or delayed mastectomy in the case of ischemia or wound complication following nipple delay. Reviewed loss of or change in skin and nipple sensation following skin and nipple-sparing mastectomy. We also discussed the pts questions and concerns. No increased risk of recurrence after nipple-sparing surgery. We also discussed nipple reconstruction and tattoo options in case of nipple involvement or removal.    Will plan for surgery 4 weeks after last chemo treatment. Will refer to plastic surgeon for further consultation, and plan further from there. This plan was reviewed with the patient and patient agrees. All questions were answered.     Written by Clay Parikh, as dictated by Dr. Swathi Henry MD.

## 2019-12-30 ENCOUNTER — HOSPITAL ENCOUNTER (OUTPATIENT)
Dept: MAMMOGRAPHY | Age: 44
Discharge: HOME | End: 2019-12-30
Attending: INTERNAL MEDICINE
Payer: OTHER GOVERNMENT

## 2019-12-30 DIAGNOSIS — Z17.1 MALIGNANT NEOPLASM OF UPPER-OUTER QUADRANT OF LEFT BREAST IN FEMALE, ESTROGEN RECEPTOR NEGATIVE (HCC): ICD-10-CM

## 2019-12-30 DIAGNOSIS — C50.412 MALIGNANT NEOPLASM OF UPPER-OUTER QUADRANT OF LEFT BREAST IN FEMALE, ESTROGEN RECEPTOR NEGATIVE (HCC): ICD-10-CM

## 2019-12-30 PROCEDURE — 76642 ULTRASOUND BREAST LIMITED: CPT

## 2019-12-30 PROCEDURE — 77061 BREAST TOMOSYNTHESIS UNI: CPT

## 2020-01-07 ENCOUNTER — OFFICE VISIT (OUTPATIENT)
Dept: GYNECOLOGY | Age: 45
End: 2020-01-07

## 2020-01-07 VITALS
BODY MASS INDEX: 23.52 KG/M2 | DIASTOLIC BLOOD PRESSURE: 70 MMHG | HEIGHT: 62 IN | SYSTOLIC BLOOD PRESSURE: 103 MMHG | HEART RATE: 98 BPM | WEIGHT: 127.8 LBS

## 2020-01-07 DIAGNOSIS — Z17.1 MALIGNANT NEOPLASM OF UPPER-OUTER QUADRANT OF LEFT BREAST IN FEMALE, ESTROGEN RECEPTOR NEGATIVE (HCC): ICD-10-CM

## 2020-01-07 DIAGNOSIS — Z15.09 BRCA1 GENE MUTATION POSITIVE: Primary | ICD-10-CM

## 2020-01-07 DIAGNOSIS — C50.412 MALIGNANT NEOPLASM OF UPPER-OUTER QUADRANT OF LEFT BREAST IN FEMALE, ESTROGEN RECEPTOR NEGATIVE (HCC): ICD-10-CM

## 2020-01-07 DIAGNOSIS — Z15.01 BRCA1 GENE MUTATION POSITIVE: Primary | ICD-10-CM

## 2020-01-07 NOTE — LETTER
2020 4:07 PM 
 
Patient: Rylee Hazel YOB: 1975 Date of Visit: 2020 Dear Peña Call MD 
[address] [facility] [phone] [facility] 99 [id number] VIA In Basket Mychal Shelby MD 
[address] [place] [id number] VIA In Basket 
 : Thank you for referring Ms. Rylee Hazel to me for evaluation/treatment. Below are the relevant portions of my assessment and plan of care. New patient referred by Aggie Neal. [address] [facility], [address] 
P [phone]  F [phone] Office Note Patient ID: 
Name:  Rylee Hazel MRN:  [id number] :  1975/44 y.o. Date:  2020 HISTORY OF PRESENT ILLNESS: 
Rylee Hazel is a 38 y.o.  premenopausal female who is being seen for a BRCA 1 mutation. She is referred by Dr. Lilia Frank. In 2019 she was diagnosed with infiltrating ductal carcinoma of the left breast.  She was treated with systemic chemotherapy consisting of Taxol/Carboplatin, which she completed in 2019. Her tumor was triple-negative. She is seeing Dr. Kristan Hashimoto for bilateral mastectomy. I have been asked to see her in consultation for oophorectomy due to her increased risk of ovarian cancer with a BRCA mutation. ROS: 
 and GI review:  Negative Cardiopulmonary review:  Negative Musculoskeletal:  Negative A comprehensive review of systems was negative except for that written in the History of Present Illness. , 10 point ROS 
 
 
OB/GYN ROS: 
[id number]  section x 3 Patient denies any abnormal bleeding or vaginal discharge. Problem List: 
Patient Active Problem List  
 Diagnosis Date Noted  Malignant neoplasm of upper-outer quadrant of left breast in female, estrogen receptor negative ([address] 2019 PMH: 
Past Medical History:  
Diagnosis Date  Breast cancer ([address] 2019 left breast cancer PSH: 
Past Surgical History:  
Procedure Laterality Date  HX APPENDECTOMY  HX  SECTION    
 x3 Social History: 
Social History Tobacco Use  Smoking status: Never Smoker  Smokeless tobacco: Never Used Substance Use Topics  Alcohol use: Not Currently Family History: 
Family History Problem Relation Age of Onset  Diabetes Mother  Heart Disease Mother  Hypertension Mother  Heart Disease Father  Cancer Paternal Aunt Breast  
 Breast Cancer Paternal Aunt  Cancer Paternal Aunt Breast  
 Breast Cancer Paternal Aunt Medications: (reviewed) Current Outpatient Medications Medication Sig  LORazepam (ATIVAN) 1 mg tablet Take 1 Tab by mouth every eight (8) hours as needed (nausea). Max Daily Amount: 3 mg.  cholecalciferol, vitamin D3, (VITAMIN D3 PO) Take 2,000 Units by mouth daily.  MELATONIN PO Take  by mouth nightly as needed.  prochlorperazine (COMPAZINE) 10 mg tablet Take 1 Tab by mouth every six (6) hours as needed for Nausea.  lidocaine-prilocaine (EMLA) topical cream Apply  to affected area as needed for Pain.  ondansetron hcl (ZOFRAN) 8 mg tablet Take 1 Tab by mouth every eight (8) hours as needed for Nausea.  OTHER Massage Dx T93.017 breast cancer  OLANZapine (ZYPREXA) 10 mg tablet 10 mg qhs on days 1-4 following chemo  Lactobacillus acidophilus (PROBIOTIC PO) Take 1 Tab by mouth daily.  prasterone, DHEA, (DHEA PO) Take 10 mg by mouth daily.  NP THYROID 15 mg tablet Take 15 mg by mouth daily.  testosterone 75 mg pllt by Implant route. No current facility-administered medications for this visit. Allergies: (reviewed) No Known Allergies OBJECTIVE: 
 
Physical Exam: VITAL SIGNS: Vitals:  
 20 1512 BP: 103/70 Pulse: 98 Weight: 127 lb 12.8 oz (58 kg) Height: 5' 2.01\" (1.575 m) Body mass index is 23.37 kg/m². GENERAL NUPUR: Conversant, alert, oriented. No acute distress. HEENT: HEENT. No thyroid enlargement. No JVD. Neck: Supple without restrictions. RESPIRATORY: Clear to auscultation and percussion to the bases. No CVAT. CARDIOVASC: RRR without murmur/rub. GASTROINT: soft, non-tender, without masses or organomegaly MUSCULOSKEL: no joint tenderness, deformity or swelling EXTREMITIES: extremities normal, atraumatic, no cyanosis or edema PELVIC: Vulva and vagina appear normal. Bimanual exam reveals normal uterus and adnexa. RECTAL: Deferred NNAMDI SURVEY: No suspicious lymphadenopathy or edema noted. NEURO: Grossly intact. No acute deficit. Lab Data: 
 
Lab Results Component Value Date/Time WBC 4.4 12/26/2019 10:17 AM  
 HGB 10.3 (L) 12/26/2019 10:17 AM  
 HCT 32.3 (L) 12/26/2019 10:17 AM  
 PLATELET 466 90/23/5142 10:17 AM  
 .9 (H) 12/26/2019 10:17 AM  
 
Lab Results Component Value Date/Time Sodium 140 12/26/2019 10:17 AM  
 Potassium 3.6 12/26/2019 10:17 AM  
 Chloride 107 12/26/2019 10:17 AM  
 CO2 28 12/26/2019 10:17 AM  
 Anion gap 5 12/26/2019 10:17 AM  
 Glucose 94 12/26/2019 10:17 AM  
 BUN 17 12/26/2019 10:17 AM  
 Creatinine 0.58 12/26/2019 10:17 AM  
 BUN/Creatinine ratio 29 (H) 12/26/2019 10:17 AM  
 GFR est AA >60 12/26/2019 10:17 AM  
 GFR est non-AA >60 12/26/2019 10:17 AM  
 Calcium 9.2 12/26/2019 10:17 AM  
 
 
 
IMPRESSION/PLAN: 
Jennifer Carbone is a 38 y.o. female with a working diagnosis of triple-negative breast cancer and a BRCA 1 mutation. I reviewed with Jennifer Carbone her medical records, physical exam, and review of symptoms. I explained to her the increased lifetime risk of ovarian cancer due to the BRCA 1 mutation, approximately 40%. For that reason, a risk-reducing bilateral salpingooopohorectomy is recommended. We also discussed hysterectomy as well, although there are no strong guidelines for that. She was counseled on the risks, benefits, indications, and alternatives of the procedure. Her questions were answered. She is supposed to see the plastic surgeon later this month to discuss reconstruction. I suggested that we wait until after her breast surgery is done to schedule the oophorectomy. She is to call back and we will discuss the procedure more at that time. Signed By: Kamila Eldridge MD   
 1/7/2020/1:10 PM  
 
 
 
If you have questions, please do not hesitate to call me. I look forward to following Ms. Jainbilt Gregory along with you.  
 
 
 
Sincerely, 
 
 
Kamila Eldridge MD

## 2020-01-07 NOTE — PROGRESS NOTES
[address] [facility] [address]  P [phone]  F [phone]    Office Note  Patient ID:  Name:  Kira Kasper  MRN:  [id number]  :  1975/44 y.o. Date:  2020      HISTORY OF PRESENT ILLNESS:  Kira Kasper is a 38 y.o.  premenopausal female who is being seen for a BRCA 1 mutation. She is referred by Dr. Esther Boswell. In 2019 she was diagnosed with infiltrating ductal carcinoma of the left breast.  She was treated with systemic chemotherapy consisting of Taxol/Carboplatin, which she completed in 2019. Her tumor was triple-negative. She is seeing Dr. Josette Crawford for bilateral mastectomy. I have been asked to see her in consultation for oophorectomy due to her increased risk of ovarian cancer with a BRCA mutation. ROS:   and GI review:  Negative  Cardiopulmonary review:  Negative   Musculoskeletal:  Negative    A comprehensive review of systems was negative except for that written in the History of Present Illness. , 10 point ROS      OB/GYN ROS:  [id number]   section x 3  Patient denies any abnormal bleeding or vaginal discharge.        Problem List:  Patient Active Problem List    Diagnosis Date Noted    Malignant neoplasm of upper-outer quadrant of left breast in female, estrogen receptor negative ([facility] [address] 2019     PMH:  Past Medical History:   Diagnosis Date    Breast cancer ([facility] [address] 2019    left breast cancer      PSH:  Past Surgical History:   Procedure Laterality Date    HX APPENDECTOMY      HX  SECTION      x3      Social History:  Social History     Tobacco Use    Smoking status: Never Smoker    Smokeless tobacco: Never Used   Substance Use Topics    Alcohol use: Not Currently      Family History:  Family History   Problem Relation Age of Onset    Diabetes Mother     Heart Disease Mother     Hypertension Mother     Heart Disease Father     Cancer Paternal Aunt         Breast    Breast Cancer Paternal Aunt     Cancer Paternal Aunt         Breast    Breast Cancer Paternal Aunt       Medications: (reviewed)  Current Outpatient Medications   Medication Sig    LORazepam (ATIVAN) 1 mg tablet Take 1 Tab by mouth every eight (8) hours as needed (nausea). Max Daily Amount: 3 mg.  cholecalciferol, vitamin D3, (VITAMIN D3 PO) Take 2,000 Units by mouth daily.  MELATONIN PO Take  by mouth nightly as needed.  prochlorperazine (COMPAZINE) 10 mg tablet Take 1 Tab by mouth every six (6) hours as needed for Nausea.  lidocaine-prilocaine (EMLA) topical cream Apply  to affected area as needed for Pain.  ondansetron hcl (ZOFRAN) 8 mg tablet Take 1 Tab by mouth every eight (8) hours as needed for Nausea.  OTHER Massage    Dx c50.412 breast cancer    OLANZapine (ZYPREXA) 10 mg tablet 10 mg qhs on days 1-4 following chemo    Lactobacillus acidophilus (PROBIOTIC PO) Take 1 Tab by mouth daily.  prasterone, DHEA, (DHEA PO) Take 10 mg by mouth daily.  NP THYROID 15 mg tablet Take 15 mg by mouth daily.  testosterone 75 mg pllt by Implant route. No current facility-administered medications for this visit. Allergies: (reviewed)  No Known Allergies       OBJECTIVE:    Physical Exam:  VITAL SIGNS: Vitals:    01/07/20 1512   BP: 103/70   Pulse: 98   Weight: 127 lb 12.8 oz (58 kg)   Height: 5' 2.01\" (1.575 m)     Body mass index is 23.37 kg/m². GENERAL NUPUR: Conversant, alert, oriented. No acute distress. HEENT: HEENT. No thyroid enlargement. No JVD. Neck: Supple without restrictions. RESPIRATORY: Clear to auscultation and percussion to the bases. No CVAT. CARDIOVASC: RRR without murmur/rub. GASTROINT: soft, non-tender, without masses or organomegaly   MUSCULOSKEL: no joint tenderness, deformity or swelling   EXTREMITIES: extremities normal, atraumatic, no cyanosis or edema   PELVIC: Vulva and vagina appear normal. Bimanual exam reveals normal uterus and adnexa.    RECTAL: Deferred   NNAMDI SURVEY: No suspicious lymphadenopathy or edema noted. NEURO: Grossly intact. No acute deficit. Lab Data:    Lab Results   Component Value Date/Time    WBC 4.4 12/26/2019 10:17 AM    HGB 10.3 (L) 12/26/2019 10:17 AM    HCT 32.3 (L) 12/26/2019 10:17 AM    PLATELET 973 87/64/5333 10:17 AM    .9 (H) 12/26/2019 10:17 AM     Lab Results   Component Value Date/Time    Sodium 140 12/26/2019 10:17 AM    Potassium 3.6 12/26/2019 10:17 AM    Chloride 107 12/26/2019 10:17 AM    CO2 28 12/26/2019 10:17 AM    Anion gap 5 12/26/2019 10:17 AM    Glucose 94 12/26/2019 10:17 AM    BUN 17 12/26/2019 10:17 AM    Creatinine 0.58 12/26/2019 10:17 AM    BUN/Creatinine ratio 29 (H) 12/26/2019 10:17 AM    GFR est AA >60 12/26/2019 10:17 AM    GFR est non-AA >60 12/26/2019 10:17 AM    Calcium 9.2 12/26/2019 10:17 AM         IMPRESSION/PLAN:  Ellen Moses is a 38 y.o. female with a working diagnosis of triple-negative breast cancer and a BRCA 1 mutation. I reviewed with Ellen Moses her medical records, physical exam, and review of symptoms. I explained to her the increased lifetime risk of ovarian cancer due to the BRCA 1 mutation, approximately 40%. For that reason, a risk-reducing bilateral salpingooopohorectomy is recommended. We also discussed hysterectomy as well, although there are no strong guidelines for that. She was counseled on the risks, benefits, indications, and alternatives of the procedure. Her questions were answered. She is supposed to see the plastic surgeon later this month to discuss reconstruction. I suggested that we wait until after her breast surgery is done to schedule the oophorectomy. She is to call back and we will discuss the procedure more at that time.           Signed By: London Reed MD     1/7/2020/1:10 PM

## 2020-01-08 DIAGNOSIS — C50.412 MALIGNANT NEOPLASM OF UPPER-OUTER QUADRANT OF LEFT BREAST IN FEMALE, ESTROGEN RECEPTOR NEGATIVE (HCC): Primary | ICD-10-CM

## 2020-01-08 DIAGNOSIS — Z17.1 MALIGNANT NEOPLASM OF UPPER-OUTER QUADRANT OF LEFT BREAST IN FEMALE, ESTROGEN RECEPTOR NEGATIVE (HCC): Primary | ICD-10-CM

## 2020-01-09 ENCOUNTER — HOSPITAL ENCOUNTER (OUTPATIENT)
Dept: INFUSION THERAPY | Age: 45
End: 2020-01-09

## 2020-01-21 DIAGNOSIS — C50.412 MALIGNANT NEOPLASM OF UPPER-OUTER QUADRANT OF LEFT BREAST IN FEMALE, ESTROGEN RECEPTOR POSITIVE (HCC): Primary | ICD-10-CM

## 2020-01-21 DIAGNOSIS — Z17.0 MALIGNANT NEOPLASM OF UPPER-OUTER QUADRANT OF LEFT BREAST IN FEMALE, ESTROGEN RECEPTOR POSITIVE (HCC): Primary | ICD-10-CM

## 2020-01-24 ENCOUNTER — TELEPHONE (OUTPATIENT)
Dept: ONCOLOGY | Age: 45
End: 2020-01-24

## 2020-01-24 DIAGNOSIS — C50.412 MALIGNANT NEOPLASM OF UPPER-OUTER QUADRANT OF LEFT BREAST IN FEMALE, ESTROGEN RECEPTOR NEGATIVE (HCC): Primary | ICD-10-CM

## 2020-01-24 DIAGNOSIS — Z17.1 MALIGNANT NEOPLASM OF UPPER-OUTER QUADRANT OF LEFT BREAST IN FEMALE, ESTROGEN RECEPTOR NEGATIVE (HCC): Primary | ICD-10-CM

## 2020-01-24 NOTE — TELEPHONE ENCOUNTER
1/24/2020 5 PM: Returned call to patient, who stated that she is having regular bowel movements but is experiencing burning in her rectum after having bowel movements. Patient stated, \"It burns and then settles up inside someplace and then aches. If you were looking at the outside of my body, it would be USP up my bottom but on the inside. It hurts to sit down. \" Patient stated she had hemorrhoids during her last pregnancy. Stated that this would \"act up\" periodically in the last five months but has gotten worse over the past two weeks. Also stated that there is usually a small amount of blood when she wipes. Stated that she does not feel that Preparation H is working and it is uncomfortable inserting the tube. Advised patient that per John Frazier NP, this sounds like hemorrhoids and NP recommends adding fiber to her diet, as well as trying witch hazel and sitz baths. Also advised that NP recommends seeing a GI physician if she doesn't already see one. Patient stated she has not seen a GI physician but would be interested in a referral. Advised that this office will enter the referral and patient will be contacted about the appointment. Patient verbalized understanding and denied further questions or concerns.

## 2020-01-24 NOTE — TELEPHONE ENCOUNTER
Patient called requesting a return call complaining of pain when she uses the bathroom with her bowels, burning inside, pain when she uses hemorrhoid cream insert but some relief with baths.  Call back number [phone]

## 2020-01-27 ENCOUNTER — PATIENT MESSAGE (OUTPATIENT)
Dept: ONCOLOGY | Age: 45
End: 2020-01-27

## 2020-01-28 ENCOUNTER — HOSPITAL ENCOUNTER (OUTPATIENT)
Dept: PREADMISSION TESTING | Age: 45
Discharge: HOME | End: 2020-01-28
Payer: OTHER GOVERNMENT

## 2020-01-28 VITALS
HEIGHT: 63 IN | DIASTOLIC BLOOD PRESSURE: 70 MMHG | SYSTOLIC BLOOD PRESSURE: 106 MMHG | HEART RATE: 98 BPM | RESPIRATION RATE: 18 BRPM | OXYGEN SATURATION: 100 % | WEIGHT: 128 LBS | BODY MASS INDEX: 22.68 KG/M2 | TEMPERATURE: 98.4 F

## 2020-01-28 DIAGNOSIS — Z17.1 MALIGNANT NEOPLASM OF UPPER-OUTER QUADRANT OF LEFT BREAST IN FEMALE, ESTROGEN RECEPTOR NEGATIVE (HCC): Primary | ICD-10-CM

## 2020-01-28 DIAGNOSIS — C50.412 MALIGNANT NEOPLASM OF UPPER-OUTER QUADRANT OF LEFT BREAST IN FEMALE, ESTROGEN RECEPTOR NEGATIVE (HCC): Primary | ICD-10-CM

## 2020-01-28 LAB
ABO + RH BLD: NORMAL
ALBUMIN SERPL-MCNC: 4 G/DL (ref 3.5–5)
ALBUMIN/GLOB SERPL: 1.3 {RATIO} (ref 1.1–2.2)
ALP SERPL-CCNC: 54 U/L (ref 45–117)
ALT SERPL-CCNC: 67 U/L (ref 12–78)
ANION GAP SERPL CALC-SCNC: 6 MMOL/L (ref 5–15)
APTT PPP: 26.2 SEC (ref 22.1–32)
AST SERPL-CCNC: 42 U/L (ref 15–37)
ATRIAL RATE: 75 BPM
BASOPHILS # BLD: 0 K/UL (ref 0–0.1)
BASOPHILS NFR BLD: 1 % (ref 0–1)
BILIRUB SERPL-MCNC: 0.5 MG/DL (ref 0.2–1)
BLOOD GROUP ANTIBODIES SERPL: NORMAL
BUN SERPL-MCNC: 16 MG/DL (ref 6–20)
BUN/CREAT SERPL: 24 (ref 12–20)
CALCIUM SERPL-MCNC: 9.3 MG/DL (ref 8.5–10.1)
CALCULATED P AXIS, ECG09: 66 DEGREES
CALCULATED R AXIS, ECG10: 59 DEGREES
CALCULATED T AXIS, ECG11: 35 DEGREES
CHLORIDE SERPL-SCNC: 105 MMOL/L (ref 97–108)
CO2 SERPL-SCNC: 30 MMOL/L (ref 21–32)
CREAT SERPL-MCNC: 0.68 MG/DL (ref 0.55–1.02)
DIAGNOSIS, 93000: NORMAL
DIFFERENTIAL METHOD BLD: ABNORMAL
EOSINOPHIL # BLD: 0.1 K/UL (ref 0–0.4)
EOSINOPHIL NFR BLD: 1 % (ref 0–7)
ERYTHROCYTE [DISTWIDTH] IN BLOOD BY AUTOMATED COUNT: 14.5 % (ref 11.5–14.5)
GLOBULIN SER CALC-MCNC: 3.1 G/DL (ref 2–4)
GLUCOSE SERPL-MCNC: 83 MG/DL (ref 65–100)
HCT VFR BLD AUTO: 37.1 % (ref 35–47)
HGB BLD-MCNC: 12 G/DL (ref 11.5–16)
IMM GRANULOCYTES # BLD AUTO: 0 K/UL (ref 0–0.04)
IMM GRANULOCYTES NFR BLD AUTO: 0 % (ref 0–0.5)
INR PPP: 1.1 (ref 0.9–1.1)
LYMPHOCYTES # BLD: 1.2 K/UL (ref 0.8–3.5)
LYMPHOCYTES NFR BLD: 21 % (ref 12–49)
MCH RBC QN AUTO: 32 PG (ref 26–34)
MCHC RBC AUTO-ENTMCNC: 32.3 G/DL (ref 30–36.5)
MCV RBC AUTO: 98.9 FL (ref 80–99)
MONOCYTES # BLD: 0.8 K/UL (ref 0–1)
MONOCYTES NFR BLD: 14 % (ref 5–13)
NEUTS SEG # BLD: 3.6 K/UL (ref 1.8–8)
NEUTS SEG NFR BLD: 63 % (ref 32–75)
NRBC # BLD: 0 K/UL (ref 0–0.01)
NRBC BLD-RTO: 0 PER 100 WBC
P-R INTERVAL, ECG05: 118 MS
PLATELET # BLD AUTO: 232 K/UL (ref 150–400)
PMV BLD AUTO: 9.6 FL (ref 8.9–12.9)
POTASSIUM SERPL-SCNC: 4.2 MMOL/L (ref 3.5–5.1)
PROT SERPL-MCNC: 7.1 G/DL (ref 6.4–8.2)
PROTHROMBIN TIME: 10.9 SEC (ref 9–11.1)
Q-T INTERVAL, ECG07: 408 MS
QRS DURATION, ECG06: 76 MS
QTC CALCULATION (BEZET), ECG08: 455 MS
RBC # BLD AUTO: 3.75 M/UL (ref 3.8–5.2)
SODIUM SERPL-SCNC: 141 MMOL/L (ref 136–145)
SPECIMEN EXP DATE BLD: NORMAL
THERAPEUTIC RANGE,PTTT: NORMAL SECS (ref 58–77)
VENTRICULAR RATE, ECG03: 75 BPM
WBC # BLD AUTO: 5.8 K/UL (ref 3.6–11)

## 2020-01-28 PROCEDURE — 85610 PROTHROMBIN TIME: CPT

## 2020-01-28 PROCEDURE — 85025 COMPLETE CBC W/AUTO DIFF WBC: CPT

## 2020-01-28 PROCEDURE — 85730 THROMBOPLASTIN TIME PARTIAL: CPT

## 2020-01-28 PROCEDURE — 93005 ELECTROCARDIOGRAM TRACING: CPT

## 2020-01-28 PROCEDURE — 86900 BLOOD TYPING SEROLOGIC ABO: CPT

## 2020-01-28 PROCEDURE — 36415 COLL VENOUS BLD VENIPUNCTURE: CPT

## 2020-01-28 PROCEDURE — 80053 COMPREHEN METABOLIC PANEL: CPT

## 2020-01-28 RX ORDER — ATORVASTATIN CALCIUM 40 MG/1
40 TABLET, FILM COATED ORAL
COMMUNITY
End: 2021-03-30

## 2020-01-28 RX ORDER — DOXYCYCLINE 100 MG/1
100 CAPSULE ORAL DAILY
COMMUNITY
End: 2020-03-04 | Stop reason: SDUPTHER

## 2020-01-28 RX ORDER — METFORMIN HYDROCHLORIDE 500 MG/1
500 TABLET ORAL EVERY EVENING
COMMUNITY
End: 2021-03-30

## 2020-01-28 NOTE — PERIOP NOTES
[address], [address] [facility]   MAIN OR                                  [phone]   MAIN PRE OP                          [phone]                                                                                AMBULATORY PRE OP          [phone]  PRE-ADMISSION TESTING    [phone]   Surgery Date:   February 4th, Tuesday         Is surgery arrival time given by surgeon? YES  If NO, [facility] staff will call you between 3 and 7pm the day before your surgery with your arrival time. (If your surgery is on a Monday, we will call you the Friday before.)    Call [phone] after 7pm Monday-Friday if you did not receive this call. INSTRUCTIONS BEFORE YOUR SURGERY   When You  Arrive Arrive at the 2nd [address] [facility] on the day of your surgery  Have your insurance card, photo ID, and any copayment (if needed)   Food   and   Drink NO food or drink after midnight the night before surgery    This means NO water, gum, mints, coffee, juice, etc.  No alcohol (beer, wine, liquor) 24 hours before and after surgery   Medications to   TAKE   Morning of Surgery MEDICATIONS TO TAKE THE MORNING OF SURGERY WITH A SIP OF WATER:    None   Medications  To  STOP      7 days before surgery  Non-Steroidal anti-inflammatory Drugs (NSAID's): for example, Ibuprofen (Advil, Motrin), Naproxen (Aleve)   Aspirin, if taking for pain    Herbal supplements, vitamins, and fish oil   Other:  (Pain medications not listed above, including Tylenol may be taken)   Blood  Thinners  If you take  Aspirin, Plavix, Coumadin, or any blood-thinning or anti-blood clot medicine, talk to the doctor who prescribed the medications for pre-operative instructions.    Bathing Clothing  Jewelry  Valuables      If you shower the morning of surgery, please do not apply anything to your skin (lotions, powders, deodorant, or makeup, especially mascara)   Follow Chlorhexidine Care Fusion body wash instructions provided to you during PAT appointment. Begin 3 days prior to surgery.  Do not shave or trim anywhere 24 hours before surgery   Wear your hair loose or down; no pony-tails, buns, or metal hair clips   Wear loose, comfortable, clean clothes   Wear glasses instead of contacts   Leave money, valuables, and jewelry, including body piercings, at home   Going Home - or Spending the Night  SAME-DAY SURGERY: You must have a responsible adult drive you home and stay with you 24 hours after surgery   ADMITS: If your doctor is keeping you in the hospital after surgery, leave personal belongings/luggage in your car until you have a hospital room number. Hospital discharge time is 12 noon  Drivers must be here before 12 noon unless you are told differently   Special Instructions Free  Parking     Follow all instructions so your surgery wont be cancelled. Please, be on time. If a situation occurs and you are delayed the day of surgery, call [phone] or [phone]. If your physical condition changes (like a fever, cold, flu, etc.) call your surgeon. Home medication(s) reviewed and verified LIST during PAT appointment. The patient was contacted  in person. The patient verbalizes understanding of all instructions and does not  need reinforcement.

## 2020-01-28 NOTE — PROGRESS NOTES
Call to [phone] office requesting Consent Orders for PAT appt today. (DOS 2/4/20 and 2/18/20).

## 2020-02-03 ENCOUNTER — ANESTHESIA EVENT (OUTPATIENT)
Dept: SURGERY | Age: 45
End: 2020-02-03
Payer: OTHER GOVERNMENT

## 2020-02-04 ENCOUNTER — ANESTHESIA (OUTPATIENT)
Dept: SURGERY | Age: 45
End: 2020-02-04
Payer: OTHER GOVERNMENT

## 2020-02-04 ENCOUNTER — DOCUMENTATION ONLY (OUTPATIENT)
Dept: SURGERY | Age: 45
End: 2020-02-04

## 2020-02-04 ENCOUNTER — HOSPITAL ENCOUNTER (OUTPATIENT)
Age: 45
Setting detail: OUTPATIENT SURGERY
Discharge: HOME | End: 2020-02-04
Attending: SURGERY | Admitting: SURGERY
Payer: OTHER GOVERNMENT

## 2020-02-04 ENCOUNTER — APPOINTMENT (OUTPATIENT)
Dept: NUCLEAR MEDICINE | Age: 45
End: 2020-02-04
Attending: SURGERY
Payer: OTHER GOVERNMENT

## 2020-02-04 VITALS
TEMPERATURE: 97.9 F | RESPIRATION RATE: 15 BRPM | HEIGHT: 63 IN | HEART RATE: 86 BPM | OXYGEN SATURATION: 94 % | BODY MASS INDEX: 23.01 KG/M2 | DIASTOLIC BLOOD PRESSURE: 73 MMHG | WEIGHT: 129.85 LBS | SYSTOLIC BLOOD PRESSURE: 106 MMHG

## 2020-02-04 DIAGNOSIS — C50.919 BREAST CANCER (HCC): ICD-10-CM

## 2020-02-04 DIAGNOSIS — Z17.1 MALIGNANT NEOPLASM OF UPPER-OUTER QUADRANT OF LEFT BREAST IN FEMALE, ESTROGEN RECEPTOR NEGATIVE (HCC): ICD-10-CM

## 2020-02-04 DIAGNOSIS — C50.412 MALIGNANT NEOPLASM OF UPPER-OUTER QUADRANT OF LEFT BREAST IN FEMALE, ESTROGEN RECEPTOR NEGATIVE (HCC): ICD-10-CM

## 2020-02-04 LAB — HCG UR QL: NEGATIVE

## 2020-02-04 PROCEDURE — 77030011267 HC ELECTRD BLD COVD -A: Performed by: SURGERY

## 2020-02-04 PROCEDURE — 74011000250 HC RX REV CODE- 250: Performed by: SURGERY

## 2020-02-04 PROCEDURE — 76060000064 HC AMB SURG ANES 2 TO 2.5 HR: Performed by: SURGERY

## 2020-02-04 PROCEDURE — 77030002933 HC SUT MCRYL J&J -A: Performed by: SURGERY

## 2020-02-04 PROCEDURE — 74011250636 HC RX REV CODE- 250/636: Performed by: ANESTHESIOLOGY

## 2020-02-04 PROCEDURE — 77030018836 HC SOL IRR NACL ICUM -A: Performed by: SURGERY

## 2020-02-04 PROCEDURE — 88305 TISSUE EXAM BY PATHOLOGIST: CPT

## 2020-02-04 PROCEDURE — 77030031304 HC WAVWGD EIGR DISP INVO -D: Performed by: SURGERY

## 2020-02-04 PROCEDURE — 81025 URINE PREGNANCY TEST: CPT

## 2020-02-04 PROCEDURE — 77030040922 HC BLNKT HYPOTHRM STRY -A

## 2020-02-04 PROCEDURE — 74011250637 HC RX REV CODE- 250/637: Performed by: SURGERY

## 2020-02-04 PROCEDURE — 77030040361 HC SLV COMPR DVT MDII -B

## 2020-02-04 PROCEDURE — 88342 IMHCHEM/IMCYTCHM 1ST ANTB: CPT

## 2020-02-04 PROCEDURE — 77030031139 HC SUT VCRL2 J&J -A: Performed by: SURGERY

## 2020-02-04 PROCEDURE — 76030000004 HC AMB SURG OR TIME 2 TO 2.5: Performed by: SURGERY

## 2020-02-04 PROCEDURE — 88307 TISSUE EXAM BY PATHOLOGIST: CPT

## 2020-02-04 PROCEDURE — 76210000035 HC AMBSU PH I REC 1 TO 1.5 HR: Performed by: SURGERY

## 2020-02-04 PROCEDURE — A9520 TC99 TILMANOCEPT DIAG 0.5MCI: HCPCS

## 2020-02-04 PROCEDURE — 74011250636 HC RX REV CODE- 250/636: Performed by: NURSE ANESTHETIST, CERTIFIED REGISTERED

## 2020-02-04 PROCEDURE — 77030034626 HC LIGASURE SM JAW SEAL OPN SURG COVD -E: Performed by: SURGERY

## 2020-02-04 PROCEDURE — 74011250637 HC RX REV CODE- 250/637: Performed by: ANESTHESIOLOGY

## 2020-02-04 PROCEDURE — 74011250636 HC RX REV CODE- 250/636: Performed by: SURGERY

## 2020-02-04 PROCEDURE — 74011250637 HC RX REV CODE- 250/637

## 2020-02-04 PROCEDURE — 76210000046 HC AMBSU PH II REC FIRST 0.5 HR: Performed by: SURGERY

## 2020-02-04 PROCEDURE — 74011000250 HC RX REV CODE- 250: Performed by: ANESTHESIOLOGY

## 2020-02-04 RX ORDER — FLUMAZENIL 0.1 MG/ML
0.2 INJECTION INTRAVENOUS
Status: DISCONTINUED | OUTPATIENT
Start: 2020-02-04 | End: 2020-02-04 | Stop reason: HOSPADM

## 2020-02-04 RX ORDER — SODIUM CHLORIDE, SODIUM LACTATE, POTASSIUM CHLORIDE, CALCIUM CHLORIDE 600; 310; 30; 20 MG/100ML; MG/100ML; MG/100ML; MG/100ML
125 INJECTION, SOLUTION INTRAVENOUS CONTINUOUS
Status: DISCONTINUED | OUTPATIENT
Start: 2020-02-04 | End: 2020-02-04 | Stop reason: HOSPADM

## 2020-02-04 RX ORDER — ONDANSETRON 2 MG/ML
INJECTION INTRAMUSCULAR; INTRAVENOUS AS NEEDED
Status: DISCONTINUED | OUTPATIENT
Start: 2020-02-04 | End: 2020-02-04 | Stop reason: HOSPADM

## 2020-02-04 RX ORDER — LIDOCAINE HYDROCHLORIDE 10 MG/ML
30 INJECTION INFILTRATION; PERINEURAL ONCE
Status: CANCELLED | OUTPATIENT
Start: 2020-02-04 | End: 2020-02-04

## 2020-02-04 RX ORDER — PROPOFOL 10 MG/ML
INJECTION, EMULSION INTRAVENOUS
Status: DISCONTINUED | OUTPATIENT
Start: 2020-02-04 | End: 2020-02-04 | Stop reason: HOSPADM

## 2020-02-04 RX ORDER — HYDROCODONE BITARTRATE AND ACETAMINOPHEN 7.5; 325 MG/1; MG/1
1 TABLET ORAL ONCE
Status: COMPLETED | OUTPATIENT
Start: 2020-02-04 | End: 2020-02-04

## 2020-02-04 RX ORDER — DIPHENHYDRAMINE HYDROCHLORIDE 50 MG/ML
12.5 INJECTION, SOLUTION INTRAMUSCULAR; INTRAVENOUS AS NEEDED
Status: DISCONTINUED | OUTPATIENT
Start: 2020-02-04 | End: 2020-02-04 | Stop reason: HOSPADM

## 2020-02-04 RX ORDER — DEXAMETHASONE SODIUM PHOSPHATE 4 MG/ML
INJECTION, SOLUTION INTRA-ARTICULAR; INTRALESIONAL; INTRAMUSCULAR; INTRAVENOUS; SOFT TISSUE AS NEEDED
Status: DISCONTINUED | OUTPATIENT
Start: 2020-02-04 | End: 2020-02-04 | Stop reason: HOSPADM

## 2020-02-04 RX ORDER — ALBUTEROL SULFATE 0.83 MG/ML
2.5 SOLUTION RESPIRATORY (INHALATION) AS NEEDED
Status: DISCONTINUED | OUTPATIENT
Start: 2020-02-04 | End: 2020-02-04 | Stop reason: HOSPADM

## 2020-02-04 RX ORDER — ONDANSETRON 2 MG/ML
4 INJECTION INTRAMUSCULAR; INTRAVENOUS AS NEEDED
Status: DISCONTINUED | OUTPATIENT
Start: 2020-02-04 | End: 2020-02-04 | Stop reason: HOSPADM

## 2020-02-04 RX ORDER — SCOLOPAMINE TRANSDERMAL SYSTEM 1 MG/1
1 PATCH, EXTENDED RELEASE TRANSDERMAL
Status: COMPLETED | OUTPATIENT
Start: 2020-02-04 | End: 2020-02-04

## 2020-02-04 RX ORDER — SODIUM CHLORIDE 0.9 % (FLUSH) 0.9 %
5-40 SYRINGE (ML) INJECTION EVERY 8 HOURS
Status: DISCONTINUED | OUTPATIENT
Start: 2020-02-04 | End: 2020-02-04 | Stop reason: HOSPADM

## 2020-02-04 RX ORDER — FENTANYL CITRATE 50 UG/ML
INJECTION, SOLUTION INTRAMUSCULAR; INTRAVENOUS
Status: DISCONTINUED
Start: 2020-02-04 | End: 2020-02-04 | Stop reason: HOSPADM

## 2020-02-04 RX ORDER — HYDROMORPHONE HYDROCHLORIDE 1 MG/ML
.25-1 INJECTION, SOLUTION INTRAMUSCULAR; INTRAVENOUS; SUBCUTANEOUS
Status: DISCONTINUED | OUTPATIENT
Start: 2020-02-04 | End: 2020-02-04 | Stop reason: HOSPADM

## 2020-02-04 RX ORDER — SODIUM CHLORIDE 0.9 % (FLUSH) 0.9 %
5-40 SYRINGE (ML) INJECTION AS NEEDED
Status: DISCONTINUED | OUTPATIENT
Start: 2020-02-04 | End: 2020-02-04 | Stop reason: HOSPADM

## 2020-02-04 RX ORDER — HYDROCODONE BITARTRATE AND ACETAMINOPHEN 7.5; 325 MG/1; MG/1
1 TABLET ORAL
Qty: 20 TAB | Refills: 0 | Status: SHIPPED | OUTPATIENT
Start: 2020-02-04 | End: 2020-02-11

## 2020-02-04 RX ORDER — LIDOCAINE HYDROCHLORIDE 20 MG/ML
INJECTION, SOLUTION EPIDURAL; INFILTRATION; INTRACAUDAL; PERINEURAL AS NEEDED
Status: DISCONTINUED | OUTPATIENT
Start: 2020-02-04 | End: 2020-02-04 | Stop reason: HOSPADM

## 2020-02-04 RX ORDER — BUPIVACAINE HYDROCHLORIDE 5 MG/ML
20 INJECTION, SOLUTION EPIDURAL; INTRACAUDAL ONCE
Status: CANCELLED | OUTPATIENT
Start: 2020-02-04 | End: 2020-02-04

## 2020-02-04 RX ORDER — BUPIVACAINE HYDROCHLORIDE 5 MG/ML
INJECTION, SOLUTION EPIDURAL; INTRACAUDAL AS NEEDED
Status: DISCONTINUED | OUTPATIENT
Start: 2020-02-04 | End: 2020-02-04 | Stop reason: HOSPADM

## 2020-02-04 RX ORDER — LIDOCAINE HYDROCHLORIDE 10 MG/ML
0.1 INJECTION, SOLUTION EPIDURAL; INFILTRATION; INTRACAUDAL; PERINEURAL AS NEEDED
Status: DISCONTINUED | OUTPATIENT
Start: 2020-02-04 | End: 2020-02-04 | Stop reason: HOSPADM

## 2020-02-04 RX ORDER — PROPOFOL 10 MG/ML
INJECTION, EMULSION INTRAVENOUS AS NEEDED
Status: DISCONTINUED | OUTPATIENT
Start: 2020-02-04 | End: 2020-02-04 | Stop reason: HOSPADM

## 2020-02-04 RX ORDER — DIPHENHYDRAMINE HYDROCHLORIDE 50 MG/ML
INJECTION, SOLUTION INTRAMUSCULAR; INTRAVENOUS AS NEEDED
Status: DISCONTINUED | OUTPATIENT
Start: 2020-02-04 | End: 2020-02-04 | Stop reason: HOSPADM

## 2020-02-04 RX ORDER — ONDANSETRON 2 MG/ML
4 INJECTION INTRAMUSCULAR; INTRAVENOUS ONCE
Status: COMPLETED | OUTPATIENT
Start: 2020-02-04 | End: 2020-02-04

## 2020-02-04 RX ORDER — FENTANYL CITRATE 50 UG/ML
INJECTION, SOLUTION INTRAMUSCULAR; INTRAVENOUS AS NEEDED
Status: DISCONTINUED | OUTPATIENT
Start: 2020-02-04 | End: 2020-02-04 | Stop reason: HOSPADM

## 2020-02-04 RX ORDER — NALOXONE HYDROCHLORIDE 0.4 MG/ML
0.04 INJECTION, SOLUTION INTRAMUSCULAR; INTRAVENOUS; SUBCUTANEOUS
Status: DISCONTINUED | OUTPATIENT
Start: 2020-02-04 | End: 2020-02-04 | Stop reason: HOSPADM

## 2020-02-04 RX ORDER — LIDOCAINE HYDROCHLORIDE 10 MG/ML
INJECTION INFILTRATION; PERINEURAL AS NEEDED
Status: DISCONTINUED | OUTPATIENT
Start: 2020-02-04 | End: 2020-02-04 | Stop reason: HOSPADM

## 2020-02-04 RX ORDER — FENTANYL CITRATE 50 UG/ML
25 INJECTION, SOLUTION INTRAMUSCULAR; INTRAVENOUS
Status: DISCONTINUED | OUTPATIENT
Start: 2020-02-04 | End: 2020-02-04 | Stop reason: HOSPADM

## 2020-02-04 RX ORDER — MIDAZOLAM HYDROCHLORIDE 1 MG/ML
INJECTION, SOLUTION INTRAMUSCULAR; INTRAVENOUS AS NEEDED
Status: DISCONTINUED | OUTPATIENT
Start: 2020-02-04 | End: 2020-02-04 | Stop reason: HOSPADM

## 2020-02-04 RX ADMIN — DIPHENHYDRAMINE HYDROCHLORIDE 12.5 MG: 50 INJECTION INTRAMUSCULAR; INTRAVENOUS at 13:53

## 2020-02-04 RX ADMIN — FENTANYL CITRATE 50 MCG: 50 INJECTION, SOLUTION INTRAMUSCULAR; INTRAVENOUS at 13:27

## 2020-02-04 RX ADMIN — DEXAMETHASONE SODIUM PHOSPHATE 4 MG: 4 INJECTION, SOLUTION INTRAMUSCULAR; INTRAVENOUS at 13:42

## 2020-02-04 RX ADMIN — ONDANSETRON 4 MG: 2 INJECTION INTRAMUSCULAR; INTRAVENOUS at 15:07

## 2020-02-04 RX ADMIN — HYDROCODONE BITARTRATE AND ACETAMINOPHEN 1 TABLET: 7.5; 325 TABLET ORAL at 16:32

## 2020-02-04 RX ADMIN — FENTANYL CITRATE 25 MCG: 50 INJECTION, SOLUTION INTRAMUSCULAR; INTRAVENOUS at 14:05

## 2020-02-04 RX ADMIN — MIDAZOLAM 2 MG: 1 INJECTION INTRAMUSCULAR; INTRAVENOUS at 13:24

## 2020-02-04 RX ADMIN — FENTANYL CITRATE 100 MCG: 50 INJECTION, SOLUTION INTRAMUSCULAR; INTRAVENOUS at 12:49

## 2020-02-04 RX ADMIN — PROPOFOL 100 MCG/KG/MIN: 10 INJECTION, EMULSION INTRAVENOUS at 13:37

## 2020-02-04 RX ADMIN — FENTANYL CITRATE 25 MCG: 50 INJECTION, SOLUTION INTRAMUSCULAR; INTRAVENOUS at 14:01

## 2020-02-04 RX ADMIN — PROPOFOL 50 MCG/KG/MIN: 10 INJECTION, EMULSION INTRAVENOUS at 13:34

## 2020-02-04 RX ADMIN — CEFAZOLIN SODIUM 2 G: 1 INJECTION, POWDER, FOR SOLUTION INTRAMUSCULAR; INTRAVENOUS at 13:31

## 2020-02-04 RX ADMIN — PROPOFOL 100 MG: 10 INJECTION, EMULSION INTRAVENOUS at 13:34

## 2020-02-04 RX ADMIN — NITROGLYCERIN 1 INCH: 20 OINTMENT TOPICAL at 15:46

## 2020-02-04 RX ADMIN — FENTANYL CITRATE 50 MCG: 50 INJECTION, SOLUTION INTRAMUSCULAR; INTRAVENOUS at 13:52

## 2020-02-04 RX ADMIN — PROPOFOL 150 MCG/KG/MIN: 10 INJECTION, EMULSION INTRAVENOUS at 13:40

## 2020-02-04 RX ADMIN — LIDOCAINE HYDROCHLORIDE 60 MG: 20 INJECTION, SOLUTION INTRAVENOUS at 13:40

## 2020-02-04 RX ADMIN — FENTANYL CITRATE 50 MCG: 50 INJECTION, SOLUTION INTRAMUSCULAR; INTRAVENOUS at 13:57

## 2020-02-04 RX ADMIN — FENTANYL CITRATE 25 MCG: 50 INJECTION, SOLUTION INTRAMUSCULAR; INTRAVENOUS at 14:08

## 2020-02-04 RX ADMIN — SCOPOLAMINE 1 PATCH: 1 PATCH TRANSDERMAL at 13:29

## 2020-02-04 RX ADMIN — SODIUM CHLORIDE, SODIUM LACTATE, POTASSIUM CHLORIDE, AND CALCIUM CHLORIDE 125 ML/HR: 600; 310; 30; 20 INJECTION, SOLUTION INTRAVENOUS at 12:02

## 2020-02-04 RX ADMIN — FENTANYL CITRATE 25 MCG: 50 INJECTION, SOLUTION INTRAMUSCULAR; INTRAVENOUS at 14:10

## 2020-02-04 RX ADMIN — ONDANSETRON 4 MG: 2 INJECTION INTRAMUSCULAR; INTRAVENOUS at 13:04

## 2020-02-04 NOTE — ANESTHESIA POSTPROCEDURE EVALUATION
Procedure(s):  BILATERAL BREAST NIPPLE DELAY, LEFT SENTINEL NODE BIOPSY  SENTINEL NODE BIOPSY. general    Anesthesia Post Evaluation      Multimodal analgesia: multimodal analgesia not used between 6 hours prior to anesthesia start to PACU discharge  Patient location during evaluation: PACU  Patient participation: complete - patient participated  Level of consciousness: awake  Pain management: adequate  Airway patency: patent  Anesthetic complications: no  Cardiovascular status: acceptable, blood pressure returned to baseline and hemodynamically stable  Respiratory status: acceptable  Hydration status: acceptable  Post anesthesia nausea and vomiting:  controlled      Vitals Value Taken Time   /75 2/4/2020  4:40 PM   Temp 36.8 °C (98.2 °F) 2/4/2020  3:40 PM   Pulse 98 2/4/2020  4:43 PM   Resp 11 2/4/2020  4:43 PM   SpO2 93 % 2/4/2020  4:43 PM   Vitals shown include unvalidated device data.

## 2020-02-04 NOTE — PROGRESS NOTES
CONCEPCION FROM DR THOMAS'S OFFICE CALLED TO MOVE TIME TO START SURGERY. THE NEW START TIME IS 1:00 PM AT [facility]. WILL NOTIFY PATIENT THE NEW ARRIVAL TIME WILL BE 11:15 AM TO MY CHART AND VIA PHONE.

## 2020-02-04 NOTE — ANESTHESIA PREPROCEDURE EVALUATION
Relevant Problems   No relevant active problems       Anesthetic History   No history of anesthetic complications            Review of Systems / Medical History  Patient summary reviewed, nursing notes reviewed and pertinent labs reviewed    Pulmonary  Within defined limits                 Neuro/Psych   Within defined limits           Cardiovascular                  Exercise tolerance: >4 METS     GI/Hepatic/Renal  Within defined limits              Endo/Other             Other Findings   Comments: Breast cancer         Physical Exam    Airway  Mallampati: I    Neck ROM: normal range of motion   Mouth opening: Normal     Cardiovascular    Rhythm: regular  Rate: normal         Dental  No notable dental hx       Pulmonary  Breath sounds clear to auscultation               Abdominal  GI exam deferred       Other Findings            Anesthetic Plan    ASA: 2  Anesthesia type: general          Induction: Intravenous  Anesthetic plan and risks discussed with: Patient

## 2020-02-04 NOTE — H&P
HISTORY OF PRESENT ILLNESS  Dereck Mera is a 38 y.o. female. HPI  ESTABLISHED patient here for breast talk. Rich Kessler has almost completed her chemotherapy.  Has one more AC treatment on 19 and will be done. Rich Kessler has done pretty well with her chemotherapy.  Has had nausea but no vomiting.  Ready to discuss surgery.  Found out shortly after diagnosis that she has a BRCA1 mutation.       06/10/19: LEFT breast bx, 3:00. PATH: IDC, 11mm largest glass slide measurement, nuclear grade 3, lymphovascular invasion present, ER-/HI-/HER2-. DCIS also present.     19: [facility] genetic testing: BRCA1 positive, c.5266dup variant, heterozygous.     19: LEFT breast MRI-guided bx. PATH: Benign breast tissue with fibrocystic changes, negative for in situ or invasive carcinoma.     19 - 10/14/19: Carbo/taxol. Z7C57 delayed one week due to neutropenia     10/28/19 - : DD AC chemo. c1 held one week due to thrombocytopenia. c3 delayed 1 week due to pt trip.  C4 held due to neutropenia.             Past Medical History:   Diagnosis Date    Breast cancer ([facility] [address] 2019     left breast cancer               Past Surgical History:   Procedure Laterality Date    HX APPENDECTOMY        HX  SECTION         x3         Social History            Socioeconomic History    Marital status:        Spouse name: Not on file    Number of children: Not on file    Years of education: Not on file    Highest education level: Not on file   Occupational History    Not on file   Social Needs    Financial resource strain: Not on file    Food insecurity:       Worry: Not on file       Inability: Not on file    Transportation needs:       Medical: Not on file       Non-medical: Not on file   Tobacco Use    Smoking status: Never Smoker    Smokeless tobacco: Never Used   Substance and Sexual Activity    Alcohol use: Not Currently    Drug use: Never    Sexual activity: Yes   Lifestyle    Physical activity:       Days per week: Not on file       Minutes per session: Not on file    Stress: Not on file   Relationships    Social connections:       Talks on phone: Not on file       Gets together: Not on file       Attends Restoration service: Not on file       Active member of club or organization: Not on file       Attends meetings of clubs or organizations: Not on file       Relationship status: Not on file    Intimate partner violence:       Fear of current or ex partner: Not on file       Emotionally abused: Not on file       Physically abused: Not on file       Forced sexual activity: Not on file   Other Topics Concern    Not on file   Social History Narrative    Not on file                Current Outpatient Medications on File Prior to Visit   Medication Sig Dispense Refill    OLANZapine (ZYPREXA) 10 mg tablet 10 mg qhs on days 1-4 following chemo 8 Tab 1    LORazepam (ATIVAN) 1 mg tablet Take 1 Tab by mouth every eight (8) hours as needed (nausea). Max Daily Amount: 3 mg. 30 Tab 1    cholecalciferol, vitamin D3, (VITAMIN D3 PO) Take 2,000 Units by mouth daily.        Lactobacillus acidophilus (PROBIOTIC PO) Take 1 Tab by mouth daily.        prasterone, DHEA, (DHEA PO) Take 10 mg by mouth daily.        MELATONIN PO Take  by mouth nightly as needed.        prochlorperazine (COMPAZINE) 10 mg tablet Take 1 Tab by mouth every six (6) hours as needed for Nausea. 50 Tab 5    lidocaine-prilocaine (EMLA) topical cream Apply  to affected area as needed for Pain. 30 g 0    ondansetron hcl (ZOFRAN) 8 mg tablet Take 1 Tab by mouth every eight (8) hours as needed for Nausea.  60 Tab 3    OTHER Massage     Dx c50.412 breast cancer 1 Each 0    NP THYROID 15 mg tablet Take 15 mg by mouth daily.        testosterone 75 mg pllt by Implant route.          No current facility-administered medications on file prior to visit.          No Known Allergies     OB History    No obstetric history on file.       Obstetric Comments   Menarche 12 or 13, LMP 5/29/29, # of children 3, age of 4st delivery 28, Hysterectomy/oophorectomy No/No, Breast bx Yes, ? LEFT 9-10 yrs ago, history of breast feeding Yes, BCP No, Hormone therapy No                   ROS     Physical Exam  Exam conducted with a chaperone present. Cardiovascular:      Rate and Rhythm: Normal rate and regular rhythm. Heart sounds: Normal heart sounds. Pulmonary:      Breath sounds: Normal breath sounds. Chest:      Breasts: Breasts are symmetrical.         Right: Normal. No swelling, bleeding, inverted nipple, mass, nipple discharge, skin change or tenderness. Left: Normal. No swelling, bleeding, inverted nipple, mass, nipple discharge, skin change or tenderness. Lymphadenopathy:      Cervical:      Right cervical: No superficial, deep or posterior cervical adenopathy. Left cervical: No superficial, deep or posterior cervical adenopathy. Upper Body:      Right upper body: No supraclavicular or axillary adenopathy. Left upper body: No supraclavicular or axillary adenopathy.       ASSESSMENT and PLAN      ICD-10-CM ICD-9-CM     1. Malignant neoplasm of upper-outer quadrant of left breast in female, estrogen receptor [facility] C50.412 174. 4       Z17.1 V86. 1        Pt presents for consultation for surgical treatment for LEFT breast IDC, ER-/OK-/HER2-, and is doing well overall. Physical exam today normal, no palpable findings.     We had a long discussion of options for treatment. A total of 45 minutes were spent face-to-face with the patient, accompanied by her sister and niece, during this encounter, and over half of that time was spent on counseling and coordination of care.     Pt notes preference for BL mastectomy. She also has consultation scheduled for BL oophorectomy.  Discussed skin and nipple sparing mastectomy, with surgical delay with inframammary incisions, nipple bx, and SLNBx, followed 2-3 weeks later by mastectomy and probable direct implant placement, or possible expander placement, by plastic surgeon. Also discussed treatment with hyperbaric oxygen chamber or delayed mastectomy in the case of ischemia or wound complication following nipple delay. Reviewed loss of or change in skin and nipple sensation following skin and nipple-sparing mastectomy.     We also discussed the pts questions and concerns. No increased risk of recurrence after nipple-sparing surgery. We also discussed nipple reconstruction and tattoo options in case of nipple involvement or removal.     Will plan for surgery 4 weeks after last chemo treatment. Will refer to plastic surgeon for further consultation, and plan further from there. This plan was reviewed with the patient and patient agrees. All questions were answered.

## 2020-02-04 NOTE — DISCHARGE INSTRUCTIONS
Patient Education   Scopolamine (Absorbed through the skin)   Scopolamine ([id number])  Treat nausea and vomiting. Brand Name(s): Transderm Scop   There may be other brand names for this medicine. When This Medicine Should Not Be Used: You should not use this medicine if you have had an allergic reaction to scopolamine, or if you have narrow angle glaucoma. How to Use This Medicine:   Patch  · Your doctor will tell you how many patches to use, where to apply them, and how often to apply them. Do not use more patches or apply them more often than your doctor tells you to. · Read and follow the patient instructions that come with this medicine. Talk to your doctor or pharmacist if you have any questions. · To prevent motion sickness, apply the patch at least 4 hours before you need it. · Wash and dry your hands thoroughly before applying the patch. · Leave the patch in its sealed wrapper until you are ready to put it on. Tear the wrapper open carefully. NEVER CUT the wrapper or the patch with scissors. Do not use any patch that has been cut by accident. · Take the liner off the sticky side before applying. · Apply the patch to dry, hairless skin behind the ear. · If the patch is loose or falls off, apply a new patch at a different place behind the ear. · After you take off the patch, wash the place where the patch was and your hands thoroughly. · Only one patch should be used at any time. If a dose is missed:   · If you forget to wear or change a patch, put one on as soon as you can. If it is almost time to put on your next patch, wait until then to apply a new patch and skip the one you missed. Do not apply extra patches to make up for a missed dose. How to Store and Dispose of This Medicine:   · Store the patches at room temperature in a closed container, away from heat, moisture, and direct light. · Fold the used patch in half with the sticky sides together.  Throw any used patch away so that children or pets cannot get to it. You will also need to throw away old patches after the expiration date has passed. · Keep all medicine out of the reach of children. Never share your medicine with anyone. Drugs and Foods to Avoid:   Ask your doctor or pharmacist before using any other medicine, including over-the-counter medicines, vitamins, and herbal products. · Tell your doctor if you use anything else that makes you sleepy. Some examples are allergy medicine, narcotic pain medicine, and alcohol. · Do not drink alcohol while you are using this medicine. Warnings While Using This Medicine:   · Make sure your doctor knows if you are pregnant or breastfeeding, or if you have glaucoma, prostate problems, trouble urinating, blocked bowels, liver disease, kidney disease, or a history of seizures or mental illness. · This medicine can cause blurring of vision and other vision problems if it comes in contact with the eyes. This medicine may also cause problems with urination. If any of these reactions occur, remove the patch and call your doctor right away. · This medicine may make you dizzy or drowsy. Avoid driving, using machines, or doing anything else that could be dangerous if you are not alert. If you plan to participate in underwater sports, this medicine may cause disorienting effects. If this is a concern for you, talk with your doctor. · This medicine may make you sweat less and cause your body to get too hot. Be careful in hot weather, when you are exercising, or if using a sauna or whirlpool. · Tell any doctor or dentist who treats you that you are using this medicine. This medicine may affect certain medical test results. · Skin burns have been reported at the patch site in several patients wearing an aluminized transdermal system during a magnetic resonance imaging scan (MRI). Because Transderm Sc?p® contains aluminum, it is recommended to remove the system before undergoing an MRI.   Possible Side Effects While Using This Medicine:   Call your doctor right away if you notice any of these side effects:  · Allergic reaction: Itching or hives, swelling in your face or hands, swelling or tingling in your mouth or throat, chest tightness, trouble breathing  · Blurred vision. · Confusion or memory loss. · Fast, slow, or uneven heartbeat. · Lightheadedness, dizziness, drowsiness, or fainting. · Seeing, hearing, or feeling things that are not there. · Severe eye pain. · Trouble urinating. If you notice these less serious side effects, talk with your doctor:   · Dry mouth. · Dry, itchy, or red eyes. · Restlessness. · Skin rash or redness. If you notice other side effects that you think are caused by this medicine, tell your doctor. Call your doctor for medical advice about side effects. You may report side effects to FDA at 2-513-FDA-9094  © 2017 [facility] Information is for End User's use only and may not be sold, redistributed or otherwise used for commercial purposes. The above information is an  only. It is not intended as medical advice for individual conditions or treatments. Talk to your doctor, nurse or pharmacist before following any medical regimen to see if it is safe and effective for you. Discharge Instructions from Dr. Blanca Dorman    · I will call you with the pathology results, typically within 1 week from today. · You may shower, but no hot tubs, swimming pools, or baths until your incision is healed. · No heavy lifting with the affected extremity (nothing greater than 5 pounds), and limit its use for the next 4-5 days. · NO ICE  · Follow medication instructions carefully. · Watch for signs of infection as listed below.   · Redness  · Swelling  · Drainage from the incision or from your nipple that appears infected  · Fever over 101 degrees for consecutive readings, or over 99.5 if you are currently undergoing chemotherapy. · Call our office (number is below) for a follow-up appointment. · If you have any problems, our phone number is [phone]. DISCHARGE SUMMARY from your Nurse      PATIENT INSTRUCTIONS    After general anesthesia or intravenous sedation, for 24 hours or while taking prescription Narcotics:  · Limit your activities  · Do not drive and operate hazardous machinery  · Do not make important personal or business decisions  · Do  not drink alcoholic beverages  · If you have not urinated within 8 hours after discharge, please contact your surgeon on call. Report the following to your surgeon:  · Excessive pain, swelling, redness or odor of or around the surgical area  · Temperature over 100.5  · Nausea and vomiting lasting longer than 4 hours or if unable to take medications  · Any signs of decreased circulation or nerve impairment to extremity: change in color, persistent  numbness, tingling, coldness or increase pain  · Any questions      COUGH AND DEEP BREATHE    Breathing deeply and coughing are very important exercises to do after surgery. Deep breathing and coughing open the little air tubes and air sacks in your lungs. You take deep breaths every day. You may not even notice - it is just something you do when you sigh or yawn. It is a natural exercise you do to keep these air passages open. After surgery, take deep breaths and cough, on purpose. DIRECTIONS:  · Take 10 to 15 slow deep breaths every hour while awake. · Breathe in deeply, and hold it for 2 seconds. · Exhale slowly through puckered lips, like blowing up a balloon. · After every 4th or 5th deep breath, hug your pillow to your chest or belly and give a hard, deep cough. Yes, it will probably hurt. But doing this exercise is a very important part of healing after surgery. Take your pain medicine to help you do this exercise without too much pain.     Coughing and deep breathing help prevent bronchitis and pneumonia after surgery. If you had chest or belly surgery, use a pillow as a \"hug josue\" and hold it tightly to your chest or belly when you cough. ANKLE PUMPS    Ankle pumps increase the circulation of oxygenated blood to your lower extremities and decrease your risk for circulation problems such as blood clots. They also stretch the muscles, tendons and ligaments in your foot and ankle, and prevent joint contracture in the ankle and foot, especially after surgeries on the legs. It is important to do ankle pump exercises regularly after surgery because immobility increases your risk for developing a blood clot. Your doctor may also have you take an Aspirin for the next few days as well. If your doctor did not ask you to take an Aspirin, consult with him before starting Aspirin therapy on your own. The exercise is quite simple. · Slowly point your foot forward, feeling the muscles on the top of your lower leg stretch, and hold this position for 5 seconds. · Next, pull your foot back toward you as far as possible, stretching the calf muscles, and hold that position for 5 seconds. · Repeat with the other foot. · Perform 10 repetitions every hour while awake for both ankles if possible (down and then up with the foot once is one repetition). You should feel gentle stretching of the muscles in your lower leg when doing this exercise. If you feel pain, or your range of motion is limited, don't push too hard. Only go the limit your joint and muscles will let you go. If you have increasing pain, progressively worsening leg warmth or swelling, STOP the exercise and call your doctor. MEDICATION AND   SIDE EFFECT GUIDE    The [facility] MEDICATION AND SIDE EFFECT GUIDE was provided to the PATIENT AND CARE PROVIDER.   Information provided includes instruction about drug purpose and common side effects for the following medications:   · Marcio Alexander These are general instructions for a healthy lifestyle:    *   Please give a list of your current medications to your Primary Care Provider. *   Please update this list whenever your medications are discontinued, doses are changed, or new medications (including over-the-counter products) are added. *   Please carry medication information at all times in case of emergency situations. About Smoking  No smoking / No tobacco products  Avoid exposure to second hand smoke     Surgeon General's Warning:  Quitting smoking now greatly reduces serious risk to your health. Obesity, smoking, and sedentary lifestyle greatly increases your risk for illness and disease. A healthy diet, regular physical exercise & weight monitoring are important for maintaining a healthy lifestyle. Congestive Heart Failure  You may be retaining fluid if you have a history of heart failure or if you experience any of the following symptoms:  Weight gain of 3 pounds or more overnight or 5 pounds in a week, increased swelling in your hands or feet or shortness of breath while lying flat in bed. Please call your doctor as soon as you notice any of these symptoms; do not wait until your next office visit. Recognize signs and symptoms of STROKE:  F -  Face looks uneven  A -  Arms unable to move or move evenly  S -  Speech slurred or non-existent  T -  Time-call 911 as soon as signs and symptoms begin-DO NOT go          back to bed or wait to see if you get better-TIME IS BRAIN. Warning Signs of HEART ATTACK   Call 911 if you have these symptoms:     Chest discomfort. Most heart attacks involve discomfort in the center of the chest that lasts more than a few minutes, or that goes away and comes back. It can feel like uncomfortable pressure, squeezing, fullness, or pain.  Discomfort in other areas of the upper body.  Symptoms can include pain or discomfort in one or both arms, the back, neck, jaw, or stomach.  Shortness of breath with or without chest discomfort.  Other signs may include breaking out in a cold sweat, nausea, or lightheadedness. Don't wait more than five minutes to call 911 - MINUTES MATTER! Fast action can save your life. Calling 911 is almost always the fastest way to get lifesaving treatment. Emergency Medical Services staff can begin treatment when they arrive -- up to an hour sooner than if someone gets to the hospital by car. The discharge information has been reviewed with the spouse. Any questions and concerns from the spouse have been addressed. The spouse verbalized understanding. Other information in your discharge envelope:  []     PRESCRIPTIONS  []     PHYSICAL THERAPY PRESCRIPTION  []     APPOINTMENT CARDS  []     Regional Anesthesia Pamphlet for block or block with On-Q Catheter from   Anesthesia Service  []     Medical device information sheets/pamphlets from their    []     School/work excuse note. []     /parent work excuse note. The following personal items collected during your admission are returned to you:   Dental Appliance: Dental Appliances: None  Vision: Visual Aid: Glasses  Hearing Aid:    Jewelry: Jewelry: None  Clothing: Clothing: Jacket/Coat, Footwear, Pants, Undergarments, Shirt  Other Valuables:  Other Valuables: Eyeglasses  Valuables sent to safe: Personal Items Sent to Safe: N/A

## 2020-02-05 ENCOUNTER — OFFICE VISIT (OUTPATIENT)
Dept: SURGERY | Age: 45
End: 2020-02-05

## 2020-02-05 ENCOUNTER — TELEPHONE (OUTPATIENT)
Dept: SURGERY | Age: 45
End: 2020-02-05

## 2020-02-05 VITALS
DIASTOLIC BLOOD PRESSURE: 59 MMHG | HEIGHT: 63 IN | HEART RATE: 92 BPM | WEIGHT: 129 LBS | BODY MASS INDEX: 22.86 KG/M2 | SYSTOLIC BLOOD PRESSURE: 105 MMHG

## 2020-02-05 DIAGNOSIS — C50.412 MALIGNANT NEOPLASM OF UPPER-OUTER QUADRANT OF LEFT BREAST IN FEMALE, ESTROGEN RECEPTOR NEGATIVE (HCC): ICD-10-CM

## 2020-02-05 DIAGNOSIS — Z17.1 MALIGNANT NEOPLASM OF UPPER-OUTER QUADRANT OF LEFT BREAST IN FEMALE, ESTROGEN RECEPTOR NEGATIVE (HCC): ICD-10-CM

## 2020-02-05 NOTE — PROGRESS NOTES
HISTORY OF PRESENT ILLNESS  Susy Jung is a 38 y.o. female. HPI  ESTABLISHED patient present for surgical follow-up. She is POD #1 after BILATERAL nipple delay and LEFT SNBX. Had some vomiting and weakness yesterday after she left the hospital.  Today feels better with only soreness.       06/10/19: LEFT breast bx, 3:00. PATH: IDC, 11mm largest glass slide measurement, nuclear grade 3, lymphovascular invasion present, ER-/NC-/HER2-. DCIS also present. 19: [facility] genetic testing: BRCA1 positive, c.[id number] variant, heterozygous. 19: LEFT breast MRI-guided bx. PATH: Benign breast tissue with fibrocystic changes, negative for in situ or invasive carcinoma.    19 - 10/14/19: Carbo/taxol. [id number] delayed one week due to neutropenia     10/28/19 - 19: DD AC chemo. c1 held one week due to thrombocytopenia. c3 delayed 1 week due to pt trip. C4 held due to neutropenia. 20: BL nipple delay and LEFT SLNBx. PATH pending.       Past Medical History:   Diagnosis Date    Breast cancer ([facility] [address] 2019    left breast cancer       Past Surgical History:   Procedure Laterality Date    HX APPENDECTOMY      HX  SECTION      x3       Social History     Socioeconomic History    Marital status:      Spouse name: Not on file    Number of children: Not on file    Years of education: Not on file    Highest education level: Not on file   Occupational History    Not on file   Social Needs    Financial resource strain: Not on file    Food insecurity:     Worry: Not on file     Inability: Not on file    Transportation needs:     Medical: Not on file     Non-medical: Not on file   Tobacco Use    Smoking status: Never Smoker    Smokeless tobacco: Never Used   Substance and Sexual Activity    Alcohol use: Not Currently    Drug use: Never    Sexual activity: Yes   Lifestyle    Physical activity:     Days per week: Not on file     Minutes per session: Not on file    Stress: Not on file   Relationships    Social connections:     Talks on phone: Not on file     Gets together: Not on file     Attends Taoist service: Not on file     Active member of club or organization: Not on file     Attends meetings of clubs or organizations: Not on file     Relationship status: Not on file    Intimate partner violence:     Fear of current or ex partner: Not on file     Emotionally abused: Not on file     Physically abused: Not on file     Forced sexual activity: Not on file   Other Topics Concern    Not on file   Social History Narrative    Not on file       Current Outpatient Medications on File Prior to Visit   Medication Sig Dispense Refill    HYDRO[facility]one[facility]aminophen ([facility]) 7.5-325 mg per tablet Take 1 Tab by mouth every four (4) hours as needed for Pain for up to 7 days. Max Daily Amount: 6 Tabs. 20 Tab 0    nitroglycerin (NITRO-BID) 2 % ointment Apply 1 Inch to affected area two (2) times a day. As directed 60 g 0    atorvastatin (LIPITOR) 40 mg tablet Take 40 mg by mouth nightly. Indications: \"Repurposed/Cancer fighting\"      metFORMIN (GLUCOPHAGE) 500 mg tablet Take 500 mg by mouth two (2) times daily (with meals). Indications: Repurposed/Cancer Fighting      MEBENDAZOLE PO Take 100 mg by mouth daily. Indications: \"Repurposed/Cancer Fighting\"      doxycycline (MONODOX) 100 mg capsule Take 100 mg by mouth daily. Indications: \"Repurposed/Cancer Fighting\"      cholecalciferol, vitamin D3, (VITAMIN D3 PO) Take 2,000 Units by mouth daily.  prasterone, DHEA, (DHEA PO) Take 10 mg by mouth daily.  MELATONIN PO Take 20 mg by mouth nightly as needed.        Current Facility-Administered Medications on File Prior to Visit   Medication Dose Route Frequency Provider Last Rate Last Dose    [COMPLETED] ceFAZolin (ANCEF) 2 g in sterile water (preservative free) 20 mL IV syringe  2 g IntraVENous NOW René Salvador MD   2 g at 02/04/20 1331    [COMPLETED] technetium tilmanocept (LYMPHOSEEK) injection 0.5 millicurie  0.5 millicurie IntraDERMal RAD ONCE Flor Salvador MD   0.5 millicurie at 00/54/99 2025    [COMPLETED] ondansetron (ZOFRAN) injection 4 mg  4 mg IntraVENous ONCE Deneen Black MD   4 mg at 02/04/20 1304    [COMPLETED] scopolamine (TRANSDERM-SCOP) 1 mg over 3 days 1 Patch  1 Patch TransDERmal Q72H Deneen Black MD   1 Patch at 02/04/20 1329    [COMPLETED] nitroglycerin (NITROBID) 2 % ointment 1 Inch  1 Inch Topical ONCE Flor Salvador MD   1 Inch at 02/04/20 1546    [COMPLETED] HYDROcodone-acetaminophen (NORCO) 7.5-325 mg per tablet 1 Tab  1 Tab Oral ONCE Flor Salvador MD   1 Tab at 02/04/20 1632    [DISCONTINUED] lidocaine (PF) (XYLOCAINE) 10 mg/mL (1 %) injection 0.1 mL  0.1 mL SubCUTAneous PRN Brendan Serna MD        [DISCONTINUED] lactated Ringers infusion  125 mL/hr IntraVENous CONTINUOUS Brendan Serna  mL/hr at 02/04/20 1202 125 mL/hr at 02/04/20 1202    [DISCONTINUED] sodium chloride (NS) flush 5-40 mL  5-40 mL IntraVENous Q8H Brendan Serna MD        [DISCONTINUED] sodium chloride (NS) flush 5-40 mL  5-40 mL IntraVENous PRLISA Serna MD        [DISCONTINUED] naloxone [facility]) injection 0.04 mg  0.04 mg IntraVENous Multiple Brendan Serna MD        [DISCONTINUED] flumazeniL (ROMAZICON) 0.1 mg/mL injection 0.2 mg  0.2 mg IntraVENous Shelbie Serna MD        [DISCONTINUED] Formerly named [facility]) with saline injection 6.25 mg  6.25 mg IntraVENous PRN Brendan Serna MD        [DISCONTINUED] lactated Ringers infusion  125 mL/hr IntraVENous CONTINUOUS Brendan Serna MD        [DISCONTINUED] sodium chloride (NS) flush 5-40 mL  5-40 mL IntraVENous Q8H Brendan Serna MD        [DISCONTINUED] sodium chloride (NS) flush 5-40 mL  5-40 mL IntraVENous PRN Brendan Serna MD        [DISCONTINUED] fentaNYL citrate (PF) injection 25 mcg  25 mcg IntraVENous Multiple MD Nivia Damon [DISCONTINUED] HYDROmorphone (DILAUDID) syringe 0.25-1 mg  0.25-1 mg IntraVENous Q10MIN PRN Hiram Nieto MD        [DISCONTINUED] albuterol (PROVENTIL VENTOLIN) nebulizer solution 2.5 mg  2.5 mg Inhalation PRN Hiram Nieto MD        [DISCONTINUED] ondansetron [facility]) injection 4 mg  4 mg IntraVENous PRN Hiram Nieto MD        [DISCONTINUED] diphenhydrAMINE (BENADRYL) injection 12.5 mg  12.5 mg IntraVENous PRN Hiram Nieto MD        [DISCONTINUED] fentaNYL citrate (PF) 50 mcg/mL injection             [DISCONTINUED] fentaNYL citrate (PF) injection   IntraVENous PRN Linda Sheikh MD   25 mcg at 02/04/20 1410    [DISCONTINUED] midazolam (VERSED) injection   IntraVENous PRN Laron Akbar MD   2 mg at 02/04/20 1324    [DISCONTINUED] dexamethasone (DECADRON) 4 mg/mL injection   IntraVENous PRN Laron Akbar MD   4 mg at 02/04/20 1342    [DISCONTINUED] lidocaine (PF) (XYLOCAINE) 20 mg/mL (2 %) injection   IntraVENous PRN Laron Akbar MD   60 mg at 02/04/20 1340    [DISCONTINUED] propofol (DIPRIVAN) 10 mg/mL injection   IntraVENous PRN Laron Akbar MD   100 mg at 02/04/20 1334    [DISCONTINUED] propofol (DIPRIVAN) 10 mg/mL injection   IntraVENous CONTINUOUS Laron Akbar MD   Stopped at 02/04/20 1514    [DISCONTINUED] diphenhydrAMINE (BENADRYL) injection    PRN Laron Akbar MD   12.5 mg at 02/04/20 1353    [DISCONTINUED] bupivacaine (PF) (MARCAINE) 0.5 % (5 mg/mL) injection    PRN Rizwan Capellan MD   9 mL at 02/04/20 1438    [DISCONTINUED] lidocaine (XYLOCAINE) 10 mg/mL (1 %) injection    PRN Rizwan Capellan MD   9 mL at 02/04/20 1439    [DISCONTINUED] ondansetron (ZOFRAN) injection   IntraVENous PRN Jamari Farrell CRNA   4 mg at 02/04/20 1507       No Known Allergies    OB History    No obstetric history on file.       Obstetric Comments   Menarche 7 or 16, LMP 5/29/29, # of children 3, age of 4st delivery 28, Hysterectomy/oophorectomy No/No, Breast bx Yes, ? LEFT 9-10 yrs ago, history of breast feeding Yes, BCP No, Hormone therapy No               ROS    Physical Exam  Exam conducted with a chaperone present. Cardiovascular:      Rate and Rhythm: Normal rate and regular rhythm. Heart sounds: Normal heart sounds. Pulmonary:      Breath sounds: Normal breath sounds. Chest:      Breasts: Breasts are symmetrical.         Right: Normal. No swelling, bleeding, inverted nipple, mass, nipple discharge, skin change or tenderness. Left: Normal. No swelling, bleeding, inverted nipple, mass, nipple discharge, skin change or tenderness. Lymphadenopathy:      Cervical:      Right cervical: No superficial, deep or posterior cervical adenopathy. Left cervical: No superficial, deep or posterior cervical adenopathy. Upper Body:      Right upper body: No supraclavicular or axillary adenopathy. Left upper body: No supraclavicular or axillary adenopathy. ASSESSMENT and PLAN    ICD-10-CM ICD-9-CM    1. 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 Slight discoloration of RIGHT nipple, LEFT nipple appears viable. Pt to continue to apply nitro paste to RIGHT nipple and areola BID until further notice. Discussed possible treatment with hyperbaric oxygen chamber if necessary. Addressed pt's other post-op questions. As axillary incision is covered with surgical glue, she may apply deodorant. LEFT arm numbness expected to improve or resolve. She should not wear a bra between now and her next surgery. She may shower but should not submerge in water. She should not take Excedrin for headaches, but may take her Lortab prescription. Pt is scheduled for BL mastectomies on 02/18/20, and will send BL photos each morning for further evaluation and planning. This plan was reviewed with the patient and patient agrees. All questions were answered.     Written by Mario Ngo, as dictated by Dr. Michael Bower MD.

## 2020-02-05 NOTE — COMMUNICATION BODY
HISTORY OF PRESENT ILLNESS  Pelno Casper is a 38 y.o. female. HPI  ESTABLISHED patient present for surgical follow-up. She is POD #1 after BILATERAL nipple delay and LEFT SNBX. Had some vomiting and weakness yesterday after she left the hospital.  Today feels better with only soreness.       06/10/19: LEFT breast bx, 3:00. PATH: IDC, 11mm largest glass slide measurement, nuclear grade 3, lymphovascular invasion present, ER-/OK-/HER2-. DCIS also present. 19: [facility] genetic testing: BRCA1 positive, c.5266dup variant, heterozygous. 19: LEFT breast MRI-guided bx. PATH: Benign breast tissue with fibrocystic changes, negative for in situ or invasive carcinoma.    19 - 10/14/19: Carbo/taxol. D7S27 delayed one week due to neutropenia     10/28/19 - 19: DD AC chemo. c1 held one week due to thrombocytopenia. c3 delayed 1 week due to pt trip. C4 held due to neutropenia. 20: BL nipple delay and LEFT SLNBx. PATH pending.       Past Medical History:   Diagnosis Date    Breast cancer ([facility] [address] [facility] 2019    left breast cancer       Past Surgical History:   Procedure Laterality Date    HX APPENDECTOMY      HX  SECTION      x3       Social History     Socioeconomic History    Marital status:      Spouse name: Not on file    Number of children: Not on file    Years of education: Not on file    Highest education level: Not on file   Occupational History    Not on file   Social Needs    Financial resource strain: Not on file    Food insecurity:     Worry: Not on file     Inability: Not on file    Transportation needs:     Medical: Not on file     Non-medical: Not on file   Tobacco Use    Smoking status: Never Smoker    Smokeless tobacco: Never Used   Substance and Sexual Activity    Alcohol use: Not Currently    Drug use: Never    Sexual activity: Yes   Lifestyle    Physical activity:     Days per week: Not on file     Minutes per session: Not on file    Stress: Not on file   Relationships    Social connections:     Talks on phone: Not on file     Gets together: Not on file     Attends Latter-day service: Not on file     Active member of club or organization: Not on file     Attends meetings of clubs or organizations: Not on file     Relationship status: Not on file    Intimate partner violence:     Fear of current or ex partner: Not on file     Emotionally abused: Not on file     Physically abused: Not on file     Forced sexual activity: Not on file   Other Topics Concern    Not on file   Social History Narrative    Not on file       Current Outpatient Medications on File Prior to Visit   Medication Sig Dispense Refill    HYDROcodone-acetaminophen (Doreen President) 7.5-325 mg per tablet Take 1 Tab by mouth every four (4) hours as needed for Pain for up to 7 days. Max Daily Amount: 6 Tabs. 20 Tab 0    nitroglycerin (NITRO-BID) 2 % ointment Apply 1 Inch to affected area two (2) times a day. As directed 60 g 0    atorvastatin (LIPITOR) 40 mg tablet Take 40 mg by mouth nightly. Indications: \"Repurposed/Cancer fighting\"      metFORMIN (GLUCOPHAGE) 500 mg tablet Take 500 mg by mouth two (2) times daily (with meals). Indications: Repurposed/Cancer Fighting      MEBENDAZOLE PO Take 100 mg by mouth daily. Indications: \"Repurposed/Cancer Fighting\"      doxycycline (MONODOX) 100 mg capsule Take 100 mg by mouth daily. Indications: \"Repurposed/Cancer Fighting\"      cholecalciferol, vitamin D3, (VITAMIN D3 PO) Take 2,000 Units by mouth daily.  prasterone, DHEA, (DHEA PO) Take 10 mg by mouth daily.  MELATONIN PO Take 20 mg by mouth nightly as needed.        Current Facility-Administered Medications on File Prior to Visit   Medication Dose Route Frequency Provider Last Rate Last Dose    [COMPLETED] ceFAZolin (ANCEF) 2 g in sterile water (preservative free) 20 mL IV syringe  2 g IntraVENous NOW Suly Salvador MD   2 g at 02/04/20 1331    [COMPLETED] technetium tilmanocept (LYMPHOSEEK) injection 0.5 millicurie  0.5 millicurie IntraDERMal RAD ONCE Melissa Salvador MD   0.5 millicurie at 66/74/78 7324    [COMPLETED] ondansetron (ZOFRAN) injection 4 mg  4 mg IntraVENous ONCE Ct Peñaloza MD   4 mg at 02/04/20 1304    [COMPLETED] scopolamine (TRANSDERM-SCOP) 1 mg over 3 days 1 Patch  1 Patch TransDERmal Q72H Ct Peñaloza MD   1 Patch at 02/04/20 1329    [COMPLETED] nitroglycerin (NITROBID) 2 % ointment 1 Inch  1 Inch Topical ONCE Melissa Salvador MD   1 Inch at 02/04/20 1546    [COMPLETED] HYDROcodone-acetaminophen (NORCO) 7.5-325 mg per tablet 1 Tab  1 Tab Oral ONCE Melissa Salvador MD   1 Tab at 02/04/20 1632    [DISCONTINUED] lidocaine (PF) (XYLOCAINE) 10 mg/mL (1 %) injection 0.1 mL  0.1 mL SubCUTAneous PRN Esha Graham MD        [DISCONTINUED] lactated Ringers infusion  125 mL/hr IntraVENous CONTINUOUS Esha Graham  mL/hr at 02/04/20 1202 125 mL/hr at 02/04/20 1202    [DISCONTINUED] sodium chloride (NS) flush 5-40 mL  5-40 mL IntraVENous Q8H Esha Graham MD        [DISCONTINUED] sodium chloride (NS) flush 5-40 mL  5-40 mL IntraVENous PRN Esha Graham MD        [DISCONTINUED] naloxone [facility]) injection 0.04 mg  0.04 mg IntraVENous Shelbie Graham MD        [DISCONTINUED] flumazeniL (ROMAZICON) 0.1 mg/mL injection 0.2 mg  0.2 mg IntraVENous Shelbie Graham MD        [DISCONTINUED] promethazine [facility]) with saline injection 6.25 mg  6.25 mg IntraVENous PRN Esha Graham MD        [DISCONTINUED] lactated Ringers infusion  125 mL/hr IntraVENous CONTINUOUS Esha Graham MD        [DISCONTINUED] sodium chloride (NS) flush 5-40 mL  5-40 mL IntraVENous Q8H Esha Graham MD        [DISCONTINUED] sodium chloride (NS) flush 5-40 mL  5-40 mL IntraVENous PRN Esha Graham MD        [DISCONTINUED] fentaNYL citrate (PF) injection 25 mcg  25 mcg IntraVENous Multiple MD Diony Hou [DISCONTINUED] HYDROmorphone (DILAUDID) syringe 0.25-1 mg  0.25-1 mg IntraVENous Q10MIN PRN Alexander Sagastume MD        [DISCONTINUED] albuterol (PROVENTIL VENTOLIN) nebulizer solution 2.5 mg  2.5 mg Inhalation PRN Alexander Sagastume MD        [DISCONTINUED] ondansetron [facility]) injection 4 mg  4 mg IntraVENous PRN Alexander Sagastume MD        [DISCONTINUED] diphenhydrAMINE (BENADRYL) injection 12.5 mg  12.5 mg IntraVENous PRN Alexander Sagastume MD        [DISCONTINUED] fentaNYL citrate (PF) 50 mcg/mL injection             [DISCONTINUED] fentaNYL citrate (PF) injection   IntraVENous PRN Jose Gillespie MD   25 mcg at 02/04/20 1410    [DISCONTINUED] midazolam (VERSED) injection   IntraVENous PRN Jason Forrest MD   2 mg at 02/04/20 1324    [DISCONTINUED] dexamethasone (DECADRON) 4 mg/mL injection   IntraVENous PRN Jason Forrest MD   4 mg at 02/04/20 1342    [DISCONTINUED] lidocaine (PF) (XYLOCAINE) 20 mg/mL (2 %) injection   IntraVENous PRN Jason Forrest MD   60 mg at 02/04/20 1340    [DISCONTINUED] propofol (DIPRIVAN) 10 mg/mL injection   IntraVENous PRN Jason Forrest MD   100 mg at 02/04/20 1334    [DISCONTINUED] propofol (DIPRIVAN) 10 mg/mL injection   IntraVENous CONTINUOUS Jason Forrest MD   Stopped at 02/04/20 1514    [DISCONTINUED] diphenhydrAMINE (BENADRYL) injection    PRN Jason Forrest MD   12.5 mg at 02/04/20 1353    [DISCONTINUED] bupivacaine (PF) (MARCAINE) 0.5 % (5 mg/mL) injection    PRN Slava Pennington MD   9 mL at 02/04/20 1438    [DISCONTINUED] lidocaine (XYLOCAINE) 10 mg/mL (1 %) injection    PRN Slava Pennington MD   9 mL at 02/04/20 1439    [DISCONTINUED] ondansetron (ZOFRAN) injection   IntraVENous PRN Lenox Primrose, CRNA   4 mg at 02/04/20 1507       No Known Allergies    OB History    No obstetric history on file.       Obstetric Comments   Menarche 7 or 16, LMP 5/29/29, # of children 3, age of 4st delivery 28, Hysterectomy/oophorectomy No/No, Breast bx Yes, ? LEFT 9-10 yrs ago, history of breast feeding Yes, BCP No, Hormone therapy No               ROS    Physical Exam  Exam conducted with a chaperone present. Cardiovascular:      Rate and Rhythm: Normal rate and regular rhythm. Heart sounds: Normal heart sounds. Pulmonary:      Breath sounds: Normal breath sounds. Chest:      Breasts: Breasts are symmetrical.         Right: Normal. No swelling, bleeding, inverted nipple, mass, nipple discharge, skin change or tenderness. Left: Normal. No swelling, bleeding, inverted nipple, mass, nipple discharge, skin change or tenderness. Lymphadenopathy:      Cervical:      Right cervical: No superficial, deep or posterior cervical adenopathy. Left cervical: No superficial, deep or posterior cervical adenopathy. Upper Body:      Right upper body: No supraclavicular or axillary adenopathy. Left upper body: No supraclavicular or axillary adenopathy. ASSESSMENT and PLAN    ICD-10-CM ICD-9-CM    1. Malignant neoplasm of upper-outer quadrant of left breast in female, estrogen receptor negative ([facility] C50.412 174.4     Z17.1 V86.1       Patient presents for f/u s/p BL nipple delay and LEFT SLNBx on 02/04/20, and is doing well overall. Slight discoloration of RIGHT nipple, LEFT nipple appears viable. Pt to continue to apply nitro paste to RIGHT nipple and areola BID until further notice. Discussed possible treatment with hyperbaric oxygen chamber if necessary. Addressed pt's other post-op questions. As axillary incision is covered with surgical glue, she may apply deodorant. LEFT arm numbness expected to improve or resolve. She should not wear a bra between now and her next surgery. She may shower but should not submerge in water. She should not take Excedrin for headaches, but may take her Lortab prescription. Pt is scheduled for BL mastectomies on 02/18/20, and will send BL photos each morning for further evaluation and planning. This plan was reviewed with the patient and patient agrees. All questions were answered.     Written by Catrina Brito, as dictated by Dr. Saritha Dueñas MD.

## 2020-02-05 NOTE — PROGRESS NOTES
HISTORY OF PRESENT ILLNESS Albert Davila is a 38 y.o. female. HPI   ESTABLISHED patient present for surgical follow-up. She is POD #1 after BILATERAL nipple delay and LEFT SNBX. Had some vomiting and weakness yesterday after she left the hospital.  Today feels better with only soreness. 06/10/19: LEFT breast bx, 3:00. PATH: IDC, 11mm largest glass slide measurement, nuclear grade 3, lymphovascular invasion present, ER-/NE-/HER2-. DCIS also present. 07/03/19: [facility] genetic testing: BRCA1 positive, c.5266dup variant, heterozygous. 07/18/19: LEFT breast MRI-guided bx. PATH: Benign breast tissue with fibrocystic changes, negative for in situ or invasive carcinoma. 
07/22/19 - 10/14/19: Carbo/taxol. c3d15 delayed one week due to neutropenia 10/28/19 - 12/26/19: DD AC chemo. c1 held one week due to thrombocytopenia. c3 delayed 1 week due to pt trip. C4 held due to neutropenia. 02/04/20: BL nipple delay and LEFT SLNBx. PATH pending. ROS Physical Exam 
 
ASSESSMENT and PLAN 
{ASSESSMENT/PLAN:[id number]}

## 2020-02-05 NOTE — TELEPHONE ENCOUNTER
Patient called and asked if she should take the dressing off when she showers. I reviewed with Dr. Beatrice Councilman who said she is to take the NTG paste off, shower gently, and then put the NTG paste back on. I let her know this. She was appreciative.

## 2020-02-07 ENCOUNTER — TELEPHONE (OUTPATIENT)
Dept: SURGERY | Age: 45
End: 2020-02-07

## 2020-02-07 ENCOUNTER — OFFICE VISIT (OUTPATIENT)
Dept: CARDIOLOGY CLINIC | Age: 45
End: 2020-02-07

## 2020-02-07 VITALS
DIASTOLIC BLOOD PRESSURE: 64 MMHG | HEIGHT: 63 IN | BODY MASS INDEX: 23.04 KG/M2 | HEART RATE: 96 BPM | SYSTOLIC BLOOD PRESSURE: 118 MMHG | WEIGHT: 130 LBS | OXYGEN SATURATION: 98 % | RESPIRATION RATE: 16 BRPM

## 2020-02-07 DIAGNOSIS — Z17.1 MALIGNANT NEOPLASM OF UPPER-OUTER QUADRANT OF LEFT BREAST IN FEMALE, ESTROGEN RECEPTOR NEGATIVE (HCC): Primary | ICD-10-CM

## 2020-02-07 DIAGNOSIS — C50.412 MALIGNANT NEOPLASM OF UPPER-OUTER QUADRANT OF LEFT BREAST IN FEMALE, ESTROGEN RECEPTOR NEGATIVE (HCC): Primary | ICD-10-CM

## 2020-02-07 DIAGNOSIS — Z01.810 PREOP CARDIOVASCULAR EXAM: ICD-10-CM

## 2020-02-07 NOTE — LETTER
2/7/2020 4:07 PM 
 
Ms. [address] [place] [id number] To Whom it may Concern. Figueroa Fraire underwent cardiac evaluation and based on the available data he/she is a low to intermediate risk candidate from cardiac standpoint to undergo an low to intermediate risk surgery. Figueroa Fraire may proceed with his/her planned breast reconstruction surgery. Thanks for giving us the opportunity to participate in the care of your patient. Sincerely, Adelina Manning MD

## 2020-02-07 NOTE — PROGRESS NOTES
Reason for Consult: PreOp evaluation. HPI: Yue Wilson is a 38 y.o. female recently diagnosed with left breast cancer underwent DD AC chemotherapy initiated on 2019. Baseline echocardiogram at that time demonstrated normal EF with normal strain imaging. She underwent first type of reconstruction surgery 2 days ago without any difficulty. She is being sent over for preop evaluation for further surgery and reconstruction. As such did not does not have any symptoms of chest pain, shortness of breath, lightheadedness, dizziness, presyncope syncope. No previous cardiac history. Family history is significant for cardiac history. Does not smoke tobacco.    EKG from 2020 was personally reviewed. EKG demonstrated normal sinus rhythm, normal axis, normal intervals, normal ST segment. EKG strips from 2020 were personally reviewed. EKG demonstrated normal sinus rhythm with normal ST segment. Plan:    1. Preop evaluation: Currently asymptomatic. No significant risk factors. Baseline echo LV function in 2019 was normal.  She is a low to intermediate risk candidate for a low to intermediate risk surgery. She can proceed with her breast reconstruction surgery without any further evaluation. ATTENTION:   This medical record was transcribed using an electronic medical records/speech recognition system. Although proofread, it may and can contain electronic, spelling and other errors. Corrections may be executed at a later time. Please feel free to contact us for any clarifications as needed.       Past Medical History:   Diagnosis Date    Breast cancer ([facility] [address] 2019    left breast cancer            Past Surgical History:   Procedure Laterality Date    HX APPENDECTOMY      HX  SECTION      x3             Family History   Problem Relation Age of Onset    Diabetes Mother     Heart Disease Mother     Hypertension Mother     Heart Disease Father    Flores Cancer Paternal Aunt         Breast    Breast Cancer Paternal Aunt     Cancer Paternal Aunt         Breast    Breast Cancer Paternal Aunt            Social History     Socioeconomic History    Marital status:      Spouse name: Not on file    Number of children: Not on file    Years of education: Not on file    Highest education level: Not on file   Occupational History    Not on file   Social Needs    Financial resource strain: Not on file    Food insecurity:     Worry: Not on file     Inability: Not on file    Transportation needs:     Medical: Not on file     Non-medical: Not on file   Tobacco Use    Smoking status: Never Smoker    Smokeless tobacco: Never Used   Substance and Sexual Activity    Alcohol use: Not Currently    Drug use: Never    Sexual activity: Yes   Lifestyle    Physical activity:     Days per week: Not on file     Minutes per session: Not on file    Stress: Not on file   Relationships    Social connections:     Talks on phone: Not on file     Gets together: Not on file     Attends Jainism service: Not on file     Active member of club or organization: Not on file     Attends meetings of clubs or organizations: Not on file     Relationship status: Not on file    Intimate partner violence:     Fear of current or ex partner: Not on file     Emotionally abused: Not on file     Physically abused: Not on file     Forced sexual activity: Not on file   Other Topics Concern    Not on file   Social History Narrative    Not on file         No Known Allergies         Current Outpatient Medications   Medication Sig Dispense Refill    HYDROcodone-acetaminophen (NORCO) 7.5-325 mg per tablet Take 1 Tab by mouth every four (4) hours as needed for Pain for up to 7 days. Max Daily Amount: 6 Tabs. 20 Tab 0    nitroglycerin (NITRO-BID) 2 % ointment Apply 1 Inch to affected area two (2) times a day. As directed 60 g 0    atorvastatin (LIPITOR) 40 mg tablet Take 40 mg by mouth nightly. Indications: \"Repurposed/Cancer fighting\"      metFORMIN (GLUCOPHAGE) 500 mg tablet Take 500 mg by mouth two (2) times daily (with meals). Indications: Repurposed/Cancer Fighting      MEBENDAZOLE PO Take 100 mg by mouth daily. Indications: \"Repurposed/Cancer Fighting\"      doxycycline (MONODOX) 100 mg capsule Take 100 mg by mouth daily. Indications: \"Repurposed/Cancer Fighting\"      cholecalciferol, vitamin D3, (VITAMIN D3 PO) Take 2,000 Units by mouth daily.  prasterone, DHEA, (DHEA PO) Take 10 mg by mouth daily.  MELATONIN PO Take 20 mg by mouth nightly as needed. ROS:  12 point review of systems was performed.  All negative except for HPI     Physical Exam:  Visit Vitals  /64 (BP 1 Location: Left arm, BP Patient Position: Sitting)   Pulse 96   Resp 16   Ht 5' 3\" (1.6 m)   Wt 130 lb (59 kg)   SpO2 98%   BMI 23.03 kg/m²       Gen:  Well-developed, well-nourished, in no acute distress  HEENT:  Pink conjunctivae, PERRL, hearing intact to voice, moist mucous membranes  Neck:  Supple, without masses, thyroid non-tender  Resp:  No accessory muscle use, clear breath sounds without wheezes rales or rhonchi  Card:  No murmurs, normal S1, S2 without thrills, bruits or peripheral edema  Abd:  Soft, non-tender, non-distended, normoactive bowel sounds are present, no palpable organomegaly and no detectable hernias  Lymph:  No cervical or inguinal adenopathy  Musc:  No cyanosis or clubbing  Skin:  No rashes or ulcers, skin turgor is good  Neuro:  Cranial nerves are grossly intact, no focal motor weakness, follows commands appropriately  Psych:  Good insight, oriented to person, place and time, alert     Labs:     Lab Results   Component Value Date/Time    WBC 5.8 01/28/2020 02:18 PM    HGB 12.0 01/28/2020 02:18 PM    HCT 37.1 01/28/2020 02:18 PM    PLATELET 988 71/04/8971 02:18 PM    MCV 98.9 01/28/2020 02:18 PM     Lab Results   Component Value Date/Time    Glucose 83 01/28/2020 02:18 PM Creatinine 0.68 01/28/2020 02:18 PM      No results found for: CHOL, CHOLPOCT, HDL, LDL, LDLC, LDLCPOC, LDLCEXT, TRIGL, TGLPOCT, CHHD, CHHDX  Lab Results   Component Value Date/Time    ALT (SGPT) 67 01/28/2020 02:18 PM    AST (SGOT) 42 (H) 01/28/2020 02:18 PM    Alk.  phosphatase 54 01/28/2020 02:18 PM    Bilirubin, direct 0.2 09/09/2019 03:52 PM    Bilirubin, total 0.5 01/28/2020 02:18 PM    Albumin 4.0 01/28/2020 02:18 PM    Protein, total 7.1 01/28/2020 02:18 PM    INR 1.1 01/28/2020 02:18 PM    Prothrombin time 10.9 01/28/2020 02:18 PM    PLATELET 073 68/29/2404 02:18 PM     Lab Results   Component Value Date/Time    INR 1.1 01/28/2020 02:18 PM    Prothrombin time 10.9 01/28/2020 02:18 PM      Lab Results   Component Value Date/Time    GFR est non-AA >60 01/28/2020 02:18 PM    GFR est AA >60 01/28/2020 02:18 PM    Creatinine 0.68 01/28/2020 02:18 PM    BUN 16 01/28/2020 02:18 PM    Sodium 141 01/28/2020 02:18 PM    Potassium 4.2 01/28/2020 02:18 PM    Chloride 105 01/28/2020 02:18 PM    CO2 30 01/28/2020 02:18 PM     No results found for: PSA, Laila Washington, PSAR3, [id number], [id number], PSALT  No results found for: TSH, TSH2, TSH3, TSHP, TSHELE, TSHEXT, TT3, T3U, T3UP, FRT3, FT3, FT4, FT4P, T4, T4P, FT4T, TT7, TSHEXT   Lab Results   Component Value Date/Time    Glucose 83 01/28/2020 02:18 PM      Lab Results   Component Value Date/Time    Troponin-I, Qt. <0.05 12/26/2019 02:05 PM      No results found for: BNP, BNPP, BNPPPOC, XBNPT, BNPNT   Lab Results   Component Value Date/Time    Sodium 141 01/28/2020 02:18 PM    Potassium 4.2 01/28/2020 02:18 PM    Chloride 105 01/28/2020 02:18 PM    CO2 30 01/28/2020 02:18 PM    Anion gap 6 01/28/2020 02:18 PM    Glucose 83 01/28/2020 02:18 PM    BUN 16 01/28/2020 02:18 PM    Creatinine 0.68 01/28/2020 02:18 PM    BUN/Creatinine ratio 24 (H) 01/28/2020 02:18 PM    GFR est AA >60 01/28/2020 02:18 PM    GFR est non-AA >60 01/28/2020 02:18 PM    Calcium 9.3 01/28/2020 02:18 PM      Lab Results   Component Value Date/Time    Sodium 141 01/28/2020 02:18 PM    Potassium 4.2 01/28/2020 02:18 PM    Chloride 105 01/28/2020 02:18 PM    CO2 30 01/28/2020 02:18 PM    Anion gap 6 01/28/2020 02:18 PM    Glucose 83 01/28/2020 02:18 PM    BUN 16 01/28/2020 02:18 PM    Creatinine 0.68 01/28/2020 02:18 PM    BUN/Creatinine ratio 24 (H) 01/28/2020 02:18 PM    GFR est AA >60 01/28/2020 02:18 PM    GFR est non-AA >60 01/28/2020 02:18 PM    Calcium 9.3 01/28/2020 02:18 PM    Bilirubin, total 0.5 01/28/2020 02:18 PM    ALT (SGPT) 67 01/28/2020 02:18 PM    AST (SGOT) 42 (H) 01/28/2020 02:18 PM    Alk. phosphatase 54 01/28/2020 02:18 PM    Protein, total 7.1 01/28/2020 02:18 PM    Albumin 4.0 01/28/2020 02:18 PM    Globulin 3.1 01/28/2020 02:18 PM    A-G Ratio 1.3 01/28/2020 02:18 PM      No results found for: HBA1C, PYR0HHOA, HGBE8, ZOX1EDPK, YJN0KBTT      No results for input(s): CPK, CKMB, TROIQ in the last 72 hours. No lab exists for component: CKQMB, CPKMB        Problem List:     Problem List  Date Reviewed: 2/7/2020          Codes Class Noted    Malignant neoplasm of upper-outer quadrant of left breast in female, [facility] ICD-10-CM: C50.412, Z17.1  ICD-9-CM: 174.4, V86.1  7/5/2019    Overview Addendum 2/5/2020 10:08 AM by Payam Rinaldi RN     06/10/19: LEFT breast bx, 3:00. PATH: IDC, 11mm largest glass slide measurement, nuclear grade 3, lymphovascular invasion present, ER-/[place]-/HER2-. DCIS also present. 07/03/19: [facility] genetic testing: BRCA1 positive, c.5266dup variant, heterozygous. 07/18/19: LEFT breast MRI-guided bx. PATH: Benign breast tissue with fibrocystic changes, negative for in situ or invasive carcinoma.    07/22/19 - 10/14/19: Carbo/taxol. M6V62 delayed one week due to neutropenia     10/28/19 - 12/26/19: DD AC chemo. c1 held one week due to thrombocytopenia. c3 delayed 1 week due to pt trip.  C4 held due to neutropenia. 02/04/20: BL nipple delay and LEFT SLNBx. PATH pending. William Mishra MD, [facility]

## 2020-02-07 NOTE — PROGRESS NOTES
Kemp Romberg is a 38 y.o. female    Chief Complaint   Patient presents with    Surgical Clearance     Pt states she hasn't taken any mediations since Saturday for surgery. Visit Vitals  /64 (BP 1 Location: Left arm, BP Patient Position: Sitting)   Pulse 96   Resp 16   Ht 5' 3\" (1.6 m)   Wt 130 lb (59 kg)   SpO2 98%   BMI 23.03 kg/m²       1. Have you been to the ER, urgent care clinic since your last visit? Hospitalized since your last visit? No    2. Have you seen or consulted any other health care providers outside of the [address] [place] [address] since your last visit? Include any pap smears or colon screening.  No

## 2020-02-10 ENCOUNTER — TELEPHONE (OUTPATIENT)
Dept: CARDIOLOGY CLINIC | Age: 45
End: 2020-02-10

## 2020-02-10 NOTE — TELEPHONE ENCOUNTER
Cecilia with [facility] inquiring about the results of the clearance.  Please advise      Phone:[phone]   Fax [phone]

## 2020-02-17 ENCOUNTER — ANESTHESIA EVENT (OUTPATIENT)
Dept: SURGERY | Age: 45
End: 2020-02-17
Payer: OTHER GOVERNMENT

## 2020-02-18 ENCOUNTER — HOSPITAL ENCOUNTER (OUTPATIENT)
Age: 45
Setting detail: OBSERVATION
Discharge: HOME | End: 2020-02-19
Attending: SURGERY | Admitting: SURGERY
Payer: OTHER GOVERNMENT

## 2020-02-18 ENCOUNTER — ANESTHESIA (OUTPATIENT)
Dept: SURGERY | Age: 45
End: 2020-02-18
Payer: OTHER GOVERNMENT

## 2020-02-18 DIAGNOSIS — C50.412 MALIGNANT NEOPLASM OF UPPER-OUTER QUADRANT OF LEFT BREAST IN FEMALE, ESTROGEN RECEPTOR NEGATIVE (HCC): ICD-10-CM

## 2020-02-18 DIAGNOSIS — Z17.0 MALIGNANT NEOPLASM OF UPPER-OUTER QUADRANT OF LEFT BREAST IN FEMALE, ESTROGEN RECEPTOR POSITIVE (HCC): ICD-10-CM

## 2020-02-18 DIAGNOSIS — Z17.1 MALIGNANT NEOPLASM OF UPPER-OUTER QUADRANT OF LEFT BREAST IN FEMALE, ESTROGEN RECEPTOR NEGATIVE (HCC): ICD-10-CM

## 2020-02-18 DIAGNOSIS — C50.412 MALIGNANT NEOPLASM OF UPPER-OUTER QUADRANT OF LEFT BREAST IN FEMALE, ESTROGEN RECEPTOR POSITIVE (HCC): ICD-10-CM

## 2020-02-18 PROBLEM — Z98.890 S/P BREAST RECONSTRUCTION: Status: ACTIVE | Noted: 2020-02-18

## 2020-02-18 LAB
ABO + RH BLD: NORMAL
BLOOD GROUP ANTIBODIES SERPL: NORMAL
HCG UR QL: NEGATIVE
SPECIMEN EXP DATE BLD: NORMAL

## 2020-02-18 PROCEDURE — 77030026227 HC FUNL KELLR 2 PCH ALGN -C: Performed by: SURGERY

## 2020-02-18 PROCEDURE — 74011250637 HC RX REV CODE- 250/637: Performed by: PHYSICIAN ASSISTANT

## 2020-02-18 PROCEDURE — 81025 URINE PREGNANCY TEST: CPT

## 2020-02-18 PROCEDURE — 77030011267 HC ELECTRD BLD COVD -A: Performed by: SURGERY

## 2020-02-18 PROCEDURE — 99218 HC RM OBSERVATION: CPT

## 2020-02-18 PROCEDURE — C9290 INJ, BUPIVACAINE LIPOSOME: HCPCS | Performed by: SURGERY

## 2020-02-18 PROCEDURE — 77030002966 HC SUT PDS J&J -A: Performed by: SURGERY

## 2020-02-18 PROCEDURE — 77030018673: Performed by: SURGERY

## 2020-02-18 PROCEDURE — C1789 PROSTHESIS, BREAST, IMP: HCPCS | Performed by: SURGERY

## 2020-02-18 PROCEDURE — 74011250636 HC RX REV CODE- 250/636: Performed by: ANESTHESIOLOGY

## 2020-02-18 PROCEDURE — 74011000250 HC RX REV CODE- 250: Performed by: ANESTHESIOLOGY

## 2020-02-18 PROCEDURE — 74011000258 HC RX REV CODE- 258: Performed by: ANESTHESIOLOGY

## 2020-02-18 PROCEDURE — 77030002996 HC SUT SLK J&J -A: Performed by: SURGERY

## 2020-02-18 PROCEDURE — 77030020269 HC MISC IMPL: Performed by: SURGERY

## 2020-02-18 PROCEDURE — 77030031304 HC WAVWGD EIGR DISP INVO -D: Performed by: SURGERY

## 2020-02-18 PROCEDURE — 74011250636 HC RX REV CODE- 250/636: Performed by: NURSE ANESTHETIST, CERTIFIED REGISTERED

## 2020-02-18 PROCEDURE — L8600 IMPLANT BREAST SILICONE/EQ: HCPCS | Performed by: SURGERY

## 2020-02-18 PROCEDURE — 77030031139 HC SUT VCRL2 J&J -A: Performed by: SURGERY

## 2020-02-18 PROCEDURE — 36415 COLL VENOUS BLD VENIPUNCTURE: CPT

## 2020-02-18 PROCEDURE — 74011000250 HC RX REV CODE- 250: Performed by: PHYSICIAN ASSISTANT

## 2020-02-18 PROCEDURE — 77030026438 HC STYL ET INTUB CARD -A: Performed by: ANESTHESIOLOGY

## 2020-02-18 PROCEDURE — 74011250636 HC RX REV CODE- 250/636: Performed by: PHYSICIAN ASSISTANT

## 2020-02-18 PROCEDURE — 76210000035 HC AMBSU PH I REC 1 TO 1.5 HR: Performed by: SURGERY

## 2020-02-18 PROCEDURE — 77030010507 HC ADH SKN DERMBND J&J -B: Performed by: SURGERY

## 2020-02-18 PROCEDURE — 77030040361 HC SLV COMPR DVT MDII -B

## 2020-02-18 PROCEDURE — 74011000258 HC RX REV CODE- 258: Performed by: SURGERY

## 2020-02-18 PROCEDURE — 77030002986 HC SUT PROL J&J -A: Performed by: SURGERY

## 2020-02-18 PROCEDURE — 77030040506 HC DRN WND MDII -A: Performed by: SURGERY

## 2020-02-18 PROCEDURE — 77030018836 HC SOL IRR NACL ICUM -A: Performed by: SURGERY

## 2020-02-18 PROCEDURE — 86900 BLOOD TYPING SEROLOGIC ABO: CPT

## 2020-02-18 PROCEDURE — 74011250637 HC RX REV CODE- 250/637: Performed by: ANESTHESIOLOGY

## 2020-02-18 PROCEDURE — 77030019940 HC BLNKT HYPOTHRM STRY -B: Performed by: SURGERY

## 2020-02-18 PROCEDURE — 76030000006 HC AMB SURG OR TIME 3 TO 3.5: Performed by: SURGERY

## 2020-02-18 PROCEDURE — 74011250636 HC RX REV CODE- 250/636: Performed by: SURGERY

## 2020-02-18 PROCEDURE — 88342 IMHCHEM/IMCYTCHM 1ST ANTB: CPT

## 2020-02-18 PROCEDURE — 76060000066 HC AMB SURG ANES 3 TO 3.5 HR: Performed by: SURGERY

## 2020-02-18 PROCEDURE — 77030011825 HC SUPP SURG PSTOP S2SG -B: Performed by: SURGERY

## 2020-02-18 PROCEDURE — 77030011264 HC ELECTRD BLD EXT COVD -A: Performed by: SURGERY

## 2020-02-18 PROCEDURE — 77030005513 HC CATH URETH FOL11 MDII -B: Performed by: SURGERY

## 2020-02-18 PROCEDURE — 88307 TISSUE EXAM BY PATHOLOGIST: CPT

## 2020-02-18 PROCEDURE — 77030008684 HC TU ET CUF COVD -B: Performed by: ANESTHESIOLOGY

## 2020-02-18 PROCEDURE — 74011000250 HC RX REV CODE- 250: Performed by: SURGERY

## 2020-02-18 PROCEDURE — 77030002916 HC SUT ETHLN J&J -A: Performed by: SURGERY

## 2020-02-18 PROCEDURE — 77030013079 HC BLNKT BAIR HGGR 3M -A: Performed by: ANESTHESIOLOGY

## 2020-02-18 PROCEDURE — 94760 N-INVAS EAR/PLS OXIMETRY 1: CPT

## 2020-02-18 PROCEDURE — 74011000272 HC RX REV CODE- 272: Performed by: SURGERY

## 2020-02-18 PROCEDURE — 74011000250 HC RX REV CODE- 250: Performed by: NURSE ANESTHETIST, CERTIFIED REGISTERED

## 2020-02-18 PROCEDURE — 77030002933 HC SUT MCRYL J&J -A: Performed by: SURGERY

## 2020-02-18 PROCEDURE — 77030040922 HC BLNKT HYPOTHRM STRY -A

## 2020-02-18 PROCEDURE — 77030038269 HC DRN EXT URIN PURWCK BARD -A

## 2020-02-18 DEVICE — SILICONE GEL BREAST IMPLANT, SMOOTH ROUND, HIGH PROFILE, 330 CC
Type: IMPLANTABLE DEVICE | Site: BREAST | Status: FUNCTIONAL
Brand: SIENTRA SILICONE GEL BREAST IMPLANT

## 2020-02-18 RX ORDER — NALOXONE HYDROCHLORIDE 0.4 MG/ML
0.4 INJECTION, SOLUTION INTRAMUSCULAR; INTRAVENOUS; SUBCUTANEOUS AS NEEDED
Status: DISCONTINUED | OUTPATIENT
Start: 2020-02-18 | End: 2020-02-19 | Stop reason: HOSPADM

## 2020-02-18 RX ORDER — NEOSTIGMINE METHYLSULFATE 1 MG/ML
INJECTION INTRAVENOUS AS NEEDED
Status: DISCONTINUED | OUTPATIENT
Start: 2020-02-18 | End: 2020-02-18 | Stop reason: HOSPADM

## 2020-02-18 RX ORDER — ONDANSETRON 4 MG/1
4 TABLET, ORALLY DISINTEGRATING ORAL
Status: DISCONTINUED | OUTPATIENT
Start: 2020-02-18 | End: 2020-02-19 | Stop reason: HOSPADM

## 2020-02-18 RX ORDER — DEXAMETHASONE SODIUM PHOSPHATE 4 MG/ML
INJECTION, SOLUTION INTRA-ARTICULAR; INTRALESIONAL; INTRAMUSCULAR; INTRAVENOUS; SOFT TISSUE AS NEEDED
Status: DISCONTINUED | OUTPATIENT
Start: 2020-02-18 | End: 2020-02-18 | Stop reason: HOSPADM

## 2020-02-18 RX ORDER — SCOLOPAMINE TRANSDERMAL SYSTEM 1 MG/1
1 PATCH, EXTENDED RELEASE TRANSDERMAL ONCE
Status: DISCONTINUED | OUTPATIENT
Start: 2020-02-18 | End: 2020-02-19 | Stop reason: HOSPADM

## 2020-02-18 RX ORDER — HYDROMORPHONE HYDROCHLORIDE 1 MG/ML
.25-1 INJECTION, SOLUTION INTRAMUSCULAR; INTRAVENOUS; SUBCUTANEOUS
Status: DISCONTINUED | OUTPATIENT
Start: 2020-02-18 | End: 2020-02-18 | Stop reason: HOSPADM

## 2020-02-18 RX ORDER — HYDROMORPHONE HCL/0.9% NACL/PF 0.5 MG/ML
PLASTIC BAG, INJECTION (ML) INTRAVENOUS CONTINUOUS
Status: DISCONTINUED | OUTPATIENT
Start: 2020-02-18 | End: 2020-02-19

## 2020-02-18 RX ORDER — DIPHENHYDRAMINE HCL 25 MG
25 CAPSULE ORAL
Status: DISCONTINUED | OUTPATIENT
Start: 2020-02-18 | End: 2020-02-19 | Stop reason: HOSPADM

## 2020-02-18 RX ORDER — ROCURONIUM BROMIDE 10 MG/ML
INJECTION, SOLUTION INTRAVENOUS AS NEEDED
Status: DISCONTINUED | OUTPATIENT
Start: 2020-02-18 | End: 2020-02-18 | Stop reason: HOSPADM

## 2020-02-18 RX ORDER — METFORMIN HYDROCHLORIDE 500 MG/1
500 TABLET ORAL 2 TIMES DAILY WITH MEALS
Status: DISCONTINUED | OUTPATIENT
Start: 2020-02-18 | End: 2020-02-19 | Stop reason: HOSPADM

## 2020-02-18 RX ORDER — PROPOFOL 10 MG/ML
INJECTION, EMULSION INTRAVENOUS AS NEEDED
Status: DISCONTINUED | OUTPATIENT
Start: 2020-02-18 | End: 2020-02-18 | Stop reason: HOSPADM

## 2020-02-18 RX ORDER — GLYCOPYRROLATE 0.2 MG/ML
INJECTION INTRAMUSCULAR; INTRAVENOUS AS NEEDED
Status: DISCONTINUED | OUTPATIENT
Start: 2020-02-18 | End: 2020-02-18 | Stop reason: HOSPADM

## 2020-02-18 RX ORDER — ENOXAPARIN SODIUM 100 MG/ML
40 INJECTION SUBCUTANEOUS EVERY 24 HOURS
Status: DISCONTINUED | OUTPATIENT
Start: 2020-02-19 | End: 2020-02-19 | Stop reason: HOSPADM

## 2020-02-18 RX ORDER — ATORVASTATIN CALCIUM 20 MG/1
40 TABLET, FILM COATED ORAL
Status: DISCONTINUED | OUTPATIENT
Start: 2020-02-18 | End: 2020-02-19 | Stop reason: HOSPADM

## 2020-02-18 RX ORDER — SODIUM CHLORIDE, SODIUM LACTATE, POTASSIUM CHLORIDE, CALCIUM CHLORIDE 600; 310; 30; 20 MG/100ML; MG/100ML; MG/100ML; MG/100ML
100 INJECTION, SOLUTION INTRAVENOUS CONTINUOUS
Status: DISCONTINUED | OUTPATIENT
Start: 2020-02-18 | End: 2020-02-18 | Stop reason: HOSPADM

## 2020-02-18 RX ORDER — ACETAMINOPHEN 325 MG/1
650 TABLET ORAL
Status: DISCONTINUED | OUTPATIENT
Start: 2020-02-18 | End: 2020-02-19 | Stop reason: HOSPADM

## 2020-02-18 RX ORDER — LIDOCAINE HYDROCHLORIDE 10 MG/ML
0.1 INJECTION, SOLUTION EPIDURAL; INFILTRATION; INTRACAUDAL; PERINEURAL AS NEEDED
Status: DISCONTINUED | OUTPATIENT
Start: 2020-02-18 | End: 2020-02-18 | Stop reason: HOSPADM

## 2020-02-18 RX ORDER — MORPHINE SULFATE 2 MG/ML
2 INJECTION, SOLUTION INTRAMUSCULAR; INTRAVENOUS
Status: DISCONTINUED | OUTPATIENT
Start: 2020-02-18 | End: 2020-02-19 | Stop reason: HOSPADM

## 2020-02-18 RX ORDER — SODIUM CHLORIDE 0.9 % (FLUSH) 0.9 %
5-40 SYRINGE (ML) INJECTION EVERY 8 HOURS
Status: DISCONTINUED | OUTPATIENT
Start: 2020-02-18 | End: 2020-02-19 | Stop reason: HOSPADM

## 2020-02-18 RX ORDER — HYDROMORPHONE HYDROCHLORIDE 2 MG/ML
INJECTION, SOLUTION INTRAMUSCULAR; INTRAVENOUS; SUBCUTANEOUS AS NEEDED
Status: DISCONTINUED | OUTPATIENT
Start: 2020-02-18 | End: 2020-02-18 | Stop reason: HOSPADM

## 2020-02-18 RX ORDER — DOCUSATE SODIUM 100 MG/1
100 CAPSULE, LIQUID FILLED ORAL 2 TIMES DAILY
Status: DISCONTINUED | OUTPATIENT
Start: 2020-02-18 | End: 2020-02-19 | Stop reason: HOSPADM

## 2020-02-18 RX ORDER — POVIDONE-IODINE 10 %
SOLUTION, NON-ORAL TOPICAL AS NEEDED
Status: DISCONTINUED | OUTPATIENT
Start: 2020-02-18 | End: 2020-02-18 | Stop reason: HOSPADM

## 2020-02-18 RX ORDER — ONDANSETRON 2 MG/ML
INJECTION INTRAMUSCULAR; INTRAVENOUS AS NEEDED
Status: DISCONTINUED | OUTPATIENT
Start: 2020-02-18 | End: 2020-02-18 | Stop reason: HOSPADM

## 2020-02-18 RX ORDER — LIDOCAINE HYDROCHLORIDE 20 MG/ML
INJECTION, SOLUTION EPIDURAL; INFILTRATION; INTRACAUDAL; PERINEURAL AS NEEDED
Status: DISCONTINUED | OUTPATIENT
Start: 2020-02-18 | End: 2020-02-18 | Stop reason: HOSPADM

## 2020-02-18 RX ORDER — DIAZEPAM 5 MG/1
5 TABLET ORAL
Status: DISCONTINUED | OUTPATIENT
Start: 2020-02-18 | End: 2020-02-19 | Stop reason: HOSPADM

## 2020-02-18 RX ORDER — MIDAZOLAM HYDROCHLORIDE 1 MG/ML
INJECTION, SOLUTION INTRAMUSCULAR; INTRAVENOUS AS NEEDED
Status: DISCONTINUED | OUTPATIENT
Start: 2020-02-18 | End: 2020-02-18 | Stop reason: HOSPADM

## 2020-02-18 RX ORDER — SODIUM CHLORIDE 9 MG/ML
75 INJECTION, SOLUTION INTRAVENOUS CONTINUOUS
Status: DISCONTINUED | OUTPATIENT
Start: 2020-02-18 | End: 2020-02-19

## 2020-02-18 RX ORDER — FENTANYL CITRATE 50 UG/ML
INJECTION, SOLUTION INTRAMUSCULAR; INTRAVENOUS AS NEEDED
Status: DISCONTINUED | OUTPATIENT
Start: 2020-02-18 | End: 2020-02-18 | Stop reason: HOSPADM

## 2020-02-18 RX ORDER — PROCHLORPERAZINE EDISYLATE 5 MG/ML
10 INJECTION INTRAMUSCULAR; INTRAVENOUS
Status: DISCONTINUED | OUTPATIENT
Start: 2020-02-18 | End: 2020-02-19 | Stop reason: HOSPADM

## 2020-02-18 RX ORDER — SODIUM CHLORIDE 0.9 % (FLUSH) 0.9 %
5-40 SYRINGE (ML) INJECTION AS NEEDED
Status: DISCONTINUED | OUTPATIENT
Start: 2020-02-18 | End: 2020-02-19 | Stop reason: HOSPADM

## 2020-02-18 RX ADMIN — Medication: at 17:34

## 2020-02-18 RX ADMIN — PROMETHAZINE HYDROCHLORIDE 6.25 MG: 25 INJECTION INTRAMUSCULAR; INTRAVENOUS at 17:03

## 2020-02-18 RX ADMIN — HYDROMORPHONE HYDROCHLORIDE 0.5 MG: 1 INJECTION, SOLUTION INTRAMUSCULAR; INTRAVENOUS; SUBCUTANEOUS at 16:45

## 2020-02-18 RX ADMIN — FENTANYL CITRATE 50 MCG: 50 INJECTION INTRAMUSCULAR; INTRAVENOUS at 14:28

## 2020-02-18 RX ADMIN — ACETAMINOPHEN 650 MG: 325 TABLET ORAL at 21:53

## 2020-02-18 RX ADMIN — GLYCOPYRROLATE 0.5 MG: 0.2 INJECTION, SOLUTION INTRAMUSCULAR; INTRAVENOUS at 15:59

## 2020-02-18 RX ADMIN — PROPOFOL 150 MG: 10 INJECTION, EMULSION INTRAVENOUS at 13:01

## 2020-02-18 RX ADMIN — ROCURONIUM BROMIDE 40 MG: 50 INJECTION, SOLUTION INTRAVENOUS at 13:01

## 2020-02-18 RX ADMIN — ROCURONIUM BROMIDE 50 MG: 50 INJECTION, SOLUTION INTRAVENOUS at 14:12

## 2020-02-18 RX ADMIN — Medication 10 ML: at 18:02

## 2020-02-18 RX ADMIN — CEFAZOLIN 2 G: 1 INJECTION, POWDER, FOR SOLUTION INTRAMUSCULAR; INTRAVENOUS at 13:11

## 2020-02-18 RX ADMIN — WATER 2 G: 1 INJECTION INTRAMUSCULAR; INTRAVENOUS; SUBCUTANEOUS at 21:06

## 2020-02-18 RX ADMIN — HYDROMORPHONE HYDROCHLORIDE 0.5 MG: 2 INJECTION INTRAMUSCULAR; INTRAVENOUS; SUBCUTANEOUS at 16:15

## 2020-02-18 RX ADMIN — FENTANYL CITRATE 100 MCG: 50 INJECTION INTRAMUSCULAR; INTRAVENOUS at 13:01

## 2020-02-18 RX ADMIN — ONDANSETRON 4 MG: 2 INJECTION INTRAMUSCULAR; INTRAVENOUS at 13:28

## 2020-02-18 RX ADMIN — SODIUM CHLORIDE 75 ML/HR: 900 INJECTION, SOLUTION INTRAVENOUS at 16:32

## 2020-02-18 RX ADMIN — DOCUSATE SODIUM 100 MG: 100 CAPSULE, LIQUID FILLED ORAL at 21:09

## 2020-02-18 RX ADMIN — FENTANYL CITRATE 50 MCG: 50 INJECTION INTRAMUSCULAR; INTRAVENOUS at 13:54

## 2020-02-18 RX ADMIN — ROCURONIUM BROMIDE 10 MG: 50 INJECTION, SOLUTION INTRAVENOUS at 13:55

## 2020-02-18 RX ADMIN — FENTANYL CITRATE 50 MCG: 50 INJECTION INTRAMUSCULAR; INTRAVENOUS at 14:58

## 2020-02-18 RX ADMIN — SODIUM CHLORIDE, POTASSIUM CHLORIDE, SODIUM LACTATE AND CALCIUM CHLORIDE: 600; 310; 30; 20 INJECTION, SOLUTION INTRAVENOUS at 15:27

## 2020-02-18 RX ADMIN — HYDROMORPHONE HYDROCHLORIDE 0.5 MG: 2 INJECTION INTRAMUSCULAR; INTRAVENOUS; SUBCUTANEOUS at 16:03

## 2020-02-18 RX ADMIN — Medication 10 ML: at 21:10

## 2020-02-18 RX ADMIN — HYDROMORPHONE HYDROCHLORIDE 0.5 MG: 2 INJECTION INTRAMUSCULAR; INTRAVENOUS; SUBCUTANEOUS at 16:30

## 2020-02-18 RX ADMIN — HYDROMORPHONE HYDROCHLORIDE 0.5 MG: 2 INJECTION INTRAMUSCULAR; INTRAVENOUS; SUBCUTANEOUS at 16:21

## 2020-02-18 RX ADMIN — DIAZEPAM 5 MG: 5 TABLET ORAL at 17:30

## 2020-02-18 RX ADMIN — DEXAMETHASONE SODIUM PHOSPHATE 4 MG: 4 INJECTION, SOLUTION INTRAMUSCULAR; INTRAVENOUS at 13:28

## 2020-02-18 RX ADMIN — SODIUM CHLORIDE, POTASSIUM CHLORIDE, SODIUM LACTATE AND CALCIUM CHLORIDE: 600; 310; 30; 20 INJECTION, SOLUTION INTRAVENOUS at 12:56

## 2020-02-18 RX ADMIN — MIDAZOLAM 2 MG: 1 INJECTION INTRAMUSCULAR; INTRAVENOUS at 12:59

## 2020-02-18 RX ADMIN — NEOSTIGMINE METHYLSULFATE 3 MG: 1 INJECTION, SOLUTION INTRAVENOUS at 15:59

## 2020-02-18 RX ADMIN — LIDOCAINE HYDROCHLORIDE 80 MG: 20 INJECTION, SOLUTION INTRAVENOUS at 13:00

## 2020-02-18 RX ADMIN — PROPOFOL 50 MG: 10 INJECTION, EMULSION INTRAVENOUS at 13:03

## 2020-02-18 NOTE — DISCHARGE INSTRUCTIONS
Patient Instructions for Breast Reconstruction- Direct to Implant      Immediately Following Surgery:  After surgery you will rest quietly for the first 48 hours in the hospital.  When you arrive home you will be able to walk around the house and perform light daily activities. You will feel tight across your chest and this usually resolves in 10-14 days. This will gradually subside and Dr. Mendel Hurt will give you pain medication to relieve it. You must take the entire prescription of antibiotics. Dr. Mendel Hurt encourages walking immediately after surgery. This activity will greatly minimize the risk of deep clots in your leg veins. You will have two small incisions in you breast area area from which small drain tubes are placed. These drains collect fluid under the skin normally produced after the procedure. The drains will remain for approximately 10 to 14 days days and Dr. Mendel Hurt will remove them in the office during your follow up visits. You will be given a log to chart the drainage twice per day while the drains are in. Dr. Mendel Hurt and his staff will teach you to do this. You will be required to lay and sleep with your head elevated. This will relieve swelling from the breast area and make you feel better. You will sleep in the position for approximately seven days. You may then shower 48 hours after surgery. Gently allow the soap and water to run over the incisions and drains. You will have small tape strips covering the breast incisions, which will remain in place for seven to ten days. You may get these tape strips and the drains wet in the shower. Eventually these tape strips will peel off on their own. You may use any normal over-the-counter anti-bacterial soap (Dove, Dial, etc.)    Do not submerge yourself in a bath, swimming pool, or whirlpool for 3 weeks until your incisions are completely healed. A few patients react to the anesthetic after surgery with nausea and vomiting. This usually lasts less than 24 hours and should be treated with copious amounts of fluids. Dr. Sean Centeno will prescribe nausea medicine for during your preoperative visit. You should plan to be off work for up to 14 days, although this can vary from person to person. Additional Post-Operative Instructions:  No heavy exercise or lifting for a minimum of four weeks following surgery. Dr. Sean Centeno will then allow you to begin cardio exercises at three weeks and weight lifting at six weeks. This will allow the skin to adhere to it new position. Please call Dr. Sean Centeno immediately if you experience any of the following after surgery:   Fever above 100.5° F   Redness around the incisions   Shortness of breath, chest pain, or dizziness   Severe nausea or vomiting  Remember: You will experience a rapid improvement in swelling during the first few weeks, but it takes several months for all of the swelling to go completely away. You will see gradual improvement in the final results for three to four months after surgery. If you need help or have any questions, feel free to call Dr Sean Centeno at [phone] with your concerns. Dr. Sean Centeno is on call 24 hours per day, seven days per week and has an answering service to forward calls. The quality of your cosmetic enhancement may be compromised if you fail to return for any scheduled post-op visits, or follow the pre and post-operative instructions. Dont hesitate to report any unusual or concerning changes to Dr. Sean Centeno. If you have any change in health or any questions, please do not hesitate to call us at [phone]. Drain Care Instructions    Your surgery required the placement of drains to remove excess fluids from your wounds. You will notice clear tubes exiting your body connecting to small reservoirs. You will be taught how to care for the drains after surgery. 1)  Please empty the drains every twelve hours and record the volumes.     To do this, open the small lid on top of bulb and pour the drainage into a small container to measure. It is important that the volume of each drain is recorded separately. Every 24 hours total the individual drain outputs. When finished, squeeze the bulb and hold while replacing the lid. The bulb needs to be collapsed in order to be effective. After the volume becomes low enough the drains will be removed. 2) Please strip the drains every six hours while awake and as needed.  Stripping the drain tubes removes clots from the tubes and allows them to drain effectively. Stripping the tubes is very important to proper drain function. To do this, grasp the tubing close to your body with one hand and stabilize it. With the other hand, grasp the tubing below the first hand. Using an alcohol pad or lotion, pinch tubing tightly, sliding fingers down the tubing and away from your body; repeat this 2-3 times. It is okay if the tube becomes flat from the suction. 3) The drainage will initially be red in color and progressively become thinner in character and change to a pale red and eventually become clear or straw colored. The time required for these changes to occur varies between patients. 4) You may shower 48 hours after surgery. Hold the drains while in the shower and let soap and water run over the incision sites. Pat dry. Please call the office if you have any questions or if we may be of assistance. Please call the office if you develop an elevated temperature or if anything concerns you.

## 2020-02-18 NOTE — PROGRESS NOTES
TRANSFER - OUT REPORT:    Verbal report given to [place], RN(name) on Jc Macias  being transferred to 4th floor Surgical(unit) for routine post - op       Report consisted of patients Situation, Background, Assessment and   Recommendations(SBAR). Information from the following report(s) SBAR, Kardex, Procedure Summary, Intake/Output, MAR and Recent Results was reviewed with the receiving nurse. Lines:   Venous Access Device Port 07/09/19 Upper chest (subclavicular area, right (Active)       Peripheral IV 02/18/20 Posterior;Right Hand (Active)   Site Assessment Clean, dry, & intact 2/18/2020  5:08 PM   Phlebitis Assessment 0 2/18/2020  5:08 PM   Infiltration Assessment 0 2/18/2020  5:08 PM   Dressing Status Clean, dry, & intact 2/18/2020  5:08 PM   Dressing Type Transparent;Tape 2/18/2020  5:08 PM   Hub Color/Line Status Pink; Infusing 2/18/2020  5:08 PM   Alcohol Cap Used Yes 2/18/2020  5:08 PM        Opportunity for questions and clarification was provided.       Patient transported with:   O2 @ 2 liters  Registered Nurse

## 2020-02-18 NOTE — BRIEF OP NOTE
BRIEF OPERATIVE NOTE    Date of Procedure: 2/18/2020   Preoperative Diagnosis: LEFT BREAST CANCER  Postoperative Diagnosis: LEFT BREAST CANCER    Procedure(s):  BILATERAL BREAST SKIN AND NIPPLE SPARING MASTECTOMIES, PORT A CATH REMOVAL, BILATERAL BREAST DIRECT TO IMPLANT RECONSTRUCTION WITH ALLODERM, AND POSSIBLE TISSUE EXPANDERS  INFUSAPORT REMOVAL  BILATERAL BREAST DIRECT TO IMPLANT RECONSTRUCTION WITH ALLODERM, AND POSSIBLE TISSUE EXPANDERS  Surgeon(s) and Role:  Panel 1:     * Sherman Chan MD - Primary  Panel 2:     * Juliene Lesches, MD - Primary         Surgical Assistant: Tarun Pena    Surgical Staff:  Circ-1: Quintin Doherty RN  Scrub RN-Relief: Josse Rowan RN  Surg Asst-1: Yaneli CHEN  Event Time In Time Out   Incision Start 1330    Incision Close       Anesthesia: General   Estimated Blood Loss: 100 cc  Specimens:   ID Type Source Tests Collected by Time Destination   1 : Left Breast  Preservative Breast  Sherman Chan MD 2/18/2020 1358 Pathology   2 : Right Breast Preservative Breast  Sherman Chan MD 2/18/2020 1430 Pathology      Findings: bilateral breasts, intact portacath   Complications: none  Implants:   Implant Name Type Inv.  Item Serial No.  Lot No. LRB No. Used Action   Alloderm Select Acellular Matrix Contour Perforated    N/A ALLERGAN [id number]  1 Implanted

## 2020-02-18 NOTE — H&P
HISTORY OF PRESENT ILLNESS  Conchita Loaiza is a 38 y.o. female. HPI  ESTABLISHED patient present for surgical follow-up.  She is POD #1 after BILATERAL nipple delay and LEFT SNBX.  Had some vomiting and weakness yesterday after she left the hospital.  Today feels better with only soreness.       06/10/19: LEFT breast bx, 3:00. PATH: IDC, 11mm largest glass slide measurement, nuclear grade 3, lymphovascular invasion present, ER-/MA-/HER2-. DCIS also present.     19: [employer] genetic testing: BRCA1 positive, c.5266dup variant, heterozygous.     19: LEFT breast MRI-guided bx. PATH: Benign breast tissue with fibrocystic changes, negative for in situ or invasive carcinoma.     19 - 10/14/19: Carbo/taxol. B6O14 delayed one week due to neutropenia      10/28/19 - 19: DD AC chemo. c1 held one week due to thrombocytopenia. c3 delayed 1 week due to pt trip. C4 held due to neutropenia.     20: BL nipple delay and LEFT SLNBx.  PATH pending.             Past Medical History:   Diagnosis Date    Breast cancer ([facility] [address] 2019     left breast cancer               Past Surgical History:   Procedure Laterality Date    HX APPENDECTOMY        HX  SECTION         x3         Social History            Socioeconomic History    Marital status:        Spouse name: Not on file    Number of children: Not on file    Years of education: Not on file    Highest education level: Not on file   Occupational History    Not on file   Social Needs    Financial resource strain: Not on file    Food insecurity:       Worry: Not on file       Inability: Not on file    Transportation needs:       Medical: Not on file       Non-medical: Not on file   Tobacco Use    Smoking status: Never Smoker    Smokeless tobacco: Never Used   Substance and Sexual Activity    Alcohol use: Not Currently    Drug use: Never    Sexual activity: Yes   Lifestyle    Physical activity:       Days per week: Not on file       Minutes per session: Not on file    Stress: Not on file   Relationships    Social connections:       Talks on phone: Not on file       Gets together: Not on file       Attends Worship service: Not on file       Active member of club or organization: Not on file       Attends meetings of clubs or organizations: Not on file       Relationship status: Not on file    Intimate partner violence:       Fear of current or ex partner: Not on file       Emotionally abused: Not on file       Physically abused: Not on file       Forced sexual activity: Not on file   Other Topics Concern    Not on file   Social History Narrative    Not on file                Current Outpatient Medications on File Prior to Visit   Medication Sig Dispense Refill    HYDROcodone-acetaminophen ([employer] [address]) 7.5-325 mg per tablet Take 1 Tab by mouth every four (4) hours as needed for Pain for up to 7 days. Max Daily Amount: 6 Tabs. 20 Tab 0    nitroglycerin (NITRO-BID) 2 % ointment Apply 1 Inch to affected area two (2) times a day. As directed 60 g 0    atorvastatin (LIPITOR) 40 mg tablet Take 40 mg by mouth nightly. Indications: \"Repurposed/Cancer fighting\"        metFORMIN (GLUCOPHAGE) 500 mg tablet Take 500 mg by mouth two (2) times daily (with meals). Indications: Repurposed/Cancer Fighting        MEBENDAZOLE PO Take 100 mg by mouth daily. Indications: \"Repurposed/Cancer Fighting\"        doxycycline (MONODOX) 100 mg capsule Take 100 mg by mouth daily.  Indications: \"Repurposed/Cancer Fighting\"        cholecalciferol, vitamin D3, (VITAMIN D3 PO) Take 2,000 Units by mouth daily.        prasterone, DHEA, (DHEA PO) Take 10 mg by mouth daily.        MELATONIN PO Take 20 mg by mouth nightly as needed.                    Current Facility-Administered Medications on File Prior to Visit   Medication Dose Route Frequency Provider Last Rate Last Dose    [COMPLETED] ceFAZolin (ANCEF) 2 g in sterile water (preservative free) 20 mL IV syringe  2 g IntraVENous NOW Flor Salvador MD   2 g at 02/04/20 1331    [COMPLETED] technetium tilmanocept (LYMPHOSEEK) injection 0.5 millicurie  0.5 millicurie IntraDERMal RAD ONCE Flor Salvador MD   0.5 millicurie at 19/38/49 1960    [COMPLETED] ondansetron (ZOFRAN) injection 4 mg  4 mg IntraVENous ONCE Deneen Black MD   4 mg at 02/04/20 1304    [COMPLETED] scopolamine (TRANSDERM-SCOP) 1 mg over 3 days 1 Patch  1 Patch TransDERmal Q72H Deneen Black MD   1 Patch at 02/04/20 1329    [COMPLETED] nitroglycerin (NITROBID) 2 % ointment 1 Inch  1 Inch Topical ONCE Flor Salvador MD   1 Inch at 02/04/20 1546    [COMPLETED] HYDROcodone-acetaminophen (NORCO) 7.5-325 mg per tablet 1 Tab  1 Tab Oral ONCE Carmencita Gentile MD   1 Tab at 02/04/20 1632    [DISCONTINUED] lidocaine (PF) (XYLOCAINE) 10 mg/mL (1 %) injection 0.1 mL  0.1 mL SubCUTAneous PRN Brendan Serna MD        [DISCONTINUED] lactated Ringers infusion  125 mL/hr IntraVENous CONTINUOUS Brendan Serna  mL/hr at 02/04/20 1202 125 mL/hr at 02/04/20 1202    [DISCONTINUED] sodium chloride (NS) flush 5-40 mL  5-40 mL IntraVENous Q8H Brendan Serna MD        [DISCONTINUED] sodium chloride (NS) flush 5-40 mL  5-40 mL IntraVENous PRN Brendan Serna MD        [DISCONTINUED] naloxone [facility]) injection 0.04 mg  0.04 mg IntraVENous Shelbie Serna MD        [DISCONTINUED] flumazeniL (ROMAZICON) 0.1 mg/mL injection 0.2 mg  0.2 mg IntraVENous Shelbie Serna MD        [DISCONTINUED] promethazine [facility]) with saline injection 6.25 mg  6.25 mg IntraVENous PRN Brendan Serna MD        [DISCONTINUED] lactated Ringers infusion  125 mL/hr IntraVENous CONTINUOUS Brendan Serna MD        [DISCONTINUED] sodium chloride (NS) flush 5-40 mL  5-40 mL IntraVENous Q8H Brendan Serna MD        [DISCONTINUED] sodium chloride (NS) flush 5-40 mL  5-40 mL IntraVENous PRN rBendan Serna MD        [DISCONTINUED] fentaNYL citrate (PF) injection 25 mcg  25 mcg IntraVENous Multiple Kelly Manzanares MD        [DISCONTINUED] HYDROmorphone (DILAUDID) syringe 0.25-1 mg  0.25-1 mg IntraVENous Q10MIN PRN Kelly Manzanares MD        [DISCONTINUED] albuterol (PROVENTIL VENTOLIN) nebulizer solution 2.5 mg  2.5 mg Inhalation PRN Kelly Manzanares MD        [DISCONTINUED] ondansetron [facility]) injection 4 mg  4 mg IntraVENous PRN Kelly Manzanares MD        [DISCONTINUED] diphenhydrAMINE (BENADRYL) injection 12.5 mg  12.5 mg IntraVENous PRN Kelly Manzanares MD        [DISCONTINUED] fentaNYL citrate (PF) 50 mcg/mL injection                [DISCONTINUED] fentaNYL citrate (PF) injection   IntraVENous PRN Lewis Winter MD   25 mcg at 02/04/20 1410    [DISCONTINUED] midazolam (VERSED) injection   IntraVENous PRN Tristan Sicard, MD   2 mg at 02/04/20 1324    [DISCONTINUED] dexamethasone (DECADRON) 4 mg/mL injection   IntraVENous PRN Tristan Sicard, MD   4 mg at 02/04/20 1342    [DISCONTINUED] lidocaine (PF) (XYLOCAINE) 20 mg/mL (2 %) injection   IntraVENous PRN Tristan Sicard, MD   60 mg at 02/04/20 1340    [DISCONTINUED] propofol (DIPRIVAN) 10 mg/mL injection   IntraVENous PRN Tristan Sicard, MD   100 mg at 02/04/20 1334    [DISCONTINUED] propofol (DIPRIVAN) 10 mg/mL injection   IntraVENous CONTINUOUS Tristan Sicard, MD   Stopped at 02/04/20 1514    [DISCONTINUED] diphenhydrAMINE (BENADRYL) injection     PRN Tristan Sicard, MD   12.5 mg at 02/04/20 1353    [DISCONTINUED] bupivacaine (PF) (MARCAINE) 0.5 % (5 mg/mL) injection     PRN Soraida Tobias MD   9 mL at 02/04/20 1438    [DISCONTINUED] lidocaine (XYLOCAINE) 10 mg/mL (1 %) injection     PRN Soraida Tobias MD   9 mL at 02/04/20 1439    [DISCONTINUED] ondansetron (ZOFRAN) injection   IntraVENous PRN Michael Johnson CRNA   4 mg at 02/04/20 1507         No Known Allergies     OB History    No obstetric history on file.       Obstetric Comments   Menarche 7 or 13, LMP 5/29/29, # of children 3, age of 4st delivery 28, Hysterectomy/oophorectomy No/No, Breast bx Yes, ? LEFT 9-10 yrs ago, history of breast feeding Yes, BCP No, Hormone therapy No                   ROS     Physical Exam  Exam conducted with a chaperone present. Cardiovascular:      Rate and Rhythm: Normal rate and regular rhythm. Heart sounds: Normal heart sounds. Pulmonary:      Breath sounds: Normal breath sounds. Chest:      Breasts: Breasts are symmetrical.         Right: Normal. No swelling, bleeding, inverted nipple, mass, nipple discharge, skin change or tenderness. Left: Normal. No swelling, bleeding, inverted nipple, mass, nipple discharge, skin change or tenderness. Lymphadenopathy:      Cervical:      Right cervical: No superficial, deep or posterior cervical adenopathy. Left cervical: No superficial, deep or posterior cervical adenopathy. Upper Body:      Right upper body: No supraclavicular or axillary adenopathy. Left upper body: No supraclavicular or axillary adenopathy.       ASSESSMENT and PLAN      ICD-10-CM ICD-9-CM     1. Malignant [facility] of upper-outer quadrant of left [facility], [facility] C50.412 174. 4       Z17.1 V86. 1        Patient presents for f/u s/p BL nipple delay and LEFT SLNBx on 02/04/20, and is doing well overall. Slight discoloration of RIGHT nipple, LEFT nipple appears viable. Pt to continue to apply nitro paste to RIGHT nipple and areola BID until further notice. Discussed possible treatment with hyperbaric oxygen chamber if necessary.     Addressed pt's other post-op questions. As axillary incision is covered with surgical glue, she may apply deodorant. LEFT arm numbness expected to improve or resolve. She should not wear a bra between now and her next surgery. She may shower but should not submerge in water.  She should not take Excedrin for headaches, but may take her Lortab prescription.     Pt is scheduled for BL mastectomies on 02/18/20, and will send BL photos each morning for further evaluation and planning. This plan was reviewed with the patient and patient agrees.  All questions were answered.

## 2020-02-18 NOTE — ANESTHESIA POSTPROCEDURE EVALUATION
Procedure(s):  BILATERAL BREAST SKIN AND NIPPLE SPARING MASTECTOMIES, PORT A CATH REMOVAL, BILATERAL BREAST DIRECT TO IMPLANT RECONSTRUCTION WITH ALLODERM, AND POSSIBLE TISSUE EXPANDERS  INFUSAPORT REMOVAL  BILATERAL BREAST DIRECT TO IMPLANT RECONSTRUCTION WITH ALLODERM. general    Anesthesia Post Evaluation        Patient location during evaluation: PACU  Level of consciousness: awake  Pain management: adequate  Airway patency: patent  Anesthetic complications: no  Cardiovascular status: acceptable  Respiratory status: acceptable  Hydration status: acceptable  Post anesthesia nausea and vomiting:  none      Vitals Value Taken Time   /76 2/18/2020  5:30 PM   Temp 36.6 °C (97.9 °F) 2/18/2020  4:33 PM   Pulse 82 2/18/2020  5:32 PM   Resp 16 2/18/2020  5:32 PM   SpO2 100 % 2/18/2020  5:32 PM   Vitals shown include unvalidated device data.

## 2020-02-18 NOTE — PROGRESS NOTES
Problem: Pain  Goal: *Control of Pain  Outcome: Progressing Towards Goal     Problem: Patient Education: Go to Patient Education Activity  Goal: Patient/Family Education  Outcome: Progressing Towards Goal     Problem: Patient Education: Go to Patient Education Activity  Goal: Patient/Family Education  Outcome: Progressing Towards Goal     Problem: Surgical Pathway Day of Surgery  Goal: Activity/Safety  Outcome: Progressing Towards Goal  Goal: Consults, if ordered  Outcome: Progressing Towards Goal  Goal: Nutrition/Diet  Outcome: Progressing Towards Goal  Goal: Medications  Outcome: Progressing Towards Goal  Goal: Respiratory  Outcome: Progressing Towards Goal  Goal: Treatments/Interventions/Procedures  Outcome: Progressing Towards Goal  Goal: Psychosocial  Outcome: Progressing Towards Goal  Goal: *No signs and symptoms of infection or wound complications  Outcome: Progressing Towards Goal  Goal: *Optimal pain control at patient's stated goal  Outcome: Progressing Towards Goal  Goal: *Adequate urinary output (equal to or greater than 30 milliliters/hour)  Description  Ambulatory Surgery patients voiding without difficulty.   Outcome: Progressing Towards Goal  Goal: *Hemodynamically stable  Outcome: Progressing Towards Goal  Goal: *Tolerating diet  Outcome: Progressing Towards Goal  Goal: *Demonstrates progressive activity  Outcome: Progressing Towards Goal     Problem: Surgical Pathway Post-Op Day 1  Goal: Activity/Safety  Outcome: Progressing Towards Goal  Goal: Diagnostic Test/Procedures  Outcome: Progressing Towards Goal  Goal: Nutrition/Diet  Outcome: Progressing Towards Goal  Goal: Discharge Planning  Outcome: Progressing Towards Goal  Goal: Medications  Outcome: Progressing Towards Goal  Goal: Respiratory  Outcome: Progressing Towards Goal  Goal: Treatments/Interventions/Procedures  Outcome: Progressing Towards Goal  Goal: Psychosocial  Outcome: Progressing Towards Goal  Goal: *No signs and symptoms of infection or wound complications  Outcome: Progressing Towards Goal  Goal: *Optimal pain control at patient's stated goal  Outcome: Progressing Towards Goal  Goal: *Adequate urinary output (equal to or greater than 30 milliliters/hour)  Outcome: Progressing Towards Goal  Goal: *Hemodynamically stable  Outcome: Progressing Towards Goal  Goal: *Tolerating diet  Outcome: Progressing Towards Goal  Goal: *Demonstrates progressive activity  Outcome: Progressing Towards Goal  Goal: *Lungs clear or at baseline  Outcome: Progressing Towards Goal     Problem: Surgical Pathway Post-Op Day 2 through Discharge  Goal: Activity/Safety  Outcome: Progressing Towards Goal  Goal: Nutrition/Diet  Outcome: Progressing Towards Goal  Goal: Discharge Planning  Outcome: Progressing Towards Goal  Goal: Medications  Outcome: Progressing Towards Goal  Goal: Respiratory  Outcome: Progressing Towards Goal  Goal: Treatments/Interventions/Procedures  Outcome: Progressing Towards Goal  Goal: Psychosocial  Outcome: Progressing Towards Goal  Goal: *No signs and symptoms of infection or wound complications  Outcome: Progressing Towards Goal  Goal: *Optimal pain control at patient's stated goal  Outcome: Progressing Towards Goal  Goal: *Adequate urinary output (equal to or greater than 30 milliliters/hour)  Outcome: Progressing Towards Goal  Goal: *Hemodynamically stable  Outcome: Progressing Towards Goal  Goal: *Tolerating diet  Outcome: Progressing Towards Goal  Goal: *Demonstrates progressive activity  Outcome: Progressing Towards Goal  Goal: *Lungs clear or at baseline  Outcome: Progressing Towards Goal     Problem: Surgical Pathway: Discharge Outcomes  Goal: *Hemodynamically stable  Outcome: Progressing Towards Goal  Goal: *Lungs clear or at baseline  Outcome: Progressing Towards Goal  Goal: *Demonstrates independent activity or return to baseline  Outcome: Progressing Towards Goal  Goal: *Optimal pain control at patient's stated goal  Outcome: Progressing Towards Goal  Goal: *Verbalizes understanding and describes prescribed diet  Outcome: Progressing Towards Goal  Goal: *Tolerating diet  Outcome: Progressing Towards Goal  Goal: *Verbalizes name, dosage, time, side effects, and number of days to continue medications  Outcome: Progressing Towards Goal  Goal: *No signs and symptoms of infection or wound complications  Outcome: Progressing Towards Goal  Goal: *Anxiety reduced or absent  Outcome: Progressing Towards Goal  Goal: *Understands and describes signs and symptoms to report to providers(Stroke Metric)  Outcome: Progressing Towards Goal  Goal: *Describes follow-up/return visits to physicians  Outcome: Progressing Towards Goal  Goal: *Describes available resources and support systems  Outcome: Progressing Towards Goal

## 2020-02-18 NOTE — BRIEF OP NOTE
BRIEF OPERATIVE NOTE    Date of Procedure: 2/18/2020   Preoperative Diagnosis: LEFT BREAST CANCER  Postoperative Diagnosis: LEFT BREAST CANCER    Procedure(s):  BILATERAL BREAST SKIN AND NIPPLE SPARING MASTECTOMIES, PORT A CATH REMOVAL, BILATERAL BREAST DIRECT TO IMPLANT RECONSTRUCTION WITH ALLODERM, AND POSSIBLE TISSUE EXPANDERS  INFUSAPORT REMOVAL  BILATERAL BREAST DIRECT TO IMPLANT RECONSTRUCTION WITH ALLODERM, AND POSSIBLE TISSUE EXPANDERS  Surgeon(s) and Role:  Panel 1:     * Po Monroe MD - Primary  Panel 2:     * Shannon Whalen MD - Primary         Surgical Assistant: karen vilchis    Surgical Staff:  Circ-1: Peter Tang RN  Scrub RN-Relief: Jack Rider RN  Surg Asst-1: Jack CHEN  Event Time In Time Out   Incision Start 1330    Incision Close       Anesthesia: General   Estimated Blood Loss: 50  Specimens:   ID Type Source Tests Collected by Time Destination   1 : Left Breast  Preservative Breast  Po Monroe MD 2/18/2020 1358 Pathology   2 : Right Breast Preservative Breast  Po Monroe MD 2/18/2020 1430 Pathology      Findings: 0   Complications: 0  Implants:   Implant Name Type Inv.  Item Serial No.  Lot No. LRB No. Used Action   Alloderm Select Acellular Matrix Contour Perforated    N/A ALLERGAN [id number] Right 1 Implanted   allergan lifecell alloderm select acellular matrix contour perf lrg 10.7cmx21.5cm med 1.2-2mm   NA ALLERGAN [place] - LIFE[employer] [id number] Left 1 Implanted   sientra opus silicone gel breast implant hsc+ high stregnth cohesive gel smooth round high projection 330cc   [id number] [employer] NA Left 1 Implanted   sientra opus silicone gel breast implant hsc+ high stregnth cohesive gel smooth round high projection 330cc   [id number] [employer] NA Right 1 Implanted

## 2020-02-19 VITALS
SYSTOLIC BLOOD PRESSURE: 117 MMHG | RESPIRATION RATE: 14 BRPM | TEMPERATURE: 98.5 F | DIASTOLIC BLOOD PRESSURE: 74 MMHG | WEIGHT: 130.07 LBS | BODY MASS INDEX: 23.05 KG/M2 | HEART RATE: 97 BPM | HEIGHT: 63 IN | OXYGEN SATURATION: 100 %

## 2020-02-19 PROCEDURE — 94760 N-INVAS EAR/PLS OXIMETRY 1: CPT

## 2020-02-19 PROCEDURE — 99218 HC RM OBSERVATION: CPT

## 2020-02-19 PROCEDURE — 74011000250 HC RX REV CODE- 250: Performed by: PHYSICIAN ASSISTANT

## 2020-02-19 PROCEDURE — 74011250636 HC RX REV CODE- 250/636: Performed by: PHYSICIAN ASSISTANT

## 2020-02-19 PROCEDURE — 74011250637 HC RX REV CODE- 250/637: Performed by: PHYSICIAN ASSISTANT

## 2020-02-19 RX ORDER — HYDROMORPHONE HYDROCHLORIDE 2 MG/1
4 TABLET ORAL
Status: DISCONTINUED | OUTPATIENT
Start: 2020-02-19 | End: 2020-02-19 | Stop reason: HOSPADM

## 2020-02-19 RX ADMIN — ENOXAPARIN SODIUM 40 MG: 40 INJECTION SUBCUTANEOUS at 12:33

## 2020-02-19 RX ADMIN — WATER 2 G: 1 INJECTION INTRAMUSCULAR; INTRAVENOUS; SUBCUTANEOUS at 04:56

## 2020-02-19 RX ADMIN — DOCUSATE SODIUM 100 MG: 100 CAPSULE, LIQUID FILLED ORAL at 08:14

## 2020-02-19 RX ADMIN — WATER 2 G: 1 INJECTION INTRAMUSCULAR; INTRAVENOUS; SUBCUTANEOUS at 12:34

## 2020-02-19 RX ADMIN — HYDROMORPHONE HYDROCHLORIDE 4 MG: 2 TABLET ORAL at 08:14

## 2020-02-19 RX ADMIN — HYDROMORPHONE HYDROCHLORIDE 4 MG: 2 TABLET ORAL at 12:43

## 2020-02-19 NOTE — PROGRESS NOTES
Patient received a copy of discharge instructions and a list of medications received at the hospital 2/19/2020. Nurse read and explained medications and instructions to patient and her family. Nurse taught proper way to drain and record NIURKA output. Sister will be helping patient with NIURKA care.  Verbalized understanding

## 2020-02-19 NOTE — PROGRESS NOTES
POD #1  Patient doing well. Pain controlled with PCA. Tolerating regular diet. VSS. Afebrile  Breasts soft bilaterally. Incisions clean, dry, and intact. Flaps perfusing well.    Drains Serosangiouness  1) D/C when ready  2) Advance diet  3) OOB  4) D/C PCA  5) NIURKA drain care

## 2020-02-19 NOTE — OP NOTES
[facility]sie[facility] 80 
OPERATIVE REPORT Name:  Barbara Gillespie 
MR#:  [id number] :  1975 ACCOUNT #:  [de-identified] DATE OF SERVICE:  2020 PREOPERATIVE DIAGNOSES: 
1. Carcinoma, breast. 
2.  Surgical absence, bilateral breasts. 3.  Personal history of breast cancer. 4.  Open wounds, right and left breasts. POSTOPERATIVE DIAGNOSES: 
1. Carcinoma, breast. 
2.  Surgical absence, bilateral breasts. 3.  Personal history of breast cancer. 4.  Open wounds, right and left breasts. PROCEDURE PERFORMED: 1. Reconstruction of right and left breasts with insertion of silicone breast prosthesis. 2.  Reconstruction of right and left breasts with pectoralis major muscle flap. 3.  Reconstruction of right and left breasts with acellular dermal matrix, 132 cm2 each. SURGEON:  Charan Wall MD 
 
ASSISTANT:  Tyrel Parsons PA-C. Due to the complexity of this procedure, surgical assistance was required. RAMSEY Em assisted with the irrigation and hemostasis of the pockets, raising of the pectoralis major muscle flap, insertion of the silicone breast prosthesis and acellular dermal matrix, drain placement, complex closure of the wounds, and dressing placement. ANESTHESIA:  General endotracheal. 
 
COMPLICATIONS:  None. SPECIMENS REMOVED:  None. IMPLANTS:  See note. ESTIMATED BLOOD LOSS:  50 mL DRAINS:  A 19-Maldivian Kem x2. COUNTS:  Correct x2. DISPOSITION:  Stable to PACU. 
 
BRIEF HISTORY:  The patient is a 67-year-old female with carcinoma of the breast necessitating bilateral total skin-sparing mastectomies with immediate direct implant reconstruction with acellular dermal matrix. The mastectomy portion of the procedure will be dictated by Dr. Carey Lopez under a separate note. The overall procedure, incisional approach, expected outcomes and recovery were discussed.   The risks, benefits, and alternatives to the procedure including but not limited to bleeding, infection, damage to surrounding tissue structures, the need for revisional surgery, seroma, hematoma, capsule contracture, implant rupture, implant deflation, the need for future implant maintenance and surveillance MRIs, partial or total loss of sensation to the skin, skin flap necrosis, fat necrosis, nonincorporation of acellular dermal matrix, PE, DVT, and fat embolism were discussed. She understands the need for future revisional procedures. Postoperative cup size cannot be guaranteed. She agreed to proceed. PROCEDURE NOTE:  Preoperative markings were made with the patient in the standing position. Informed consent was obtained. The patient was taken to the operating room, placed supine on the operating table. Sequential compression boots were placed. General endotracheal anesthesia was obtained. A lower body Ml Hugger was placed. The chest was prepped and draped in the usual sterile fashion. At the conclusion of Dr. Lisney Flor procedure, the chest was prepped and redraped in the usual sterile fashion. Attention was turned to the right chest wall. The mastectomy flaps were examined and appeared to be 1-cm thick throughout with good soft tissue perfusion. The pectoralis major muscle was identified. The pectoralis major muscle was then incised along its lateral border and raised in a lateral to medial-ascencio direction basing the muscle on its medial  and thoracoacromial blood supply. Acellular dermal matrix reconstruction of the lower pole was indicated. AlloDerm 132 cm2, serial number [id number] was presoaked in triple antibiotic solution. Gloves were changed. The breast pocket was irrigated with 500 mL of half-strength Betadine solution and 2 liters of triple antibiotic solution.   The acellular dermal matrix graft was then sutured along the inframammary fold and lateral mammary fold with a running 2-0 PDS suture. Multiple silicone sizers were then placed and a Sientra high-profile 814-QW silicone implant was chosen, serial number [id number]. It was presoaked in triple antibiotic solution and placed in the subpectoral position. The superior border of the acellular dermal matrix graft was sutured to the inferior border of the pectoralis major muscle with a running 2-0 PDS suture. Exparel 20 mL was expanded to 100 mL using 80 mL of sterile injectable normal saline. The 50 mL of this mixture was injected into the right pec muscle and along the chest wall border to provide long-lasting local anesthesia. A 19-Comoran Kem drain was brought through a lateral stab incision and secured to the skin with 3-0 nylon and placed within the wound bed. The superior fasciocutaneous flap was transposed inferiorly to its new anatomic position. The wound was then closed with running 2-0 PDS suture on the deep subcutaneous tissue, running deep dermal 2-0 PDS and a running subcuticular 3-0 Monocryl on the skin. The exact same procedure was repeated on the contralateral left side with implant serial number [id number] and acellular dermal matrix graft, serial number [id number] at 132 cm2. The wound was closed likewise. Symmetry and volume were satisfactory. The wounds were then dressed with Dermabond, 4 x 4s, Medipore tape. A surgical bra was placed. Anesthesia was then discontinued. The patient extubated in the operating room having tolerated the procedure well. The needle, instrument, and laparotomy pad counts at the end of the case were correct. MD GAB [id number] 
D:  02/19/2020 10:04 
T:  02/19/2020 13:05 JOB #:  [id number]

## 2020-02-19 NOTE — OP NOTES
[facility]sie[facility] 80  OPERATIVE REPORT    Name:  Kip Leonardo  MR#:  [id number]  :  1975  ACCOUNT #:  [de-identified]  DATE OF SERVICE:  2020    PREOPERATIVE DIAGNOSIS:  Carcinoma of the left breast status post neoadjuvant chemotherapy. POSTOPERATIVE DIAGNOSIS:  Carcinoma of the left breast status post neoadjuvant chemotherapy. PROCEDURES PERFORMED:  Bilateral skin and nipple-sparing mastectomies with immediate reconstruction by Dr. Esperanza Lundy and Port-A-Cath removal.    SURGEON:  Shanel Crisostomo MD    ASSISTANT:  Paula Yost    ANESTHESIA:  General.    SPECIMENS REMOVED:  Bilateral breasts. ESTIMATED BLOOD LOSS:  Approximately 100 mL. IMPLANTS:  None. COMPLICATIONS:  None. INDICATIONS:  The patient is a 67-year-old female who has a BRCA1 mutation who is status post neoadjuvant chemotherapy for a left-sided breast cancer who has opted for bilateral skin and nipple-sparing mastectomies. PROCEDURE:  The patient is two weeks status post bilateral surgical nipple delay and left sentinel lymph node biopsy. She has done well with some hematoma on the right but is currently doing well. After satisfactory induction of general endotracheal anesthesia, she was prepped and draped in the usual sterile fashion. Attention was turned to the left side first where the inframammary incision was opened, seroma cavity drained. Skin flaps were completed superior to the clavicle, medial to the sternum, inferior to the inframammary fold and lateral to latissimus dorsi muscle. The breast was removed off the chest wall subfascially and maintaining hemostasis with the Bovie cautery. The lateral border of the pectoralis major muscle of the breast was mobilized and amputated of the level of the axilla. Specimen was oriented and sent to pathology. All dissection planes were hemostatic and the wound was covered with a wet laparotomy pad and a dry towel.   Attention was then turned to the right side where the previous inframammary incision was opened and seroma cavity was drained and skin flaps were completed superior to the clavicle, medial to the sternum, inferior to the inframammary fold, and lateral to latissimus dorsi muscle. The breast was removed off the chest wall subfascially maintaining hemostasis with the Bovie cautery. At the lateral boarder of pectoralis major muscle, the breast was entered at the level of the axilla. Specimens were oriented and sent to pathology. All dissection planes were hemostatic. Port-A-Cath was identified and the Port-A-Cath capsule was opened. The Port-A-Cath was removed and noted to be intact and the site was hemostatic. The wound was made hemostatic with the Bovie cautery. It was covered with a wet laparotomy pad and a dry towel. At this point in time, Dr. Jose Méndez entered the room to complete the immediate reconstruction.       Karie Roberson MD      [id number]  D:  02/18/2020 15:04  T:  02/19/2020 2:27  JOB #:  [id number]  CC:  MD Maya Stuart MD Ouida Glee, MD

## 2020-02-19 NOTE — PROGRESS NOTES
Problem: Pain  Goal: *Control of Pain  Outcome: Progressing Towards Goal     Problem: Surgical Pathway Post-Op Day 1  Goal: Activity/Safety  Outcome: Progressing Towards Goal  Goal: Diagnostic Test/Procedures  Outcome: Progressing Towards Goal  Goal: Nutrition/Diet  Outcome: Progressing Towards Goal  Goal: Discharge Planning  Outcome: Progressing Towards Goal  Goal: Medications  Outcome: Progressing Towards Goal  Goal: Respiratory  Outcome: Progressing Towards Goal  Goal: Treatments/Interventions/Procedures  Outcome: Progressing Towards Goal  Goal: Psychosocial  Outcome: Progressing Towards Goal  Goal: *No signs and symptoms of infection or wound complications  Outcome: Progressing Towards Goal  Goal: *Optimal pain control at patient's stated goal  Outcome: Progressing Towards Goal  Goal: *Adequate urinary output (equal to or greater than 30 milliliters/hour)  Outcome: Progressing Towards Goal  Goal: *Hemodynamically stable  Outcome: Progressing Towards Goal  Goal: *Tolerating diet  Outcome: Progressing Towards Goal  Goal: *Demonstrates progressive activity  Outcome: Progressing Towards Goal  Goal: *Lungs clear or at baseline  Outcome: Progressing Towards Goal

## 2020-02-21 NOTE — OP NOTES
Jean Hsie[facility] 80  OPERATIVE REPORT    Name:  Nicole Fernando  MR#:  [id number]  :  1975  ACCOUNT #:  [de-identified]  DATE OF SERVICE:  2020    AMENDED DOCUMENT - corrected CSN/dates; 2020    PREOPERATIVE DIAGNOSES:  1. Carcinoma, breast.  2.  Surgical absence, bilateral breasts. 3.  Personal history of breast cancer. 4.  Open wounds, right and left breasts. POSTOPERATIVE DIAGNOSES:  1. Carcinoma, breast.  2.  Surgical absence, bilateral breasts. 3.  Personal history of breast cancer. 4.  Open wounds, right and left breasts. PROCEDURE PERFORMED:  1. Reconstruction of right and left breasts with insertion of silicone breast prosthesis. 2.  Reconstruction of right and left breasts with pectoralis major muscle flap. 3.  Reconstruction of right and left breasts with acellular dermal matrix, 132 cm2 each. SURGEON:  Jimmy Campoverde MD    ASSISTANT:  Alphonse Wadsworth PA-C. Due to the complexity of this procedure, surgical assistance was required. RAMSEY Arias assisted with the irrigation and hemostasis of the pockets, raising of the pectoralis major muscle flap, insertion of the silicone breast prosthesis and acellular dermal matrix, drain placement, complex closure of the wounds, and dressing placement. ANESTHESIA:  General endotracheal.    COMPLICATIONS:  None. SPECIMENS REMOVED:  None. IMPLANTS:  See note. ESTIMATED BLOOD LOSS:  50 mL    DRAINS:  A 19-Portuguese Kem x2. COUNTS:  Correct x2. DISPOSITION:  Stable to PACU.    BRIEF HISTORY:  The patient is a 50-year-old female with carcinoma of the breast necessitating bilateral total skin-sparing mastectomies with immediate direct implant reconstruction with acellular dermal matrix. The mastectomy portion of the procedure will be dictated by Dr. Roseanne Ruiz under a separate note. The overall procedure, incisional approach, expected outcomes and recovery were discussed.   The risks, benefits, and alternatives to the procedure including but not limited to bleeding, infection, damage to surrounding tissue structures, the need for revisional surgery, seroma, hematoma, capsule contracture, implant rupture, implant deflation, the need for future implant maintenance and surveillance MRIs, partial or total loss of sensation to the skin, skin flap necrosis, fat necrosis, nonincorporation of acellular dermal matrix, PE, DVT, and fat embolism were discussed. She understands the need for future revisional procedures. Postoperative cup size cannot be guaranteed. She agreed to proceed. PROCEDURE NOTE:  Preoperative markings were made with the patient in the standing position. Informed consent was obtained. The patient was taken to the operating room, placed supine on the operating table. Sequential compression boots were placed. General endotracheal anesthesia was obtained. A lower body Ml Hugger was placed. The chest was prepped and draped in the usual sterile fashion. At the conclusion of Dr. Claudia Alcantar procedure, the chest was prepped and redraped in the usual sterile fashion. Attention was turned to the right chest wall. The mastectomy flaps were examined and appeared to be 1-cm thick throughout with good soft tissue perfusion. The pectoralis major muscle was identified. The pectoralis major muscle was then incised along its lateral border and raised in a lateral to medial-ascencio direction basing the muscle on its medial  and thoracoacromial blood supply. Acellular dermal matrix reconstruction of the lower pole was indicated. AlloDerm 132 cm2, serial number [id number] was presoaked in triple antibiotic solution. Gloves were changed. The breast pocket was irrigated with 500 mL of half-strength Betadine solution and 2 liters of triple antibiotic solution.   The acellular dermal matrix graft was then sutured along the inframammary fold and lateral mammary fold with a running 2-0 PDS suture. Multiple silicone sizers were then placed and a Sientra high-profile 154-CR silicone implant was chosen, serial number [id number]. It was presoaked in triple antibiotic solution and placed in the subpectoral position. The superior border of the acellular dermal matrix graft was sutured to the inferior border of the pectoralis major muscle with a running 2-0 PDS suture. Exparel 20 mL was expanded to 100 mL using 80 mL of sterile injectable normal saline. The 50 mL of this mixture was injected into the right pec muscle and along the chest wall border to provide long-lasting local anesthesia. A 19-Dominican Kem drain was brought through a lateral stab incision and secured to the skin with 3-0 nylon and placed within the wound bed. The superior fasciocutaneous flap was transposed inferiorly to its new anatomic position. The wound was then closed with running 2-0 PDS suture on the deep subcutaneous tissue, running deep dermal 2-0 PDS and a running subcuticular 3-0 Monocryl on the skin. The exact same procedure was repeated on the contralateral left side with implant serial number [id number] and acellular dermal matrix graft, serial number [id number] at 132 cm2. The wound was closed likewise. Symmetry and volume were satisfactory. The wounds were then dressed with Dermabond, 4 x 4s, Medipore tape. A surgical bra was placed. Anesthesia was then discontinued. The patient extubated in the operating room having tolerated the procedure well. The needle, instrument, and laparotomy pad counts at the end of the case were correct.       MD GAB [id number]  D:  02/19/2020 10:04  T:  02/19/2020 13:05  JOB #:  [id number]

## 2020-02-22 ENCOUNTER — TELEPHONE (OUTPATIENT)
Dept: SURGERY | Age: 45
End: 2020-02-22

## 2020-03-02 ENCOUNTER — TELEPHONE (OUTPATIENT)
Dept: GYNECOLOGY | Age: 45
End: 2020-03-02

## 2020-03-02 NOTE — TELEPHONE ENCOUNTER
The patient is calling back to schedule surgery. She had her breast surgery on 2/18. She has decided to go with the hysterectomy. She would also like to know how far out from her surgery to schedule.

## 2020-03-04 ENCOUNTER — OFFICE VISIT (OUTPATIENT)
Dept: ONCOLOGY | Age: 45
End: 2020-03-04

## 2020-03-04 ENCOUNTER — RESEARCH ENCOUNTER (OUTPATIENT)
Dept: ONCOLOGY | Age: 45
End: 2020-03-04

## 2020-03-04 VITALS
WEIGHT: 130 LBS | RESPIRATION RATE: 18 BRPM | DIASTOLIC BLOOD PRESSURE: 79 MMHG | BODY MASS INDEX: 23.04 KG/M2 | OXYGEN SATURATION: 100 % | HEIGHT: 63 IN | SYSTOLIC BLOOD PRESSURE: 111 MMHG | HEART RATE: 116 BPM | TEMPERATURE: 96.6 F

## 2020-03-04 DIAGNOSIS — Z17.1 MALIGNANT NEOPLASM OF UPPER-OUTER QUADRANT OF LEFT BREAST IN FEMALE, ESTROGEN RECEPTOR NEGATIVE (HCC): Primary | ICD-10-CM

## 2020-03-04 DIAGNOSIS — C50.412 MALIGNANT NEOPLASM OF UPPER-OUTER QUADRANT OF LEFT BREAST IN FEMALE, ESTROGEN RECEPTOR NEGATIVE (HCC): Primary | ICD-10-CM

## 2020-03-04 NOTE — PROGRESS NOTES
Met and spoke with patient and her  about . Per Dr. Teressa Rosenthal, she is not eligible the study yet, but will be, so I gave her a read-only consent and answered any questions they had at this time. I will meet with her again when she comes back in for her appointment in June 2020.

## 2020-03-04 NOTE — PROGRESS NOTES
Conchita Loaiza is a 38 y.o. female Follow up for the Evaluation for Breast Cancer. 1. Have you been to the ER, urgent care clinic since your last visit? Hospitalized since your last visit? Patient had a Double Mastectomy with Reconstruction and also a Nipple Delay at [facility]. 2. Have you seen or consulted any other health care providers outside of the [address] [place] [address] since your last visit? Include any pap smears or colon screening.  No

## 2020-03-04 NOTE — PROGRESS NOTES
[facility] [place] at [address], [address] [facility]  Calos Estrada: [phone]  F: [phone]      Reason for Visit:   Noman Hester is a 38 y.o. female who is seen in follow up for evaluation of therapy for breast cancer    Breast surgeon:  Dr. Ashlie Rowan    Treatment History:   · 6/10/19 L breast core bx:  IDC, gr 3, 1.1 cm, + LVI, ER negative, TX negative, HER 2 negative at [facility] 1+; DCIS present gr 3, solid with expansile necrosis  · 7/3/19 BRCA1 pathogenic mutation (invitae), [id number].[id number]  · 19 MRI breast bx:  Negative  · Carbo/taxol 19-10/14/19  · C3d15 delayed one week due to neutropenia    [facility]   c1 held one week due to thrombocytopenia  c3 delayed 1 week due to pt trip    20 right and left nipple bx:  Negative; left ax SLN bx:  0/2 LN  20 left breast mastectomy:  No invasive ca; residual DCIS, ypTis ypN0 cM0; right breast mastectomy:  negative    History of Present Illness:   She felt the L breast mass herself in May 2019, with aching pain, leading to the pathology above. Interval history:  In today for follow up and treatment.  Complains of gr 1 hot flashes, gr 1 rectal bleeding due to fissure, gr 1 vaginal dryness    FH:   2 paternal aunts with post-menopausal breast cancer, one aunt with ovarian cancer; that aunt's daughter tested positive for BRCA1 (first cousin); no pancreas cancer, no prostate cancer    Past Medical History:   Diagnosis Date    Breast cancer ([facility] [address] 2019    left breast cancer      Past Surgical History:   Procedure Laterality Date    HX APPENDECTOMY      HX BREAST RECONSTRUCTION Bilateral 2020    BILATERAL BREAST DIRECT TO IMPLANT RECONSTRUCTION WITH ALLODERM performed by Ruy Wesley MD at [facility] 57 HX  SECTION      x3    HX MASTECTOMY Bilateral 2020    BILATERAL BREAST SKIN AND NIPPLE SPARING MASTECTOMIES, PORT A CATH REMOVAL, BILATERAL BREAST DIRECT TO IMPLANT RECONSTRUCTION WITH ALLODERM, AND POSSIBLE TISSUE EXPANDERS performed by Kiersten Glover MD at [facility]      Social History     Tobacco Use    Smoking status: Never Smoker    Smokeless tobacco: Never Used   Substance Use Topics    Alcohol use: Not Currently      Family History   Problem Relation Age of Onset    Diabetes Mother     Heart Disease Mother     Hypertension Mother     Heart Disease Father     Cancer Paternal Aunt         Breast    Breast Cancer Paternal Aunt     Cancer Paternal Aunt         Breast    Breast Cancer Paternal Aunt      Current Outpatient Medications   Medication Sig    nitroglycerin (NITRO-BID) 2 % ointment Apply 1 Inch to affected area two (2) times a day. As directed    atorvastatin (LIPITOR) 40 mg tablet Take 40 mg by mouth nightly. Indications: \"Repurposed/Cancer fighting\"    metFORMIN (GLUCOPHAGE) 500 mg tablet Take 500 mg by mouth two (2) times daily (with meals). Indications: Repurposed/Cancer Fighting    MEBENDAZOLE PO Take 100 mg by mouth daily. Indications: \"Repurposed/Cancer Fighting\"    cholecalciferol, vitamin D3, (VITAMIN D3 PO) Take 2,000 Units by mouth daily.  prasterone, DHEA, (DHEA PO) Take 10 mg by mouth daily.  MELATONIN PO Take 20 mg by mouth nightly as needed. No current facility-administered medications for this visit. No Known Allergies     Review of Systems: A complete review of systems was obtained, negative except as described above.     Physical Exam:     Visit Vitals  /79 (BP 1 Location: Left arm, BP Patient Position: Sitting)   Pulse (!) 116   Temp 96.6 °F (35.9 °C) (Temporal)   Resp 18   Ht 5' 3\" (1.6 m)   Wt 130 lb (59 kg)   SpO2 100%   BMI 23.03 kg/m²     ECOG PS: 0  General: No distress  Respiratory: Normal respiratory effort  CV: No peripheral edema  Skin: No rashes, ecchymoses, or petechiae  Psych: Alert, oriented, normal mood/affect      Results:     Lab Results   Component Value Date/Time    WBC 5.8 01/28/2020 02:18 PM    HGB 12.0 01/28/2020 02:18 PM    HCT 37.1 01/28/2020 02:18 PM    PLATELET 379 32/18/0617 02:18 PM    MCV 98.9 01/28/2020 02:18 PM    ABS. NEUTROPHILS 3.6 01/28/2020 02:18 PM     Lab Results   Component Value Date/Time    Sodium 141 01/28/2020 02:18 PM    Potassium 4.2 01/28/2020 02:18 PM    Chloride 105 01/28/2020 02:18 PM    CO2 30 01/28/2020 02:18 PM    Glucose 83 01/28/2020 02:18 PM    BUN 16 01/28/2020 02:18 PM    Creatinine 0.68 01/28/2020 02:18 PM    GFR est AA >60 01/28/2020 02:18 PM    GFR est non-AA >60 01/28/2020 02:18 PM    Calcium 9.3 01/28/2020 02:18 PM     Lab Results   Component Value Date/Time    Bilirubin, total 0.5 01/28/2020 02:18 PM    ALT (SGPT) 67 01/28/2020 02:18 PM    AST (SGOT) 42 (H) 01/28/2020 02:18 PM    Alk. phosphatase 54 01/28/2020 02:18 PM    Protein, total 7.1 01/28/2020 02:18 PM    Albumin 4.0 01/28/2020 02:18 PM    Globulin 3.1 01/28/2020 02:18 PM       6/13/19 MRI breast  FINDINGS:     Background parenchymal enhancement: Moderate. Lymph nodes: No lymphadenopathy.     Left breast: Irregular triangular-shaped mass in the posterior third of the left  breast at 2:00 measures 2.8 x 1.8 x 4.2 cm. Posterior margin is 0.3 cm from the  left pectoralis major muscle. Biopsy clip is in the inferior margin of the mass.     In the middle and posterior thirds of the left breast at 5-6:00, segmental non  mass enhancement with heterogeneous kinetics measures 2.6 cm in oblique AP  diameter in the axial plane. Otherwise, there are foci in the left breast.     Right breast: Heterogeneous patchy enhancement in multiple locations. No  suspicious kinetics. Biopsy clip in the middle third is at 3:00. No surrounding  abnormal enhancement or morphology.     IMPRESSION:   1. 2.8 x 1.8 x 4.2 cm biopsy-proven infiltrating ductal carcinoma in the left  breast at 2:00 is in close approximation to the chest wall. 2. Left breast 5-6:00 suspicious segmental non mass enhancement. 3.  No MRI evidence of malignancy in the right breast.  4. No lymphadenopathy.     Assessment: ACR BI-RADS category   Left breast: BI-RADS Assessment Category 4: Suspicious abnormality - Biopsy  should be performed in the absence of clinical contraindication. Right breast: BI-RADS Assessment Category 2: Benign finding.     Recommendation: Bilateral mammography if not recently performed elsewhere. Second look ultrasound of the left breast at 5-6:00 to determine  ultrasound-guided or MRI guided biopsy of non mass enhancement.     A negative breast MRI examination speaks strongly against invasive cancer down  to a detection threshold of 3 to 5 mm but may not detect some lower grade or in  situ carcinomas. Therefore, routine clinical and mammographic followup are  recommended. A summary portfolio has been created in PACS.    7/12/19 TTE EF 65%    10/16/19 Left mammo/US:  ULTRASOUND:  Corresponding with the mammographic density and previously seen  mass is a 1.2 x 1.2 x 1.4 cm hypoechoic mass lesion, which previously measured  1.9 x 2.7 cm on diagnostic MRI. Note is made of an adjacent biopsy marker clip. No other suspicious cystic or solid lesions identified.     IMPRESSION: Response to therapy with apparent reduction in size of left breast  mass compared to prior MRI. BI-RADS Assessment Category 6: Known biopsy proven  malignancy- Appropriate action should be taken.     RECOMMENDATION:  Appropriate clinical management of known left breast  malignancy. Records reviewed and summarized above. Pathology report(s) reviewed above. Radiology report(s) reviewed above. Assessment/plan:   1. L UOQ IDC, gr 3, triple negative:  cT2 cN0 cM0, stage IIA, prognostic stage IIB  ypTis ypN0 cM0    We explained to the patient that the goal of systemic adjuvant therapy is to improve the chances for cure and decrease the risk of relapse.  We explained why a patient can have microscopic cancer spread now even though physical examination, laboratory studies and imaging studies are negative for cancer. We explained that the same treatments used now as adjuvant or preventive treatments rarely if ever are curative in women who develop metastases. No residual invasive disease, no indication for adjuvant capecitabine. No need for XRT    She is on metformin for breast cancer recurrence prevention by a physician in [place]. There is a phase 3 study looking at this, but that data is not available. I would advise to stop it due to the possible toxicity and unknown benefit at this time. She may be interested in the ABC study, sample informed consent given today    Follow-up after early breast cancer was discussed. I recommend follow-up as defined by the American Society of Clinical Oncology and [employer]. This includes a visit to a health care professional every 3-6 months for 3 years, then every 6-12 months for 2 years, and then yearly as well as mammograms yearly. 2. Emotional well being:  She has excellent support and is coping well with her disease    3. BRCA1 pathogenic mutation:   Discussed recommendation for bilateral mastectomies and bilateral oophorectomies; risk of ovarian, fallopian or peritoneal cancer is 16-59%; risk for pancreatic cancer is 1-3%; risk for contralateral breast cancer is 10-15%    S/p bilateral mastectomies    Has seen Dr. Rigoberto Allen, she will have bilateral oophorectomies in April    Follow-up after early breast cancer was discussed. I recommend follow-up as defined by the American Society of Clinical Oncology and [employer]. This includes a visit to a health care professional every 3-6 months for 3 years, then every 6-12 months for 2 years, and then yearly as well as mammograms yearly. 4. Fertility preservation:  Discussed that there is > 20% chance of loss of menses with chemotherapy.   She and her  state that they are not interested in further children and fertility preservation     5. Rectal fissure:  Seeing GI; stool softener and miralax    6. Anemia:  resolved    > 25 minutes were spent with this patient with > 50% of that time spent in face to face counseling. Signed By: Dieter Zhu MD      No orders of the defined types were placed in this encounter.

## 2020-04-02 ENCOUNTER — TELEPHONE (OUTPATIENT)
Dept: SURGERY | Age: 45
End: 2020-04-02

## 2020-04-02 NOTE — TELEPHONE ENCOUNTER
Called and spoke with patient. Healing well with some discomfort from muscle tightness and nerve regeneration. Reviewed normal post op changes and what to expect moving forward. Will continue to follow-up with Dr. Daniel Baltazar. Will continue to follow-up with Dr. Raegan Boyer. Has surgery scheduled with Dr. Husam Meyer for June. Patient is ok to cancel post-op visit on Monday since she is not having issues and we are in the midst of the Covid-19 pandemic. She will contact the office if she has issues. Reschedule with me in 4-5 months.

## 2020-05-26 ENCOUNTER — HOSPITAL ENCOUNTER (OUTPATIENT)
Dept: PREADMISSION TESTING | Age: 45
Discharge: HOME | End: 2020-05-26
Payer: OTHER GOVERNMENT

## 2020-05-26 VITALS
WEIGHT: 137.13 LBS | HEIGHT: 63 IN | SYSTOLIC BLOOD PRESSURE: 102 MMHG | BODY MASS INDEX: 24.3 KG/M2 | TEMPERATURE: 98.1 F | HEART RATE: 86 BPM | DIASTOLIC BLOOD PRESSURE: 72 MMHG

## 2020-05-26 LAB
ALBUMIN SERPL-MCNC: 3.9 G/DL (ref 3.5–5)
ALBUMIN/GLOB SERPL: 1.1 {RATIO} (ref 1.1–2.2)
ALP SERPL-CCNC: 64 U/L (ref 45–117)
ALT SERPL-CCNC: 44 U/L (ref 12–78)
ANION GAP SERPL CALC-SCNC: 8 MMOL/L (ref 5–15)
AST SERPL-CCNC: 36 U/L (ref 15–37)
BASOPHILS # BLD: 0 K/UL (ref 0–0.1)
BASOPHILS NFR BLD: 1 % (ref 0–1)
BILIRUB SERPL-MCNC: 0.6 MG/DL (ref 0.2–1)
BUN SERPL-MCNC: 15 MG/DL (ref 6–20)
BUN/CREAT SERPL: 21 (ref 12–20)
CALCIUM SERPL-MCNC: 9.1 MG/DL (ref 8.5–10.1)
CHLORIDE SERPL-SCNC: 103 MMOL/L (ref 97–108)
CO2 SERPL-SCNC: 27 MMOL/L (ref 21–32)
CREAT SERPL-MCNC: 0.71 MG/DL (ref 0.55–1.02)
DIFFERENTIAL METHOD BLD: ABNORMAL
EOSINOPHIL # BLD: 0.2 K/UL (ref 0–0.4)
EOSINOPHIL NFR BLD: 4 % (ref 0–7)
ERYTHROCYTE [DISTWIDTH] IN BLOOD BY AUTOMATED COUNT: 16.2 % (ref 11.5–14.5)
GLOBULIN SER CALC-MCNC: 3.5 G/DL (ref 2–4)
GLUCOSE SERPL-MCNC: 91 MG/DL (ref 65–100)
HCT VFR BLD AUTO: 39.7 % (ref 35–47)
HGB BLD-MCNC: 12.5 G/DL (ref 11.5–16)
IMM GRANULOCYTES # BLD AUTO: 0 K/UL (ref 0–0.04)
IMM GRANULOCYTES NFR BLD AUTO: 0 % (ref 0–0.5)
LYMPHOCYTES # BLD: 1.6 K/UL (ref 0.8–3.5)
LYMPHOCYTES NFR BLD: 40 % (ref 12–49)
MCH RBC QN AUTO: 27.7 PG (ref 26–34)
MCHC RBC AUTO-ENTMCNC: 31.5 G/DL (ref 30–36.5)
MCV RBC AUTO: 87.8 FL (ref 80–99)
MONOCYTES # BLD: 0.5 K/UL (ref 0–1)
MONOCYTES NFR BLD: 11 % (ref 5–13)
NEUTS SEG # BLD: 1.8 K/UL (ref 1.8–8)
NEUTS SEG NFR BLD: 44 % (ref 32–75)
NRBC # BLD: 0 K/UL (ref 0–0.01)
NRBC BLD-RTO: 0 PER 100 WBC
PLATELET # BLD AUTO: 252 K/UL (ref 150–400)
PMV BLD AUTO: 10 FL (ref 8.9–12.9)
POTASSIUM SERPL-SCNC: 4 MMOL/L (ref 3.5–5.1)
PROT SERPL-MCNC: 7.4 G/DL (ref 6.4–8.2)
RBC # BLD AUTO: 4.52 M/UL (ref 3.8–5.2)
SODIUM SERPL-SCNC: 138 MMOL/L (ref 136–145)
WBC # BLD AUTO: 4.1 K/UL (ref 3.6–11)

## 2020-05-26 PROCEDURE — 85025 COMPLETE CBC W/AUTO DIFF WBC: CPT

## 2020-05-26 PROCEDURE — 80053 COMPREHEN METABOLIC PANEL: CPT

## 2020-05-26 PROCEDURE — 36415 COLL VENOUS BLD VENIPUNCTURE: CPT

## 2020-05-26 RX ORDER — DOXYCYCLINE 100 MG/1
100 CAPSULE ORAL
COMMUNITY
End: 2021-03-30

## 2020-05-26 NOTE — PERIOP NOTES
Patient given surgical site infection information FAQs handout and hand hygiene tips sheet. Pre-operative instructions reviewed and patient verbalizes understanding of instructions. Patient has been given the opportunity to ask additional questions. PATIENT GIVEN 2 BOTTLES OF CHG SOAP TO USE DAY BEFORE SURGERY AND DOS. INSTRUCTIONS PROVIDED. Pre-Operative Instructions    DO NOT EAT OR DRINK ANYTHING AFTER MIDNIGHT THE NIGHT BEFORE SURGERY. PT INSTRUCTED TO GO TO CELL PHONE LOT DOS. HER COVID TESTING APPOINTMENT IS SCHEDULED.

## 2020-05-27 NOTE — PERIOP NOTES
CALLED ABNORMAL LAB RESULTS RDW 16.2 (H) TO DR. WALKER'S , SHE SAID SHE WILL GET MESSAGE TO HIS NURSE. OFFICE ASKS THAT WE NOT FAX LAB RESULTS BUT CALL ABNORMALS TO THEM.

## 2020-05-28 ENCOUNTER — HOSPITAL ENCOUNTER (OUTPATIENT)
Dept: PREADMISSION TESTING | Age: 45
Discharge: HOME | End: 2020-05-28
Payer: OTHER GOVERNMENT

## 2020-05-28 DIAGNOSIS — U07.1 COVID-19: ICD-10-CM

## 2020-05-28 DIAGNOSIS — Z20.822 ENCOUNTER FOR LABORATORY TESTING FOR COVID-19 VIRUS: ICD-10-CM

## 2020-05-28 PROCEDURE — 87635 SARS-COV-2 COVID-19 AMP PRB: CPT

## 2020-05-29 LAB — SARS-COV-2, COV2NT: NOT DETECTED

## 2020-06-01 ENCOUNTER — ANESTHESIA EVENT (OUTPATIENT)
Dept: SURGERY | Age: 45
End: 2020-06-01
Payer: OTHER GOVERNMENT

## 2020-06-01 ENCOUNTER — ANESTHESIA (OUTPATIENT)
Dept: SURGERY | Age: 45
End: 2020-06-01
Payer: OTHER GOVERNMENT

## 2020-06-01 ENCOUNTER — HOSPITAL ENCOUNTER (OUTPATIENT)
Age: 45
Setting detail: OUTPATIENT SURGERY
Discharge: HOME | End: 2020-06-01
Attending: OBSTETRICS & GYNECOLOGY | Admitting: OBSTETRICS & GYNECOLOGY
Payer: OTHER GOVERNMENT

## 2020-06-01 VITALS
SYSTOLIC BLOOD PRESSURE: 95 MMHG | DIASTOLIC BLOOD PRESSURE: 60 MMHG | WEIGHT: 137 LBS | HEIGHT: 63 IN | RESPIRATION RATE: 18 BRPM | BODY MASS INDEX: 24.27 KG/M2 | HEART RATE: 74 BPM | OXYGEN SATURATION: 100 % | TEMPERATURE: 98.4 F

## 2020-06-01 DIAGNOSIS — G89.18 ACUTE POSTOPERATIVE PAIN: Primary | ICD-10-CM

## 2020-06-01 LAB — HCG UR QL: NEGATIVE

## 2020-06-01 PROCEDURE — 81025 URINE PREGNANCY TEST: CPT

## 2020-06-01 PROCEDURE — 74011000250 HC RX REV CODE- 250: Performed by: NURSE ANESTHETIST, CERTIFIED REGISTERED

## 2020-06-01 PROCEDURE — 77030012770 HC TRCR OPT FX AMR -B: Performed by: OBSTETRICS & GYNECOLOGY

## 2020-06-01 PROCEDURE — 77030002933 HC SUT MCRYL J&J -A: Performed by: OBSTETRICS & GYNECOLOGY

## 2020-06-01 PROCEDURE — 74011250636 HC RX REV CODE- 250/636: Performed by: NURSE ANESTHETIST, CERTIFIED REGISTERED

## 2020-06-01 PROCEDURE — 77030010507 HC ADH SKN DERMBND J&J -B: Performed by: OBSTETRICS & GYNECOLOGY

## 2020-06-01 PROCEDURE — 88305 TISSUE EXAM BY PATHOLOGIST: CPT

## 2020-06-01 PROCEDURE — 77030040361 HC SLV COMPR DVT MDII -B: Performed by: OBSTETRICS & GYNECOLOGY

## 2020-06-01 PROCEDURE — 77030008684 HC TU ET CUF COVD -B: Performed by: ANESTHESIOLOGY

## 2020-06-01 PROCEDURE — 77030002882 HC SUT CART KNOT LSIS -B: Performed by: OBSTETRICS & GYNECOLOGY

## 2020-06-01 PROCEDURE — 77030020829: Performed by: OBSTETRICS & GYNECOLOGY

## 2020-06-01 PROCEDURE — 74011000250 HC RX REV CODE- 250: Performed by: OBSTETRICS & GYNECOLOGY

## 2020-06-01 PROCEDURE — 77030033639 HC SHR ENDO COAG HARM 36 J&J -E: Performed by: OBSTETRICS & GYNECOLOGY

## 2020-06-01 PROCEDURE — 77030013079 HC BLNKT BAIR HGGR 3M -A: Performed by: ANESTHESIOLOGY

## 2020-06-01 PROCEDURE — 77030040922 HC BLNKT HYPOTHRM STRY -A

## 2020-06-01 PROCEDURE — 77030026438 HC STYL ET INTUB CARD -A: Performed by: ANESTHESIOLOGY

## 2020-06-01 PROCEDURE — 77030011640 HC PAD GRND REM COVD -A: Performed by: OBSTETRICS & GYNECOLOGY

## 2020-06-01 PROCEDURE — 77030040830 HC CATH URETH FOL MDII -A: Performed by: OBSTETRICS & GYNECOLOGY

## 2020-06-01 PROCEDURE — 76210000016 HC OR PH I REC 1 TO 1.5 HR: Performed by: OBSTETRICS & GYNECOLOGY

## 2020-06-01 PROCEDURE — 77030008756 HC TU IRR SUC STRY -B: Performed by: OBSTETRICS & GYNECOLOGY

## 2020-06-01 PROCEDURE — 74011000258 HC RX REV CODE- 258: Performed by: OBSTETRICS & GYNECOLOGY

## 2020-06-01 PROCEDURE — 76060000033 HC ANESTHESIA 1 TO 1.5 HR: Performed by: OBSTETRICS & GYNECOLOGY

## 2020-06-01 PROCEDURE — 74011250637 HC RX REV CODE- 250/637: Performed by: ANESTHESIOLOGY

## 2020-06-01 PROCEDURE — 76210000020 HC REC RM PH II FIRST 0.5 HR: Performed by: OBSTETRICS & GYNECOLOGY

## 2020-06-01 PROCEDURE — 74011250636 HC RX REV CODE- 250/636: Performed by: ANESTHESIOLOGY

## 2020-06-01 PROCEDURE — 77030009851 HC PCH RTVR ENDOSC AMR -B: Performed by: OBSTETRICS & GYNECOLOGY

## 2020-06-01 PROCEDURE — 88112 CYTOPATH CELL ENHANCE TECH: CPT

## 2020-06-01 PROCEDURE — 77030031139 HC SUT VCRL2 J&J -A: Performed by: OBSTETRICS & GYNECOLOGY

## 2020-06-01 PROCEDURE — 77030020263 HC SOL INJ SOD CL0.9% LFCR 1000ML: Performed by: OBSTETRICS & GYNECOLOGY

## 2020-06-01 PROCEDURE — 77030018836 HC SOL IRR NACL ICUM -A: Performed by: OBSTETRICS & GYNECOLOGY

## 2020-06-01 PROCEDURE — 76010000149 HC OR TIME 1 TO 1.5 HR: Performed by: OBSTETRICS & GYNECOLOGY

## 2020-06-01 RX ORDER — PROPOFOL 10 MG/ML
INJECTION, EMULSION INTRAVENOUS AS NEEDED
Status: DISCONTINUED | OUTPATIENT
Start: 2020-06-01 | End: 2020-06-01 | Stop reason: HOSPADM

## 2020-06-01 RX ORDER — EPHEDRINE SULFATE/0.9% NACL/PF 50 MG/5 ML
5 SYRINGE (ML) INTRAVENOUS AS NEEDED
Status: DISCONTINUED | OUTPATIENT
Start: 2020-06-01 | End: 2020-06-01 | Stop reason: HOSPADM

## 2020-06-01 RX ORDER — MIDAZOLAM HYDROCHLORIDE 1 MG/ML
1 INJECTION, SOLUTION INTRAMUSCULAR; INTRAVENOUS AS NEEDED
Status: DISCONTINUED | OUTPATIENT
Start: 2020-06-01 | End: 2020-06-01 | Stop reason: HOSPADM

## 2020-06-01 RX ORDER — TRAMADOL HYDROCHLORIDE 50 MG/1
50 TABLET ORAL
Qty: 20 TAB | Refills: 0 | Status: SHIPPED | OUTPATIENT
Start: 2020-06-01 | End: 2020-06-04

## 2020-06-01 RX ORDER — KETOROLAC TROMETHAMINE 30 MG/ML
INJECTION, SOLUTION INTRAMUSCULAR; INTRAVENOUS AS NEEDED
Status: DISCONTINUED | OUTPATIENT
Start: 2020-06-01 | End: 2020-06-01 | Stop reason: HOSPADM

## 2020-06-01 RX ORDER — SODIUM CHLORIDE 0.9 % (FLUSH) 0.9 %
5-40 SYRINGE (ML) INJECTION EVERY 8 HOURS
Status: DISCONTINUED | OUTPATIENT
Start: 2020-06-01 | End: 2020-06-01 | Stop reason: HOSPADM

## 2020-06-01 RX ORDER — SODIUM CHLORIDE 9 MG/ML
50 INJECTION, SOLUTION INTRAVENOUS CONTINUOUS
Status: DISCONTINUED | OUTPATIENT
Start: 2020-06-01 | End: 2020-06-01 | Stop reason: HOSPADM

## 2020-06-01 RX ORDER — SODIUM CHLORIDE, SODIUM LACTATE, POTASSIUM CHLORIDE, CALCIUM CHLORIDE 600; 310; 30; 20 MG/100ML; MG/100ML; MG/100ML; MG/100ML
125 INJECTION, SOLUTION INTRAVENOUS CONTINUOUS
Status: DISCONTINUED | OUTPATIENT
Start: 2020-06-01 | End: 2020-06-01 | Stop reason: HOSPADM

## 2020-06-01 RX ORDER — FENTANYL CITRATE 50 UG/ML
INJECTION, SOLUTION INTRAMUSCULAR; INTRAVENOUS AS NEEDED
Status: DISCONTINUED | OUTPATIENT
Start: 2020-06-01 | End: 2020-06-01 | Stop reason: HOSPADM

## 2020-06-01 RX ORDER — DIPHENHYDRAMINE HYDROCHLORIDE 50 MG/ML
12.5 INJECTION, SOLUTION INTRAMUSCULAR; INTRAVENOUS AS NEEDED
Status: DISCONTINUED | OUTPATIENT
Start: 2020-06-01 | End: 2020-06-01 | Stop reason: HOSPADM

## 2020-06-01 RX ORDER — SODIUM CHLORIDE 9 MG/ML
1000 INJECTION, SOLUTION INTRAVENOUS CONTINUOUS
Status: DISCONTINUED | OUTPATIENT
Start: 2020-06-01 | End: 2020-06-01 | Stop reason: HOSPADM

## 2020-06-01 RX ORDER — FENTANYL CITRATE 50 UG/ML
25 INJECTION, SOLUTION INTRAMUSCULAR; INTRAVENOUS
Status: DISCONTINUED | OUTPATIENT
Start: 2020-06-01 | End: 2020-06-01 | Stop reason: HOSPADM

## 2020-06-01 RX ORDER — MORPHINE SULFATE 10 MG/ML
2 INJECTION, SOLUTION INTRAMUSCULAR; INTRAVENOUS
Status: DISCONTINUED | OUTPATIENT
Start: 2020-06-01 | End: 2020-06-01 | Stop reason: HOSPADM

## 2020-06-01 RX ORDER — MIDAZOLAM HYDROCHLORIDE 1 MG/ML
0.5 INJECTION, SOLUTION INTRAMUSCULAR; INTRAVENOUS
Status: DISCONTINUED | OUTPATIENT
Start: 2020-06-01 | End: 2020-06-01 | Stop reason: HOSPADM

## 2020-06-01 RX ORDER — OXYCODONE AND ACETAMINOPHEN 5; 325 MG/1; MG/1
1 TABLET ORAL AS NEEDED
Status: DISCONTINUED | OUTPATIENT
Start: 2020-06-01 | End: 2020-06-01 | Stop reason: HOSPADM

## 2020-06-01 RX ORDER — GLYCOPYRROLATE 0.2 MG/ML
INJECTION INTRAMUSCULAR; INTRAVENOUS AS NEEDED
Status: DISCONTINUED | OUTPATIENT
Start: 2020-06-01 | End: 2020-06-01 | Stop reason: HOSPADM

## 2020-06-01 RX ORDER — SODIUM CHLORIDE 0.9 % (FLUSH) 0.9 %
5-40 SYRINGE (ML) INJECTION AS NEEDED
Status: DISCONTINUED | OUTPATIENT
Start: 2020-06-01 | End: 2020-06-01 | Stop reason: HOSPADM

## 2020-06-01 RX ORDER — HYDROMORPHONE HYDROCHLORIDE 1 MG/ML
0.2 INJECTION, SOLUTION INTRAMUSCULAR; INTRAVENOUS; SUBCUTANEOUS
Status: DISCONTINUED | OUTPATIENT
Start: 2020-06-01 | End: 2020-06-01 | Stop reason: HOSPADM

## 2020-06-01 RX ORDER — BUPIVACAINE HYDROCHLORIDE 5 MG/ML
INJECTION, SOLUTION EPIDURAL; INTRACAUDAL AS NEEDED
Status: DISCONTINUED | OUTPATIENT
Start: 2020-06-01 | End: 2020-06-01 | Stop reason: HOSPADM

## 2020-06-01 RX ORDER — LIDOCAINE HYDROCHLORIDE 20 MG/ML
INJECTION, SOLUTION EPIDURAL; INFILTRATION; INTRACAUDAL; PERINEURAL AS NEEDED
Status: DISCONTINUED | OUTPATIENT
Start: 2020-06-01 | End: 2020-06-01 | Stop reason: HOSPADM

## 2020-06-01 RX ORDER — LIDOCAINE HYDROCHLORIDE 10 MG/ML
0.1 INJECTION, SOLUTION EPIDURAL; INFILTRATION; INTRACAUDAL; PERINEURAL AS NEEDED
Status: DISCONTINUED | OUTPATIENT
Start: 2020-06-01 | End: 2020-06-01 | Stop reason: HOSPADM

## 2020-06-01 RX ORDER — FENTANYL CITRATE 50 UG/ML
50 INJECTION, SOLUTION INTRAMUSCULAR; INTRAVENOUS AS NEEDED
Status: DISCONTINUED | OUTPATIENT
Start: 2020-06-01 | End: 2020-06-01 | Stop reason: HOSPADM

## 2020-06-01 RX ORDER — MIDAZOLAM HYDROCHLORIDE 1 MG/ML
INJECTION, SOLUTION INTRAMUSCULAR; INTRAVENOUS AS NEEDED
Status: DISCONTINUED | OUTPATIENT
Start: 2020-06-01 | End: 2020-06-01 | Stop reason: HOSPADM

## 2020-06-01 RX ORDER — DEXMEDETOMIDINE HYDROCHLORIDE 100 UG/ML
INJECTION, SOLUTION INTRAVENOUS AS NEEDED
Status: DISCONTINUED | OUTPATIENT
Start: 2020-06-01 | End: 2020-06-01 | Stop reason: HOSPADM

## 2020-06-01 RX ORDER — ONDANSETRON 2 MG/ML
4 INJECTION INTRAMUSCULAR; INTRAVENOUS AS NEEDED
Status: DISCONTINUED | OUTPATIENT
Start: 2020-06-01 | End: 2020-06-01 | Stop reason: HOSPADM

## 2020-06-01 RX ORDER — SCOLOPAMINE TRANSDERMAL SYSTEM 1 MG/1
1 PATCH, EXTENDED RELEASE TRANSDERMAL ONCE
Status: DISCONTINUED | OUTPATIENT
Start: 2020-06-01 | End: 2020-06-01 | Stop reason: HOSPADM

## 2020-06-01 RX ORDER — ONDANSETRON 2 MG/ML
INJECTION INTRAMUSCULAR; INTRAVENOUS AS NEEDED
Status: DISCONTINUED | OUTPATIENT
Start: 2020-06-01 | End: 2020-06-01 | Stop reason: HOSPADM

## 2020-06-01 RX ORDER — NEOSTIGMINE METHYLSULFATE 1 MG/ML
INJECTION, SOLUTION INTRAVENOUS AS NEEDED
Status: DISCONTINUED | OUTPATIENT
Start: 2020-06-01 | End: 2020-06-01 | Stop reason: HOSPADM

## 2020-06-01 RX ORDER — ACETAMINOPHEN 325 MG/1
650 TABLET ORAL ONCE
Status: COMPLETED | OUTPATIENT
Start: 2020-06-01 | End: 2020-06-01

## 2020-06-01 RX ORDER — ROCURONIUM BROMIDE 10 MG/ML
INJECTION, SOLUTION INTRAVENOUS AS NEEDED
Status: DISCONTINUED | OUTPATIENT
Start: 2020-06-01 | End: 2020-06-01 | Stop reason: HOSPADM

## 2020-06-01 RX ADMIN — Medication 3 MG: at 11:23

## 2020-06-01 RX ADMIN — FENTANYL CITRATE 25 MCG: 50 INJECTION, SOLUTION INTRAMUSCULAR; INTRAVENOUS at 11:55

## 2020-06-01 RX ADMIN — DEXMEDETOMIDINE HYDROCHLORIDE 8 MCG: 100 INJECTION, SOLUTION, CONCENTRATE INTRAVENOUS at 11:04

## 2020-06-01 RX ADMIN — SODIUM CHLORIDE, SODIUM LACTATE, POTASSIUM CHLORIDE, AND CALCIUM CHLORIDE 125 ML/HR: 600; 310; 30; 20 INJECTION, SOLUTION INTRAVENOUS at 10:27

## 2020-06-01 RX ADMIN — FENTANYL CITRATE 50 MCG: 50 INJECTION, SOLUTION INTRAMUSCULAR; INTRAVENOUS at 10:40

## 2020-06-01 RX ADMIN — PROPOFOL 150 MG: 10 INJECTION, EMULSION INTRAVENOUS at 10:40

## 2020-06-01 RX ADMIN — KETOROLAC TROMETHAMINE 30 MG: 30 INJECTION, SOLUTION INTRAMUSCULAR; INTRAVENOUS at 11:16

## 2020-06-01 RX ADMIN — ROCURONIUM BROMIDE 30 MG: 10 SOLUTION INTRAVENOUS at 10:40

## 2020-06-01 RX ADMIN — MIDAZOLAM 2 MG: 1 INJECTION INTRAMUSCULAR; INTRAVENOUS at 10:31

## 2020-06-01 RX ADMIN — CEFOTETAN DISODIUM 2 G: 2 INJECTION, POWDER, FOR SOLUTION INTRAMUSCULAR; INTRAVENOUS at 10:50

## 2020-06-01 RX ADMIN — ACETAMINOPHEN 650 MG: 325 TABLET ORAL at 10:27

## 2020-06-01 RX ADMIN — DEXMEDETOMIDINE HYDROCHLORIDE 8 MCG: 100 INJECTION, SOLUTION, CONCENTRATE INTRAVENOUS at 11:11

## 2020-06-01 RX ADMIN — ONDANSETRON HYDROCHLORIDE 4 MG: 2 INJECTION, SOLUTION INTRAMUSCULAR; INTRAVENOUS at 10:46

## 2020-06-01 RX ADMIN — LIDOCAINE HYDROCHLORIDE 100 MG: 20 INJECTION, SOLUTION EPIDURAL; INFILTRATION; INTRACAUDAL; PERINEURAL at 10:40

## 2020-06-01 RX ADMIN — ONDANSETRON 4 MG: 2 INJECTION INTRAMUSCULAR; INTRAVENOUS at 11:57

## 2020-06-01 RX ADMIN — FENTANYL CITRATE 50 MCG: 50 INJECTION, SOLUTION INTRAMUSCULAR; INTRAVENOUS at 10:31

## 2020-06-01 RX ADMIN — GLYCOPYRROLATE 0.4 MG: 0.2 INJECTION, SOLUTION INTRAMUSCULAR; INTRAVENOUS at 11:23

## 2020-06-01 NOTE — H&P
[address], [address], [address] [facility]  P [phone]  F [phone]    Office Note  Patient ID:  Name:  Josue Patel  MRN:  [id number]  :  1975/44 y.o. Date:  2020      HISTORY OF PRESENT ILLNESS:  Josue Patel is a 38 y.o.  premenopausal female who is being seen for a BRCA 1 mutation. She is referred by Dr. Negrito Montano. In 2019 she was diagnosed with infiltrating ductal carcinoma of the left breast.  She was treated with systemic chemotherapy consisting of Taxol/Carboplatin, which she completed in 2019. Her tumor was triple-negative. She is seeing Dr. Kobe Sorto for bilateral mastectomy. I have been asked to see her in consultation for oophorectomy due to her increased risk of ovarian cancer with a BRCA mutation. ROS:   and GI review:  Negative  Cardiopulmonary review:  Negative   Musculoskeletal:  Negative    A comprehensive review of systems was negative except for that written in the History of Present Illness. , 10 point ROS      OB/GYN ROS:  [id number]   section x 3  Patient denies any abnormal bleeding or vaginal discharge.        Problem List:  Patient Active Problem List    Diagnosis Date Noted    S/P breast reconstruction 2020    Malignant neoplasm of upper-outer quadrant of left breast in female, estrogen receptor negative ([facility] [address] 2019     PMH:  Past Medical History:   Diagnosis Date    Breast cancer ([address] 2019    left breast cancer      PSH:  Past Surgical History:   Procedure Laterality Date    HX APPENDECTOMY      HX BREAST RECONSTRUCTION Bilateral 2020    BILATERAL BREAST DIRECT TO IMPLANT RECONSTRUCTION WITH ALLODERM performed by Elvira Clark MD at [facility]    HX  SECTION      x3    HX MASTECTOMY Bilateral 2020    BILATERAL BREAST SKIN AND NIPPLE SPARING MASTECTOMIES, PORT A CATH REMOVAL, BILATERAL BREAST DIRECT TO IMPLANT RECONSTRUCTION WITH ALLODERM, AND POSSIBLE TISSUE EXPANDERS performed by Shelly Parish MD at [facility] AM[facility] OR      Social History:  Social History     Tobacco Use    Smoking status: Never Smoker    Smokeless tobacco: Never Used   Substance Use Topics    Alcohol use: Yes     Comment: RARELY      Family History:  Family History   Problem Relation Age of Onset    Diabetes Mother     Heart Disease Mother     Hypertension Mother     Anesth Problems Mother         PONV    Heart Disease Father     Cancer Paternal Aunt         Breast    Breast Cancer Paternal Aunt     Cancer Paternal Aunt         Breast    Breast Cancer Paternal Aunt     No Known Problems Sister     Hypertension Brother     Diabetes Brother       Medications: (reviewed)  No current facility-administered medications for this encounter. Current Outpatient Medications   Medication Sig    doxycycline (MONODOX) 100 mg capsule Take 100 mg by mouth nightly.  atorvastatin (LIPITOR) 40 mg tablet Take 40 mg by mouth nightly. Indications: \"Repurposed/Cancer fighting\"    metFORMIN (GLUCOPHAGE) 500 mg tablet Take 500 mg by mouth two (2) times daily (with meals). Indications: Repurposed/Cancer Fighting    MEBENDAZOLE PO Take 100 mg by mouth daily. Indications: \"Repurposed/Cancer Fighting\" PT TAKES EVERY OTHER MONTH    MELATONIN PO Take 20 mg by mouth nightly as needed. Allergies: (reviewed)  No Known Allergies       OBJECTIVE:    Physical Exam:  VITAL SIGNS: There were no vitals filed for this visit. There is no height or weight on file to calculate BMI. GENERAL NUPUR: Conversant, alert, oriented. No acute distress. HEENT: HEENT. No thyroid enlargement. No JVD. Neck: Supple without restrictions. RESPIRATORY: Clear to auscultation and percussion to the bases. No CVAT. CARDIOVASC: RRR without murmur/rub.    GASTROINT: soft, non-tender, without masses or organomegaly   MUSCULOSKEL: no joint tenderness, deformity or swelling   EXTREMITIES: extremities normal, atraumatic, no cyanosis or edema   PELVIC: Vulva and vagina appear normal. Bimanual exam reveals normal uterus and adnexa. RECTAL: Deferred   NNAMDI SURVEY: No suspicious lymphadenopathy or edema noted. NEURO: Grossly intact. No acute deficit. Lab Data:    Lab Results   Component Value Date/Time    WBC 4.1 05/26/2020 02:47 PM    HGB 12.5 05/26/2020 02:47 PM    HCT 39.7 05/26/2020 02:47 PM    PLATELET 227 24/01/3153 02:47 PM    MCV 87.8 05/26/2020 02:47 PM     Lab Results   Component Value Date/Time    Sodium 138 05/26/2020 02:47 PM    Potassium 4.0 05/26/2020 02:47 PM    Chloride 103 05/26/2020 02:47 PM    CO2 27 05/26/2020 02:47 PM    Anion gap 8 05/26/2020 02:47 PM    Glucose 91 05/26/2020 02:47 PM    BUN 15 05/26/2020 02:47 PM    Creatinine 0.71 05/26/2020 02:47 PM    BUN/Creatinine ratio 21 (H) 05/26/2020 02:47 PM    GFR est AA >60 05/26/2020 02:47 PM    GFR est non-AA >60 05/26/2020 02:47 PM    Calcium 9.1 05/26/2020 02:47 PM         IMPRESSION/PLAN:  Josue Patel is a 38 y.o. female with a working diagnosis of triple-negative breast cancer and a BRCA 1 mutation. I reviewed with Josue Patel her medical records, physical exam, and review of symptoms. I explained to her the increased lifetime risk of ovarian cancer due to the BRCA 1 mutation, approximately 40%. For that reason, a risk-reducing bilateral salpingooopohorectomy is recommended. We also discussed hysterectomy as well, although there are no strong guidelines for that. She was counseled on the risks, benefits, indications, and alternatives of the procedure. Her questions were answered. The patient was counseled at length about the risks of logan Covid-19 during their perioperative period and any recovery window from their procedure.   The patient was made aware that logan Covid-19  may worsen their prognosis for recovering from their procedure and lend to a higher morbidity and/or mortality risk.  All material risks, benefits, and reasonable alternatives including postponing the procedure were discussed. The patient does  wish to proceed with the procedure at this time. Signed By: Alexis Reyna MD     6/1/2020/1:10 PM       Date of Surgery Update: Katy Ray was seen and examined. History and physical has been reviewed. The patient has been examined. There have been no significant clinical changes since the completion of the originally dated History and Physical.  Patient identified by surgeon; surgical site was confirmed by patient and surgeon.     Signed By: Alexis Reyna MD     June 1, 2020 7:19 AM

## 2020-06-01 NOTE — BRIEF OP NOTE
Brief Postoperative Note    Patient: Theo Cross  YOB: 1975  MRN: [id number]    Date of Procedure: 6/1/2020     Pre-Op Diagnosis: BRCA 1 MUTATION    Post-Op Diagnosis: Same as preoperative diagnosis. Procedure(s):  LAPAROSCOPIC BILATERAL SALPINGO OOPHORECTOMY    Surgeon(s):  Caden Lopez MD    Surgical Assistant: Physician Assistant: Chelsey Chapman PA-C    Anesthesia: General     Estimated Blood Loss (mL): Minimal    Complications: None    Specimens:   ID Type Source Tests Collected by Time Destination   1 : RIGHT FALLOPIAN TUBE AND OVARY Fresh Bhargavopian Kavitha Villasenor MD 6/1/2020 1113 Pathology   2 : LEFT FALLOPIAN TUBE AND OVARY Fresh Bhargavopian Kavitha Villasenor MD 6/1/2020 1113 Pathology   1 : PELVIC WASHINGS Fresh Pelvis  Caden Lopez MD 6/1/2020 1104 Cytology        Implants: * No implants in log *    Drains: * No LDAs found *    Findings: Normal appearing uterus, tubes, and ovaries.     Electronically Signed by Lex Curry MD on 6/1/2020 at 11:19 AM

## 2020-06-01 NOTE — DISCHARGE INSTRUCTIONS
Patient Education        Laparoscopic Oophorectomy: What to Expect at Home  Your Recovery     After surgery to remove one or both ovaries, you may feel some pain in your belly for a few days. Your belly may also be swollen. You may have a change in your bowel movements for a few days. It's normal to also have some shoulder or back pain. This is caused by the gas your doctor put in your belly to help see your organs better. To help with pain, your doctor will prescribe medicines. You may need about 1 week to fully recover. It's important not to lift anything heavy for about 1 week. You can ask your doctor when it's okay to have sex. This care sheet gives you a general idea about how long it will take for you to recover. But each person recovers at a different pace. Follow the steps below to get better as quickly as possible. How can you care for yourself at home? Activity  · Rest when you feel tired. · Be active. Walking is a good choice. · Allow your body to heal. Don't move quickly or lift anything heavy until you are feeling better. · Hold a pillow over your incisions when you cough or take deep breaths. This will support your belly and may help to decrease your pain. · Do breathing exercises at home as instructed by your doctor. This will help prevent pneumonia. Diet  · You can eat your normal diet. If your stomach is upset, try bland, low-fat foods like plain rice, broiled chicken, toast, and yogurt. · Drink plenty of fluids (unless your doctor tells you not to). · If your bowel movements are not regular right after surgery, try to avoid constipation and straining. Drink plenty of water. Your doctor may suggest fiber, a stool softener, or a mild laxative. Medicines  · Your doctor will tell you if and when you can restart your medicines. He or she will also give you instructions about taking any new medicines.   · If you take aspirin or some other blood thinner, ask your doctor if and when to start taking it again. Make sure that you understand exactly what your doctor wants you to do. · Be safe with medicines. Read and follow all instructions on the label. ? If the doctor gave you a prescription medicine for pain, take it as prescribed. ? If you are not taking a prescription pain medicine, ask your doctor if you can take an over-the-counter medicine. Incision care  · If you have strips of tape on the cut (incision) the doctor made, leave the tape on for a week or until it falls off. · Wash the area daily with warm, soapy water, and pat it dry. Don't use hydrogen peroxide or alcohol. They can slow healing. · You may cover the area with a gauze bandage if it oozes fluid or rubs against clothing. · Change the bandage every day. · Keep the area clean and dry. Other instructions  · Wear loose, comfortable clothing. For a few weeks, avoid anything that puts pressure on your belly. · You may want to use a heating pad on your belly to help with pain. Follow-up care is a key part of your treatment and safety. Be sure to make and go to all appointments, and call your doctor if you are having problems. It's also a good idea to know your test results and keep a list of the medicines you take. When should you call for help? [id number] anytime you think you may need emergency care. For example, call if:  · You passed out (lost consciousness). · You have chest pain, are short of breath, or cough up blood. Call your doctor now or seek immediate medical care if:  · You have pain that does not get better after you take pain medicine. · You cannot pass stools or gas. · You have vaginal discharge that has increased in amount or smells bad. · You are sick to your stomach or cannot drink fluids. · You have loose stitches, or your incision comes open. · Bright red blood has soaked through the bandage over your incision.   · You have signs of infection, such as:  ? Increased pain, swelling, warmth, or redness. ? Red streaks leading from the incision. ? Pus draining from the incision. ? A fever. · You have bright red vaginal bleeding that soaks one or more pads in an hour, or you have large clots. · You have signs of a blood clot in your leg (called a deep vein thrombosis), such as:  ? Pain in your calf, back of the knee, thigh, or groin. ? Redness and swelling in your leg. Watch closely for changes in your health, and be sure to contact your doctor if you have any problems. Where can you learn more? Go to http://michael-marco a.info/  Enter C826 in the search box to learn more about \"Laparoscopic Oophorectomy: What to Expect at Home. \"  Current as of: November 8, 2019               Content Version: 12.5  © 7327-3039 [employer] Care instructions adapted under license by [employer] (which disclaims liability or warranty for this information). If you have questions about a medical condition or this instruction, always ask your healthcare professional. [address] any warranty or liability for your use of this information. ______________________________________________________________________    Anesthesia Discharge Instructions    After general anesthesia or intervenous sedation, for 24 hours or while taking prescription Narcotics:  · Limit your activities  · Do not drive or operate hazardous machinery  · If you have not urinated within 8 hours after discharge, please contact your surgeon on call.   · Do not make important personal or business decisions  · Do not drink alcoholic beverages    Report the following to your surgeon:  · Excessive pain, swelling, redness or odor of or around the surgical area  · Temperature over 100.5 degrees  · Nausea and vomiting lasting longer than 4 hours or if unable to take medication  · Any signs of decreased circulation or nerve impairment to extremity:  Change in color, persistent numbness, tingling, coldness or increased pain. · Any questions      Patient Education        Learning About Coronavirus [phone])  Coronavirus [phone]): Overview  What is coronavirus ([id number])? The coronavirus disease (COVID-19) is caused by a virus. It is an illness that was first found in [place], [place], in December 2019. It has since spread worldwide. The virus can cause fever, cough, and trouble breathing. In severe cases, it can cause pneumonia and make it hard to breathe without help. It can cause death. Coronaviruses are a large group of viruses. They cause the common cold. They also cause more serious illnesses like Middle East respiratory syndrome (MERS) and severe acute respiratory syndrome (SARS). COVID-19 is caused by a novel coronavirus. That means it's a new type that has not been seen in people before. This virus spreads person-to-person through droplets from coughing and sneezing. It can also spread when you are close to someone who is infected. And it can spread when you touch something that has the virus on it, such as a doorknob or a tabletop. What can you do to protect yourself from coronavirus (COVID-19)? The best way to protect yourself from getting sick is to:  · Avoid areas where there is an outbreak. · Avoid contact with people who may be infected. · Wash your hands often with soap or alcohol-based hand sanitizers. · Avoid crowds and try to stay at least 6 feet away from other people. · Wash your hands often, especially after you cough or sneeze. Use soap and water, and scrub for at least 20 seconds. If soap and water aren't available, use an alcohol-based hand . · Avoid touching your mouth, nose, and eyes. What can you do to avoid spreading the virus to others? To help avoid spreading the virus to others:  · Cover your mouth with a tissue when you cough or sneeze. Then throw the tissue in the trash. · Use a disinfectant to clean things that you touch often.   · Wear a cloth face cover if you have to go to public areas. · Stay home if you are sick or have been exposed to the virus. Don't go to school, work, or public areas. And don't use public transportation, ride-shares, or taxis unless you have no choice. · If you are sick:  ? Leave your home only if you need to get medical care. But call the doctor's office first so they know you're coming. And wear a face cover. ? Wear the face cover whenever you're around other people. It can help stop the spread of the virus when you cough or sneeze. ? Clean and disinfect your home every day. Use household  and disinfectant wipes or sprays. Take special care to clean things that you grab with your hands. These include doorknobs, remote controls, phones, and handles on your refrigerator and microwave. And don't forget countertops, tabletops, bathrooms, and computer keyboards. When to call for help  [id number] anytime you think you may need emergency care. For example, call if:  · You have severe trouble breathing. (You can't talk at all.)  · You have constant chest pain or pressure. · You are severely dizzy or lightheaded. · You are confused or can't think clearly. · Your face and lips have a blue color. · You pass out (lose consciousness) or are very hard to wake up. Call your doctor now if you develop symptoms such as:  · Shortness of breath. · Fever. · Cough. If you need to get care, call ahead to the doctor's office for instructions before you go. Make sure you wear a face cover to prevent exposing other people to the virus. Where can you get the latest information? The following health organizations are tracking and studying this virus. Their websites contain the most up-to-date information. [employer] also learn what to do if you think you may have been exposed to the virus. · U.S. Centers for Disease Control and Prevention (CDC): The CDC provides updated news about the disease and travel advice.  The website also tells you how to prevent the spread of infection. www.cdc.gov  · World Health Organization [facility]): WHO offers information about the virus outbreaks. WHO also has travel advice. www.who.int  Current as of: May 8, 2020               Content Version: 12.5  © 2006-2020 [employer]. Care instructions adapted under license by [email or web address] (which disclaims liability or warranty for this information). If you have questions about a medical condition or this instruction, always ask your healthcare professional. [address] any warranty or liability for your use of this information.

## 2020-06-01 NOTE — ROUTINE PROCESS
Patient: Lizzette Mcdonough MRN: [id number]  SSN: [id number]   YOB: 1975  Age: 38 y.o. Sex: female     Patient is status post Procedure(s):  LAPAROSCOPIC BILATERAL SALPINGO OOPHORECTOMY. Surgeon(s) and Role:     Uzma Huddleston MD - Primary    Local/Dose/Irrigation:  30 ML MARCAINE 0.5%                Venous Access Device Port 07/09/19 Upper chest (subclavicular area, right (Active)      Peripheral IV 06/01/20 Right Antecubital (Active)   Site Assessment Clean, dry, & intact 6/1/2020 10:21 AM   Phlebitis Assessment 0 6/1/2020 10:21 AM   Infiltration Assessment 0 6/1/2020 10:21 AM   Dressing Status Clean, dry, & intact 6/1/2020 10:21 AM   Dressing Type Transparent;Tape 6/1/2020 10:21 AM   Hub Color/Line Status Pink; Infusing 6/1/2020 10:21 AM            Airway - Endotracheal Tube 06/01/20 Oral (Active)                   Dressing/Packing:  Wound Abdomen 3 TROCAR SITES-Dressing Type: Topical skin adhesive/glue (06/01/20 1120)    Splint/Cast:  ]    Other:  NA

## 2020-06-01 NOTE — OP NOTES
Gynecologic Oncology Operative Report    Nayely Morocho  6/1/2020    Pre-operative dx:  BRCA 1 mutation    Post-operative dx:  BRCA 1 mutation    Procedure:  Laparoscopic bilateral salpingooophorectomy    Surgeon:  Claire Graham MD    Assistant:  Kam Dickson PA-C     Anesthesia:  GETA    EBL:  <75 cc    Complications:  None    Implants:  None    Specimens:    ID Type Source Tests Collected by Time Destination   1 : RIGHT FALLOPIAN TUBE AND OVARY Fresh Fallopian Tube Jeremy Jung MD 6/1/2020 1113 Pathology   2 : LEFT FALLOPIAN TUBE AND OVARY Fresh Fallopian Tube Jeremy Jung MD 6/1/2020 1113 Pathology   1 : PELVIC WASHINGS Fresh Pelvis   Thomas Lopez MD 6/1/2020 1104 Cytology     Operative indications:  37 yo WF with BRCA 1 mutation and history of breast cancer. She was referred to me for oophorectomy. I recommended a laparoscopic BSO without hysterectomy. Operative findings:  Normal appearing uterus, tubes, and ovaries. Procedure in detail:  After the risks, benefits, indications, and alternatives of the procedure were discussed with the patient and informed consent was obtained, the patient was taken to the operating room. She was positively identified, administered general anesthesia, and then placed in the dorsal lithotomy position in [address] [place] [address]. An exam under anesthesia was performed. She was then prepped and draped in the usual fashion. A carrera catheter was inserted, followed by a sponge-stick in the vagina to act as a uterine manipulator. We then proceeded with laparoscopy by grasping the umbilicus on either side with Allis clamps. A small incision was made at the base with an #11-blade scalpel. A 5 mm blade-less trocar was then directly inserted, with the camera in position to visualize safe entry. Once proper placement was confirmed, the abdomen was then insufflated with carbon dioxide.   A 12 mm blade-less trocar was inserted in the left lower quadrant and a 5 mm blade-less trocar was inserted in the right lower quadrant, each midway between the anterior superior iliac spine and the umbilicus. These trocars were inserted under direct visualization to ensure safe entry. The abdomen and pelvis were then examined, with the above mentioned findings noted. Pelvic washings were obtained as well. The patient was then placed in Trendelenburg tilt and we then proceeded with the bilateral salpingooophorectomy. We began on the left side. The peritoneum between the round ligament and the ovary was grasped and elevated. It was then incised with the Harmonic Scalpel, allowing entry into the retroperitoneal space where the ureter was identified. The ovarian vessels were then coagulated and transected with the Harmonic scalpel. The ovary was then elevated and the fallopian tube and proper ovarian ligament were then coagulated and transected at the level of the uterine cornua. The adnexa was then removed through the 12 mm trocar site. We then moved to the right side. The peritoneum between the round ligament and the ovary was grasped and elevated. It was then incised with the Harmonic Scalpel, allowing entry into the retroperitoneal space where the ureter was identified. The ovarian vessels were then coagulated and transected with the Harmonic scalpel. The ovary was then elevated and the fallopian tube and proper ovarian ligament were then coagulated and transected at the level of the uterine cornua. The adnexa was then removed through the 12 mm trocar site. This trocar site was then closed with a 0 Vicryl suture at the level of the fascia using the Yousuf-Jorge closure device. Excellent reapproximation and hemostasis was noted on both sides. The pelvis was then irrigated. Once satisfied with hemostasis, the gas was then allowed to escape from the abdomen and the trocars were removed. She was then taken out of Trendelenburg tilt.   The incision sites were closed with a stitch of 4-0 Monocryl, followed by a layer of Dermabond. The patient was taken out of stirrups, awakened from anesthesia, and taken to the recovery room in stable condition. All instrument, sponge, and needle counts were correct.         Joanne Gonzalez MD  6/1/2020  11:20 AM

## 2020-06-02 ENCOUNTER — RESEARCH ENCOUNTER (OUTPATIENT)
Dept: ONCOLOGY | Age: 45
End: 2020-06-02

## 2020-06-02 NOTE — PROGRESS NOTES
Called Ms. [address] [facility] regarding her interest in study [id number]. Pt politely declined study and stated that she is possibly moving as well as other things going on right now which she feels would inhibit her willingness to participate.

## 2020-06-08 ENCOUNTER — TELEPHONE (OUTPATIENT)
Dept: GYNECOLOGY | Age: 45
End: 2020-06-08

## 2020-06-08 ENCOUNTER — VIRTUAL VISIT (OUTPATIENT)
Dept: ONCOLOGY | Age: 45
End: 2020-06-08

## 2020-06-08 DIAGNOSIS — C50.412 MALIGNANT NEOPLASM OF UPPER-OUTER QUADRANT OF LEFT BREAST IN FEMALE, ESTROGEN RECEPTOR NEGATIVE (HCC): Primary | ICD-10-CM

## 2020-06-08 DIAGNOSIS — Z17.1 MALIGNANT NEOPLASM OF UPPER-OUTER QUADRANT OF LEFT BREAST IN FEMALE, ESTROGEN RECEPTOR NEGATIVE (HCC): Primary | ICD-10-CM

## 2020-06-08 NOTE — TELEPHONE ENCOUNTER
Pt , she had surgery on 6/1/2020 and has some concerns regarding her incision, she states it \"looks different than the other\", it has been draining and is itching, she states it is not warm to the touch, no fever, will send a pic for Dr. Scott Certain to review

## 2020-06-08 NOTE — TELEPHONE ENCOUNTER
I called pt back, I showed Dr. Guillory Memory the pictures that she sent and per his recommendations, put neosporin and a band aid, call office if she develops a fever, area is warm to the touch, or red streaks, pt verbalized understanding

## 2020-06-08 NOTE — PROGRESS NOTES
[facility]  [address] [facility], [address] [facility] [address] [facility] [place]: [phone]  F: [phone]        Reason for Visit:   Jessica Lo is a 38 y.o. female who is seen by synchronous (real-time) audio-video technology for follow up of breast cancer      Breast surgeon:  Dr. Ngoc Chávez    Treatment History:   · 6/10/19 L breast core bx:  IDC, gr 3, 1.1 cm, + LVI, ER negative, DE negative, HER 2 negative at [facility] 1+; DCIS present gr 3, solid with expansile necrosis  · 7/3/19 BRCA1 pathogenic mutation (invitae), c.5266dup  · 19 MRI breast bx:  Negative  · Carbo/taxol 19-10/14/19  · C3d15 delayed one week due to neutropenia    [facility]19   c1 held one week due to thrombocytopenia  c3 delayed 1 week due to pt trip    20 right and left nipple bx:  Negative; left ax SLN bx:  0/2 LN  20 left breast mastectomy:  No invasive ca; residual DCIS, ypTis ypN0 cM0; right breast mastectomy:  Negative    20 BSO negative    History of Present Illness:   She felt the L breast mass herself in May 2019, with aching pain, leading to the pathology above. Interval history:  In today for follow up and treatment.  Complains of gr 1 hot flashes, gr 1 rectal bleeding due to fissure, gr 1 vaginal dryness    FH:   2 paternal aunts with post-menopausal breast cancer, one aunt with ovarian cancer; that aunt's daughter tested positive for BRCA1 (first cousin); no pancreas cancer, no prostate cancer    Past Medical History:   Diagnosis Date    Breast cancer ([address] 2019    left breast cancer    Hx of motion sickness       Past Surgical History:   Procedure Laterality Date    HX APPENDECTOMY      HX BREAST RECONSTRUCTION Bilateral 2020    BILATERAL BREAST DIRECT TO IMPLANT RECONSTRUCTION WITH ALLODERM performed by Maximus Herrera MD at [address] HX  SECTION      x3    HX MASTECTOMY Bilateral 2020    BILATERAL BREAST SKIN AND NIPPLE SPARING MASTECTOMIES, PORT A CATH REMOVAL, BILATERAL BREAST DIRECT TO IMPLANT RECONSTRUCTION WITH ALLODERM, AND POSSIBLE TISSUE EXPANDERS performed by Bianca Chahal MD at [facility] AM[facility]UL[facility]      Social History     Tobacco Use    Smoking status: Never Smoker    Smokeless tobacco: Never Used   Substance Use Topics    Alcohol use: Yes     Comment: RARELY      Family History   Problem Relation Age of Onset    Diabetes Mother     Heart Disease Mother     Hypertension Mother    [facility] Anesth Problems Mother         PONV    Heart Disease Father     Cancer Paternal Aunt         Breast    Breast Cancer Paternal Aunt     Cancer Paternal Aunt         Breast    Breast Cancer Paternal Aunt     No Known Problems Sister     Hypertension Brother     Diabetes Brother      Current Outpatient Medications   Medication Sig    atorvastatin (LIPITOR) 40 mg tablet Take 40 mg by mouth nightly. Indications: \"Repurposed/Cancer fighting\"    metFORMIN (GLUCOPHAGE) 500 mg tablet Take 500 mg by mouth two (2) times daily (with meals). Indications: Repurposed/Cancer Fighting    MELATONIN PO Take 20 mg by mouth nightly as needed.  doxycycline (MONODOX) 100 mg capsule Take 100 mg by mouth nightly.  MEBENDAZOLE PO Take 100 mg by mouth daily. Indications: \"Repurposed/Cancer Fighting\" PT TAKES EVERY OTHER MONTH     No current facility-administered medications for this visit. No Known Allergies     Review of Systems: A complete review of systems was obtained, negative except as described above. Physical Exam:     There were no vitals taken for this visit.   ECOG PS: 0  General: alert, cooperative, no distress   Mental  status: normal mood, behavior, speech, dress, motor activity, and thought processes, able to follow commands   HENT: NCAT   Neck: no visualized mass   Resp: no respiratory distress   Neuro: no gross deficits   Skin: no discoloration or lesions of concern on visible areas   Psychiatric: normal affect, consistent with stated mood, no evidence of hallucinations       Due to this being a TeleHealth evaluation (During [facility] emergency), many elements of the physical examination are unable to be assessed. Evaluation of the following organ systems was limited: Vitals/Constitutional/EENT/Resp/CV/GI//MS/Neuro/Skin/Heme-Lymph-Imm. Results:     Lab Results   Component Value Date/Time    WBC 4.1 05/26/2020 02:47 PM    HGB 12.5 05/26/2020 02:47 PM    HCT 39.7 05/26/2020 02:47 PM    PLATELET 080 33/57/1820 02:47 PM    MCV 87.8 05/26/2020 02:47 PM    ABS. NEUTROPHILS 1.8 05/26/2020 02:47 PM     Lab Results   Component Value Date/Time    Sodium 138 05/26/2020 02:47 PM    Potassium 4.0 05/26/2020 02:47 PM    Chloride 103 05/26/2020 02:47 PM    CO2 27 05/26/2020 02:47 PM    Glucose 91 05/26/2020 02:47 PM    BUN 15 05/26/2020 02:47 PM    Creatinine 0.71 05/26/2020 02:47 PM    GFR est AA >60 05/26/2020 02:47 PM    GFR est non-AA >60 05/26/2020 02:47 PM    Calcium 9.1 05/26/2020 02:47 PM     Lab Results   Component Value Date/Time    Bilirubin, total 0.6 05/26/2020 02:47 PM    ALT (SGPT) 44 05/26/2020 02:47 PM    Alk. phosphatase 64 05/26/2020 02:47 PM    Protein, total 7.4 05/26/2020 02:47 PM    Albumin 3.9 05/26/2020 02:47 PM    Globulin 3.5 05/26/2020 02:47 PM       6/13/19 MRI breast  FINDINGS:     Background parenchymal enhancement: Moderate. Lymph nodes: No lymphadenopathy.     Left breast: Irregular triangular-shaped mass in the posterior third of the left  breast at 2:00 measures 2.8 x 1.8 x 4.2 cm. Posterior margin is 0.3 cm from the  left pectoralis major muscle. Biopsy clip is in the inferior margin of the mass.     In the middle and posterior thirds of the left breast at 5-6:00, segmental non  mass enhancement with heterogeneous kinetics measures 2.6 cm in oblique AP  diameter in the axial plane.  Otherwise, there are foci in the left breast.     Right breast: Heterogeneous patchy enhancement in multiple locations. No  suspicious kinetics. Biopsy clip in the middle third is at 3:00. No surrounding  abnormal enhancement or morphology.     IMPRESSION:   1. 2.8 x 1.8 x 4.2 cm biopsy-proven infiltrating ductal carcinoma in the left  breast at 2:00 is in close approximation to the chest wall. 2. Left breast 5-6:00 suspicious segmental non mass enhancement. 3. No MRI evidence of malignancy in the right breast.  4. No lymphadenopathy.     Assessment: ACR BI-RADS category   Left breast: BI-RADS Assessment Category 4: Suspicious abnormality - Biopsy  should be performed in the absence of clinical contraindication. Right breast: BI-RADS Assessment Category 2: Benign finding.     Recommendation: Bilateral mammography if not recently performed elsewhere. Second look ultrasound of the left breast at 5-6:00 to determine  ultrasound-guided or MRI guided biopsy of non mass enhancement.     A negative breast MRI examination speaks strongly against invasive cancer down  to a detection threshold of 3 to 5 mm but may not detect some lower grade or in  situ carcinomas. Therefore, routine clinical and mammographic followup are  recommended. A summary portfolio has been created in PACS.    7/12/19 TTE EF 65%    10/16/19 Left mammo/US:  ULTRASOUND:  Corresponding with the mammographic density and previously seen  mass is a 1.2 x 1.2 x 1.4 cm hypoechoic mass lesion, which previously measured  1.9 x 2.7 cm on diagnostic MRI. Note is made of an adjacent biopsy marker clip. No other suspicious cystic or solid lesions identified.     IMPRESSION: Response to therapy with apparent reduction in size of left breast  mass compared to prior MRI. BI-RADS Assessment Category 6: Known biopsy proven  malignancy- Appropriate action should be taken.     RECOMMENDATION:  Appropriate clinical management of known left breast  malignancy. Records reviewed and summarized above. Pathology report(s) reviewed above.   Radiology report(s) reviewed above. Assessment/plan:   1. L UOQ IDC, gr 3, triple negative:  cT2 cN0 cM0, stage IIA, prognostic stage IIB  ypTis ypN0 cM0    We explained to the patient that the goal of systemic adjuvant therapy is to improve the chances for cure and decrease the risk of relapse. We explained why a patient can have microscopic cancer spread now even though physical examination, laboratory studies and imaging studies are negative for cancer. We explained that the same treatments used now as adjuvant or preventive treatments rarely if ever are curative in women who develop metastases. No residual invasive disease, no indication for adjuvant capecitabine. No need for XRT    She is not interested in the ABC study, discussed    Follow-up after early breast cancer was discussed. I recommend follow-up as defined by the American Society of Clinical Oncology and [facility]. This includes a visit to a health care professional every 3-6 months for 3 years, then every 6-12 months for 2 years, and then yearly as well as mammograms yearly. 2. Emotional well being:  She has excellent support and is coping well with her disease    3. BRCA1 pathogenic mutation:   Discussed recommendation for bilateral mastectomies and bilateral oophorectomies; risk of ovarian, fallopian or peritoneal cancer is 16-59%; risk for pancreatic cancer is 1-3%; risk for contralateral breast cancer is 10-15%    S/p bilateral mastectomies    Has seen Dr. Mei Henry, s/p bilateral oophorectomies 6/1/20, negative    I was in the office while conducting this encounter. The patient was at her home. Consent:  She and/or her healthcare decision maker is aware that this patient-initiated Telehealth encounter is a billable service, with coverage as determined by her insurance carrier.  She is aware that she may receive a bill and has provided verbal consent to proceed: Yes    Pursuant to the emergency declaration under the [address] and the [address] authority and the [employer] Act, this Virtual  Visit was conducted, with patient's (and/or legal guardian's) consent, to reduce the patient's risk of exposure to COVID-19 and provide necessary medical care. Services were provided through a video synchronous discussion virtually to substitute for in-person visit. Signed By: Seb Myers MD      No orders of the defined types were placed in this encounter.

## 2020-06-30 ENCOUNTER — OFFICE VISIT (OUTPATIENT)
Dept: GYNECOLOGY | Age: 45
End: 2020-06-30

## 2020-06-30 ENCOUNTER — HOSPITAL ENCOUNTER (OUTPATIENT)
Dept: LAB | Age: 45
Discharge: HOME | End: 2020-06-30
Payer: OTHER GOVERNMENT

## 2020-06-30 VITALS
BODY MASS INDEX: 25.12 KG/M2 | DIASTOLIC BLOOD PRESSURE: 75 MMHG | WEIGHT: 141.8 LBS | TEMPERATURE: 97.7 F | HEART RATE: 94 BPM | HEIGHT: 63 IN | SYSTOLIC BLOOD PRESSURE: 103 MMHG

## 2020-06-30 DIAGNOSIS — Z17.1 MALIGNANT NEOPLASM OF UPPER-OUTER QUADRANT OF LEFT BREAST IN FEMALE, ESTROGEN RECEPTOR NEGATIVE (HCC): ICD-10-CM

## 2020-06-30 DIAGNOSIS — Z15.01 BRCA1 GENE MUTATION POSITIVE: Primary | ICD-10-CM

## 2020-06-30 DIAGNOSIS — Z01.419 PAPANICOLAOU TEST, AS PART OF ROUTINE GYNECOLOGICAL EXAMINATION: ICD-10-CM

## 2020-06-30 DIAGNOSIS — Z15.09 BRCA1 GENE MUTATION POSITIVE: Primary | ICD-10-CM

## 2020-06-30 DIAGNOSIS — C50.412 MALIGNANT NEOPLASM OF UPPER-OUTER QUADRANT OF LEFT BREAST IN FEMALE, ESTROGEN RECEPTOR NEGATIVE (HCC): ICD-10-CM

## 2020-06-30 PROCEDURE — 87624 HPV HI-RISK TYP POOLED RSLT: CPT

## 2020-06-30 PROCEDURE — 88175 CYTOPATH C/V AUTO FLUID REDO: CPT

## 2020-06-30 NOTE — PROGRESS NOTES
[address] [facility] [address], [address] [facility]  P [phone]  F [phone]    Postoperative Office Note  Patient ID:  Name: Flor Marshall  MRM: [id number]  : 1975/44 y.o. Date: 2020    Diagnosis:     ICD-10-CM ICD-9-CM    1. BRCA1 gene mutation positive Z15.01 V84.01     Z15.09     2. Malignant neoplasm of upper-outer quadrant of left breast in female, estrogen receptor negative (HCC) C50.412 174.4     Z17.1 V86.1    3. Papanicolaou test, as part of routine gynecological examination Z01.419 V76.2 PAP IG, APTIMA HPV AND RFX 16/18,45 ([id number])       Problem List:   Patient Active Problem List   Diagnosis Code    Malignant neoplasm of upper-outer quadrant of left breast in female, estrogen receptor negative [facility] C50.412, Z17.1    S/P breast reconstruction Z98.82       SUBJECTIVE:    Flor Marshall is a 38 y.o. female who presents for followup postoperative care following [facility] BSO for a BRCA mutation. The patient's pathology revealed: FINAL PATHOLOGIC DIAGNOSIS   1. Right fallopian tube and ovary, right salpingo-oophorectomy (10 g): Benign fallopian tube and ovary with physiologic changes   No evidence of malignancy   2. Left fallopian tube and ovary, left salpingo-oophorectomy (10 g): Benign fallopian tube and ovary with physiologic changes   No evidence of malignancy     Currently she has no problems with eating, bowel movements, voiding, or their wound. Appetite is good. Eating a regular diet without difficulty. Bowel movements are regular. The patient is not having any pain. Medications:     Current Outpatient Medications on File Prior to Visit   Medication Sig Dispense Refill    doxycycline (MONODOX) 100 mg capsule Take 100 mg by mouth nightly.  atorvastatin (LIPITOR) 40 mg tablet Take 40 mg by mouth nightly.  Indications: \"Repurposed/Cancer fighting\"      metFORMIN (GLUCOPHAGE) 500 mg tablet Take 500 mg by mouth two (2) times daily (with meals). Indications: Repurposed/Cancer Fighting      MEBENDAZOLE PO Take 100 mg by mouth daily. Indications: \"Repurposed/Cancer Fighting\" PT TAKES EVERY OTHER MONTH      MELATONIN PO Take 20 mg by mouth nightly as needed. No current facility-administered medications on file prior to visit. Allergies:      Allergies   Allergen Reactions    Other Medication Rash and Itching     dermabond skin glue     Past Medical History:   Diagnosis Date    Breast [facility] 2019    left breast cancer    Hx of motion sickness      Past Surgical History:   Procedure Laterality Date    HX APPENDECTOMY      HX BREAST RECONSTRUCTION Bilateral 2020    BILATERAL BREAST DIRECT TO IMPLANT RECONSTRUCTION WITH ALLODERM performed by Jazmin Jimenez MD at [address] HX  SECTION      x3    HX MASTECTOMY Bilateral 2020    BILATERAL BREAST SKIN AND NIPPLE SPARING MASTECTOMIES, PORT A CATH REMOVAL, BILATERAL BREAST DIRECT TO IMPLANT RECONSTRUCTION WITH ALLODERM, AND POSSIBLE TISSUE EXPANDERS performed by Robert Thomas MD at [facility] AMBULATORY OR     Social History     Socioeconomic History    Marital status:      Spouse name: Not on file    Number of children: Not on file    Years of education: Not on file    Highest education level: Not on file   Occupational History    Not on file   Social Needs    Financial resource strain: Not on file    Food insecurity     Worry: Not on file     Inability: Not on file    Transportation needs     Medical: Not on file     Non-medical: Not on file   Tobacco Use    Smoking status: Never Smoker    Smokeless tobacco: Never Used   Substance and Sexual Activity    Alcohol use: Yes     Comment: RARELY    Drug use: Never    Sexual activity: Yes   Lifestyle    Physical activity     Days per week: Not on file     Minutes per session: Not on file    Stress: Not on file   Relationships    Social connections     Talks on phone: Not on file Gets together: Not on file     Attends Anabaptist service: Not on file     Active member of club or organization: Not on file     Attends meetings of clubs or organizations: Not on file     Relationship status: Not on file    Intimate partner violence     Fear of current or ex partner: Not on file     Emotionally abused: Not on file     Physically abused: Not on file     Forced sexual activity: Not on file   Other Topics Concern    Not on file   Social History Narrative    Not on file       OBJECTIVE:    Vitals:   Vitals:    06/30/20 1112 06/30/20 1116   BP:  103/75   Pulse:  94   Temp: 97.7 °F (36.5 °C) 97.7 °F (36.5 °C)   TempSrc: Temporal    Weight:  141 lb 12.8 oz (64.3 kg)   Height: 5' 3\" (1.6 m) 5' 3\" (1.6 m)     Physical Examination:     General:  alert, cooperative, no distress   Lungs: lungs clear to auscultation   Cardiac: Regular rate and rhythm   Abdomen: soft, bowel sounds active, non-tender  No CVA tenderness   Incision: healing well   Pelvic: Vulva and vagina appear normal. Bimanual exam reveals normal uterus. Rectal: not done   Extremity:   extremities normal, atraumatic, no cyanosis or edema     IMPRESSION:    Chelsey Boles is doing well postoperatively. She has no apparent complications from surgery. Medical problems:     Patient Active Problem List   Diagnosis Code    Malignant neoplasm of upper-outer quadrant of left breast in female, estrogen receptor negative [facility] C50.412, Z17.1    S/P breast reconstruction P42.79       PLAN:    The operative procedures and clinical results have been reviewed with the patient. Implications of diagnosis discussed at length. All questions answered. The patient may return to work at this time. I will see the patient back prn. The patient is advised to call our office with any problems or concerns.     Babs Maza MD  6/30/2020/9:48 AM

## 2020-10-07 ENCOUNTER — HOSPITAL ENCOUNTER (OUTPATIENT)
Dept: PREADMISSION TESTING | Age: 45
Discharge: HOME | End: 2020-10-07
Payer: OTHER GOVERNMENT

## 2020-10-07 VITALS
WEIGHT: 150.13 LBS | TEMPERATURE: 98.2 F | BODY MASS INDEX: 26.6 KG/M2 | RESPIRATION RATE: 16 BRPM | SYSTOLIC BLOOD PRESSURE: 114 MMHG | HEIGHT: 63 IN | OXYGEN SATURATION: 100 % | DIASTOLIC BLOOD PRESSURE: 80 MMHG | HEART RATE: 76 BPM

## 2020-10-07 LAB
ALBUMIN SERPL-MCNC: 4.2 G/DL (ref 3.5–5)
ALBUMIN/GLOB SERPL: 1.3 {RATIO} (ref 1.1–2.2)
ALP SERPL-CCNC: 78 U/L (ref 45–117)
ALT SERPL-CCNC: 31 U/L (ref 12–78)
ANION GAP SERPL CALC-SCNC: 6 MMOL/L (ref 5–15)
APTT PPP: 28.2 SEC (ref 22.1–32)
AST SERPL-CCNC: 27 U/L (ref 15–37)
BASOPHILS # BLD: 0 K/UL (ref 0–0.1)
BASOPHILS NFR BLD: 0 % (ref 0–1)
BILIRUB SERPL-MCNC: 1 MG/DL (ref 0.2–1)
BUN SERPL-MCNC: 17 MG/DL (ref 6–20)
BUN/CREAT SERPL: 23 (ref 12–20)
CALCIUM SERPL-MCNC: 9.2 MG/DL (ref 8.5–10.1)
CHLORIDE SERPL-SCNC: 104 MMOL/L (ref 97–108)
CO2 SERPL-SCNC: 29 MMOL/L (ref 21–32)
CREAT SERPL-MCNC: 0.73 MG/DL (ref 0.55–1.02)
DIFFERENTIAL METHOD BLD: NORMAL
EOSINOPHIL # BLD: 0.1 K/UL (ref 0–0.4)
EOSINOPHIL NFR BLD: 2 % (ref 0–7)
ERYTHROCYTE [DISTWIDTH] IN BLOOD BY AUTOMATED COUNT: 12.7 % (ref 11.5–14.5)
GLOBULIN SER CALC-MCNC: 3.3 G/DL (ref 2–4)
GLUCOSE SERPL-MCNC: 82 MG/DL (ref 65–100)
HCT VFR BLD AUTO: 40.4 % (ref 35–47)
HGB BLD-MCNC: 13.6 G/DL (ref 11.5–16)
IMM GRANULOCYTES # BLD AUTO: 0 K/UL (ref 0–0.04)
IMM GRANULOCYTES NFR BLD AUTO: 0 % (ref 0–0.5)
INR PPP: 1.1 (ref 0.9–1.1)
LYMPHOCYTES # BLD: 1.7 K/UL (ref 0.8–3.5)
LYMPHOCYTES NFR BLD: 25 % (ref 12–49)
MCH RBC QN AUTO: 30.9 PG (ref 26–34)
MCHC RBC AUTO-ENTMCNC: 33.7 G/DL (ref 30–36.5)
MCV RBC AUTO: 91.8 FL (ref 80–99)
MONOCYTES # BLD: 0.6 K/UL (ref 0–1)
MONOCYTES NFR BLD: 9 % (ref 5–13)
NEUTS SEG # BLD: 4.3 K/UL (ref 1.8–8)
NEUTS SEG NFR BLD: 64 % (ref 32–75)
NRBC # BLD: 0 K/UL (ref 0–0.01)
NRBC BLD-RTO: 0 PER 100 WBC
PLATELET # BLD AUTO: 245 K/UL (ref 150–400)
PMV BLD AUTO: 9.7 FL (ref 8.9–12.9)
POTASSIUM SERPL-SCNC: 3.8 MMOL/L (ref 3.5–5.1)
PROT SERPL-MCNC: 7.5 G/DL (ref 6.4–8.2)
PROTHROMBIN TIME: 11.2 SEC (ref 9–11.1)
RBC # BLD AUTO: 4.4 M/UL (ref 3.8–5.2)
SODIUM SERPL-SCNC: 139 MMOL/L (ref 136–145)
THERAPEUTIC RANGE,PTTT: NORMAL SECS (ref 58–77)
WBC # BLD AUTO: 6.8 K/UL (ref 3.6–11)

## 2020-10-07 PROCEDURE — 93005 ELECTROCARDIOGRAM TRACING: CPT

## 2020-10-07 PROCEDURE — 85025 COMPLETE CBC W/AUTO DIFF WBC: CPT

## 2020-10-07 PROCEDURE — 85730 THROMBOPLASTIN TIME PARTIAL: CPT

## 2020-10-07 PROCEDURE — 85610 PROTHROMBIN TIME: CPT

## 2020-10-07 PROCEDURE — 36415 COLL VENOUS BLD VENIPUNCTURE: CPT

## 2020-10-07 PROCEDURE — 80053 COMPREHEN METABOLIC PANEL: CPT

## 2020-10-07 RX ORDER — ACETAMINOPHEN 500 MG
1000 TABLET ORAL
COMMUNITY

## 2020-10-07 RX ORDER — ERGOCALCIFEROL 1.25 MG/1
50000 CAPSULE ORAL
COMMUNITY
End: 2020-10-07

## 2020-10-07 NOTE — PERIOP NOTES
[address] , [address] [facility]   MAIN OR                                  [phone]   MAIN PRE OP                          [phone]                                                                                AMBULATORY PRE OP          [phone]  PRE-ADMISSION TESTING    [phone]   Surgery Date:  Thursday October 15, 2020       Is surgery arrival time given by surgeon? YES  NO  If NO, [facility] staff will call you between 3 and 7pm the day before your surgery with your arrival time. (If your surgery is on a Monday, we will call you the Friday before.)    Call [phone] after 7pm Monday-Friday if you did not receive this call. INSTRUCTIONS BEFORE YOUR SURGERY   When You  Arrive Arrive at the 2nd [address] [facility] on the day of your surgery  Have your insurance card, photo ID, and any copayment (if needed)   Food   and   Drink NO food or drink after midnight the night before surgery    This means NO water, gum, mints, coffee, juice, etc.  No alcohol (beer, wine, liquor) 24 hours before and after surgery   Medications to   TAKE   Morning of Surgery MEDICATIONS TO TAKE THE MORNING OF SURGERY WITH A SIP OF WATER:    None   Medications  To  STOP      7 days before surgery  Non-Steroidal anti-inflammatory Drugs (NSAID's): for example, Ibuprofen (Advil, Motrin), Naproxen (Aleve)   Aspirin, if taking for pain    Herbal supplements, vitamins, and fish oil   Other:  (Pain medications not listed above, including Tylenol may be taken)   Blood  Thinners  If you take  Aspirin, Plavix, Coumadin, or any blood-thinning or anti-blood clot medicine, talk to the doctor who prescribed the medications for pre-operative instructions.    Bathing Clothing  Jewelry  Valuables      If you shower the morning of surgery, please do not apply anything to your skin (lotions, powders, deodorant, or makeup, especially mascara)   Follow Chlorhexidine [employer] body wash instructions provided to you during PAT appointment. Begin 3 days prior to surgery.  Do not shave or trim anywhere 24 hours before surgery   Wear your hair loose or down; no pony-tails, buns, or metal hair clips   Wear loose, comfortable, clean clothes   Wear glasses instead of contacts   Leave money, valuables, and jewelry, including body piercings, at home   Going Home - or Spending the Night  SAME-DAY SURGERY: You must have a responsible adult drive you home and stay with you 24 hours after surgery   ADMITS: If your doctor is keeping you in the hospital after surgery, leave personal belongings/luggage in your car until you have a hospital room number. Hospital discharge time is 12 noon  Drivers must be here before 12 noon unless you are told differently   Special Instructions It is now mandated that all surgical patients be tested for COVID-19 prior to surgery. Testing has to be exactly 4 days prior to surgery. Your COVID test date is October 11, 2020 between 8:00 am and 11:00 am.       COVID testing will be performed curbside at the [address] [place] [address] Dr bennett. There will be signs leading you to the testing site. You will need to bring a photo ID with you to be swabbed. Patients are advised to self-quarantine at home after testing and prior to your surgery date. You will be notified if your results are positive.     What to watch for:   Coronavirus (COVID-19) affects different people in different ways   It also appears with a wide range of symptoms from mild to severe   Signs usually appear 2-14 days after exposure     If you develop any of the following, notify your doctor immediately:  o Fever  o Chills, with or without a shiver  o Muscle pain  o Headache  o Sore throat  o Dry cough  o New loss of taste or smell  o Tiredness      If you develop any of the following, call 911:  o Shortness of breath  o Difficulty breathing  o Chest pain  o New confusion  o Blueness of fingers and/or lips     Follow all instructions so your surgery wont be cancelled. Please, be on time. If a situation occurs and you are delayed the day of surgery, call [phone]. If your physical condition changes (like a fever, cold, flu, etc.) call your surgeon. Home medication(s) reviewed and verified via LIST   VERBAL   during PAT appointment. The patient was contacted by  IN-PERSON  The patient verbalizes understanding of all instructions and  DOES NOT   need reinforcement.

## 2020-10-08 LAB
ATRIAL RATE: 78 BPM
CALCULATED P AXIS, ECG09: 66 DEGREES
CALCULATED R AXIS, ECG10: 34 DEGREES
CALCULATED T AXIS, ECG11: 47 DEGREES
DIAGNOSIS, 93000: NORMAL
P-R INTERVAL, ECG05: 136 MS
Q-T INTERVAL, ECG07: 412 MS
QRS DURATION, ECG06: 80 MS
QTC CALCULATION (BEZET), ECG08: 469 MS
VENTRICULAR RATE, ECG03: 78 BPM

## 2020-10-11 ENCOUNTER — HOSPITAL ENCOUNTER (OUTPATIENT)
Dept: PREADMISSION TESTING | Age: 45
Discharge: HOME | End: 2020-10-11
Payer: OTHER GOVERNMENT

## 2020-10-11 PROCEDURE — 87635 SARS-COV-2 COVID-19 AMP PRB: CPT

## 2020-10-12 LAB — SARS-COV-2, COV2NT: NOT DETECTED

## 2020-10-14 ENCOUNTER — ANESTHESIA EVENT (OUTPATIENT)
Dept: SURGERY | Age: 45
End: 2020-10-14
Payer: OTHER GOVERNMENT

## 2020-10-15 ENCOUNTER — HOSPITAL ENCOUNTER (OUTPATIENT)
Age: 45
Setting detail: OUTPATIENT SURGERY
Discharge: HOME | End: 2020-10-15
Attending: SURGERY | Admitting: SURGERY
Payer: OTHER GOVERNMENT

## 2020-10-15 ENCOUNTER — ANESTHESIA (OUTPATIENT)
Dept: SURGERY | Age: 45
End: 2020-10-15
Payer: OTHER GOVERNMENT

## 2020-10-15 VITALS
OXYGEN SATURATION: 99 % | SYSTOLIC BLOOD PRESSURE: 121 MMHG | DIASTOLIC BLOOD PRESSURE: 72 MMHG | HEART RATE: 81 BPM | TEMPERATURE: 98 F | RESPIRATION RATE: 13 BRPM

## 2020-10-15 PROCEDURE — 76010000131 HC OR TIME 2 TO 2.5 HR: Performed by: SURGERY

## 2020-10-15 PROCEDURE — 77030019940 HC BLNKT HYPOTHRM STRY -B: Performed by: SURGERY

## 2020-10-15 PROCEDURE — 74011000272 HC RX REV CODE- 272: Performed by: SURGERY

## 2020-10-15 PROCEDURE — 77030005513 HC CATH URETH FOL11 MDII -B: Performed by: SURGERY

## 2020-10-15 PROCEDURE — 77030040922 HC BLNKT HYPOTHRM STRY -A

## 2020-10-15 PROCEDURE — 77030040361 HC SLV COMPR DVT MDII -B

## 2020-10-15 PROCEDURE — 77030013079 HC BLNKT BAIR HGGR 3M -A: Performed by: ANESTHESIOLOGY

## 2020-10-15 PROCEDURE — 77030008463 HC STPLR SKN PROX J&J -B: Performed by: SURGERY

## 2020-10-15 PROCEDURE — 77030008684 HC TU ET CUF COVD -B: Performed by: ANESTHESIOLOGY

## 2020-10-15 PROCEDURE — 74011000250 HC RX REV CODE- 250: Performed by: NURSE ANESTHETIST, CERTIFIED REGISTERED

## 2020-10-15 PROCEDURE — 77030040506 HC DRN WND MDII -A: Performed by: SURGERY

## 2020-10-15 PROCEDURE — 77030013358: Performed by: SURGERY

## 2020-10-15 PROCEDURE — 77030026438 HC STYL ET INTUB CARD -A: Performed by: ANESTHESIOLOGY

## 2020-10-15 PROCEDURE — 77030008526 HC TBNG ASPIR DISP MCRA -B: Performed by: SURGERY

## 2020-10-15 PROCEDURE — 74011250636 HC RX REV CODE- 250/636: Performed by: ANESTHESIOLOGY

## 2020-10-15 PROCEDURE — C1789 PROSTHESIS, BREAST, IMP: HCPCS | Performed by: SURGERY

## 2020-10-15 PROCEDURE — 74011250636 HC RX REV CODE- 250/636: Performed by: SURGERY

## 2020-10-15 PROCEDURE — 2709999900 HC NON-CHARGEABLE SUPPLY: Performed by: SURGERY

## 2020-10-15 PROCEDURE — 74011250636 HC RX REV CODE- 250/636: Performed by: NURSE ANESTHETIST, CERTIFIED REGISTERED

## 2020-10-15 PROCEDURE — 74011000250 HC RX REV CODE- 250: Performed by: SURGERY

## 2020-10-15 PROCEDURE — 77030002966 HC SUT PDS J&J -A: Performed by: SURGERY

## 2020-10-15 PROCEDURE — 77030018836 HC SOL IRR NACL ICUM -A: Performed by: SURGERY

## 2020-10-15 PROCEDURE — 77030026227 HC FUNL KELLR 2 PCH ALGN -C: Performed by: SURGERY

## 2020-10-15 PROCEDURE — 77030011264 HC ELECTRD BLD EXT COVD -A: Performed by: SURGERY

## 2020-10-15 PROCEDURE — 77030020263 HC SOL INJ SOD CL0.9% LFCR 1000ML: Performed by: SURGERY

## 2020-10-15 PROCEDURE — 77030035548 HC DEV TISS COLL DEL SYS REVOLV DISP ALGN -F: Performed by: SURGERY

## 2020-10-15 PROCEDURE — 76210000021 HC REC RM PH II 0.5 TO 1 HR: Performed by: SURGERY

## 2020-10-15 PROCEDURE — 77030002933 HC SUT MCRYL J&J -A: Performed by: SURGERY

## 2020-10-15 PROCEDURE — 76210000016 HC OR PH I REC 1 TO 1.5 HR: Performed by: SURGERY

## 2020-10-15 PROCEDURE — 77030002974 HC SUT PLN J&J -A: Performed by: SURGERY

## 2020-10-15 PROCEDURE — 77030020255 HC SOL INJ LR 1000ML BG: Performed by: SURGERY

## 2020-10-15 PROCEDURE — 77030008534 HC TBNG LIPOSUC BYRO -B: Performed by: SURGERY

## 2020-10-15 PROCEDURE — 76060000035 HC ANESTHESIA 2 TO 2.5 HR: Performed by: SURGERY

## 2020-10-15 DEVICE — NATRELLE INSPIRA SCF 520CC FULL PROFILE  SMOOTH ROUND SILICONE
Type: IMPLANTABLE DEVICE | Site: BREAST | Status: NON-FUNCTIONAL
Brand: NATRELLE INSPIRA COHESIVE BREAST IMPLANTS
Removed: 2021-04-07

## 2020-10-15 RX ORDER — ONDANSETRON 2 MG/ML
4 INJECTION INTRAMUSCULAR; INTRAVENOUS AS NEEDED
Status: DISCONTINUED | OUTPATIENT
Start: 2020-10-15 | End: 2020-10-15 | Stop reason: HOSPADM

## 2020-10-15 RX ORDER — LIDOCAINE HYDROCHLORIDE 10 MG/ML
0.1 INJECTION, SOLUTION EPIDURAL; INFILTRATION; INTRACAUDAL; PERINEURAL AS NEEDED
Status: DISCONTINUED | OUTPATIENT
Start: 2020-10-15 | End: 2020-10-15 | Stop reason: HOSPADM

## 2020-10-15 RX ORDER — PROPOFOL 10 MG/ML
INJECTION, EMULSION INTRAVENOUS AS NEEDED
Status: DISCONTINUED | OUTPATIENT
Start: 2020-10-15 | End: 2020-10-15 | Stop reason: HOSPADM

## 2020-10-15 RX ORDER — ONDANSETRON 2 MG/ML
INJECTION INTRAMUSCULAR; INTRAVENOUS AS NEEDED
Status: DISCONTINUED | OUTPATIENT
Start: 2020-10-15 | End: 2020-10-15 | Stop reason: HOSPADM

## 2020-10-15 RX ORDER — POVIDONE-IODINE 10 %
SOLUTION, NON-ORAL TOPICAL AS NEEDED
Status: DISCONTINUED | OUTPATIENT
Start: 2020-10-15 | End: 2020-10-15 | Stop reason: HOSPADM

## 2020-10-15 RX ORDER — ALBUTEROL SULFATE 0.83 MG/ML
2.5 SOLUTION RESPIRATORY (INHALATION) AS NEEDED
Status: DISCONTINUED | OUTPATIENT
Start: 2020-10-15 | End: 2020-10-15 | Stop reason: HOSPADM

## 2020-10-15 RX ORDER — ROCURONIUM BROMIDE 10 MG/ML
INJECTION, SOLUTION INTRAVENOUS AS NEEDED
Status: DISCONTINUED | OUTPATIENT
Start: 2020-10-15 | End: 2020-10-15 | Stop reason: HOSPADM

## 2020-10-15 RX ORDER — SODIUM CHLORIDE, SODIUM LACTATE, POTASSIUM CHLORIDE, CALCIUM CHLORIDE 600; 310; 30; 20 MG/100ML; MG/100ML; MG/100ML; MG/100ML
125 INJECTION, SOLUTION INTRAVENOUS CONTINUOUS
Status: DISCONTINUED | OUTPATIENT
Start: 2020-10-15 | End: 2020-10-15 | Stop reason: HOSPADM

## 2020-10-15 RX ORDER — HYDROMORPHONE HYDROCHLORIDE 1 MG/ML
0.5 INJECTION, SOLUTION INTRAMUSCULAR; INTRAVENOUS; SUBCUTANEOUS
Status: DISCONTINUED | OUTPATIENT
Start: 2020-10-15 | End: 2020-10-15 | Stop reason: HOSPADM

## 2020-10-15 RX ORDER — FAMOTIDINE 10 MG/ML
INJECTION INTRAVENOUS AS NEEDED
Status: DISCONTINUED | OUTPATIENT
Start: 2020-10-15 | End: 2020-10-15 | Stop reason: HOSPADM

## 2020-10-15 RX ORDER — LORAZEPAM 2 MG/ML
1 INJECTION INTRAMUSCULAR
Status: DISCONTINUED | OUTPATIENT
Start: 2020-10-15 | End: 2020-10-15 | Stop reason: HOSPADM

## 2020-10-15 RX ORDER — LIDOCAINE HYDROCHLORIDE 20 MG/ML
INJECTION, SOLUTION EPIDURAL; INFILTRATION; INTRACAUDAL; PERINEURAL AS NEEDED
Status: DISCONTINUED | OUTPATIENT
Start: 2020-10-15 | End: 2020-10-15 | Stop reason: HOSPADM

## 2020-10-15 RX ORDER — NEOSTIGMINE METHYLSULFATE 1 MG/ML
INJECTION, SOLUTION INTRAVENOUS AS NEEDED
Status: DISCONTINUED | OUTPATIENT
Start: 2020-10-15 | End: 2020-10-15 | Stop reason: HOSPADM

## 2020-10-15 RX ORDER — MIDAZOLAM HYDROCHLORIDE 1 MG/ML
INJECTION, SOLUTION INTRAMUSCULAR; INTRAVENOUS AS NEEDED
Status: DISCONTINUED | OUTPATIENT
Start: 2020-10-15 | End: 2020-10-15 | Stop reason: HOSPADM

## 2020-10-15 RX ORDER — DEXAMETHASONE SODIUM PHOSPHATE 4 MG/ML
INJECTION, SOLUTION INTRA-ARTICULAR; INTRALESIONAL; INTRAMUSCULAR; INTRAVENOUS; SOFT TISSUE AS NEEDED
Status: DISCONTINUED | OUTPATIENT
Start: 2020-10-15 | End: 2020-10-15 | Stop reason: HOSPADM

## 2020-10-15 RX ORDER — SUCCINYLCHOLINE CHLORIDE 20 MG/ML
INJECTION INTRAMUSCULAR; INTRAVENOUS AS NEEDED
Status: DISCONTINUED | OUTPATIENT
Start: 2020-10-15 | End: 2020-10-15 | Stop reason: HOSPADM

## 2020-10-15 RX ORDER — CEFAZOLIN SODIUM/WATER 2 G/20 ML
2 SYRINGE (ML) INTRAVENOUS
Status: DISCONTINUED | OUTPATIENT
Start: 2020-10-15 | End: 2020-10-15

## 2020-10-15 RX ORDER — FENTANYL CITRATE 50 UG/ML
INJECTION, SOLUTION INTRAMUSCULAR; INTRAVENOUS AS NEEDED
Status: DISCONTINUED | OUTPATIENT
Start: 2020-10-15 | End: 2020-10-15 | Stop reason: HOSPADM

## 2020-10-15 RX ORDER — SODIUM CHLORIDE, SODIUM LACTATE, POTASSIUM CHLORIDE, CALCIUM CHLORIDE 600; 310; 30; 20 MG/100ML; MG/100ML; MG/100ML; MG/100ML
150 INJECTION, SOLUTION INTRAVENOUS CONTINUOUS
Status: DISCONTINUED | OUTPATIENT
Start: 2020-10-15 | End: 2020-10-15 | Stop reason: HOSPADM

## 2020-10-15 RX ORDER — DIPHENHYDRAMINE HYDROCHLORIDE 50 MG/ML
12.5 INJECTION, SOLUTION INTRAMUSCULAR; INTRAVENOUS AS NEEDED
Status: DISCONTINUED | OUTPATIENT
Start: 2020-10-15 | End: 2020-10-15 | Stop reason: HOSPADM

## 2020-10-15 RX ORDER — GLYCOPYRROLATE 0.2 MG/ML
INJECTION INTRAMUSCULAR; INTRAVENOUS AS NEEDED
Status: DISCONTINUED | OUTPATIENT
Start: 2020-10-15 | End: 2020-10-15 | Stop reason: HOSPADM

## 2020-10-15 RX ADMIN — ONDANSETRON 4 MG: 2 INJECTION INTRAMUSCULAR; INTRAVENOUS at 10:36

## 2020-10-15 RX ADMIN — ROCURONIUM BROMIDE 30 MG: 10 INJECTION INTRAVENOUS at 08:11

## 2020-10-15 RX ADMIN — LORAZEPAM 0.5 MG: 2 INJECTION INTRAMUSCULAR; INTRAVENOUS at 10:32

## 2020-10-15 RX ADMIN — PROPOFOL 200 MG: 10 INJECTION, EMULSION INTRAVENOUS at 07:57

## 2020-10-15 RX ADMIN — LIDOCAINE HYDROCHLORIDE 60 MG: 20 INJECTION, SOLUTION EPIDURAL; INFILTRATION; INTRACAUDAL; PERINEURAL at 07:57

## 2020-10-15 RX ADMIN — CEFAZOLIN SODIUM 2 G: 1 POWDER, FOR SOLUTION INTRAMUSCULAR; INTRAVENOUS at 08:09

## 2020-10-15 RX ADMIN — SODIUM CHLORIDE, POTASSIUM CHLORIDE, SODIUM LACTATE AND CALCIUM CHLORIDE: 600; 310; 30; 20 INJECTION, SOLUTION INTRAVENOUS at 07:32

## 2020-10-15 RX ADMIN — FENTANYL CITRATE 25 MCG: 50 INJECTION, SOLUTION INTRAMUSCULAR; INTRAVENOUS at 09:58

## 2020-10-15 RX ADMIN — SUCCINYLCHOLINE CHLORIDE 100 MG: 20 INJECTION, SOLUTION INTRAMUSCULAR; INTRAVENOUS; PARENTERAL at 07:57

## 2020-10-15 RX ADMIN — FENTANYL CITRATE 25 MCG: 50 INJECTION, SOLUTION INTRAMUSCULAR; INTRAVENOUS at 09:48

## 2020-10-15 RX ADMIN — FAMOTIDINE 20 MG: 10 INJECTION INTRAVENOUS at 08:09

## 2020-10-15 RX ADMIN — Medication 3 MG: at 09:32

## 2020-10-15 RX ADMIN — FENTANYL CITRATE 100 MCG: 50 INJECTION, SOLUTION INTRAMUSCULAR; INTRAVENOUS at 07:57

## 2020-10-15 RX ADMIN — ONDANSETRON HYDROCHLORIDE 4 MG: 2 SOLUTION INTRAMUSCULAR; INTRAVENOUS at 08:16

## 2020-10-15 RX ADMIN — FENTANYL CITRATE 50 MCG: 50 INJECTION, SOLUTION INTRAMUSCULAR; INTRAVENOUS at 10:03

## 2020-10-15 RX ADMIN — FENTANYL CITRATE 50 MCG: 50 INJECTION, SOLUTION INTRAMUSCULAR; INTRAVENOUS at 08:27

## 2020-10-15 RX ADMIN — GLYCOPYRROLATE 0.4 MG: 0.2 INJECTION INTRAMUSCULAR; INTRAVENOUS at 09:32

## 2020-10-15 RX ADMIN — ONDANSETRON HYDROCHLORIDE 4 MG: 2 SOLUTION INTRAMUSCULAR; INTRAVENOUS at 09:26

## 2020-10-15 RX ADMIN — MIDAZOLAM HYDROCHLORIDE 2 MG: 2 INJECTION, SOLUTION INTRAMUSCULAR; INTRAVENOUS at 07:50

## 2020-10-15 RX ADMIN — DEXAMETHASONE SODIUM PHOSPHATE 8 MG: 4 INJECTION, SOLUTION INTRAMUSCULAR; INTRAVENOUS at 08:16

## 2020-10-15 RX ADMIN — ROCURONIUM BROMIDE 10 MG: 10 INJECTION INTRAVENOUS at 08:27

## 2020-10-15 NOTE — H&P
Plastic Surgery PRE-OP Admission History and Physical    Patient: Estevan Gold MRN: [id number]  SSN: [id number]    YOB: 1975  Age: 41 y.o. Sex: female            Subjective:   Patient is a 41 y.o.  female who presents for bilateral breast reconstruction and fat graft transfer. The patient was evaluated and determined the most appropriate plan of care is to proceed with surgical intervention. Patient denies chest pain, tightness, pain radiating down left arm, palpitations, dizziness, lightheadedness, fevers, chills. Patient denies shortness of breath, wheezing, diarrhea, constipation, abdominal pain. Patient denies urinary problems, dysuria, hesitancy, urgency. Denies skin breakdown, rashes, insect bites or open area. Patient Active Problem List    Diagnosis Date Noted    S/P breast reconstruction 2020    Malignant neoplasm of upper-outer quadrant of left breast in female, estrogen receptor [facility]     Past Medical History:   Diagnosis Date    [facility] 2019    left breast cancer      Past Surgical History:   Procedure Laterality Date    HX APPENDECTOMY      HX BILATERAL SALPINGO-OOPHORECTOMY Bilateral 2020    HX BREAST RECONSTRUCTION Bilateral 2020    BILATERAL BREAST DIRECT TO IMPLANT RECONSTRUCTION WITH ALLODERM performed by Angel Luis Borjas MD at [address] HX  SECTION      x3    HX MASTECTOMY Bilateral 2020    BILATERAL BREAST SKIN AND NIPPLE SPARING MASTECTOMIES, PORT A CATH REMOVAL, BILATERAL BREAST DIRECT TO IMPLANT RECONSTRUCTION WITH ALLODERM, AND POSSIBLE TISSUE EXPANDERS performed by Arleth Ibanez MD at [address] HX VASCULAR ACCESS  2020    HX WISDOM TEETH EXTRACTION      young adult      Prior to Admission medications    Medication Sig Start Date End Date Taking? Authorizing Provider   turmeric (CURCUMIN) 1 Tab by Does Not Apply route every evening.    Yes Provider, Historical   OTHER 500 mg every evening. Indications: Trimethylglycine   Yes Provider, Historical   mv-min/iron/folic/calcium/vitK (WOMEN'S MULTIVITAMIN PO) Take 1 Tab by mouth every evening. Yes Provider, Historical   cholecalciferol, vitamin D3, (VITAMIN D3 PO) Take 1 Tab by mouth every evening. Yes Provider, Historical   acetaminophen (TYLENOL) 500 mg tablet Take 1,000 mg by mouth every six (6) hours as needed for Pain. Yes Provider, Historical   aspirin/acetaminophen/caffeine (EXCEDRIN MIGRAINE PO) Take 4 Tabs by mouth as needed for Pain. Yes Provider, Historical   doxycycline (MONODOX) 100 mg capsule Take 100 mg by mouth nightly. Indications: Alternates each month with Mebendazole   Yes Provider, Historical   atorvastatin (LIPITOR) 40 mg tablet Take 40 mg by mouth nightly. Indications: \"Repurposed/Cancer fighting\"   Yes Provider, Historical   metFORMIN (GLUCOPHAGE) 500 mg tablet Take 500 mg by mouth every evening. Indications: Repurposed/Cancer Fighting   Yes Provider, Historical   MEBENDAZOLE PO Take 100 mg by mouth daily. Indications: \"Repurposed/Cancer Fighting\" PT TAKES EVERY OTHER MONTH   Yes Provider, Historical   MELATONIN PO Take 20 mg by mouth nightly as needed. Yes Provider, Historical   OTHER 1 Tab. Indications: Pro Resolving Mediators    Provider, Historical   OTHER 1 Tab. Indications: Immuneti    Provider, Historical   OTHER 1 Tab every evening.  Indications: AlliBiotic CF    Provider, Historical     Current Facility-Administered Medications   Medication Dose Route Frequency    lactated Ringers infusion  150 mL/hr IntraVENous CONTINUOUS    lidocaine (PF) (XYLOCAINE) 10 mg/mL (1 %) injection 0.1 mL  0.1 mL SubCUTAneous PRN    ceFAZolin (ANCEF) 2 g in sterile water (preservative free) 20 mL IV syringe  2 g IntraVENous ONCE      Allergies   Allergen Reactions    Other Medication Rash and Itching     dermabond skin glue      Social History     Tobacco Use    Smoking status: Never Smoker  Smokeless tobacco: Never Used   Substance Use Topics    Alcohol use: Yes     Alcohol/week: 0.0 - 1.0 standard drinks     Comment: RARELY      Family History   Problem Relation Age of Onset    Diabetes Mother     Heart Disease Mother     Hypertension Mother    eLnora Smyth Anesth Problems Mother         PONV    Heart Disease Father     Cancer Paternal Aunt         Breast    Breast Cancer Paternal Aunt     Cancer Paternal Aunt         Breast    Breast Cancer Paternal Aunt     No Known Problems Sister     Hypertension Brother     Diabetes Brother         Review of Systems  A comprehensive review of systems was negative except for that written in the HPI. Objective:     Patient Vitals for the past 8 hrs:   BP Temp Pulse Resp SpO2   10/15/20 0643 119/84 98.3 °F (36.8 °C) 82 20 100 %     Temp (24hrs), Av.3 °F (36.8 °C), Min:98.3 °F (36.8 °C), Max:98.3 °F (36.8 °C)      Gen: Well-developed,  in no acute distress   HEENT: Pink conjunctivae, PERRL, hearing intact to voice, moist mucous membranes   Neck: Supple, without masses, thyroid non-tender   Resp: No accessory muscle use,  breathing even/ nonlabored. Card: Regular rate and rhythm. Abd: Soft, non-tender, non-distended,   Lymph: No cervical or inguinal adenopathy   Musc: WNL  Skin: No skin breakdown noted. Skin warm, pink, dry. No rashes. Neuro: Cranial nerves are grossly intact, no focal motor weakness, follows commands appropriately   Psych: Good insight, oriented to person, place and time, alert      Labs: No results found for this or any previous visit (from the past 24 hour(s)). Assessment:     Patient Active Problem List    Diagnosis Date Noted    S/P breast reconstruction 2020    Malignant neoplasm of upper-outer quadrant of left breast in female, estrogen receptor negative ([facility] 2019         Plan:   Proceed with surgical intervention without contraindications.   I met with pt. this morning and discussed increased ambulation to improve pulmonary status. We also discussed preop and postop expectations to include pain management. All questions were answered.         Marissa Hdez NP

## 2020-10-15 NOTE — PERIOP NOTES
10/15/20, 9:02 AM    [facility]-Anesthesia Services Chart Audit - Patient Encounter Reviewed

## 2020-10-15 NOTE — DISCHARGE INSTRUCTIONS
Breast Augmentation Patient Instructions  Dr. Marlon Oneill, NP      Activities  Immediatly after  surgery you will rest quietly for the first 48 hours. You will be able to walk around the house and perform light daily activities; however, during this time, it is not at all unusual for you to feel some pain, soreness and pressure in the chest area. This will gradually subside and Dr. Janae Davis will give you pain medication to relieve it. Be sure to lie on your back whenever you rest or sleep. Keep your head elevated for 72 hours after surgery as this will help with swelling. No heavy exercise or lifting for three weeks following surgery. This will allow the implants to remain in the proper position without movement. For the first three days following surgery you should try to restrict your arm movements. Move your arms slowly and avoid sudden jerky movements of the chest and breast area. Keep your arms as close to your body as possible. This allows the implants to remain in place. Dr. Janae Davis encourages walking immediately after surgery. This activity will greatly minimize the risk of blood clots in your leg veins. Bathing: You will then be allowed to shower two days after surgery. Wash yourself everywhere, including the incisions gently. You will feel glue on your incisions. With your finger tips, gently wash over incisions. Use mild soap and pat yourself dry and put the bra back on. This daily routine will help keep the incisions clean, and will promote wound healing. The glue needs to stay on your incisions for 2 weeks time. After 2 weeks, you can start to pull off the glue if it has not fallen off on its own. Do not submerge yourself in a bath, swimming pool, or whirlpool for two weeks. Under garments: The surgery bra that you have been put in should be worn constantly during the day and at night.   You will be changed out of that surgery bra to a more comfortable bra in the office at your first post operative appointment. You will wear the sports bra for a total of two to three weeks after surgery. You may also wear a soft sports bra with light support instead of the surgery bra if preferred. The maximum swelling occurs at about three days and then begins to dramatically improve. Mild bruising typically resolves within 14 days. Medications:      Dilaudid: or Percocet  this is a your pain medication. Take one to two tablets as needed every 3-4 hours. No NSAIDS (advil, ibuprofen 5 days after surgery. This will increase your bleeding risk. Tylenol: (over the counter) 650 mg every 4 hours as needed for mild pain. Be careful because percocet has tylenol in it. You can not exceed 4000 mg a day of tylenol. Zofran: this is a medication for nausea. Take this as needed for nausea every 6-8 hours. Make sure you drink plenty of fluids. Nausea usually resolves after the first 24 hours. Augmentin or Levaquin: this is your antibiotic. Take this medication completley until empty. Valium: this is for muscle relaxation. Your implant was placed beneath the pectoral muscle. This muscle can sometimes feel tight. Valium can help relax this muscle. Stool softeners: while taking narcotics, take a stool softener (over the counter) twice daily. Incease fluids, fiber. It is very important to keep your bowels regular while taking narcotics. Work: You should plan to be off work for 5-6 days, although this can vary from person to person. Do not expose your breasts to the sun for eight weeks after surgery. Follow up: Please present to Dr. Juanita Arellano office at your scheduled appointment made prior to surgery. If you do not have an appt, please call the office ASAP to make your first post op visit. We like to see you within one to two days after surgery.     Please notify Dr. Bobby Marion if:   If your one breast becomes significantly larger than the other   If you develop significant bruising across the chest    If you experience a significant increase in pain in the breast after the pain has improved   If you develop a temperature above 101.5° F   If you develop redness (like a sunburn) around your incisions  If you need help or have any questions feel free to call Dr Bridgett Martinez with your concerns. Dr. Bridgett Martinez is on call 24 hours per day, seven days per week and has an answering service to forward calls. LIPOSUCTION WITH FAT Abhijit Almanza NP    Immediately Following Surgery:    After surgery you will rest quietly for the first 48 hours. You will be able to walk around the house and perform light daily activities; however, during this time, it is not at all unusual for you to feel some pain, soreness and pressure in the area you received liposuction. This will gradually subside and Dr. Bridgett Martinez will give you pain medication to relieve it. Be sure to lie on your back whenever you rest or sleep. Keep your head elevated for 72 hours after surgery as this will help with swelling. You may experience mild bruising of the breasts from fat grafting. You must wear a soft-cotton sports bra with light support day and night for the first week after surgery. You must wear the compression garment received after surgery as much as possible, day and night. You may take the garment off to wash and dry it as needed. You will then be allowed to shower two days after surgery. Wash yourself everywhere, including your incisions with mild soap. Do not scrub the incisions. This daily routine will help keep the incisions clean, and will promote wound healing. Do not submerge yourself in a bath, swimming pool, or whirlpool for two weeks. A few patients react to the anesthetic after surgery with nausea and vomiting.   This usually lasts less than 24 hours and should be treated with lots of fluids. Dr. Raj Cox will prescribe nausea medicine for during your preoperative visit. The maximum swelling occurs at about three days and then begins to dramatically improve. Mild bruising typically resolves within 14 days. You should plan to be off work for 1-2 weeks, although this can vary from person to person. Additional Post-Operative Instructions:    No heavy exercise or lifting for three weeks following surgery. For the first few days, you should only perform light activities of daily living. Too much activity may increase pain and swelling and delay the healing process. Dr. Raj Cox encourages walking immediately after surgery. This activity will greatly minimize the risk of blood clots in your leg veins. Please notify Dr. Raj Cox if:   If you develop any significant bleeding from a liposuction site.  If one breast becomes significantly larger than the other.  If you experience significant pain after the pain has improved.  If you develop a temperature above 101.5° F   If you develop redness (like a sunburn) around your incisions  If you need help or have any questions feel free to call Dr Raj Cox with your concerns. Dr. Raj Cox is on call 24 hours per day, seven days per week and has an answering service to forward calls. The quality of your cosmetic enhancement may be compromised if you fail to return for any scheduled post-op visits, or follow the pre- and post-operative instructions. Please dont hesitate to report any unusual or concerning changes. Our number is [phone]. Breast Massage Following Breast Augmentation   Breast massage will be taught to you TWO weeks  from surgery. No massage is recommended before 2 weeks. The purpose of these exercises is to keep the scar tissue that forms around implants as soft as possible.   By performing these exercises as described, you can help soften the internal scar, minimize the risk of significant capsular contracture and maximize the likelihood of a soft, natural feel and appearance to your breast.    Begin doing the exercises two weeks after surgery. Dr. Kavitha Pena will show you the technique during your second post-operative visit. It is important to remember that slow steady massage is more effective than quick jerky movements. Don't worry about injuring the implant; you cannot cause a rupture with these exercises.  Press the breasts slowly and maximally inwards (towards your breast bone) and hold for 10 seconds, then release. Repeat four times.  Press the breasts apart slowly and maximally outwards and hold for 10 seconds, then release. Repeat four times.  Repeat for downward movement.  Repeat for upward movement.  Perform these exercises four times per day for the first three months. The quality of your cosmetic enhancement may be compromised if you fail to return for any scheduled post-op visits, or follow the pre- and post-operative instructions. Please dont hesitate to report any unusual or concerning changes. Our number is [phone]. DISCHARGE SUMMARY from your Nurse      PATIENT INSTRUCTIONS    After general anesthesia or intravenous sedation, for 24 hours or while taking prescription Narcotics:  · Limit your activities  · Do not drive and operate hazardous machinery  · Do not make important personal or business decisions  · Do  not drink alcoholic beverages  · If you have not urinated within 8 hours after discharge, please contact your surgeon on call.     Report the following to your surgeon:  · Excessive pain, swelling, redness or odor of or around the surgical area  · Temperature over 100.5  · Nausea and vomiting lasting longer than 4 hours or if unable to take medications  · Any signs of decreased circulation or nerve impairment to extremity: change in color, persistent  numbness, tingling, coldness or increase pain  · Any questions      GOOD HELP TO FIGHT AN INFECTION  Here are a few tip to help reduce the chance of getting an infection after surgery:   Wash Your Hands   Good handwashing is the most important thing you and your caregiver can do.  Wash before and after caring for any wounds. Dry your hand with a clean towel.  Wash with soap and water for at least 20 seconds. A TIP: sing the \"Happy Birthday\" song through one time while washing to help with the timing.  Use a hand  in between washings.  Shower   When your surgeon says it is OK to take a shower, use a new bar of antibacterial soap (if that is what you use, and keep that bar of soap ONLY for your use), or antibacterial body wash.  Use a clean wash cloth or sponge when you bathe.  Dry off with a clean towel  after every bath - be careful around any wounds, skin staples, sutures or surgical glue over/on wounds.  Do not enter swimming pools, hot tubs, lakes, rivers and/or ocean until wounds are healed and your doctor/surgeon says it is OK.  Use Clean Sheets   Sleep on freshly laundered sheets after your surgery.  Keep the surgery site covered with a clean, dry bandage (if instructed to do so). If the bandage becomes soiled, reapply a new, dry, clean bandage.  Do not allow pets to sleep with you while your wound is healing.  Lifestyle Modification and Controlling Your Blood Sugar   Smoking slows wound healing. Stop smoking and limit exposure to second-hand smoke.  High blood sugar slows wound healing. Eat a well-balanced diet to provide proper nutrition while healing   Monitor your blood sugar (if you are a diabetic) and take your medications as you are suppose to so you can control you blood sugar after surgery. COUGH AND DEEP BREATHE    Breathing deeply and coughing are very important exercises to do after surgery. Deep breathing and coughing open the little air tubes and air sacks in your lungs. You take deep breaths every day.   You may not even notice - it is just something you do when you sigh or yawn. It is a natural exercise you do to keep these air passages open. After surgery, take deep breaths and cough, on purpose. DIRECTIONS:  · Take 10 to 15 slow deep breaths every hour while awake. · Breathe in deeply, and hold it for 2 seconds. · Exhale slowly through puckered lips, like blowing up a balloon. · After every 4th or 5th deep breath, hug your pillow to your chest or belly and give a hard, deep cough. Yes, it will probably hurt. But doing this exercise is a very important part of healing after surgery. Take your pain medicine to help you do this exercise without too much pain. Coughing and deep breathing help prevent bronchitis and pneumonia after surgery. If you had chest or belly surgery, use a pillow as a \"hug josue\" and hold it tightly to your chest or belly when you cough. ANKLE PUMPS    Ankle pumps increase the circulation of oxygenated blood to your lower extremities and decrease your risk for circulation problems such as blood clots. They also stretch the muscles, tendons and ligaments in your foot and ankle, and prevent joint contracture in the ankle and foot, especially after surgeries on the legs. It is important to do ankle pump exercises regularly after surgery because immobility increases your risk for developing a blood clot. Your doctor may also have you take an Aspirin for the next few days as well. If your doctor did not ask you to take an Aspirin, consult with him before starting Aspirin therapy on your own. The exercise is quite simple. · Slowly point your foot forward, feeling the muscles on the top of your lower leg stretch, and hold this position for 5 seconds. · Next, pull your foot back toward you as far as possible, stretching the calf muscles, and hold that position for 5 seconds. · Repeat with the other foot.   · Perform 10 repetitions every hour while awake for both ankles if possible (down and then up with the foot once is one repetition). You should feel gentle stretching of the muscles in your lower leg when doing this exercise. If you feel pain, or your range of motion is limited, don't push too hard. Only go the limit your joint and muscles will let you go. If you have increasing pain, progressively worsening leg warmth or swelling, STOP the exercise and call your doctor. MEDICATION AND   SIDE EFFECT GUIDE    The [facility] Insurance MEDICATION AND SIDE EFFECT GUIDE was provided to the PATIENT AND CARE PROVIDER. Information provided includes instruction about drug purpose and common side effects for the following medications:   · Please take medications exactly as prescribed! These are general instructions for a healthy lifestyle:    *   Please give a list of your current medications to your Primary Care Provider. *   Please update this list whenever your medications are discontinued, doses are changed, or new medications (including over-the-counter products) are added. *   Please carry medication information at all times in case of emergency situations. About Smoking  No smoking / No tobacco products  Avoid exposure to second hand smoke     Surgeon General's Warning:  Quitting smoking now greatly reduces serious risk to your health. Obesity, smoking, and sedentary lifestyle greatly increases your risk for illness and disease. A healthy diet, regular physical exercise & weight monitoring are important for maintaining a healthy lifestyle. Congestive Heart Failure  You may be retaining fluid if you have a history of heart failure or if you experience any of the following symptoms:  Weight gain of 3 pounds or more overnight or 5 pounds in a week, increased swelling in your hands or feet or shortness of breath while lying flat in bed.   Please call your doctor as soon as you notice any of these symptoms; do not wait until your next office visit. Recognize signs and symptoms of STROKE:  F -  Face looks uneven  A -  Arms unable to move or move evenly  S -  Speech slurred or non-existent  T -  Time-call 911 as soon as signs and symptoms begin-DO NOT go          back to bed or wait to see if you get better-TIME IS BRAIN. Warning Signs of HEART ATTACK   Call 911 if you have these symptoms:     Chest discomfort. Most heart attacks involve discomfort in the center of the chest that lasts more than a few minutes, or that goes away and comes back. It can feel like uncomfortable pressure, squeezing, fullness, or pain.  Discomfort in other areas of the upper body. Symptoms can include pain or discomfort in one or both arms, the back, neck, jaw, or stomach.  Shortness of breath with or without chest discomfort.  Other signs may include breaking out in a cold sweat, nausea, or lightheadedness. Don't wait more than five minutes to call 911 - MINUTES MATTER! Fast action can save your life. Calling 911 is almost always the fastest way to get lifesaving treatment. Emergency Medical Services staff can begin treatment when they arrive -- up to an hour sooner than if someone gets to the hospital by car. Learning About Coronavirus [phone])  Coronavirus [phone]): Overview  What is coronavirus (COVID-19)? The coronavirus disease (COVID-19) is caused by a virus. It is an illness that was first found in [place], [place], in December 2019. It has since spread worldwide. The virus can cause fever, cough, and trouble breathing. In severe cases, it can cause pneumonia and make it hard to breathe without help. It can cause death. Coronaviruses are a large group of viruses. They cause the common cold. They also cause more serious illnesses like Middle East respiratory syndrome (MERS) and severe acute respiratory syndrome (SARS). COVID-19 is caused by a novel coronavirus.  That means it's a new type that has not been seen in people before. This virus spreads person-to-person through droplets from coughing and sneezing. It can also spread when you are close to someone who is infected. And it can spread when you touch something that has the virus on it, such as a doorknob or a tabletop. What can you do to protect yourself from coronavirus (COVID-19)? The best way to protect yourself from getting sick is to:  · Avoid areas where there is an outbreak. · Avoid contact with people who may be infected. · Wash your hands often with soap or alcohol-based hand sanitizers. · Avoid crowds and try to stay at least 6 feet away from other people. · Wash your hands often, especially after you cough or sneeze. Use soap and water, and scrub for at least 20 seconds. If soap and water aren't available, use an alcohol-based hand . · Avoid touching your mouth, nose, and eyes. What can you do to avoid spreading the virus to others? To help avoid spreading the virus to others:  · Cover your mouth with a tissue when you cough or sneeze. Then throw the tissue in the trash. · Use a disinfectant to clean things that you touch often. · Stay home if you are sick or have been exposed to the virus. Don't go to school, work, or public areas. And don't use public transportation. · If you are sick:  ? Leave your home only if you need to get medical care. But call the doctor's office first so they know you're coming. And wear a face mask, if you have one.  ? If you have a face mask, wear it whenever you're around other people. It can help stop the spread of the virus when you cough or sneeze. ? Clean and disinfect your home every day. Use household  and disinfectant wipes or sprays. Take special care to clean things that you grab with your hands. These include doorknobs, remote controls, phones, and handles on your refrigerator and microwave. And don't forget countertops, tabletops, bathrooms, and computer keyboards.   When to call for help  Call 911 anytime you think you may need emergency care. For example, call if:  · You have severe trouble breathing. (You can't talk at all.)  · You have constant chest pain or pressure. · You are severely dizzy or lightheaded. · You are confused or can't think clearly. · Your face and lips have a blue color. · You pass out (lose consciousness) or are very hard to wake up. Call your doctor now if you develop symptoms such as:  · Shortness of breath. · Fever. · Cough. If you need to get care, call ahead to the doctor's office for instructions before you go. Make sure you wear a face mask, if you have one, to prevent exposing other people to the virus. Where can you get the latest information? The following health organizations are tracking and studying this virus. Their websites contain the most up-to-date information. Cari Coatesl also learn what to do if you think you may have been exposed to the virus. · U.S. Centers for Disease Control and Prevention (CDC): The CDC provides updated news about the disease and travel advice. The website also tells you how to prevent the spread of infection. www.cdc.gov  · World Health Organization [facility]): WHO offers information about the virus outbreaks. WHO also has travel advice. www.who.int  Current as of: April 1, 2020               Content Version: 12.4  © 5123-3620 [employer]. Care instructions adapted under license by your healthcare professional. If you have questions about a medical condition or this instruction, always ask your healthcare professional. [place] [address] any warranty or liability for your use of this information. The discharge information has been reviewed with the {PATIENT PARENT GUARDIAN:00637}. Any questions and concerns from the {PATIENT PARENT GUARDIAN:48977} have been addressed. The {PATIENT PARENT GUARDIAN:11385} verbalized understanding.         The following personal items collected during your admission are returned to you:   Dental Appliance: Dental Appliances: None  Vision: Visual Aid: Glasses  Hearing Aid:    Jewelry: Jewelry: None  Clothing: Clothing: Undergarments, Socks, Footwear, Pants, Shirt  Other Valuables:  Other Valuables: None  Valuables sent to safe:

## 2020-10-15 NOTE — BRIEF OP NOTE
Brief Postoperative Note    Patient: Bibi Garner  YOB: 1975  MRN: [id number]    Date of Procedure: 10/15/2020     Pre-Op Diagnosis: BREAST CANCER    Post-Op Diagnosis: Same as preoperative diagnosis. Procedure(s):  BILATERAL BREAST RECONSTRUCTION WITH IMPLANT EXCHANGE, POCKET REVISION, FAT GRAFTING TO BILATERAL BREAST    Surgeon(s):  Sherry Bonner MD    Surgical Assistant: Nurse Practitioner: Daljit Gomez NP    Anesthesia: General     Estimated Blood Loss (mL): less than 569     Complications: None    Specimens: * No specimens in log *     Implants:   Implant Name Type Inv.  Item Serial No.  Lot No. LRB No. Used Action   IMPLANT BRST 520CC P5.5CM DIA13.25CM [employer] - [id number]  IMPLANT BRST 520CC P5.5CM DIA13.25CM [employer]HESIVE [employer]EL [id number] [facility] [facility] [employer] [employer] N/A Right 1 Implanted   IMPLANT BRST 520CC P5.5CM DIA13.25CM [employer]IVE [employer] - [id number]  IMPLANT BRST 520CC P5.5CM DIA13.25CM COHESIVE [employer]RIS[employer] GEL [id number] [employer] [place] [employer] N/A Left 1 Implanted       Drains: * No LDAs found *    Findings: none    Electronically Signed by Sarath Bloom MD on 10/15/2020 at 1:49 PM

## 2020-10-15 NOTE — ANESTHESIA PREPROCEDURE EVALUATION
Relevant Problems   No relevant active problems       Anesthetic History   No history of anesthetic complications            Review of Systems / Medical History  Patient summary reviewed, nursing notes reviewed and pertinent labs reviewed    Pulmonary  Within defined limits                 Neuro/Psych   Within defined limits           Cardiovascular  Within defined limits                Exercise tolerance: >4 METS     GI/Hepatic/Renal  Within defined limits              Endo/Other        Cancer    Comments: Left breast cancer Other Findings   Comments: Breast cancer           Physical Exam    Airway  Mallampati: I    Neck ROM: normal range of motion   Mouth opening: Normal     Cardiovascular    Rhythm: regular  Rate: normal         Dental  No notable dental hx       Pulmonary  Breath sounds clear to auscultation               Abdominal         Other Findings            Anesthetic Plan    ASA: 2  Anesthesia type: general          Induction: Intravenous  Anesthetic plan and risks discussed with: Patient      Informed consent obtained.

## 2020-10-15 NOTE — ANESTHESIA POSTPROCEDURE EVALUATION
Procedure(s):  BILATERAL BREAST RECONSTRUCTION WITH IMPLANT EXCHANGE, POCKET REVISION, FAT GRAFTING TO BILATERAL BREAST.    general    Anesthesia Post Evaluation      Multimodal analgesia: multimodal analgesia not used between 6 hours prior to anesthesia start to PACU discharge  Patient location during evaluation: PACU  Patient participation: complete - patient participated  Level of consciousness: awake  Pain management: adequate  Airway patency: patent  Anesthetic complications: no  Cardiovascular status: acceptable, blood pressure returned to baseline and hemodynamically stable  Respiratory status: acceptable  Hydration status: acceptable  Post anesthesia nausea and vomiting:  controlled      INITIAL Post-op Vital signs:   Vitals Value Taken Time   /71 10/15/2020 11:00 AM   Temp 36.4 °C (97.6 °F) 10/15/2020 10:03 AM   Pulse 75 10/15/2020 11:03 AM   Resp 11 10/15/2020 11:03 AM   SpO2 100 % 10/15/2020 11:03 AM   Vitals shown include unvalidated device data.

## 2020-10-16 NOTE — PERIOP NOTES
10/16/20, 9:01 AM    [facility]-Anesthesia Services Chart Audit - Patient Encounter Reviewed

## 2020-10-20 NOTE — OP NOTES
[facility]sie[facility] [place] 80  OPERATIVE REPORT    Name:  Bharat Manzo  MR#:  [id number]  :  1975  ACCOUNT #:  [de-identified]  DATE OF SERVICE:  10/15/2020    PREOPERATIVE DIAGNOSES:  1. Carcinoma of breast.  2.  Surgical absence, bilateral breasts. 3.  Breast asymmetry. 4.  Capsule contracture. POSTOPERATIVE DIAGNOSES:  1. Carcinoma of breast.  2.  Surgical absence, bilateral breasts. 3.  Breast asymmetry. 4.  Capsule contracture. PROCEDURES PERFORMED:  1. Reconstruction of right and left breasts with removal of silicone breast prostheses. 2.  Reconstruction of right and left breasts with insertion of silicone breast implant. 3.  Reconstruction of right and left breasts with extensive open capsulotomy and capsulectomy. 4.  Reconstruction of right and left breasts with autologous tissue fat transfer, totaling 350 mL. SURGEON:  Yoko Roberson MD    ASSISTANT:  Anya Gaytan NP. Due to the complexity of this procedure, surgical assistance was required. ANN Tejeda assisted with the removal of the silicone breast implant, replacement with new silicone breast prosthesis, extensive open capsulotomy, capsulectomy, pocket revision, harvesting and placement of autologous fat, closure of the wounds, and dressing placement. ANESTHESIA:  General endotracheal.    COMPLICATIONS:  None. SPECIMENS REMOVED:  1. Excised capsule, right breast.  2.  Excised capsule, left breast.  3.  Total aspirate, liposuction, 500 mL. IMPLANTS:  See note. ESTIMATED BLOOD LOSS:  50 mL. DRAINS:  None. COUNTS:  Correct x2. DISPOSITION:  Stable, PACU.    BRIEF HISTORY:  The patient is a 60-year-old female, well known to our clinic. She underwent previous bilateral total skin-sparing mastectomies with immediate direct implant reconstruction. She now presents with contour deformity, severe asymmetry, capsule contracture requiring revision.   The overall procedure, incisional approach, expected outcomes and recovery were discussed. The risks, benefits, and alternatives to the procedure including but not limited to bleeding, infection, damage to surrounding tissue structures, the need for revisional surgery, seroma, hematoma, capsule contracture, implant rupture, implant deflation, the need for future implant maintenance and surveillance MRIs, partial or total loss of sensation to the skin, fat graft atrophy, oil cysts, calcium deposits, PE, DVT, and fat embolism were discussed. She understood these risks and agreed to proceed. PROCEDURE:  Preoperative markings were made with the patient in the standing position. Informed consent was obtained. The patient was taken to the operating room, placed supine on the operating table. Sequential compression boots were placed. General endotracheal anesthesia was obtained. A lower body Ml Hugger was placed. The chest and abdominal wall were prepped and draped in the usual sterile fashion. Two lower abdominal wall liposuction port sites were made sharply. The anterior abdominal wall and flanks were infused with 1 liter of warm lactated Ringer's mixed with 1 ampule of epinephrine and 10 mL of 2% lidocaine plain using a Lamis infiltrating cannula. After 7 minutes, tumescent liposuction was performed with a 4.0-mm blunt Mercedes tip flared cannula in the deep and intermediate planes. Cross tunneling was performed at all times and the adequacy of the resection was confirmed with deep palpation and pinch test.  A total of 500 mL of lipoaspirate was removed. The lipoaspirate was captured directly into the [employer] fat grafting system. The supernatant was decanted. The grafts were washed with lactated Ringer's and loaded into 60-mL syringes for later grafting. The port sites were closed with 5-0 plain gut suture. Attention was then turned to the right breast pocket.   The previous inframammary incision was then reopened, dissection carried down through the subcutaneous tissue to the level of the anterior capsule. The anterior capsule was opened. The breast pocket was entered. The previous implant was delivered and appeared to be in good condition without rupture. The pocket was then examined. The acellular dermal matrix was completely incorporated. The capsule was somewhat tight but healthy. Circumferential capsulotomies were performed to improve upper pole, medial pole, and lateral pole contour and strip anterior capsulectomy was likewise performed to improve anterior pole contour. Multiple silicone sizers were placed and an Allergan style -mL implant was chosen. The wounds were tailor-tack closed with surgical staples. The same procedure was then repeated on the contralateral left side. However, it was noted that the original pocket lateral recess extended to the anterior axillary line and therefore, lateral capsulorrhaphy was indicated to medialize and narrow the pocket. This was done with a running imbricating 2-0 PDS suture. Multiple sizers were placed and the same implant was chosen. The sizers were removed. The pockets were irrigated with 500 mL of half-strength Betadine solution, 2 liters of double antibiotic solution, and 10 mL of 0.5% Marcaine with epinephrine. Gloves were changed. An Allergan style -mL implant, serial number [id number], was presoaked in triple antibiotic solution and placed within the right breast pocket via a Sol funnel using the no-touch technique. Orientation of the implant was confirmed. The pocket wound was then closed with running 2-0 PDS suture on the anterior capsule and deep subcutaneous tissue, running deep dermal 3-0 Monocryl and running subcuticular 3-0 Monocryl. The exact same procedure was repeated on the contralateral left side with implant serial number [id number]. Autologous fat transfer was then performed.   Multiple percutaneous port sites were created with an 18-gauge needle across the anterior surface of each breast.  Fat was then transferred to the upper pole, upper medial pole, and anterior surface of each breast using a type II Berkowitz cannula. Fat was transferred to the deep dermal and superficial subcutaneous planes. A total of 175 mL was transferred to each breast.  Symmetry and volume were satisfactory. The port sites were closed with 5-0 plain gut suture. The wounds were dressed with Dermabond, 4x4s, Medipore tape. A surgical bra and abdominal binder were placed. Anesthesia was then discontinued. The patient was extubated in the operating room, having tolerated the procedure well. The needle, instrument, and laparotomy pad counts at the end of the case were correct.       Sean Webster MD      [id number]  D:  10/20/2020 14:52  T:  10/20/2020 17:28  JOB #:  [id number]

## 2020-12-18 ENCOUNTER — OFFICE VISIT (OUTPATIENT)
Dept: SURGERY | Age: 45
End: 2020-12-18
Payer: OTHER GOVERNMENT

## 2020-12-18 VITALS
SYSTOLIC BLOOD PRESSURE: 115 MMHG | DIASTOLIC BLOOD PRESSURE: 69 MMHG | HEART RATE: 80 BPM | WEIGHT: 150 LBS | TEMPERATURE: 98 F | BODY MASS INDEX: 26.58 KG/M2 | HEIGHT: 63 IN

## 2020-12-18 DIAGNOSIS — Z85.3 HISTORY OF BREAST CANCER IN FEMALE: ICD-10-CM

## 2020-12-18 DIAGNOSIS — Z90.13 S/P MASTECTOMY, BILATERAL: ICD-10-CM

## 2020-12-18 DIAGNOSIS — Z17.1 MALIGNANT NEOPLASM OF UPPER-OUTER QUADRANT OF LEFT BREAST IN FEMALE, ESTROGEN RECEPTOR NEGATIVE (HCC): Primary | ICD-10-CM

## 2020-12-18 DIAGNOSIS — C50.412 MALIGNANT NEOPLASM OF UPPER-OUTER QUADRANT OF LEFT BREAST IN FEMALE, ESTROGEN RECEPTOR NEGATIVE (HCC): Primary | ICD-10-CM

## 2020-12-18 PROCEDURE — 99213 OFFICE O/P EST LOW 20 MIN: CPT | Performed by: NURSE PRACTITIONER

## 2020-12-18 NOTE — PATIENT INSTRUCTIONS

## 2020-12-18 NOTE — PROGRESS NOTES
HISTORY OF PRESENT ILLNESS  Pinky Price is a 41 y.o. female. HPI Established patient presents for follow-up to LEFT breast cancer. S/P bilateral mastectomy. Denies breast mass, skin changes and pain. Breast history -   Referring - self referral  6/10/19 L breast core bx:  IDC, gr 3, 1.1 cm, + LVI, ER negative, SD negative, HER 2 negative at [facility]; DCIS present gr 3, solid with expansile necrosis  7/3/19 BRCA1 pathogenic mutation (invitae), c.5266dup  7/18/19 MRI breast bx:  Negative  Chemo - Carbo/taxol 7/22/19-10/14/19 followed by [facility] 10/28/19-12/17/19 - Dr. Edda Hughes   2/4/20 right and left nipple bx:  Negative; left ax SLN bx:  0/2 LN  2/18/20 left breast mastectomy:  No invasive ca; residual DCIS, ypTis ypN0 cM0; right breast mastectomy:  Negative  - Dr. Brizuela [facility]  79/5869 - implants replaced and fat grafting - Dr. Gay Burrows history -   6/1/20 BSO negative - Dr. Gela Loyola      Family history -   2 paternal aunts with post-menopausal breast cancer, one aunt with ovarian cancer; that aunt's daughter tested positive for BRCA1 (first cousin); no pancreas cancer, no prostate cancer  Patient - 7/3/19 BRCA1 pathogenic mutation (invitae), c.5266dup      OB History    No obstetric history on file. Obstetric Comments   Menarche 7 or 16, LMP 5/29/29, # of children 3, age of 4st delivery 28, Hysterectomy/oophorectomy No/No, Breast bx Yes, ?  LEFT 9-10 yrs ago, history of breast feeding Yes, BCP No, Hormone therapy No               Past Surgical History:   Procedure Laterality Date    HX APPENDECTOMY  2011    HX BILATERAL SALPINGO-OOPHORECTOMY Bilateral 06/2020    HX BREAST RECONSTRUCTION Bilateral 2/18/2020    BILATERAL BREAST DIRECT TO IMPLANT RECONSTRUCTION WITH ALLODERM performed by Magan Villa MD at [address] HX BREAST RECONSTRUCTION Bilateral 10/15/2020    BILATERAL BREAST RECONSTRUCTION WITH IMPLANT EXCHANGE, POCKET REVISION, FAT GRAFTING TO BILATERAL BREAST performed by Vianca Maldonado, Seb Ibarra MD at [facility] MAIN OR    HX  SECTION      x3    HX MASTECTOMY Bilateral 2020    BILATERAL BREAST SKIN AND NIPPLE SPARING MASTECTOMIES, PORT A CATH REMOVAL, BILATERAL BREAST DIRECT TO IMPLANT RECONSTRUCTION WITH ALLODERM, AND POSSIBLE TISSUE EXPANDERS performed by Shaun Foote MD at [address] HX VASCULAR ACCESS  2020    HX WISDOM TEETH EXTRACTION      young adult         ROS    Physical Exam  Constitutional:       Appearance: Normal appearance. Chest:      Comments: Chest wall s/p bilateral mastectomy with implant reconstruction  Musculoskeletal: Normal range of motion. Comments: UE x 2   Lymphadenopathy:      Upper Body:      Right upper body: No supraclavicular or axillary adenopathy. Left upper body: No supraclavicular or axillary adenopathy. Neurological:      Mental Status: She is alert. Psychiatric:         Attention and Perception: Attention normal.         Mood and Affect: Mood normal.         Speech: Speech normal.         Behavior: Behavior normal.       Visit Vitals  /69 (BP 1 Location: Right arm, BP Patient Position: Sitting)   Pulse 80   Temp 98 °F (36.7 °C) (Skin)   Ht 5' 3\" (1.6 m)   Wt 150 lb (68 kg)   BMI 26.57 kg/m²         ASSESSMENT and PLAN  Encounter Diagnoses   Name Primary?  Malignant neoplasm of upper-outer quadrant of left breast in female, estrogen receptor negative ([address] Yes    S/P mastectomy, bilateral     History of breast cancer in female         Normal exam with no evidence of local recurrence. Reviewed s/sx of local recurrence. Continues plastic surgery follow-up with Dr. Star King - will continue to work on symmetry. Saw Dr. Jack Velasco a few weeks ago - will see yearly. RTC in 6 months then will go to yearly to stagger visits with Dr. Jack Velasco. She is comfortable with this plan. All questions answered and she stated understanding.

## 2021-03-30 ENCOUNTER — HOSPITAL ENCOUNTER (OUTPATIENT)
Dept: PREADMISSION TESTING | Age: 46
Discharge: HOME | End: 2021-03-30
Attending: SURGERY
Payer: OTHER GOVERNMENT

## 2021-03-30 VITALS
HEART RATE: 65 BPM | TEMPERATURE: 98 F | RESPIRATION RATE: 20 BRPM | DIASTOLIC BLOOD PRESSURE: 65 MMHG | WEIGHT: 153.2 LBS | HEIGHT: 64 IN | OXYGEN SATURATION: 98 % | BODY MASS INDEX: 26.15 KG/M2 | SYSTOLIC BLOOD PRESSURE: 114 MMHG

## 2021-03-30 LAB
ALBUMIN SERPL-MCNC: 4.1 G/DL (ref 3.5–5)
ALBUMIN/GLOB SERPL: 1.3 {RATIO} (ref 1.1–2.2)
ALP SERPL-CCNC: 88 U/L (ref 45–117)
ALT SERPL-CCNC: 21 U/L (ref 12–78)
ANION GAP SERPL CALC-SCNC: 6 MMOL/L (ref 5–15)
APTT PPP: 26.1 SEC (ref 22.1–31)
AST SERPL-CCNC: 20 U/L (ref 15–37)
BASOPHILS # BLD: 0 K/UL (ref 0–0.1)
BASOPHILS NFR BLD: 1 % (ref 0–1)
BILIRUB SERPL-MCNC: 0.8 MG/DL (ref 0.2–1)
BUN SERPL-MCNC: 14 MG/DL (ref 6–20)
BUN/CREAT SERPL: 21 (ref 12–20)
CALCIUM SERPL-MCNC: 9.1 MG/DL (ref 8.5–10.1)
CHLORIDE SERPL-SCNC: 106 MMOL/L (ref 97–108)
CO2 SERPL-SCNC: 28 MMOL/L (ref 21–32)
CREAT SERPL-MCNC: 0.67 MG/DL (ref 0.55–1.02)
DIFFERENTIAL METHOD BLD: NORMAL
EOSINOPHIL # BLD: 0.1 K/UL (ref 0–0.4)
EOSINOPHIL NFR BLD: 2 % (ref 0–7)
ERYTHROCYTE [DISTWIDTH] IN BLOOD BY AUTOMATED COUNT: 12.6 % (ref 11.5–14.5)
GLOBULIN SER CALC-MCNC: 3.2 G/DL (ref 2–4)
GLUCOSE SERPL-MCNC: 83 MG/DL (ref 65–100)
HCT VFR BLD AUTO: 41.4 % (ref 35–47)
HGB BLD-MCNC: 14 G/DL (ref 11.5–16)
IMM GRANULOCYTES # BLD AUTO: 0 K/UL (ref 0–0.04)
IMM GRANULOCYTES NFR BLD AUTO: 0 % (ref 0–0.5)
INR PPP: 1.1 (ref 0.9–1.1)
LYMPHOCYTES # BLD: 2 K/UL (ref 0.8–3.5)
LYMPHOCYTES NFR BLD: 36 % (ref 12–49)
MCH RBC QN AUTO: 31 PG (ref 26–34)
MCHC RBC AUTO-ENTMCNC: 33.8 G/DL (ref 30–36.5)
MCV RBC AUTO: 91.6 FL (ref 80–99)
MONOCYTES # BLD: 0.5 K/UL (ref 0–1)
MONOCYTES NFR BLD: 9 % (ref 5–13)
NEUTS SEG # BLD: 2.9 K/UL (ref 1.8–8)
NEUTS SEG NFR BLD: 52 % (ref 32–75)
NRBC # BLD: 0 K/UL (ref 0–0.01)
NRBC BLD-RTO: 0 PER 100 WBC
PLATELET # BLD AUTO: 270 K/UL (ref 150–400)
PMV BLD AUTO: 9.8 FL (ref 8.9–12.9)
POTASSIUM SERPL-SCNC: 4 MMOL/L (ref 3.5–5.1)
PROT SERPL-MCNC: 7.3 G/DL (ref 6.4–8.2)
PROTHROMBIN TIME: 11.3 SEC (ref 9–11.1)
RBC # BLD AUTO: 4.52 M/UL (ref 3.8–5.2)
SODIUM SERPL-SCNC: 140 MMOL/L (ref 136–145)
THERAPEUTIC RANGE,PTTT: NORMAL SECS (ref 58–77)
WBC # BLD AUTO: 5.6 K/UL (ref 3.6–11)

## 2021-03-30 PROCEDURE — 36415 COLL VENOUS BLD VENIPUNCTURE: CPT

## 2021-03-30 PROCEDURE — 85025 COMPLETE CBC W/AUTO DIFF WBC: CPT

## 2021-03-30 PROCEDURE — 93005 ELECTROCARDIOGRAM TRACING: CPT

## 2021-03-30 PROCEDURE — 85730 THROMBOPLASTIN TIME PARTIAL: CPT

## 2021-03-30 PROCEDURE — 80053 COMPREHEN METABOLIC PANEL: CPT

## 2021-03-30 PROCEDURE — 85610 PROTHROMBIN TIME: CPT

## 2021-03-30 NOTE — PERIOP NOTES
[address] [address] [facility], [place]   MAIN OR                                  [phone]   MAIN PRE OP                          [phone]                                                                                AMBULATORY PRE OP          [phone]  PRE-ADMISSION TESTING    [phone]   Surgery Date: Wednesday 4/7/21        Is surgery arrival time given by surgeon? NO  If NO, [facility] staff will call you between 3 and 7pm the day before your surgery with your arrival time. (If your surgery is on a Monday, we will call you the Friday before.)    Call [phone] after 7pm Monday-Friday if you did not receive this call. INSTRUCTIONS BEFORE YOUR SURGERY   When You  Arrive Arrive at the [facility]nd [facility] on the day of your surgery  Have your insurance card, photo ID, and any copayment (if needed)   Food   and   Drink NO food or drink after midnight the night before surgery    This means NO water, gum, mints, coffee, juice, etc.  No alcohol (beer, wine, liquor) 24 hours before and after surgery   Medications to   TAKE   Morning of Surgery MEDICATIONS TO TAKE THE MORNING OF SURGERY WITH A SIP OF WATER:    none   Medications  To  STOP      7 days before surgery  Non-Steroidal anti-inflammatory Drugs (NSAID's): for example, Ibuprofen (Advil, Motrin), Naproxen (Aleve)   Aspirin, if taking for pain    Herbal supplements, vitamins, and fish oil   Other:  (Pain medications not listed above, including Tylenol may be taken)   Blood  Thinners     Bathing Clothing  Jewelry  Valuables      If you shower the morning of surgery, please do not apply anything to your skin (lotions, powders, deodorant, or makeup, especially mascara)   Follow Chlorhexidine [employer] body wash instructions provided to you during PAT appointment. Begin 3 days prior to surgery.    Do not shave or trim anywhere 24 hours before surgery   Wear your hair loose or down; no pony-tails, buns, or metal hair clips   Wear loose, comfortable, clean clothes   Wear glasses instead of contacts  [address], valuables, and jewelry, including body piercings, at home   Going Home - or Spending the Night  SAME-DAY SURGERY: You must have a responsible adult drive you home and stay with you 24 hours after surgery   ADMITS: If your doctor is keeping you in the hospital after surgery, leave personal belongings/luggage in your car until you have a hospital room number. Hospital discharge time is 12 noon  Drivers must be here before 12 noon unless you are told differently   Special Instructions It is now mandated that all surgical patients be tested for COVID-19 prior to surgery. Testing has to be exactly 4 days prior to surgery. Your COVID test date is Saturday 4/3/21 between 8:00 am and 11:00 am.       COVID testing will be performed curbside at the [facility]. There will be signs leading you to the testing site. You will need to bring a photo ID with you to be swabbed. Patients are advised to self-quarantine at home after testing and prior to your surgery date. You will be notified if your results are positive. What to watch for:   Coronavirus (COVID-19) affects different people in different ways   It also appears with a wide range of symptoms from mild to severe   Signs usually appear 2-14 days after exposure     If you develop any of the following, notify your doctor immediately:  o Fever  o Chills, with or without a shiver  o Muscle pain  o Headache  o Sore throat  o Dry cough  o New loss of taste or smell  o Tiredness      If you develop any of the following, call 911:  o Shortness of breath  o Difficulty breathing  o Chest pain  o New confusion  o Blueness of fingers and/or lips     Follow all instructions so your surgery wont be cancelled. Please, be on time.                     If a situation occurs and you are delayed the day of surgery, call [phone]. If your physical condition changes (like a fever, cold, flu, etc.) call your surgeon. Home medication(s) reviewed and verified via     LIST   VERBAL   during PAT appointment. The patient was contacted by  IN-PERSON  The patient verbalizes understanding of all instructions and   DOES NOT   need reinforcement.

## 2021-03-31 LAB
ATRIAL RATE: 65 BPM
CALCULATED P AXIS, ECG09: 57 DEGREES
CALCULATED R AXIS, ECG10: 33 DEGREES
CALCULATED T AXIS, ECG11: 39 DEGREES
DIAGNOSIS, 93000: NORMAL
P-R INTERVAL, ECG05: 132 MS
Q-T INTERVAL, ECG07: 414 MS
QRS DURATION, ECG06: 82 MS
QTC CALCULATION (BEZET), ECG08: 430 MS
VENTRICULAR RATE, ECG03: 65 BPM

## 2021-04-03 ENCOUNTER — HOSPITAL ENCOUNTER (OUTPATIENT)
Dept: PREADMISSION TESTING | Age: 46
Discharge: HOME | End: 2021-04-03
Payer: OTHER GOVERNMENT

## 2021-04-03 PROCEDURE — U0003 INFECTIOUS AGENT DETECTION BY NUCLEIC ACID (DNA OR RNA); SEVERE ACUTE RESPIRATORY SYNDROME CORONAVIRUS 2 (SARS-COV-2) (CORONAVIRUS DISEASE [COVID-19]), AMPLIFIED PROBE TECHNIQUE, MAKING USE OF HIGH THROUGHPUT TECHNOLOGIES AS DESCRIBED BY CMS-2020-01-R: HCPCS

## 2021-04-04 LAB — SARS-COV-2, COV2NT: NOT DETECTED

## 2021-04-06 ENCOUNTER — ANESTHESIA EVENT (OUTPATIENT)
Dept: SURGERY | Age: 46
End: 2021-04-06
Payer: OTHER GOVERNMENT

## 2021-04-07 ENCOUNTER — ANESTHESIA (OUTPATIENT)
Dept: SURGERY | Age: 46
End: 2021-04-07
Payer: OTHER GOVERNMENT

## 2021-04-07 ENCOUNTER — HOSPITAL ENCOUNTER (OUTPATIENT)
Age: 46
Setting detail: OUTPATIENT SURGERY
Discharge: HOME | End: 2021-04-07
Attending: SURGERY | Admitting: SURGERY
Payer: OTHER GOVERNMENT

## 2021-04-07 VITALS
WEIGHT: 153.22 LBS | DIASTOLIC BLOOD PRESSURE: 74 MMHG | OXYGEN SATURATION: 99 % | HEART RATE: 97 BPM | TEMPERATURE: 97.5 F | SYSTOLIC BLOOD PRESSURE: 132 MMHG | RESPIRATION RATE: 20 BRPM | HEIGHT: 64 IN | BODY MASS INDEX: 26.16 KG/M2

## 2021-04-07 LAB — HCG UR QL: NEGATIVE

## 2021-04-07 PROCEDURE — 74011250636 HC RX REV CODE- 250/636: Performed by: ANESTHESIOLOGY

## 2021-04-07 PROCEDURE — 77030020255 HC SOL INJ LR 1000ML BG: Performed by: SURGERY

## 2021-04-07 PROCEDURE — 77030036554: Performed by: SURGERY

## 2021-04-07 PROCEDURE — 77030011264 HC ELECTRD BLD EXT COVD -A: Performed by: SURGERY

## 2021-04-07 PROCEDURE — 77030002966 HC SUT PDS J&J -A: Performed by: SURGERY

## 2021-04-07 PROCEDURE — 77030008534 HC TBNG LIPOSUC BYRO -B: Performed by: SURGERY

## 2021-04-07 PROCEDURE — 76210000020 HC REC RM PH II FIRST 0.5 HR: Performed by: SURGERY

## 2021-04-07 PROCEDURE — 74011000250 HC RX REV CODE- 250: Performed by: SURGERY

## 2021-04-07 PROCEDURE — 77030013358: Performed by: SURGERY

## 2021-04-07 PROCEDURE — 77030019908 HC STETH ESOPH SIMS -A: Performed by: ANESTHESIOLOGY

## 2021-04-07 PROCEDURE — C1789 PROSTHESIS, BREAST, IMP: HCPCS | Performed by: SURGERY

## 2021-04-07 PROCEDURE — 77030040361 HC SLV COMPR DVT MDII -B

## 2021-04-07 PROCEDURE — 77030035548 HC DEV TISS COLL DEL SYS REVOLV DISP ALGN -F: Performed by: SURGERY

## 2021-04-07 PROCEDURE — 76210000006 HC OR PH I REC 0.5 TO 1 HR: Performed by: SURGERY

## 2021-04-07 PROCEDURE — 74011000272 HC RX REV CODE- 272: Performed by: SURGERY

## 2021-04-07 PROCEDURE — 74011000250 HC RX REV CODE- 250: Performed by: NURSE ANESTHETIST, CERTIFIED REGISTERED

## 2021-04-07 PROCEDURE — 81025 URINE PREGNANCY TEST: CPT

## 2021-04-07 PROCEDURE — 77030011825 HC SUPP SURG PSTOP S2SG -B: Performed by: SURGERY

## 2021-04-07 PROCEDURE — 77030026438 HC STYL ET INTUB CARD -A: Performed by: ANESTHESIOLOGY

## 2021-04-07 PROCEDURE — 77030002933 HC SUT MCRYL J&J -A: Performed by: SURGERY

## 2021-04-07 PROCEDURE — 77030040922 HC BLNKT HYPOTHRM STRY -A

## 2021-04-07 PROCEDURE — 2709999900 HC NON-CHARGEABLE SUPPLY: Performed by: SURGERY

## 2021-04-07 PROCEDURE — 77030002974 HC SUT PLN J&J -A: Performed by: SURGERY

## 2021-04-07 PROCEDURE — 74011250637 HC RX REV CODE- 250/637: Performed by: ANESTHESIOLOGY

## 2021-04-07 PROCEDURE — 77030008526 HC TBNG ASPIR DISP MCRA -B: Performed by: SURGERY

## 2021-04-07 PROCEDURE — 76060000035 HC ANESTHESIA 2 TO 2.5 HR: Performed by: SURGERY

## 2021-04-07 PROCEDURE — 74011250636 HC RX REV CODE- 250/636: Performed by: SURGERY

## 2021-04-07 PROCEDURE — 77030026227 HC FUNL KELLR 2 PCH ALGN -C: Performed by: SURGERY

## 2021-04-07 PROCEDURE — 77030008684 HC TU ET CUF COVD -B: Performed by: ANESTHESIOLOGY

## 2021-04-07 PROCEDURE — 77030010507 HC ADH SKN DERMBND J&J -B: Performed by: SURGERY

## 2021-04-07 PROCEDURE — 74011000258 HC RX REV CODE- 258: Performed by: SURGERY

## 2021-04-07 PROCEDURE — 74011250636 HC RX REV CODE- 250/636: Performed by: NURSE ANESTHETIST, CERTIFIED REGISTERED

## 2021-04-07 PROCEDURE — 76010000131 HC OR TIME 2 TO 2.5 HR: Performed by: SURGERY

## 2021-04-07 PROCEDURE — 77030019940 HC BLNKT HYPOTHRM STRY -B: Performed by: SURGERY

## 2021-04-07 RX ORDER — HYDROMORPHONE HYDROCHLORIDE 1 MG/ML
.25-1 INJECTION, SOLUTION INTRAMUSCULAR; INTRAVENOUS; SUBCUTANEOUS
Status: DISCONTINUED | OUTPATIENT
Start: 2021-04-07 | End: 2021-04-07 | Stop reason: HOSPADM

## 2021-04-07 RX ORDER — BUPIVACAINE HYDROCHLORIDE AND EPINEPHRINE 5; 5 MG/ML; UG/ML
INJECTION, SOLUTION EPIDURAL; INTRACAUDAL; PERINEURAL AS NEEDED
Status: DISCONTINUED | OUTPATIENT
Start: 2021-04-07 | End: 2021-04-07 | Stop reason: HOSPADM

## 2021-04-07 RX ORDER — FENTANYL CITRATE 50 UG/ML
INJECTION, SOLUTION INTRAMUSCULAR; INTRAVENOUS AS NEEDED
Status: DISCONTINUED | OUTPATIENT
Start: 2021-04-07 | End: 2021-04-07 | Stop reason: HOSPADM

## 2021-04-07 RX ORDER — SODIUM CHLORIDE 0.9 % (FLUSH) 0.9 %
5-40 SYRINGE (ML) INJECTION EVERY 8 HOURS
Status: DISCONTINUED | OUTPATIENT
Start: 2021-04-07 | End: 2021-04-07 | Stop reason: HOSPADM

## 2021-04-07 RX ORDER — LIDOCAINE HYDROCHLORIDE 10 MG/ML
0.1 INJECTION, SOLUTION EPIDURAL; INFILTRATION; INTRACAUDAL; PERINEURAL AS NEEDED
Status: DISCONTINUED | OUTPATIENT
Start: 2021-04-07 | End: 2021-04-07 | Stop reason: HOSPADM

## 2021-04-07 RX ORDER — GLYCOPYRROLATE 0.2 MG/ML
INJECTION INTRAMUSCULAR; INTRAVENOUS AS NEEDED
Status: DISCONTINUED | OUTPATIENT
Start: 2021-04-07 | End: 2021-04-07 | Stop reason: HOSPADM

## 2021-04-07 RX ORDER — SODIUM CHLORIDE, SODIUM LACTATE, POTASSIUM CHLORIDE, CALCIUM CHLORIDE 600; 310; 30; 20 MG/100ML; MG/100ML; MG/100ML; MG/100ML
125 INJECTION, SOLUTION INTRAVENOUS CONTINUOUS
Status: DISCONTINUED | OUTPATIENT
Start: 2021-04-07 | End: 2021-04-07 | Stop reason: HOSPADM

## 2021-04-07 RX ORDER — SCOLOPAMINE TRANSDERMAL SYSTEM 1 MG/1
1 PATCH, EXTENDED RELEASE TRANSDERMAL
Status: DISCONTINUED | OUTPATIENT
Start: 2021-04-07 | End: 2021-04-07 | Stop reason: HOSPADM

## 2021-04-07 RX ORDER — NALOXONE HYDROCHLORIDE 0.4 MG/ML
0.4 INJECTION, SOLUTION INTRAMUSCULAR; INTRAVENOUS; SUBCUTANEOUS
Status: DISCONTINUED | OUTPATIENT
Start: 2021-04-07 | End: 2021-04-07 | Stop reason: HOSPADM

## 2021-04-07 RX ORDER — FLUMAZENIL 0.1 MG/ML
0.2 INJECTION INTRAVENOUS
Status: DISCONTINUED | OUTPATIENT
Start: 2021-04-07 | End: 2021-04-07 | Stop reason: HOSPADM

## 2021-04-07 RX ORDER — PROPOFOL 10 MG/ML
INJECTION, EMULSION INTRAVENOUS
Status: DISCONTINUED | OUTPATIENT
Start: 2021-04-07 | End: 2021-04-07 | Stop reason: HOSPADM

## 2021-04-07 RX ORDER — NEOSTIGMINE METHYLSULFATE 1 MG/ML
INJECTION, SOLUTION INTRAVENOUS AS NEEDED
Status: DISCONTINUED | OUTPATIENT
Start: 2021-04-07 | End: 2021-04-07 | Stop reason: HOSPADM

## 2021-04-07 RX ORDER — ONDANSETRON 2 MG/ML
INJECTION INTRAMUSCULAR; INTRAVENOUS AS NEEDED
Status: DISCONTINUED | OUTPATIENT
Start: 2021-04-07 | End: 2021-04-07 | Stop reason: HOSPADM

## 2021-04-07 RX ORDER — ROCURONIUM BROMIDE 10 MG/ML
INJECTION, SOLUTION INTRAVENOUS AS NEEDED
Status: DISCONTINUED | OUTPATIENT
Start: 2021-04-07 | End: 2021-04-07 | Stop reason: HOSPADM

## 2021-04-07 RX ORDER — LIDOCAINE HYDROCHLORIDE 20 MG/ML
INJECTION, SOLUTION EPIDURAL; INFILTRATION; INTRACAUDAL; PERINEURAL AS NEEDED
Status: DISCONTINUED | OUTPATIENT
Start: 2021-04-07 | End: 2021-04-07 | Stop reason: HOSPADM

## 2021-04-07 RX ORDER — MIDAZOLAM HYDROCHLORIDE 1 MG/ML
INJECTION, SOLUTION INTRAMUSCULAR; INTRAVENOUS AS NEEDED
Status: DISCONTINUED | OUTPATIENT
Start: 2021-04-07 | End: 2021-04-07 | Stop reason: HOSPADM

## 2021-04-07 RX ORDER — DEXAMETHASONE SODIUM PHOSPHATE 4 MG/ML
INJECTION, SOLUTION INTRA-ARTICULAR; INTRALESIONAL; INTRAMUSCULAR; INTRAVENOUS; SOFT TISSUE AS NEEDED
Status: DISCONTINUED | OUTPATIENT
Start: 2021-04-07 | End: 2021-04-07 | Stop reason: HOSPADM

## 2021-04-07 RX ORDER — FAMOTIDINE 10 MG/ML
INJECTION INTRAVENOUS AS NEEDED
Status: DISCONTINUED | OUTPATIENT
Start: 2021-04-07 | End: 2021-04-07 | Stop reason: HOSPADM

## 2021-04-07 RX ORDER — PROPOFOL 10 MG/ML
INJECTION, EMULSION INTRAVENOUS AS NEEDED
Status: DISCONTINUED | OUTPATIENT
Start: 2021-04-07 | End: 2021-04-07 | Stop reason: HOSPADM

## 2021-04-07 RX ORDER — SODIUM CHLORIDE 0.9 % (FLUSH) 0.9 %
5-40 SYRINGE (ML) INJECTION AS NEEDED
Status: DISCONTINUED | OUTPATIENT
Start: 2021-04-07 | End: 2021-04-07 | Stop reason: HOSPADM

## 2021-04-07 RX ORDER — HYDROMORPHONE HYDROCHLORIDE 2 MG/ML
INJECTION, SOLUTION INTRAMUSCULAR; INTRAVENOUS; SUBCUTANEOUS AS NEEDED
Status: DISCONTINUED | OUTPATIENT
Start: 2021-04-07 | End: 2021-04-07 | Stop reason: HOSPADM

## 2021-04-07 RX ORDER — SODIUM CHLORIDE 9 MG/ML
25 INJECTION, SOLUTION INTRAVENOUS AS NEEDED
Status: DISCONTINUED | OUTPATIENT
Start: 2021-04-07 | End: 2021-04-07 | Stop reason: HOSPADM

## 2021-04-07 RX ORDER — POVIDONE-IODINE 10 %
SOLUTION, NON-ORAL TOPICAL AS NEEDED
Status: DISCONTINUED | OUTPATIENT
Start: 2021-04-07 | End: 2021-04-07 | Stop reason: HOSPADM

## 2021-04-07 RX ADMIN — DEXAMETHASONE SODIUM PHOSPHATE 8 MG: 4 INJECTION, SOLUTION INTRAMUSCULAR; INTRAVENOUS at 07:27

## 2021-04-07 RX ADMIN — CEFAZOLIN SODIUM 2 G: 1 POWDER, FOR SOLUTION INTRAMUSCULAR; INTRAVENOUS at 07:42

## 2021-04-07 RX ADMIN — FENTANYL CITRATE 50 MCG: 0.05 INJECTION, SOLUTION INTRAMUSCULAR; INTRAVENOUS at 08:48

## 2021-04-07 RX ADMIN — FENTANYL CITRATE 25 MCG: 0.05 INJECTION, SOLUTION INTRAMUSCULAR; INTRAVENOUS at 08:14

## 2021-04-07 RX ADMIN — HYDROMORPHONE HYDROCHLORIDE 0.5 MG: 2 INJECTION INTRAMUSCULAR; INTRAVENOUS; SUBCUTANEOUS at 09:12

## 2021-04-07 RX ADMIN — SODIUM CHLORIDE, POTASSIUM CHLORIDE, SODIUM LACTATE AND CALCIUM CHLORIDE: 600; 310; 30; 20 INJECTION, SOLUTION INTRAVENOUS at 08:32

## 2021-04-07 RX ADMIN — PROPOFOL 40 MG: 10 INJECTION, EMULSION INTRAVENOUS at 08:38

## 2021-04-07 RX ADMIN — FENTANYL CITRATE 50 MCG: 0.05 INJECTION, SOLUTION INTRAMUSCULAR; INTRAVENOUS at 08:00

## 2021-04-07 RX ADMIN — FENTANYL CITRATE 25 MCG: 0.05 INJECTION, SOLUTION INTRAMUSCULAR; INTRAVENOUS at 07:30

## 2021-04-07 RX ADMIN — ROCURONIUM BROMIDE 5 MG: 10 INJECTION INTRAVENOUS at 07:34

## 2021-04-07 RX ADMIN — GLYCOPYRROLATE 0.4 MG: 0.2 INJECTION INTRAMUSCULAR; INTRAVENOUS at 09:12

## 2021-04-07 RX ADMIN — FENTANYL CITRATE 25 MCG: 0.05 INJECTION, SOLUTION INTRAMUSCULAR; INTRAVENOUS at 07:34

## 2021-04-07 RX ADMIN — ROCURONIUM BROMIDE 10 MG: 10 INJECTION INTRAVENOUS at 08:14

## 2021-04-07 RX ADMIN — ROCURONIUM BROMIDE 5 MG: 10 INJECTION INTRAVENOUS at 08:58

## 2021-04-07 RX ADMIN — FAMOTIDINE 20 MG: 10 INJECTION INTRAVENOUS at 07:27

## 2021-04-07 RX ADMIN — SODIUM CHLORIDE, POTASSIUM CHLORIDE, SODIUM LACTATE AND CALCIUM CHLORIDE 125 ML/HR: 600; 310; 30; 20 INJECTION, SOLUTION INTRAVENOUS at 06:38

## 2021-04-07 RX ADMIN — ROCURONIUM BROMIDE 25 MG: 10 INJECTION INTRAVENOUS at 07:35

## 2021-04-07 RX ADMIN — LIDOCAINE HYDROCHLORIDE 80 MG: 20 INJECTION, SOLUTION EPIDURAL; INFILTRATION; INTRACAUDAL; PERINEURAL at 07:33

## 2021-04-07 RX ADMIN — MEPERIDINE HYDROCHLORIDE 12.5 MG: 50 INJECTION INTRAMUSCULAR; INTRAVENOUS; SUBCUTANEOUS at 09:45

## 2021-04-07 RX ADMIN — MIDAZOLAM HYDROCHLORIDE 1 MG: 2 INJECTION, SOLUTION INTRAMUSCULAR; INTRAVENOUS at 07:30

## 2021-04-07 RX ADMIN — PROPOFOL 100 MCG/KG/MIN: 10 INJECTION, EMULSION INTRAVENOUS at 07:34

## 2021-04-07 RX ADMIN — PROPOFOL 40 MG: 10 INJECTION, EMULSION INTRAVENOUS at 07:37

## 2021-04-07 RX ADMIN — ROCURONIUM BROMIDE 10 MG: 10 INJECTION INTRAVENOUS at 08:31

## 2021-04-07 RX ADMIN — FENTANYL CITRATE 25 MCG: 0.05 INJECTION, SOLUTION INTRAMUSCULAR; INTRAVENOUS at 08:21

## 2021-04-07 RX ADMIN — ONDANSETRON HYDROCHLORIDE 4 MG: 2 SOLUTION INTRAMUSCULAR; INTRAVENOUS at 07:27

## 2021-04-07 RX ADMIN — Medication 3 MG: at 09:12

## 2021-04-07 RX ADMIN — MIDAZOLAM HYDROCHLORIDE 3 MG: 2 INJECTION, SOLUTION INTRAMUSCULAR; INTRAVENOUS at 07:27

## 2021-04-07 RX ADMIN — PROPOFOL 120 MG: 10 INJECTION, EMULSION INTRAVENOUS at 07:34

## 2021-04-07 RX ADMIN — ROCURONIUM BROMIDE 10 MG: 10 INJECTION INTRAVENOUS at 07:54

## 2021-04-07 RX ADMIN — ROCURONIUM BROMIDE 10 MG: 10 INJECTION INTRAVENOUS at 08:39

## 2021-04-07 RX ADMIN — ROCURONIUM BROMIDE 10 MG: 10 INJECTION INTRAVENOUS at 08:48

## 2021-04-07 NOTE — ANESTHESIA PREPROCEDURE EVALUATION
Relevant Problems   No relevant active problems       Anesthetic History     PONV          Review of Systems / Medical History  Patient summary reviewed, nursing notes reviewed and pertinent labs reviewed    Pulmonary  Within defined limits                 Neuro/Psych   Within defined limits           Cardiovascular  Within defined limits                Exercise tolerance: >4 METS     GI/Hepatic/Renal  Within defined limits              Endo/Other        Cancer    Comments: Left breast cancer Other Findings   Comments: Breast cancer           Physical Exam    Airway  Mallampati: I    Neck ROM: normal range of motion   Mouth opening: Normal     Cardiovascular    Rhythm: regular  Rate: normal         Dental  No notable dental hx       Pulmonary  Breath sounds clear to auscultation               Abdominal         Other Findings            Anesthetic Plan    ASA: 2  Anesthesia type: general and total IV anesthesia          Induction: Intravenous  Anesthetic plan and risks discussed with: Patient      Informed consent obtained.

## 2021-04-07 NOTE — OP NOTES
[facility]sie[facility] 80  OPERATIVE REPORT    Name:  Ayla Miranda  MR#:  [id number]  :  1975  ACCOUNT #:  [de-identified]  DATE OF SERVICE:  2021    PREOPERATIVE DIAGNOSES:  1. Carcinoma of the breast.  2.  Surgical absence, bilateral breasts. 3.  Breast asymmetry. 4.  Capsule contracture. 5.  Personal history of breast cancer. POSTOPERATIVE DIAGNOSES:  1. Carcinoma of the breast.  2.  Surgical absence, bilateral breasts. 3.  Breast asymmetry. 4.  Capsule contracture. 5.  Personal history of breast cancer. PROCEDURE PERFORMED:  1. Reconstruction, right and left breasts, with removal of breast prosthesis. 2.  Reconstruction, right and left breasts, with extensive open capsulotomy and capsulectomy. 3.  Reconstruction, right and left breasts, with insertion of silicone breast prosthetic. 4.  Reconstruction, left breast, with lateral inferior capsulorrhaphy. 5.  Reconstruction, right and left breasts, with autologous tissue fat transfer, 300 mL total.    SURGEON:  Curtis Pope MD    ASSISTANT:  Raquel Gallo NP. Due to the complexity of this procedure, surgical assistance was required. ANN Riggs assisted with the removal of breast prosthesis, extensive open capsulotomy, capsulectomy, insertion of silicone breast prosthesis, lateral inferior capsulorrhaphy, harvesting and placement of autologous fat, irrigation, hemostasis, wound closure, and dressing placement. ANESTHESIA:  General endotracheal.    COMPLICATIONS:  None. SPECIMENS REMOVED:  1. Excised capsule, right breast.  2.  Excised capsule, left breast.  3.  Total aspirate liposuction 500 mL. IMPLANTS:  See note. ESTIMATED BLOOD LOSS:  50 mL    DRAINS:  None. COUNTS:  Correct x2. DISPOSITION:  Stable to PACU.    BRIEF HISTORY:  The patient is a 45-year-old female well known to our clinic.   She underwent previous bilateral total skin and nipple-areolar sparing mastectomy for carcinoma of the breast with immediate direct implant reconstruction. She now presents for revision. The overall procedure, incisional approach, expected outcomes and recovery were discussed. The risks, benefits and alternatives to the procedure including but not limited to bleeding, infection, damage to surrounding tissue structures, the need for revisional surgery, seroma, hematoma, capsule contracture, implant rupture, implant deflation, the need for future implant maintenance and surveillance MRIs, partial or total loss of sensation to the skin, skin flap necrosis, fat necrosis, atrophy of oil cyst, calcium deposits, oil cyst, PE, DVT, and fat embolism were discussed. Some residual postoperative asymmetry is to be expected. The patient agreed to proceed. PROCEDURE:  Preoperative markings were made with the patient in the standing position. Informed consent was obtained. The patient was taken to the operating room and placed supine on the operating table. Sequential compression boots were placed. General endotracheal anesthesia was obtained. A lower body Ml Hugger was placed. The chest and abdominal wall were prepped and draped in the usual sterile fashion. Two lower abdominal wall liposuction port sites were made sharply. The anterior abdominal wall and flanks were infused with 1800 mL of standard tumescent solution consisting of 1 liter of warm lactated Ringer's mixed with 1 ampule of epinephrine and 10 mL of 2% lidocaine plain using a Lamis infiltrating cannula. After 7 minutes, tumescent liposuction was performed with a 4.0-mm blunt Mercedes tip flared cannula in the deep and intermediate planes. Cross tunneling was performed at all times and the adequacy of the resection was confirmed with a deep palpation and pinch test.  A total of 600 mL of lipoaspirate was removed. The fat was captured directly into the [employer] Fat Grafting System.   The supernatant was decanted and grafts were washed with lactated Ringer's and loaded into 60-mL syringes for later grafting. The port sites were closed with 5-0 plain gut suture. Attention was then turned to the right breast.  The previous mastectomy incision was opened. Dissection was carried down through the subcutaneous tissue to level of the anterior capsule. The anterior capsule was opened. The breast pocket was entered. The previous breast implant was removed and appeared to be in good condition without rupture. The subpectoral pocket was then examined. The pocket was in the partial submuscular plane. The acellular dermal matrix graft was completely incorporated. However, the capsule was thick and tight anteriorly. Extensive circumferential capsulotomy was performed superiorly, medially and inferiorly to improve upper pole, medial pole, and lower pole contour. Extensive strip anterior capsulectomy was likewise performed to improve anterior pole contour. Hemostasis was secured throughout. Multiple silicone sizers were placed and a 520-mL style SCX Allergan smooth round silicone implant was chosen. The pocket was then irrigated with 500 mL of half-strength Betadine solution, 2 liters of double antibiotic solution, and 10 mL of 0.5% Marcaine with epinephrine. Gloves were changed and implant serial number [id number] was presoaked in triple antibiotic solution and placed within the right breast pocket via a Sol funnel. Orientation of the implant was confirmed. The breast wound was closed with running 2-0 PDS suture on the anterior capsule, deep and subcutaneous tissue, and deep dermis followed by running subcuticular 3-0 Monocryl. The same procedure was repeated on the contralateral left side. However, the pocket showed substantial lateral inferior subluxation which required capsulorrhaphy. A double-layered imbricating capsulorrhaphy was performed using a 2-0 PDS suture. Implant serial number [id number] was placed and the breast was closed likewise. Symmetry and volume were satisfactory with regards to implant placement. Autologous fat was then performed. The 150 mL of autologous fat was placed into each breast using a type 2 Berkowitz cannula. Multiple percutaneous port sites were created. Fat was injected into the deep dermal and superficial subcutaneous planes. The grafts were lightly massaged. The contour improvement appeared satisfactory. The port sites were closed with 5-0 plain gut suture. The wounds were then dressed with Dermabond, 4 x 4s, Medipore tape. A surgical bra was placed. Anesthesia was then discontinued. The patient extubated in the operating room having tolerated the procedure well. The needle, instrument and laparotomy pad counts at the end of the case were correct.       Melinda Miranda MD      [id number]  D:  04/07/2021 10:00  T:  04/07/2021 16:36  JOB #:  [id number]

## 2021-04-07 NOTE — H&P
Plastic Surgery PRE-OP Admission History and Physical    Patient: Bert Ricardo MRN: [id number]  SSN: [id number]    YOB: 1975  Age: 41 y.o. Sex: female            Subjective:   Patient is a 41 y.o.  female who presents with breast reconstruction. The patient was evaluated and determined the most appropriate plan of care is to proceed with surgical intervention. Patient denies chest pain, tightness, pain radiating down left arm, palpitations, dizziness, lightheadedness, fevers, chills. Patient denies shortness of breath, wheezing, diarrhea, constipation, abdominal pain. Patient denies urinary problems, dysuria, hesitancy, urgency. Denies skin breakdown, rashes, insect bites or open area. Pt. Is a non smoker.         Patient Active Problem List    Diagnosis Date Noted    S/P breast reconstruction 02/18/2020    Malignant neoplasm of upper-outer quadrant of left breast in female, estrogen receptor negative ([facility] [address] 07/05/2019     Past Medical History:   Diagnosis Date    Breast cancer ([facility] [address] 06/2019    left breast cancer    Nausea and vomiting after administration of anesthetic agent     once pt got home from surgery on day of surgery on 2 of 4 surgeries from 2020      Past Surgical History:   Procedure Laterality Date    HX APPENDECTOMY  2011    HX BILATERAL SALPINGO-OOPHORECTOMY Bilateral 06/2020    HX BREAST RECONSTRUCTION Bilateral 2/18/2020    BILATERAL BREAST DIRECT TO IMPLANT RECONSTRUCTION WITH ALLODERM performed by Shashank Baumann MD at [address] HX BREAST RECONSTRUCTION Bilateral 10/15/2020    BILATERAL BREAST RECONSTRUCTION WITH IMPLANT EXCHANGE, POCKET REVISION, FAT GRAFTING TO BILATERAL BREAST performed by Shashank Baumann MD at [address]    [address]      x3    HX MASTECTOMY Bilateral 2/18/2020    BILATERAL BREAST SKIN AND NIPPLE SPARING MASTECTOMIES, PORT A CATH REMOVAL, BILATERAL BREAST DIRECT TO IMPLANT RECONSTRUCTION WITH ALLODERM, AND POSSIBLE TISSUE EXPANDERS performed by Clive Vargas MD at [address] HX VASCULAR ACCESS  07/2020    HX WISDOM TEETH EXTRACTION      young adult      Prior to Admission medications    Medication Sig Start Date End Date Taking? Authorizing Provider   OTHER Take 1 Tab by mouth. [place] Complex with ginger   Yes Provider, Historical   OTHER 1 Tab. Indications: Pro Resolving Mediators   Yes Provider, Historical   cholecalciferol, vitamin D3, (VITAMIN D3 PO) Take 1 Tab by mouth every evening. Yes Provider, Historical   OTHER 1 Tab every evening. Indications: AlliBiotic CF   Yes Provider, Historical   aspirin/acetaminophen/caffeine (EXCEDRIN MIGRAINE PO) Take 4 Tabs by mouth as needed for Pain. Yes Provider, Historical   MELATONIN PO Take 20 mg by mouth nightly as needed. Yes Provider, Historical   acetaminophen (TYLENOL) 500 mg tablet Take 1,000 mg by mouth every six (6) hours as needed for Pain.     Provider, Historical     Current Facility-Administered Medications   Medication Dose Route Frequency    lidocaine (PF) (XYLOCAINE) 10 mg/mL (1 %) injection 0.1 mL  0.1 mL SubCUTAneous PRN    lactated Ringers infusion  125 mL/hr IntraVENous CONTINUOUS    lactated ringers bolus infusion 1,000 mL  1,000 mL IntraVENous ONCE    sodium chloride (NS) flush 5-40 mL  5-40 mL IntraVENous Q8H    sodium chloride (NS) flush 5-40 mL  5-40 mL IntraVENous PRN    0.9% sodium chloride infusion 25 mL  25 mL IntraVENous PRN    naloxone (NARCAN) injection 0.4 mg  0.4 mg IntraVENous Multiple    flumazeniL (ROMAZICON) 0.1 mg/mL injection 0.2 mg  0.2 mg IntraVENous Multiple    lactated Ringers infusion  125 mL/hr IntraVENous CONTINUOUS    ceFAZolin (ANCEF) 2 g in sterile water (preservative free) 20 mL IV syringe  2 g IntraVENous ONCE    scopolamine (TRANSDERM-SCOP) 1 mg over 3 days 1 Patch  1 Patch TransDERmal Q72H      Allergies   Allergen Reactions    Other Medication Rash and Itching     dermabond skin glue Social History     Tobacco Use    Smoking status: Never Smoker    Smokeless tobacco: Never Used   Substance Use Topics    Alcohol use: Yes     Alcohol/week: 0.0 - 1.0 standard drinks     Comment: RARELY      Family History   Problem Relation Age of Onset    Diabetes Mother     Heart Disease Mother     Hypertension Mother    [facility] Anesth Problems Mother         PONV    Heart Disease Father     Cancer Paternal Aunt         Breast    Breast Cancer Paternal Aunt     Cancer Paternal Aunt         Breast    Breast Cancer Paternal Aunt     No Known Problems Sister     Hypertension Brother     Diabetes Brother         Review of Systems  A comprehensive review of systems was negative except for that written in the HPI. Objective:     Patient Vitals for the past 8 hrs:   BP Temp Pulse Resp SpO2 Height Weight   21 0617 109/71 98.1 °F (36.7 °C) 78 13 98 % 5' 4\" (1.626 m) 69.5 kg (153 lb 3.5 oz)     Temp (24hrs), Av.1 °F (36.7 °C), Min:98.1 °F (36.7 °C), Max:98.1 °F (36.7 °C)      Gen: Well-developed,  in no acute distress   HEENT: Pink conjunctivae, PERRL, hearing intact to voice, moist mucous membranes   Neck: Supple, without masses, thyroid non-tender   Resp: No accessory muscle use,  breathing even/ nonlabored. Card: Regular rate and rhythm. Abd: Soft, non-tender, non-distended,   Lymph: No cervical or inguinal adenopathy   Musc: WNL  Skin: No skin breakdown noted. Skin warm, pink, dry. No rashes.   Neuro: Cranial nerves are grossly intact, no focal motor weakness, follows commands appropriately   Psych: Good insight, oriented to person, place and time, alert      Labs:   Recent Results (from the past 24 hour(s))   HCG URINE, QL. - POC    Collection Time: 21  6:39 AM   Result Value Ref Range    Pregnancy test,urine (POC) Negative NEG         Assessment:     Patient Active Problem List    Diagnosis Date Noted    S/P breast reconstruction 2020    Malignant neoplasm of upper-outer quadrant of left breast in female, estrogen receptor negative ([facility] [address] 07/05/2019         Plan:   Proceed with surgical intervention without contraindications. I met with pt. this morning and discussed increased ambulation to improve pulmonary status. We also discussed preop and postop expectations to include pain management. All questions were answered.         Nita Payton NP

## 2021-04-07 NOTE — ANESTHESIA POSTPROCEDURE EVALUATION
Procedure(s):  RECONSTRUCTION REVISION LEFT BREAST IMPLANT EXCHANGE AND SCAR REVISIONS/ RIGHT BREAST IMPLANT EXCHANGE AND POCKET REVISIONS/ FAT GRAFTING TO BILATERAL BREAST.    general, total IV anesthesia    Anesthesia Post Evaluation        Patient location during evaluation: PACU  Level of consciousness: awake  Pain management: adequate  Airway patency: patent  Anesthetic complications: no  Cardiovascular status: acceptable  Respiratory status: acceptable  Hydration status: acceptable  Post anesthesia nausea and vomiting:  none      INITIAL Post-op Vital signs:   Vitals Value Taken Time   /80 04/07/21 1035   Temp 36.4 °C (97.5 °F) 04/07/21 1030   Pulse 103 04/07/21 1040   Resp 20 04/07/21 1030   SpO2 100 % 04/07/21 1040   Vitals shown include unvalidated device data.

## 2021-04-07 NOTE — BRIEF OP NOTE
Brief Postoperative Note    Patient: Adan Chisholm  YOB: 1975  MRN: [id number]    Date of Procedure: 4/7/2021     Pre-Op Diagnosis: BREAST CANCER    Post-Op Diagnosis: Same as preoperative diagnosis. Procedure(s):  RECONSTRUCTION REVISION LEFT BREAST IMPLANT EXCHANGE AND SCAR REVISIONS/ RIGHT BREAST IMPLANT EXCHANGE AND POCKET REVISIONS/ FAT GRAFTING TO BILATERAL BREAST    Surgeon(s):  Barbara Barbosa MD    Surgical Assistant: Nurse Practitioner: Gabby Sheldon NP    Anesthesia: General     Estimated Blood Loss (mL): less than 107     Complications: None    Specimens: * No specimens in log *     Implants:   Implant Name Type Inv.  Item Serial No.  Lot No. LRB No. Used Action   IMPLANT BRST 520CC P5.5CM DIA13.25CM [employer] - [id number]  IMPLANT BRST 520CC P5.5CM DIA13.25CM [employer]IVE [employer]EL [id number] [employer] [employer] N/A Left 1 Explanted   IMPLANT BRST 520CC P5.5CM DIA13.25CM [employer] - [id number]  IMPLANT BRST 520CC P5.5CM DIA13.25CM [employer]IVE [employer] [id number] [facility] [employer] [employer] N/A Right 1 Explanted   IMPLANT BRST 520CC P5.5CM DIA13.25CM [employer] - [id number]  IMPLANT BRST 520CC P5.5CM DIA13.25CM [employer]IVE [employer]EL [id number] [employer] [employer] NA Left 1 Implanted   IMPLANT BRST 520CC P5.5CM DIA13.25CM [employer] - [id number]  IMPLANT BRST 520CC P5.5CM DIA13.25CM [employer] [id number] [employer] [employer] NA Right 1 Implanted       Drains: * No LDAs found *    Findings: none    Electronically Signed by Neetu Kelsey MD on 4/7/2021 at 2:31 PM

## 2021-04-07 NOTE — DISCHARGE INSTRUCTIONS
Breast Reconstruction with liposuction and fat grafting patient Instructions  Dr. Narcisa Cruz, NP      Activities  Immediatly after  surgery you will rest quietly for the first 48 hours. You will be able to walk around the house and perform light daily activities; however, during this time, it is not at all unusual for you to feel some pain, soreness and pressure in the chest area. This will gradually subside and Dr. Ashley Pimentel will give you pain medication to relieve it. Be sure to lie on your back whenever you rest or sleep. Keep your head elevated for 72 hours after surgery as this will help with swelling. No heavy exercise or lifting for three weeks following surgery. This will allow the implants to remain in the proper position without movement. For the first three days following surgery you should try to restrict your arm movements. Move your arms slowly and avoid sudden jerky movements of the chest and breast area. Keep your arms as close to your body as possible. This allows the implants to remain in place. Dr. Ashley Pimentel encourages walking immediately after surgery. This activity will greatly minimize the risk of blood clots in your leg veins. Bathing: You will then be allowed to shower two days after surgery. Wash yourself everywhere, including the incisions gently. You will feel glue on your incisions. With your finger tips, gently wash over incisions. Use mild soap and pat yourself dry and put the bra back on. This daily routine will help keep the incisions clean, and will promote wound healing. The glue needs to stay on your incisions for 2 weeks time. After 2 weeks, you can start to pull off the glue if it has not fallen off on its own. Do not submerge yourself in a bath, swimming pool, or whirlpool for two weeks. Under garments: The surgery bra that you have been put in should be worn constantly during the day and at night.   You will be changed out of that surgery bra to a more comfortable bra in the office at your first post operative appointment. You will wear the sports bra for a total of two to three weeks after surgery. You may also wear a soft sports bra with light support instead of the surgery bra if preferred. Abdominal binder:  Wear this until your first post operative visit. After this, you can switch to compression undergarments (such as spanx)      The maximum swelling occurs at about three days and then begins to dramatically improve. Mild bruising typically resolves within 14 days. Medications:      Dilaudid: or Percocet  this is a your pain medication. Take one to two tablets as needed every 3-4 hours. No NSAIDS (advil, ibuprofen 5 days after surgery. This will increase your bleeding risk. Tylenol: (over the counter) 650 mg every 4 hours as needed for mild pain. Be careful because percocet has tylenol in it. You can not exceed 4000 mg a day of tylenol. Zofran: this is a medication for nausea. Take this as needed for nausea every 6-8 hours. Make sure you drink plenty of fluids. Nausea usually resolves after the first 24 hours. Augmentin or Levaquin: this is your antibiotic. Take this medication completley until empty. Valium: this is for muscle relaxation. Your implant was placed beneath the pectoral muscle. This muscle can sometimes feel tight. Valium can help relax this muscle. Stool softeners: while taking narcotics, take a stool softener (over the counter) twice daily. Incease fluids, fiber. It is very important to keep your bowels regular while taking narcotics. Work: You should plan to be off work for 5-6 days, although this can vary from person to person. Do not expose your breasts to the sun for eight weeks after surgery. Follow up: Please present to Dr. Verner Demark office at your scheduled appointment made prior to surgery. If you do not have an appt, please call the office ASAP to make your first post op visit. We like to see you within one to two days after surgery. Please notify Dr. Peter Rae if:   If your one breast becomes significantly larger than the other   If you develop significant bruising across the chest    If you experience a significant increase in pain in the breast after the pain has improved   If you develop a temperature above 101.5° F   If you develop redness (like a sunburn) around your incisions  If you need help or have any questions feel free to call Dr Peter Rae with your concerns. Dr. Peter Rae is on call 24 hours per day, seven days per week and has an answering service to forward calls. Breast Massage Following Breast Augmentation   Breast massage will be taught to you TWO weeks  from surgery. No massage is recommended before 2 weeks. The purpose of these exercises is to keep the scar tissue that forms around implants as soft as possible. By performing these exercises as described, you can help soften the internal scar, minimize the risk of significant capsular contracture and maximize the likelihood of a soft, natural feel and appearance to your breast.    Begin doing the exercises two weeks after surgery. Dr. Peter Rae will show you the technique during your second post-operative visit. It is important to remember that slow steady massage is more effective than quick jerky movements. Don't worry about injuring the implant; you cannot cause a rupture with these exercises.  Press the breasts slowly and maximally inwards (towards your breast bone) and hold for 10 seconds, then release. Repeat four times.  Press the breasts apart slowly and maximally outwards and hold for 10 seconds, then release. Repeat four times.  Repeat for downward movement.  Repeat for upward movement.  Perform these exercises four times per day for the first three months.       The quality of your cosmetic enhancement may be compromised if you fail to return for any scheduled post-op visits, or follow the pre- and post-operative instructions. Please dont hesitate to report any unusual or concerning changes. Our number is [phone]. DISCHARGE SUMMARY from your Nurse      PATIENT INSTRUCTIONS    After general anesthesia or intravenous sedation, for 24 hours or while taking prescription Narcotics:  · Limit your activities  · Do not drive and operate hazardous machinery  · Do not make important personal or business decisions  · Do  not drink alcoholic beverages  · If you have not urinated within 8 hours after discharge, please contact your surgeon on call. Report the following to your surgeon:  · Excessive pain, swelling, redness or odor of or around the surgical area  · Temperature over 100.5  · Nausea and vomiting lasting longer than 4 hours or if unable to take medications  · Any signs of decreased circulation or nerve impairment to extremity: change in color, persistent  numbness, tingling, coldness or increase pain  · Any questions      GOOD HELP TO FIGHT AN INFECTION  Here are a few tip to help reduce the chance of getting an infection after surgery:   Wash Your Hands   Good handwashing is the most important thing you and your caregiver can do.  Wash before and after caring for any wounds. Dry your hand with a clean towel.  Wash with soap and water for at least 20 seconds. A TIP: sing the \"Happy Birthday\" song through one time while washing to help with the timing.  Use a hand  in between washings.  Shower   When your surgeon says it is OK to take a shower, use a new bar of antibacterial soap (if that is what you use, and keep that bar of soap ONLY for your use), or antibacterial body wash.  Use a clean wash cloth or sponge when you bathe.  Dry off with a clean towel  after every bath - be careful around any wounds, skin staples, sutures or surgical glue over/on wounds.    Do not enter swimming pools, hot tubs, lakes, rivers and/or ocean until wounds are healed and your doctor/surgeon says it is OK.  Use Clean Sheets   Sleep on freshly laundered sheets after your surgery.  Keep the surgery site covered with a clean, dry bandage (if instructed to do so). If the bandage becomes soiled, reapply a new, dry, clean bandage.  Do not allow pets to sleep with you while your wound is healing.  Lifestyle Modification and Controlling Your Blood Sugar   Smoking slows wound healing. Stop smoking and limit exposure to second-hand smoke.  High blood sugar slows wound healing. Eat a well-balanced diet to provide proper nutrition while healing   Monitor your blood sugar (if you are a diabetic) and take your medications as you are suppose to so you can control you blood sugar after surgery. COUGH AND DEEP BREATHE    Breathing deeply and coughing are very important exercises to do after surgery. Deep breathing and coughing open the little air tubes and air sacks in your lungs. You take deep breaths every day. You may not even notice - it is just something you do when you sigh or yawn. It is a natural exercise you do to keep these air passages open. After surgery, take deep breaths and cough, on purpose. DIRECTIONS:  · Take 10 to 15 slow deep breaths every hour while awake. · Breathe in deeply, and hold it for 2 seconds. · Exhale slowly through puckered lips, like blowing up a balloon. · After every 4th or 5th deep breath, hug your pillow to your chest or belly and give a hard, deep cough. Yes, it will probably hurt. But doing this exercise is a very important part of healing after surgery. Take your pain medicine to help you do this exercise without too much pain. Coughing and deep breathing help prevent bronchitis and pneumonia after surgery. If you had chest or belly surgery, use a pillow as a \"hug josue\" and hold it tightly to your chest or belly when you cough.        ANKLE PUMPS    Ankle pumps increase the circulation of oxygenated blood to your lower extremities and decrease your risk for circulation problems such as blood clots. They also stretch the muscles, tendons and ligaments in your foot and ankle, and prevent joint contracture in the ankle and foot, especially after surgeries on the legs. It is important to do ankle pump exercises regularly after surgery because immobility increases your risk for developing a blood clot. Your doctor may also have you take an Aspirin for the next few days as well. If your doctor did not ask you to take an Aspirin, consult with him before starting Aspirin therapy on your own. The exercise is quite simple. · Slowly point your foot forward, feeling the muscles on the top of your lower leg stretch, and hold this position for 5 seconds. · Next, pull your foot back toward you as far as possible, stretching the calf muscles, and hold that position for 5 seconds. · Repeat with the other foot. · Perform 10 repetitions every hour while awake for both ankles if possible (down and then up with the foot once is one repetition). You should feel gentle stretching of the muscles in your lower leg when doing this exercise. If you feel pain, or your range of motion is limited, don't push too hard. Only go the limit your joint and muscles will let you go. If you have increasing pain, progressively worsening leg warmth or swelling, STOP the exercise and call your doctor. MEDICATION AND   SIDE EFFECT GUIDE    The [facility] MEDICATION AND SIDE EFFECT GUIDE was provided to the PATIENT AND CARE PROVIDER. Information provided includes instruction about drug purpose and common side effects for the following medications:   · ***        These are general instructions for a healthy lifestyle:    *   Please give a list of your current medications to your Primary Care Provider.   *   Please update this list whenever your medications are discontinued, doses are changed, or new medications (including over-the-counter products) are added. *   Please carry medication information at all times in case of emergency situations. About Smoking  No smoking / No tobacco products  Avoid exposure to second hand smoke     Surgeon General's Warning:  Quitting smoking now greatly reduces serious risk to your health. Obesity, smoking, and sedentary lifestyle greatly increases your risk for illness and disease. A healthy diet, regular physical exercise & weight monitoring are important for maintaining a healthy lifestyle. Congestive Heart Failure  You may be retaining fluid if you have a history of heart failure or if you experience any of the following symptoms:  Weight gain of 3 pounds or more overnight or 5 pounds in a week, increased swelling in your hands or feet or shortness of breath while lying flat in bed. Please call your doctor as soon as you notice any of these symptoms; do not wait until your next office visit. Recognize signs and symptoms of STROKE:  F -  Face looks uneven  A -  Arms unable to move or move evenly  S -  Speech slurred or non-existent  T -  Time-call 911 as soon as signs and symptoms begin-DO NOT go          back to bed or wait to see if you get better-TIME IS BRAIN. Warning Signs of HEART ATTACK   Call 911 if you have these symptoms:     Chest discomfort. Most heart attacks involve discomfort in the center of the chest that lasts more than a few minutes, or that goes away and comes back. It can feel like uncomfortable pressure, squeezing, fullness, or pain.  Discomfort in other areas of the upper body. Symptoms can include pain or discomfort in one or both arms, the back, neck, jaw, or stomach.  Shortness of breath with or without chest discomfort.  Other signs may include breaking out in a cold sweat, nausea, or lightheadedness. Don't wait more than five minutes to call 911 - MINUTES MATTER! Fast action can save your life.  Calling 911 is almost always the fastest way to get lifesaving treatment. Emergency Medical Services staff can begin treatment when they arrive -- up to an hour sooner than if someone gets to the hospital by car. Learning About Coronavirus [phone])  Coronavirus [phone]): Overview  What is coronavirus (COVID-19)? The coronavirus disease (COVID-19) is caused by a virus. It is an illness that was first found in [place], [place], in December 2019. It has since spread worldwide. The virus can cause fever, cough, and trouble breathing. In severe cases, it can cause pneumonia and make it hard to breathe without help. It can cause death. Coronaviruses are a large group of viruses. They cause the common cold. They also cause more serious illnesses like Middle East respiratory syndrome (MERS) and severe acute respiratory syndrome (SARS). COVID-19 is caused by a novel coronavirus. That means it's a new type that has not been seen in people before. This virus spreads person-to-person through droplets from coughing and sneezing. It can also spread when you are close to someone who is infected. And it can spread when you touch something that has the virus on it, such as a doorknob or a tabletop. What can you do to protect yourself from coronavirus (COVID-19)? The best way to protect yourself from getting sick is to:  · Avoid areas where there is an outbreak. · Avoid contact with people who may be infected. · Wash your hands often with soap or alcohol-based hand sanitizers. · Avoid crowds and try to stay at least 6 feet away from other people. · Wash your hands often, especially after you cough or sneeze. Use soap and water, and scrub for at least 20 seconds. If soap and water aren't available, use an alcohol-based hand . · Avoid touching your mouth, nose, and eyes. What can you do to avoid spreading the virus to others?   To help avoid spreading the virus to others:  · Cover your mouth with a tissue when you cough or sneeze. Then throw the tissue in the trash. · Use a disinfectant to clean things that you touch often. · Stay home if you are sick or have been exposed to the virus. Don't go to school, work, or public areas. And don't use public transportation. · If you are sick:  ? Leave your home only if you need to get medical care. But call the doctor's office first so they know you're coming. And wear a face mask, if you have one.  ? If you have a face mask, wear it whenever you're around other people. It can help stop the spread of the virus when you cough or sneeze. ? Clean and disinfect your home every day. Use household  and disinfectant wipes or sprays. Take special care to clean things that you grab with your hands. These include doorknobs, remote controls, phones, and handles on your refrigerator and microwave. And don't forget countertops, tabletops, bathrooms, and computer keyboards. When to call for help  Call 911 anytime you think you may need emergency care. For example, call if:  · You have severe trouble breathing. (You can't talk at all.)  · You have constant chest pain or pressure. · You are severely dizzy or lightheaded. · You are confused or can't think clearly. · Your face and lips have a blue color. · You pass out (lose consciousness) or are very hard to wake up. Call your doctor now if you develop symptoms such as:  · Shortness of breath. · Fever. · Cough. If you need to get care, call ahead to the doctor's office for instructions before you go. Make sure you wear a face mask, if you have one, to prevent exposing other people to the virus. Where can you get the latest information? The following health organizations are tracking and studying this virus. Their websites contain the most up-to-date information. Babs Damian also learn what to do if you think you may have been exposed to the virus. · U.S. Centers for Disease Control and Prevention (CDC):  The CDC provides updated news about the disease and travel advice. The website also tells you how to prevent the spread of infection. www.cdc.gov  · World Health Organization [facility]): WHO offers information about the virus outbreaks. WHO also has travel advice. www.who.int  Current as of: April 1, 2020               Content Version: 12.4  © 3079-0685 [employer]. Care instructions adapted under license by your healthcare professional. If you have questions about a medical condition or this instruction, always ask your healthcare professional. [address] any warranty or liability for your use of this information. The discharge information has been reviewed with the {PATIENT PARENT GUARDIAN:97131}. Any questions and concerns from the {PATIENT PARENT GUARDIAN:43322} have been addressed. The {PATIENT PARENT GUARDIAN:76573} verbalized understanding. CONTENTS FOUND IN YOUR DISCHARGE ENVELOPE:  [x]     Surgeon and Hospital Discharge Instructions  [x]     [facility] Care Provider Card  [x]     Medication & Side Effect Guide            (your newly prescribed medications have been marked/highlighted showing the most common side effects from   the classes of drugs on your prescriptions)  [x]     Medication Prescription(s) x *** ( [] These have been sent electronically to your pharmacy by your surgeon,   - OR -       your surgeon has already provided these to you during a previous/pre-op office visit)  []     [address]  []     Physical Therapy Prescription  [x]     Follow-up Appointment Cards  []     Surgery-related Pictures/Media  []     Pain block and/or block with On-Q Catheter from Anesthesia Service (information included in your instructions above)  []     Medical device information sheets/pamphlets from their    []     School/work excuse note. []     /parent work excuse note.       The following personal items collected during your admission are returned to you:   Dental Appliance: Dental Appliances: None  Vision: Visual Aid: Glasses, With patient  Hearing Aid:    Jewelry: Jewelry: Ring(2 rings sent to security with cell  phone and glasses)  Clothing: Clothing: Other (comment)(street clothes)  Other Valuables:  Other Valuables: Cell Phone, Eyeglasses  Valuables sent to safe: Personal Items Sent to Safe: 2 rings, cell phone and glasses with case

## 2021-06-04 ENCOUNTER — OFFICE VISIT (OUTPATIENT)
Dept: SURGERY | Age: 46
End: 2021-06-04
Payer: OTHER GOVERNMENT

## 2021-06-04 VITALS — HEIGHT: 64 IN | WEIGHT: 153 LBS | BODY MASS INDEX: 26.12 KG/M2

## 2021-06-04 DIAGNOSIS — Z17.1 MALIGNANT NEOPLASM OF UPPER-OUTER QUADRANT OF LEFT BREAST IN FEMALE, ESTROGEN RECEPTOR NEGATIVE (HCC): Primary | ICD-10-CM

## 2021-06-04 DIAGNOSIS — C50.412 MALIGNANT NEOPLASM OF UPPER-OUTER QUADRANT OF LEFT BREAST IN FEMALE, ESTROGEN RECEPTOR NEGATIVE (HCC): Primary | ICD-10-CM

## 2021-06-04 DIAGNOSIS — Z90.13 S/P MASTECTOMY, BILATERAL: ICD-10-CM

## 2021-06-04 DIAGNOSIS — Z85.3 HISTORY OF BREAST CANCER IN FEMALE: ICD-10-CM

## 2021-06-04 PROCEDURE — 99213 OFFICE O/P EST LOW 20 MIN: CPT | Performed by: NURSE PRACTITIONER

## 2021-06-04 NOTE — PATIENT INSTRUCTIONS

## 2021-06-04 NOTE — PROGRESS NOTES
HISTORY OF PRESENT ILLNESS  Jordan Lopez is a 41 y.o. female. HPI Established patient presents for follow-up to LEFT breast cancer. S/P bilateral mastectomy. Denies breast mass, skin changes and pain.     Breast history -   Referring - self referral  6/10/19 L breast core bx:  IDC, gr 3, 1.1 cm, + LVI, ER negative, LA negative, HER 2 negative at [facility] 1+; DCIS present gr 3, solid with expansile necrosis  7/3/19 BRCA1 pathogenic mutation (invitae), c.5266dup  7/18/19 MRI breast bx:  Negative  Chemo - Carbo/taxol 7/22/19-10/14/19 followed by [facility] [place] 10/28/19-12/17/19 - Dr. Robby Street   2/4/20 right and left nipple bx:  Negative; left ax SLN bx:  0/2 LN  2/18/20 left breast mastectomy:  No invasive ca; residual DCIS, ypTis ypN0 cM0; right breast mastectomy:  Negative  - Dr. Arias Cons  70/4033 - implants replaced and fat grafting - Dr. Nino Freedman history -   6/1/20 BSO negative - Dr. Sophie Mukherjee        Family history -   2 paternal aunts with post-menopausal breast cancer, one aunt with ovarian cancer; that aunt's daughter tested positive for BRCA1 (first cousin); no pancreas cancer, no prostate cancer  Patient - 7/3/19 BRCA1 pathogenic mutation (invitae), c.5266dup      OB History    No obstetric history on file. Obstetric Comments   Menarche 7 or 16, LMP 5/29/29, # of children 3, age of 4st delivery 28, Hysterectomy/oophorectomy No/No, Breast bx Yes, ?  LEFT 9-10 yrs ago, history of breast feeding Yes, BCP No, Hormone therapy No                 Past Surgical History:   Procedure Laterality Date    HX APPENDECTOMY  2011    HX BILATERAL SALPINGO-OOPHORECTOMY Bilateral 06/2020    HX BREAST AUGMENTATION Bilateral 4/7/2021    RECONSTRUCTION REVISION LEFT BREAST IMPLANT EXCHANGE AND SCAR REVISIONS/ RIGHT BREAST IMPLANT EXCHANGE AND POCKET REVISIONS/ FAT GRAFTING TO BILATERAL BREAST performed by Robbie Serrano MD [facility]    HX BREAST RECONSTRUCTION Bilateral 2/18/2020    BILATERAL BREAST DIRECT TO IMPLANT RECONSTRUCTION WITH ALLODERM performed by Renée Blcak MD at [address] HX BREAST RECONSTRUCTION Bilateral 10/15/2020    BILATERAL BREAST RECONSTRUCTION WITH IMPLANT EXCHANGE, POCKET REVISION, FAT GRAFTING TO BILATERAL BREAST performed by Renée Black MD [facility]    HX  SECTION      x3    HX MASTECTOMY Bilateral 2020    BILATERAL BREAST SKIN AND NIPPLE SPARING MASTECTOMIES, PORT A CATH REMOVAL, BILATERAL BREAST DIRECT TO IMPLANT RECONSTRUCTION WITH ALLODERM, AND POSSIBLE TISSUE EXPANDERS performed by Eva Peguero MD at [address] HX VASCULAR ACCESS  2020    HX WISDOM TEETH EXTRACTION      young adult       ROS    Physical Exam  Constitutional:       Appearance: Normal appearance. Chest:      Breasts:         Right: Absent. Left: Absent. Comments: Chest wall s/p bilateral mastectomy with implant reconstruction  Musculoskeletal:      Comments: UE x 2   Lymphadenopathy:      Upper Body:      Right upper body: No supraclavicular or axillary adenopathy. Left upper body: No supraclavicular or axillary adenopathy. Neurological:      Mental Status: She is alert. Psychiatric:         Attention and Perception: Attention normal.         Mood and Affect: Mood normal.         Speech: Speech normal.         Behavior: Behavior normal.         Visit Vitals  Ht 5' 4\" (1.626 m)   Wt 153 lb (69.4 kg)   BMI 26.26 kg/m²         ASSESSMENT and PLAN  Encounter Diagnoses   Name Primary?  Malignant neoplasm of upper-outer quadrant of left breast in female, estrogen receptor negative ([facility] [address] Yes    S/P mastectomy, bilateral     History of breast cancer in female       Normal exam with no evidence of local recurrence. Reviewed s/sx of local recurrence. Continues plastic surgery follow-up with Dr. Clarice Contreras - had surgery in 2021 and is not planning to have anymore at this time. Continues to see Dr. Lion Crandall. Follow-up in 1 year or sooner PRN.  family, so she might be moving before her next appointment. She is comfortable with this plan. All questions answered and she stated understanding. Total time spent for this patient - 20 minutes.

## 2021-06-10 NOTE — OP NOTES
Arterial Line Insertion  Performed by: Herb Dubose MD  Authorized by: Herb Dubose MD   Consent: Verbal consent obtained  Written consent obtained  Risks and benefits: risks, benefits and alternatives were discussed  Patient understanding: patient states understanding of the procedure being performed  Patient consent: the patient's understanding of the procedure matches consent given  Procedure consent: procedure consent matches procedure scheduled  Relevant documents: relevant documents present and verified  Test results: test results available and properly labeled  Site marked: the operative site was not marked  Radiology Images: No Radiology Images displayed or report reviewed  Required items: required blood products, implants, devices, and special equipment available  Patient identity confirmed: arm band  Time out: Immediately prior to procedure a "time out" was called to verify the correct patient, procedure, equipment, support staff and site/side marked as required  Preparation: Patient was prepped and draped in the usual sterile fashion    Indications: multiple ABGs  Orientation:  Right  Location: radial artery  Procedure Details:  Jeff's test normal: no  Needle gauge: 20  Number of attempts: 1    Post-procedure:  Post-procedure: dressing applied  Waveform: good waveform  Post-procedure CNS: normal    Comments: Placed by FITO Hui Jean [facility]sie[facility]  OPERATIVE REPORT    Name:  Deni Weston  MR#:  [id number]  :  1975  ACCOUNT #:  [de-identified]  DATE OF SERVICE:  2020    PREOPERATIVE DIAGNOSIS:  Carcinoma of the left breast status post neoadjuvant chemotherapy. POSTOPERATIVE DIAGNOSIS:  Carcinoma of the left breast status post neoadjuvant chemotherapy. PROCEDURE PERFORMED:  Bilateral surgical delay of the nipple-areolar complex with bilateral nipple biopsies and left sentinel node biopsy. SURGEON:  Jo Garcia MD    ASSISTANT:  Christian Kay    ANESTHESIA:  General.    COMPLICATIONS:  None. SPECIMENS REMOVED:  Bilateral nipple biopsy and left axillary sentinel lymph nodes. IMPLANTS:  None. ESTIMATED BLOOD LOSS:  Minimal.    INDICATIONS:  The patient is a 46-year-old female who has completed neoadjuvant chemotherapy for carcinoma of the left breast who has opted for bilateral mastectomies given her BRCA1 mutation status. She is admitted for bilateral surgical nipple delay in preparation for bilateral skin and nipple-sparing mastectomies. PROCEDURE:  After lymphoscintigraphy in Radiology, the patient was brought into the operating room. After satisfactory induction of general endotracheal anesthesia, she was prepped and draped in sterile fashion. Attention was directed to the right side first where an incision was made in the Inframammary fold. The skin flaps were raised in the mastectomy plane 5 cm circumferentially around the nipple. Posterior nipple biopsy was performed. The wound was made hemostatic, anesthetized with 0.5% Marcaine and closed with interrupted 3-0 Vicryl and a running subcuticular 4-0 Monocryl on the skin. Attention was then turned to the left axillary where an axillary incision was made and deepened through subcutaneous tissues with the Bovie cautery.   Utilizing the Neoprobe, three sentinel nodes were found in the deep axilla and they were removed and sent for permanent section. The axilla was hemostatic, anesthetized with 0.5% Marcaine, and closed with interrupted 3-0 Vicryl and a running subcuticular 4-0 Monocryl on the skin. Attention was then turned to the breast where an incision was made in the inframammary fold and deepened through subcutaneous tissues with the Bovie cautery. Skin flaps were raised in the mastectomy plane 5 cm circumferentially around the nipple. Posterior nipple biopsy was then performed. All dissection planes were made hemostatic. The wound was anesthetized with 0.5% Marcaine and closed with interrupted 3-0 Vicryl and a running subcuticular 4-0 Monocryl on the skin. The patient tolerated the procedure well with no complications. She was taken to the recovery room in stable condition.       Stephane Catalan MD      [id number]/  D:  02/05/2020 10:27  T:  02/05/2020 11:21  JOB #:  [id number]  CC:  MD Nazia Uribe MD Venetia Baptise, MD

## 2022-01-13 DIAGNOSIS — C50.412 MALIGNANT NEOPLASM OF UPPER-OUTER QUADRANT OF LEFT BREAST IN FEMALE, ESTROGEN RECEPTOR NEGATIVE (HCC): Primary | ICD-10-CM

## 2022-01-13 DIAGNOSIS — Z17.1 MALIGNANT NEOPLASM OF UPPER-OUTER QUADRANT OF LEFT BREAST IN FEMALE, ESTROGEN RECEPTOR NEGATIVE (HCC): Primary | ICD-10-CM

## 2022-01-13 DIAGNOSIS — M79.601 RIGHT ARM PAIN: ICD-10-CM

## 2022-01-31 ENCOUNTER — OFFICE VISIT (OUTPATIENT)
Dept: ONCOLOGY | Age: 47
End: 2022-01-31
Payer: OTHER GOVERNMENT

## 2022-01-31 ENCOUNTER — HOSPITAL ENCOUNTER (OUTPATIENT)
Dept: GENERAL RADIOLOGY | Age: 47
Discharge: HOME | End: 2022-01-31
Payer: OTHER GOVERNMENT

## 2022-01-31 VITALS
OXYGEN SATURATION: 98 % | HEART RATE: 72 BPM | TEMPERATURE: 98 F | WEIGHT: 136 LBS | RESPIRATION RATE: 18 BRPM | SYSTOLIC BLOOD PRESSURE: 112 MMHG | DIASTOLIC BLOOD PRESSURE: 79 MMHG | HEIGHT: 64 IN | BODY MASS INDEX: 23.22 KG/M2

## 2022-01-31 DIAGNOSIS — C50.412 MALIGNANT NEOPLASM OF UPPER-OUTER QUADRANT OF LEFT BREAST IN FEMALE, ESTROGEN RECEPTOR NEGATIVE (HCC): Primary | ICD-10-CM

## 2022-01-31 DIAGNOSIS — R93.89 ABNORMAL X-RAY: ICD-10-CM

## 2022-01-31 DIAGNOSIS — Z15.09 BRCA1 GENE MUTATION POSITIVE: ICD-10-CM

## 2022-01-31 DIAGNOSIS — Z15.01 BRCA1 GENE MUTATION POSITIVE: ICD-10-CM

## 2022-01-31 DIAGNOSIS — M79.603 PAIN OF UPPER EXTREMITY, UNSPECIFIED LATERALITY: ICD-10-CM

## 2022-01-31 DIAGNOSIS — Z17.1 MALIGNANT NEOPLASM OF UPPER-OUTER QUADRANT OF LEFT BREAST IN FEMALE, ESTROGEN RECEPTOR NEGATIVE (HCC): ICD-10-CM

## 2022-01-31 DIAGNOSIS — C50.412 MALIGNANT NEOPLASM OF UPPER-OUTER QUADRANT OF LEFT BREAST IN FEMALE, ESTROGEN RECEPTOR NEGATIVE (HCC): ICD-10-CM

## 2022-01-31 DIAGNOSIS — Z17.1 MALIGNANT NEOPLASM OF UPPER-OUTER QUADRANT OF LEFT BREAST IN FEMALE, ESTROGEN RECEPTOR NEGATIVE (HCC): Primary | ICD-10-CM

## 2022-01-31 DIAGNOSIS — M79.601 RIGHT ARM PAIN: ICD-10-CM

## 2022-01-31 PROCEDURE — 73060 X-RAY EXAM OF HUMERUS: CPT

## 2022-01-31 PROCEDURE — 99212 OFFICE O/P EST SF 10 MIN: CPT | Performed by: INTERNAL MEDICINE

## 2022-01-31 NOTE — PROGRESS NOTES
Samara Aranda is a 40 y.o. female Follow up for the evaluation of breast cancer. 1. Have you been to the ER, urgent care clinic since your last visit? Hospitalized since your last visit? No    2. Have you seen or consulted any other health care providers outside of the [address] [place] [address] since your last visit? Include any pap smears or colon screening.  No

## 2022-01-31 NOTE — PROGRESS NOTES
[facility]cer [place] at [place] [address] [facility]  W: [phone]  F: [phone]        Reason for Visit:   Dangelo Akbar is a 40 y.o. female who is seen  for follow up of breast cancer      Breast surgeon:  Dr. Adiel Marti    Treatment History:   · 6/10/19 L breast core bx:  IDC, gr 3, 1.1 cm, + LVI, ER negative, FL negative, HER 2 negative at [facility] 1+; DCIS present gr 3, solid with expansile necrosis  · 7/3/19 BRCA1 pathogenic mutation (invitae), c.5266dup  · 7/18/19 MRI breast bx:  Negative  · Carbo/taxol 7/22/19-10/14/19  · C3d15 delayed one week due to neutropenia    DD [facility] 10/28/19-12/17/19   c1 held one week due to thrombocytopenia  c3 delayed 1 week due to pt trip    2/4/20 right and left nipple bx:  Negative; left ax SLN bx:  0/2 LN  2/18/20 left breast mastectomy:  No invasive ca; residual DCIS, ypTis ypN0 cM0; right breast mastectomy:  Negative    6/1/20 BSO negative    History of Present Illness:   She felt the L breast mass herself in May 2019, with aching pain, leading to the pathology above.     Interval history:  No new complains, states right forearms is achy occasionally    FH:   2 paternal aunts with post-menopausal breast cancer, one aunt with ovarian cancer; that aunt's daughter tested positive for BRCA1 (first cousin); no pancreas cancer, no prostate cancer    Past Medical History:   Diagnosis Date    Breast cancer ([facility] [address] 06/2019    left breast cancer    Nausea and vomiting after administration of anesthetic agent     once pt got home from surgery on day of surgery on 2 of 4 surgeries from 2020      Past Surgical History:   Procedure Laterality Date    HX APPENDECTOMY  2011    HX BILATERAL SALPINGO-OOPHORECTOMY Bilateral 06/2020    HX BREAST AUGMENTATION Bilateral 4/7/2021    RECONSTRUCTION REVISION LEFT BREAST IMPLANT EXCHANGE AND SCAR REVISIONS/ RIGHT BREAST IMPLANT EXCHANGE AND POCKET REVISIONS/ FAT GRAFTING TO BILATERAL BREAST performed by Tigist Dewey MD at [address] HX BREAST RECONSTRUCTION Bilateral 2020    BILATERAL BREAST DIRECT TO IMPLANT RECONSTRUCTION WITH ALLODERM performed by Tigist Dewey MD at [address] HX BREAST RECONSTRUCTION Bilateral 10/15/2020    BILATERAL BREAST RECONSTRUCTION WITH IMPLANT EXCHANGE, POCKET REVISION, FAT GRAFTING TO BILATERAL BREAST performed by Tigist Dewey MD at [address] [facility]    HX  SECTION      x3    HX MASTECTOMY Bilateral 2020    BILATERAL BREAST SKIN AND NIPPLE SPARING MASTECTOMIES, PORT A CATH REMOVAL, BILATERAL BREAST DIRECT TO IMPLANT RECONSTRUCTION WITH ALLODERM, AND POSSIBLE TISSUE EXPANDERS performed by Patricia Kern MD at [address] HX VASCULAR ACCESS  2020    HX WISDOM TEETH EXTRACTION      young adult      Social History     Tobacco Use    Smoking status: Never Smoker    Smokeless tobacco: Never Used   Substance Use Topics    Alcohol use: Yes     Alcohol/week: 0.0 - 1.0 standard drinks     Comment: RARELY      Family History   Problem Relation Age of Onset    Diabetes Mother     Heart Disease Mother     Hypertension Mother     Anesth Problems Mother         PONV    Heart Disease Father     Cancer Paternal Aunt         Breast    Breast Cancer Paternal Aunt     Cancer Paternal Aunt         Breast    Breast Cancer Paternal Aunt     No Known Problems Sister     Hypertension Brother     Diabetes Brother      Current Outpatient Medications   Medication Sig    OTHER 1 Tab. Indications: Pro Resolving Mediators    cholecalciferol, vitamin D3, (VITAMIN D3 PO) Take 1 Tab by mouth every evening.  OTHER 1 Tab every evening. Indications: AlliBiotic CF    acetaminophen (TYLENOL) 500 mg tablet Take 1,000 mg by mouth every six (6) hours as needed for Pain.  aspirin/acetaminophen/caffeine (EXCEDRIN MIGRAINE PO) Take 4 Tabs by mouth as needed for Pain.  MELATONIN PO Take 20 mg by mouth nightly as needed.      No current facility-administered medications for this visit. Allergies   Allergen Reactions    Other Medication Rash and Itching     dermabond skin glue        Review of Systems: A complete review of systems was obtained, negative except as described above. Physical Exam:     Visit Vitals  /79   Pulse 72   Temp 98 °F (36.7 °C) (Temporal)   Resp 18   Ht 5' 4\" (1.626 m)   Wt 136 lb (61.7 kg)   SpO2 98%   BMI 23.34 kg/m²     ECOG PS: 0  General: alert, cooperative, no distress   Mental  status: normal mood, behavior, speech, dress, motor activity, and thought processes, able to follow commands   HENT: NCAT   Neck: no visualized mass   Resp: no respiratory distress   Neuro: no gross deficits   Skin: no discoloration or lesions of concern on visible areas   Psychiatric: normal affect, consistent with stated mood, no evidence of hallucinations               Results:     Lab Results   Component Value Date/Time    WBC 5.6 03/30/2021 12:15 PM    HGB 14.0 03/30/2021 12:15 PM    HCT 41.4 03/30/2021 12:15 PM    PLATELET 165 37/51/6609 12:15 PM    MCV 91.6 03/30/2021 12:15 PM    ABS. NEUTROPHILS 2.9 03/30/2021 12:15 PM     Lab Results   Component Value Date/Time    Sodium 140 03/30/2021 12:15 PM    Potassium 4.0 03/30/2021 12:15 PM    Chloride 106 03/30/2021 12:15 PM    CO2 28 03/30/2021 12:15 PM    Glucose 83 03/30/2021 12:15 PM    BUN 14 03/30/2021 12:15 PM    Creatinine 0.67 03/30/2021 12:15 PM    GFR est AA >60 03/30/2021 12:15 PM    GFR est non-AA >60 03/30/2021 12:15 PM    Calcium 9.1 03/30/2021 12:15 PM     Lab Results   Component Value Date/Time    Bilirubin, total 0.8 03/30/2021 12:15 PM    ALT (SGPT) 21 03/30/2021 12:15 PM    Alk. phosphatase 88 03/30/2021 12:15 PM    Protein, total 7.3 03/30/2021 12:15 PM    Albumin 4.1 03/30/2021 12:15 PM    Globulin 3.2 03/30/2021 12:15 PM       6/13/19 MRI breast  FINDINGS:     Background parenchymal enhancement: Moderate.  Lymph nodes: No lymphadenopathy.     Left breast: Irregular triangular-shaped mass in the posterior third of the left  breast at 2:00 measures 2.8 x 1.8 x 4.2 cm. Posterior margin is 0.3 cm from the  left pectoralis major muscle. Biopsy clip is in the inferior margin of the mass.     In the middle and posterior thirds of the left breast at 5-6:00, segmental non  mass enhancement with heterogeneous kinetics measures 2.6 cm in oblique AP  diameter in the axial plane. Otherwise, there are foci in the left breast.     Right breast: Heterogeneous patchy enhancement in multiple locations. No  suspicious kinetics. Biopsy clip in the middle third is at 3:00. No surrounding  abnormal enhancement or morphology.     IMPRESSION:   1. 2.8 x 1.8 x 4.2 cm biopsy-proven infiltrating ductal carcinoma in the left  breast at 2:00 is in close approximation to the chest wall. 2. Left breast 5-6:00 suspicious segmental non mass enhancement. 3. No MRI evidence of malignancy in the right breast.  4. No lymphadenopathy.     Assessment: ACR BI-RADS category   Left breast: BI-RADS Assessment Category 4: Suspicious abnormality - Biopsy  should be performed in the absence of clinical contraindication. Right breast: BI-RADS Assessment Category 2: Benign finding.     Recommendation: Bilateral mammography if not recently performed elsewhere. Second look ultrasound of the left breast at 5-6:00 to determine  ultrasound-guided or MRI guided biopsy of non mass enhancement.     A negative breast MRI examination speaks strongly against invasive cancer down  to a detection threshold of 3 to 5 mm but may not detect some lower grade or in  situ carcinomas. Therefore, routine clinical and mammographic followup are  recommended.   A summary portfolio has been created in PACS.    7/12/19 TTE EF 65%    10/16/19 Left mammo/US:  ULTRASOUND:  Corresponding with the mammographic density and previously seen  mass is a 1.2 x 1.2 x 1.4 cm hypoechoic mass lesion, which previously measured  1.9 x 2.7 cm on diagnostic MRI. Note is made of an adjacent biopsy marker clip. No other suspicious cystic or solid lesions identified.     IMPRESSION: Response to therapy with apparent reduction in size of left breast  mass compared to prior MRI. BI-RADS Assessment Category 6: Known biopsy proven  malignancy- Appropriate action should be taken.     RECOMMENDATION:  Appropriate clinical management of known left breast  malignancy. Records reviewed and summarized above. Pathology report(s) reviewed above. Radiology report(s) reviewed above. Assessment/plan:   1. L UOQ IDC, gr 3, triple negative:  cT2 cN0 cM0, stage IIA, prognostic stage IIB  ypTis ypN0 cM0    We explained to the patient that the goal of systemic adjuvant therapy is to improve the chances for cure and decrease the risk of relapse. We explained why a patient can have microscopic cancer spread now even though physical examination, laboratory studies and imaging studies are negative for cancer. We explained that the same treatments used now as adjuvant or preventive treatments rarely if ever are curative in women who develop metastases. No residual invasive disease, no indication for adjuvant capecitabine. No need for XRT    Follow-up after early breast cancer was discussed. I recommend follow-up as defined by the American Society of Clinical Oncology and [facility]. This includes a visit to a health care professional every 3-6 months for 3 years, then every 6-12 months for 2 years, and then yearly as well as mammograms yearly. Discussed ASCO guidelines for NOT checking routine tumor markers or staging studies. 2. Emotional well being:  She has excellent support and is coping well with her disease    3.  BRCA1 pathogenic mutation:   Discussed recommendation for bilateral mastectomies and bilateral oophorectomies; risk of ovarian, fallopian or peritoneal cancer is 16-59%; risk for pancreatic cancer is 1-3%; risk for contralateral breast cancer is 10-15%    S/p bilateral mastectomies    Has seen Dr. Alicia Rangel, s/p bilateral oophorectomies 6/1/20, negative    Will refer to GI to gene for pancreatic cancer screening    Recommend her seeing dermatology once a year        Signed By: Alan Mueller MD      No orders of the defined types were placed in this encounter.

## 2022-02-03 ENCOUNTER — TELEPHONE (OUTPATIENT)
Dept: ONCOLOGY | Age: 47
End: 2022-02-03

## 2022-02-04 ENCOUNTER — TRANSCRIBE ORDER (OUTPATIENT)
Dept: SCHEDULING | Age: 47
End: 2022-02-04

## 2022-02-04 DIAGNOSIS — M79.601 RIGHT ARM PAIN: ICD-10-CM

## 2022-02-04 DIAGNOSIS — C50.412 MALIGNANT NEOPLASM OF UPPER-OUTER QUADRANT OF LEFT BREAST IN FEMALE, ESTROGEN RECEPTOR NEGATIVE (HCC): Primary | ICD-10-CM

## 2022-02-04 DIAGNOSIS — Z17.1 MALIGNANT NEOPLASM OF UPPER-OUTER QUADRANT OF LEFT BREAST IN FEMALE, ESTROGEN RECEPTOR NEGATIVE (HCC): Primary | ICD-10-CM

## 2022-02-04 DIAGNOSIS — R93.7 ABNORMAL X-RAY OF BONE: ICD-10-CM

## 2022-02-10 ENCOUNTER — HOSPITAL ENCOUNTER (OUTPATIENT)
Dept: MRI IMAGING | Age: 47
Discharge: HOME | End: 2022-02-10
Attending: NURSE PRACTITIONER
Payer: OTHER GOVERNMENT

## 2022-02-10 ENCOUNTER — HOSPITAL ENCOUNTER (OUTPATIENT)
Dept: NUCLEAR MEDICINE | Age: 47
Discharge: HOME | End: 2022-02-10
Attending: NURSE PRACTITIONER
Payer: OTHER GOVERNMENT

## 2022-02-10 DIAGNOSIS — M79.601 RIGHT ARM PAIN: ICD-10-CM

## 2022-02-10 DIAGNOSIS — C50.412 MALIGNANT NEOPLASM OF UPPER-OUTER QUADRANT OF LEFT BREAST IN FEMALE, ESTROGEN RECEPTOR NEGATIVE (HCC): ICD-10-CM

## 2022-02-10 DIAGNOSIS — Z17.1 MALIGNANT NEOPLASM OF UPPER-OUTER QUADRANT OF LEFT BREAST IN FEMALE, ESTROGEN RECEPTOR NEGATIVE (HCC): ICD-10-CM

## 2022-02-10 DIAGNOSIS — R93.7 ABNORMAL X-RAY OF BONE: ICD-10-CM

## 2022-02-10 DIAGNOSIS — M79.603 PAIN OF UPPER EXTREMITY, UNSPECIFIED LATERALITY: ICD-10-CM

## 2022-02-10 DIAGNOSIS — R93.89 ABNORMAL X-RAY: ICD-10-CM

## 2022-02-10 PROCEDURE — 73220 MRI UPPR EXTREMITY W/O&W/DYE: CPT

## 2022-02-10 PROCEDURE — A9576 INJ PROHANCE MULTIPACK: HCPCS

## 2022-02-10 PROCEDURE — 74011250636 HC RX REV CODE- 250/636

## 2022-02-10 PROCEDURE — 74011000250 HC RX REV CODE- 250: Performed by: NURSE PRACTITIONER

## 2022-02-10 PROCEDURE — 78306 BONE IMAGING WHOLE BODY: CPT

## 2022-02-10 RX ORDER — SODIUM CHLORIDE 0.9 % (FLUSH) 0.9 %
10 SYRINGE (ML) INJECTION
Status: COMPLETED | OUTPATIENT
Start: 2022-02-10 | End: 2022-02-10

## 2022-02-10 RX ADMIN — SODIUM CHLORIDE, PRESERVATIVE FREE 10 ML: 5 INJECTION INTRAVENOUS at 15:06

## 2022-02-10 RX ADMIN — GADOTERIDOL 10 ML: 279.3 INJECTION, SOLUTION INTRAVENOUS at 15:07

## 2022-02-10 RX ADMIN — SODIUM CHLORIDE, PRESERVATIVE FREE 10 ML: 5 INJECTION INTRAVENOUS at 15:07

## 2022-02-11 ENCOUNTER — PATIENT MESSAGE (OUTPATIENT)
Dept: ONCOLOGY | Age: 47
End: 2022-02-11

## 2022-02-11 DIAGNOSIS — C50.412 MALIGNANT NEOPLASM OF UPPER-OUTER QUADRANT OF LEFT BREAST IN FEMALE, ESTROGEN RECEPTOR NEGATIVE (HCC): Primary | ICD-10-CM

## 2022-02-11 DIAGNOSIS — Z17.1 MALIGNANT NEOPLASM OF UPPER-OUTER QUADRANT OF LEFT BREAST IN FEMALE, ESTROGEN RECEPTOR NEGATIVE (HCC): Primary | ICD-10-CM

## 2022-02-11 DIAGNOSIS — Z15.01 BRCA1 GENE MUTATION POSITIVE: ICD-10-CM

## 2022-02-11 DIAGNOSIS — Z15.09 BRCA1 GENE MUTATION POSITIVE: ICD-10-CM

## 2022-03-18 PROBLEM — Z17.1 MALIGNANT NEOPLASM OF UPPER-OUTER QUADRANT OF LEFT BREAST IN FEMALE, ESTROGEN RECEPTOR NEGATIVE (HCC): Status: ACTIVE | Noted: 2019-07-05

## 2022-03-18 PROBLEM — C50.412 MALIGNANT NEOPLASM OF UPPER-OUTER QUADRANT OF LEFT BREAST IN FEMALE, ESTROGEN RECEPTOR NEGATIVE (HCC): Status: ACTIVE | Noted: 2019-07-05

## 2022-03-19 PROBLEM — Z98.890 S/P BREAST RECONSTRUCTION: Status: ACTIVE | Noted: 2020-02-18

## 2022-04-14 ENCOUNTER — TRANSCRIBE ORDER (OUTPATIENT)
Dept: SCHEDULING | Age: 47
End: 2022-04-14

## 2022-04-14 DIAGNOSIS — Z15.09 BRCA1 POSITIVE: Primary | ICD-10-CM

## 2022-04-14 DIAGNOSIS — Z12.11 SCREEN FOR COLON CANCER: ICD-10-CM

## 2022-04-14 DIAGNOSIS — Z15.01 BRCA1 POSITIVE: Primary | ICD-10-CM

## 2022-04-28 ENCOUNTER — HOSPITAL ENCOUNTER (OUTPATIENT)
Dept: MRI IMAGING | Age: 47
Discharge: HOME | End: 2022-04-28
Attending: PHYSICIAN ASSISTANT
Payer: OTHER GOVERNMENT

## 2022-04-28 VITALS — BODY MASS INDEX: 23.34 KG/M2 | WEIGHT: 136 LBS

## 2022-04-28 DIAGNOSIS — Z12.11 SCREEN FOR COLON CANCER: ICD-10-CM

## 2022-04-28 DIAGNOSIS — Z15.09 BRCA1 POSITIVE: ICD-10-CM

## 2022-04-28 DIAGNOSIS — Z15.01 BRCA1 POSITIVE: ICD-10-CM

## 2022-04-28 PROCEDURE — 77030021566

## 2022-04-28 PROCEDURE — 74183 MRI ABD W/O CNTR FLWD CNTR: CPT

## 2022-04-28 PROCEDURE — A9575 INJ GADOTERATE MEGLUMI 0.1ML: HCPCS | Performed by: RADIOLOGY

## 2022-04-28 PROCEDURE — 74011250636 HC RX REV CODE- 250/636: Performed by: RADIOLOGY

## 2022-04-28 RX ORDER — GADOTERATE MEGLUMINE 376.9 MG/ML
12 INJECTION INTRAVENOUS
Status: COMPLETED | OUTPATIENT
Start: 2022-04-28 | End: 2022-04-28

## 2022-04-28 RX ADMIN — GADOTERATE MEGLUMINE 12 ML: 376.9 INJECTION, SOLUTION INTRAVENOUS at 19:00

## 2022-08-10 NOTE — PROGRESS NOTES
[address]  Endoscopy Preprocedure Instructions      1. On the day of your surgery, please report to registration located on the 2nd floor of the  [facility]. yes    2. You must have a responsible adult to drive you to the hospital, stay at the hospital during your procedure and drive you home. If they leave your procedure will not be started (no exceptions). yes    3. Do not have anything to eat or drink (including water, gum, mints, coffee, and juice) after midnight. This does not apply to the medications you were instructed to take by your physician. yes  If you are currently taking Plavix, Coumadin, Aspirin, or other blood-thinning agents, contact your physician for special instructions. not applicable,    4. If you are having a procedure that requires bowel prep: We recommend that you have only clear liquids the day before your procedure and begin your bowel prep by 5:00 pm.  You may continue to drink clear liquids until midnight. If for any reason you are not able to complete your prep please notify your physician immediately. yes    5. Have a list of all current medications, including vitamins, herbal supplements and any other over the counter medications. yes    6. If you wear glasses, contacts, dentures and/or hearing aids, they may be removed prior to procedure, please bring a case to store them in. yes    7. You should understand that if you do not follow these instructions your procedure may be cancelled. If your physical condition changes (I.e. fever, cold or flu) please contact your doctor as soon as possible. 8. It is important that you be on time. If for any reason you are unable to keep your appointment please call [phone] the day of or your physicians office prior to your scheduled procedure    9.  Have you received your COVID Vaccine? not applicable If no, you will need to receive a COVID test/swab here at [address] [place] [address] the [facility] parking lot Monday - Friday 8a - 11am. There are no Saturday or Sunday swabbing at any [facility]. (patient verbalizes understanding) not applicable

## 2022-08-11 ENCOUNTER — HOSPITAL ENCOUNTER (OUTPATIENT)
Age: 47
Setting detail: OUTPATIENT SURGERY
Discharge: HOME | End: 2022-08-11
Attending: INTERNAL MEDICINE | Admitting: INTERNAL MEDICINE
Payer: OTHER GOVERNMENT

## 2022-08-11 ENCOUNTER — ANESTHESIA EVENT (OUTPATIENT)
Dept: ENDOSCOPY | Age: 47
End: 2022-08-11
Payer: OTHER GOVERNMENT

## 2022-08-11 ENCOUNTER — ANESTHESIA (OUTPATIENT)
Dept: ENDOSCOPY | Age: 47
End: 2022-08-11
Payer: OTHER GOVERNMENT

## 2022-08-11 VITALS
DIASTOLIC BLOOD PRESSURE: 81 MMHG | WEIGHT: 136.69 LBS | HEIGHT: 63 IN | BODY MASS INDEX: 24.22 KG/M2 | OXYGEN SATURATION: 100 % | TEMPERATURE: 98.1 F | HEART RATE: 74 BPM | RESPIRATION RATE: 22 BRPM | SYSTOLIC BLOOD PRESSURE: 159 MMHG

## 2022-08-11 PROCEDURE — 2709999900 HC NON-CHARGEABLE SUPPLY: Performed by: INTERNAL MEDICINE

## 2022-08-11 PROCEDURE — 76040000019: Performed by: INTERNAL MEDICINE

## 2022-08-11 PROCEDURE — 74011250636 HC RX REV CODE- 250/636: Performed by: NURSE ANESTHETIST, CERTIFIED REGISTERED

## 2022-08-11 PROCEDURE — 74011000250 HC RX REV CODE- 250: Performed by: NURSE ANESTHETIST, CERTIFIED REGISTERED

## 2022-08-11 PROCEDURE — 76060000031 HC ANESTHESIA FIRST 0.5 HR: Performed by: INTERNAL MEDICINE

## 2022-08-11 RX ORDER — NALOXONE HYDROCHLORIDE 0.4 MG/ML
0.4 INJECTION, SOLUTION INTRAMUSCULAR; INTRAVENOUS; SUBCUTANEOUS
Status: DISCONTINUED | OUTPATIENT
Start: 2022-08-11 | End: 2022-08-11 | Stop reason: HOSPADM

## 2022-08-11 RX ORDER — PROPOFOL 10 MG/ML
INJECTION, EMULSION INTRAVENOUS
Status: DISCONTINUED | OUTPATIENT
Start: 2022-08-11 | End: 2022-08-11 | Stop reason: HOSPADM

## 2022-08-11 RX ORDER — EPINEPHRINE 0.1 MG/ML
1 INJECTION INTRACARDIAC; INTRAVENOUS
Status: DISCONTINUED | OUTPATIENT
Start: 2022-08-11 | End: 2022-08-11 | Stop reason: HOSPADM

## 2022-08-11 RX ORDER — MIDAZOLAM HYDROCHLORIDE 1 MG/ML
.25-5 INJECTION, SOLUTION INTRAMUSCULAR; INTRAVENOUS
Status: DISCONTINUED | OUTPATIENT
Start: 2022-08-11 | End: 2022-08-11 | Stop reason: HOSPADM

## 2022-08-11 RX ORDER — FLUMAZENIL 0.1 MG/ML
0.2 INJECTION INTRAVENOUS
Status: DISCONTINUED | OUTPATIENT
Start: 2022-08-11 | End: 2022-08-11 | Stop reason: HOSPADM

## 2022-08-11 RX ORDER — ATROPINE SULFATE 0.1 MG/ML
0.4 INJECTION INTRAVENOUS
Status: DISCONTINUED | OUTPATIENT
Start: 2022-08-11 | End: 2022-08-11 | Stop reason: HOSPADM

## 2022-08-11 RX ORDER — SODIUM CHLORIDE 9 MG/ML
INJECTION, SOLUTION INTRAVENOUS
Status: DISCONTINUED | OUTPATIENT
Start: 2022-08-11 | End: 2022-08-11 | Stop reason: HOSPADM

## 2022-08-11 RX ORDER — SODIUM CHLORIDE 9 MG/ML
50 INJECTION, SOLUTION INTRAVENOUS CONTINUOUS
Status: DISCONTINUED | OUTPATIENT
Start: 2022-08-11 | End: 2022-08-11 | Stop reason: HOSPADM

## 2022-08-11 RX ORDER — LIDOCAINE HYDROCHLORIDE 20 MG/ML
INJECTION, SOLUTION EPIDURAL; INFILTRATION; INTRACAUDAL; PERINEURAL AS NEEDED
Status: DISCONTINUED | OUTPATIENT
Start: 2022-08-11 | End: 2022-08-11 | Stop reason: HOSPADM

## 2022-08-11 RX ORDER — DEXTROMETHORPHAN/PSEUDOEPHED 2.5-7.5/.8
1.2 DROPS ORAL
Status: DISCONTINUED | OUTPATIENT
Start: 2022-08-11 | End: 2022-08-11 | Stop reason: HOSPADM

## 2022-08-11 RX ADMIN — LIDOCAINE HYDROCHLORIDE 100 MG: 20 INJECTION, SOLUTION EPIDURAL; INFILTRATION; INTRACAUDAL; PERINEURAL at 12:25

## 2022-08-11 RX ADMIN — SODIUM CHLORIDE: 9 INJECTION, SOLUTION INTRAVENOUS at 12:22

## 2022-08-11 RX ADMIN — PROPOFOL 150 MCG/KG/MIN: 10 INJECTION, EMULSION INTRAVENOUS at 12:25

## 2022-08-11 NOTE — ANESTHESIA PREPROCEDURE EVALUATION
Relevant Problems   PERSONAL HX & FAMILY HX OF CANCER   (+) Malignant neoplasm of upper-outer quadrant of left breast in female, estrogen receptor negative (HCC)       Anesthetic History     PONV          Review of Systems / Medical History  Patient summary reviewed    Pulmonary  Within defined limits                 Neuro/Psych   Within defined limits           Cardiovascular  Within defined limits                Exercise tolerance: >4 METS     GI/Hepatic/Renal  Within defined limits              Endo/Other        Cancer    Comments: Left breast cancer Other Findings   Comments: Breast cancer in remission           Physical Exam    Airway  Mallampati: I    Neck ROM: normal range of motion   Mouth opening: Normal     Cardiovascular    Rhythm: regular  Rate: normal         Dental  No notable dental hx       Pulmonary  Breath sounds clear to auscultation               Abdominal  GI exam deferred       Other Findings            Anesthetic Plan    ASA: 2  Anesthesia type: MAC          Induction: Intravenous  Anesthetic plan and risks discussed with: Patient

## 2022-08-11 NOTE — PROGRESS NOTES
Katlin Hays  1975  [id number]    Situation:  Verbal report received from: [facility]  Procedure: Procedure(s):  COLONOSCOPY    Background:    Preoperative diagnosis: SCREENING  Postoperative diagnosis: Normal Colonoscopy    :  Dr. Sara Mixon  Assistant(s): Endoscopy Technician-1: Winifred Bryan  Endoscopy RN-1: aJneth Rubin RN    Specimens: * No specimens in log *  H. Pylori  no    Assessment:  Intra-procedure medications       Anesthesia gave intra-procedure sedation and medications, see anesthesia flow sheet yes    Intravenous fluids: NS@ KVO     Vital signs stable yes    Abdominal assessment: round and soft yes    Recommendation:  Discharge patient per MD order yes.   Return to floor na  Family or Friend friend  Permission to share finding with family or friend yes

## 2022-08-11 NOTE — PROCEDURES
Tamiko Meza M.D.  [phone]            2022          Colonoscopy Operative Report  Ethel Boudreaux  :  1975  [facility] Medical Record Number:  [id number]      Indications:    Screening colonoscopy     :  Malik Hernandez MD    Assistant -- None  Implants -- None    Referring Provider: Lola Saez MD    Sedation:  MAC anesthesia Propofol    Pre-Procedural Exam:      Airway: clear,  No airway problems anticipated  Heart: RRR, without gallops or rubs  Lungs: clear bilaterally without wheezes, crackles, or rhonchi  Abdomen: soft, nontender, nondistended, bowel sounds present  Mental Status: awake, alert and oriented to person, place and time     Procedure Details:  After informed consent was obtained with all risks and benefits of procedure explained and preoperative exam completed, the patient was taken to the endoscopy suite and placed in the left lateral decubitus position. Upon sequential sedation as per above, a digital rectal exam was performed. The Olympus videocolonoscope  was inserted in the rectum and carefully advanced to the terminal ileum. The quality of preparation was good. The colonoscope was slowly withdrawn with careful inspection and evaluation between folds. Retroflexion in the rectum was performed. Findings:   Terminal Ileum: normal  Cecum: normal  Ascending Colon: normal  Transverse Colon: normal  Descending Colon: normal  Sigmoid: normal  Rectum: normal    Interventions:  none    Specimen Removed:  none    Complications: None. EBL:  None. Impression:  normal exam    Recommendations:  -Repeat colonoscopy in 10 years      Discharge Disposition:  Home in the company of a  when able to ambulate.     Malik Hernandez MD  2022  12:37 PM

## 2022-08-11 NOTE — H&P
Shannan Perez M.D.  [phone]            History and Physical       NAME:  Humaira Ornelas   :   1975   MRN:   [id number]       Referring Physician:  Flower Parker MD      Consult Date: 2022 10:27 AM    Chief Complaint:  Colon cancer screening    History of Present Illness:  Patient is a 40 y.o. who is seen for colon cancer screening. Denies any ongoing GI complaints. PMH:  Past Medical History:   Diagnosis Date    Breast cancer ([address] 2019    left breast cancer    Nausea and vomiting after administration of anesthetic agent     once pt got home from surgery on day of surgery on 2 of 4 surgeries from        PSH:  Past Surgical History:   Procedure Laterality Date    HX APPENDECTOMY      HX BILATERAL SALPINGO-OOPHORECTOMY Bilateral 2020    HX BREAST AUGMENTATION Bilateral 2021    RECONSTRUCTION REVISION LEFT BREAST IMPLANT EXCHANGE AND SCAR REVISIONS/ RIGHT BREAST IMPLANT EXCHANGE AND POCKET REVISIONS/ FAT GRAFTING TO BILATERAL BREAST performed by Yesenia Acuna MD at [facility] MAIN OR    HX BREAST RECONSTRUCTION Bilateral 2020    BILATERAL BREAST DIRECT TO IMPLANT RECONSTRUCTION WITH ALLODERM performed by Yesenia Acuna MD at [facility]    HX BREAST RECONSTRUCTION Bilateral 10/15/2020    BILATERAL BREAST RECONSTRUCTION WITH IMPLANT EXCHANGE, POCKET REVISION, FAT GRAFTING TO BILATERAL BREAST performed by Yesenia Acuna MD at [facility] MAIN OR    HX  SECTION      x3    HX MASTECTOMY Bilateral 2020    BILATERAL BREAST SKIN AND NIPPLE SPARING MASTECTOMIES, PORT A CATH REMOVAL, BILATERAL BREAST DIRECT TO IMPLANT RECONSTRUCTION WITH ALLODERM, AND POSSIBLE TISSUE EXPANDERS performed by Viktor Preston MD at [facility]    HX VASCULAR ACCESS  2020    HX WISDOM TEETH EXTRACTION      young adult       Allergies:   Allergies   Allergen Reactions    Other Medication Rash and Itching     dermabond skin glue       Home Medications:  Prior to Admission Medications   Prescriptions Last Dose Informant Patient Reported? Taking? MELATONIN PO Not Taking  Yes No   Sig: Take 20 mg by mouth nightly as needed. Patient not taking: Reported on 8/10/2022   OTHER   Yes Yes   Si Tab. Indications: Pro Resolving Mediators   OTHER   Yes Yes   Si Tab every evening. Indications: AlliBiotic CF   acetaminophen (TYLENOL) 500 mg tablet   Yes Yes   Sig: Take 1,000 mg by mouth every six (6) hours as needed for Pain. aspirin/acetaminophen/caffeine (EXCEDRIN MIGRAINE PO)   Yes Yes   Sig: Take 4 Tabs by mouth as needed for Pain. cholecalciferol, vitamin D3, (VITAMIN D3 PO)   Yes No   Sig: Take 1 Tab by mouth every evening. Facility-Administered Medications: None       Hospital Medications:  No current facility-administered medications for this encounter. Social History:  Social History     Tobacco Use    Smoking status: Never    Smokeless tobacco: Never   Substance Use Topics    Alcohol use:  Yes     Alcohol/week: 0.0 - 1.0 standard drinks     Comment: RARELY       Family History:  Family History   Problem Relation Age of Onset    Diabetes Mother     Heart Disease Mother     Hypertension Mother     Anesth Problems Mother         PONV    Heart Disease Father     Cancer Paternal Aunt         Breast    Breast Cancer Paternal Aunt     Cancer Paternal Aunt         Breast    Breast Cancer Paternal Aunt     No Known Problems Sister     Hypertension Brother     Diabetes Brother              Review of Systems:      Constitutional: negative fever, negative chills, negative weight loss  Eyes:   negative visual changes  ENT:   negative sore throat, tongue or lip swelling  Respiratory:  negative cough, negative dyspnea  Cards:  negative for chest pain, palpitations, lower extremity edema  GI:   See HPI  :  negative for frequency, dysuria  Integument:  negative for rash and pruritus  Heme:  negative for easy bruising and gum/nose bleeding  Musculoskel: negative for myalgias,  back pain and muscle weakness  Neuro: negative for headaches, dizziness, vertigo  Psych:  negative for feelings of anxiety, depression       Objective:   No data found. No intake/output data recorded. No intake/output data recorded. EXAM:     NEURO-a&o   HEENT-wnl   LUNGS-clear    COR-regular rate and rhythym     ABD-soft , no tenderness, no rebound, good bs     EXT-no edema     Data Review     No results for input(s): WBC, HGB, HCT, PLT, HGBEXT, HCTEXT, PLTEXT in the last 72 hours. No results for input(s): NA, K, CL, CO2, BUN, CREA, GLU, PHOS, CA in the last 72 hours. No results for input(s): AP, TBIL, TP, ALB, GLOB, GGT, AML, LPSE in the last 72 hours. No lab exists for component: SGOT, GPT, AMYP, HLPSE  No results for input(s): INR, PTP, APTT, INREXT in the last 72 hours. Patient Active Problem List   Diagnosis Code    Malignant neoplasm of upper-outer quadrant of left breast in female, estrogen receptor negative ([facility] C50.412, Z17.1    S/P breast reconstruction Z98.890      Assessment:     Colon cancer screening   Plan:     Colonoscopy today.      Signed By: Cha Olivas MD     8/11/2022  10:27 AM

## 2022-08-11 NOTE — DISCHARGE INSTRUCTIONS
[facility]. Dominga Lima M.D.  [phone]          COLON DISCHARGE INSTRUCTIONS       2022    Todd Martin  :  1975  Carleen Medical Record Number:  [id number]      COLONOSCOPY FINDINGS:  Your colonoscopy showed: normal exam.    DISCOMFORT:  Redness at IV site- apply warm compress to area; if redness or soreness persist- contact your physician  There may be a slight amount of blood passed from the rectum  Gaseous discomfort- walking, belching will help relieve any discomfort  You may not operate a vehicle for 12 hours  You may not engage in an occupation involving machinery or appliances for rest of today  You may not drink alcoholic beverages for at least 12 hours  Avoid making any critical decisions for at least 24 hour  DIET:   High fiber diet. - however -  remember your colon is empty and a heavy meal will produce gas. Avoid these foods:  vegetables, fried / greasy foods, carbonated drinks for today     ACTIVITY:  You may resume your normal daily activities it is recommended that you spend the remainder of the day resting -  avoid any strenuous activity. CALL M.DOrtega ANY SIGN OF:   Increasing pain, nausea, vomiting  Abdominal distension (swelling)  New increased bleeding (oral or rectal)  Fever (chills)  Pain in chest area  Bloody discharge from nose or mouth   Shortness of breath    Follow-up Instructions:   Call Dr. Violeta Piña if any questions or problems. Telephone # [phone]  Biopsy results will be available in  5 to 7 days  Should have a repeat colonoscopy in 10 years.

## 2022-08-11 NOTE — PROGRESS NOTES
Endoscopy discharge instructions have been reviewed and given to patient. The patient verbalized understanding and acceptance of instructions. Dr. Gideon Lantigua discussed with patient procedure findings and next steps.

## 2022-08-11 NOTE — PERIOP NOTES
Report from Nicole, CRNA, see anesthesia record. ABD remains soft and non-tender post procedure. Pt has no complaints at this time and tolerated the procedure well. Endoscope was pre-cleaned at bedside immediately following procedure by Patricia Swan.

## 2022-08-11 NOTE — ANESTHESIA POSTPROCEDURE EVALUATION
Procedure(s):  COLONOSCOPY. MAC    Anesthesia Post Evaluation      Multimodal analgesia: multimodal analgesia not used between 6 hours prior to anesthesia start to PACU discharge  Patient location during evaluation: PACU  Patient participation: complete - patient participated  Level of consciousness: awake  Pain management: adequate  Airway patency: patent  Anesthetic complications: no  Cardiovascular status: acceptable, blood pressure returned to baseline and hemodynamically stable  Respiratory status: acceptable  Hydration status: acceptable  Post anesthesia nausea and vomiting:  controlled  Final Post Anesthesia Temperature Assessment:  Normothermia (36.0-37.5 degrees C)      INITIAL Post-op Vital signs:   Vitals Value Taken Time   /81 08/11/22 1302   Temp 36.7 °C (98.1 °F) 08/11/22 1242   Pulse 77 08/11/22 1306   Resp 20 08/11/22 1306   SpO2 100 % 08/11/22 1306   Vitals shown include unvalidated device data.

## 2023-01-03 ENCOUNTER — TELEPHONE (OUTPATIENT)
Dept: ONCOLOGY | Age: 48
End: 2023-01-03

## 2023-01-03 NOTE — TELEPHONE ENCOUNTER
Patient called and stated that she is moving at the end of this month and would like to be seen sooner.   #[phone]

## 2023-01-24 ENCOUNTER — OFFICE VISIT (OUTPATIENT)
Dept: ONCOLOGY | Age: 48
End: 2023-01-24
Payer: OTHER GOVERNMENT

## 2023-01-24 VITALS
BODY MASS INDEX: 26.58 KG/M2 | SYSTOLIC BLOOD PRESSURE: 136 MMHG | DIASTOLIC BLOOD PRESSURE: 79 MMHG | OXYGEN SATURATION: 99 % | HEIGHT: 63 IN | HEART RATE: 86 BPM | RESPIRATION RATE: 19 BRPM | WEIGHT: 150 LBS | TEMPERATURE: 98 F

## 2023-01-24 DIAGNOSIS — Z17.1 MALIGNANT NEOPLASM OF UPPER-OUTER QUADRANT OF LEFT BREAST IN FEMALE, ESTROGEN RECEPTOR NEGATIVE (HCC): Primary | ICD-10-CM

## 2023-01-24 DIAGNOSIS — C50.412 MALIGNANT NEOPLASM OF UPPER-OUTER QUADRANT OF LEFT BREAST IN FEMALE, ESTROGEN RECEPTOR NEGATIVE (HCC): Primary | ICD-10-CM

## 2023-01-24 PROCEDURE — 99212 OFFICE O/P EST SF 10 MIN: CPT | Performed by: INTERNAL MEDICINE

## 2023-01-24 NOTE — PROGRESS NOTES
Identified pt with two pt identifiers(name and ). Reviewed record in preparation for visit and have obtained necessary documentation. Chief Complaint   Patient presents with    Breast Cancer    Follow-up      Vitals:    23 1429   BP: 136/79   Pulse: 86   Resp: 19   Temp: 98 °F (36.7 °C)   TempSrc: Oral   SpO2: 99%   Weight: 150 lb (68 kg)   Height: 5' 3\" (1.6 m)   PainSc:   0 - No pain       Health Maintenance Review: Patient reminded of \"due or due soon\" health maintenance. I have asked the patient to contact his/her primary care provider (PCP) for follow-up on his/her health maintenance. There is no immunization history on file for this patient. Current Outpatient Medications   Medication Instructions    acetaminophen (TYLENOL) 1,000 mg, Oral, EVERY 6 HOURS AS NEEDED    aspirin/acetaminophen/caffeine (EXCEDRIN MIGRAINE PO) 4 Tablets, Oral, AS NEEDED    cholecalciferol, vitamin D3, (VITAMIN D3 PO) 1 Tablet, Oral, EVERY EVENING    MELATONIN PO 20 mg, Oral, BEDTIME PRN    OTHER 1 Tab. Indications: Pro Resolving Mediators    OTHER 1 Tablet, EVERY EVENING       Allergies   Allergen Reactions    Other Medication Rash and Itching     dermabond skin glue         There is no immunization history on file for this patient. Past Medical History:   Diagnosis Date    Breast cancer ([facility] 2019    left breast cancer    Nausea and vomiting after administration of anesthetic agent     once pt got home from surgery on day of surgery on 2 of 4 surgeries from            1. \"Have you been to the ER, urgent care clinic since your last visit? Hospitalized since your last visit? \" No    2. \"Have you seen or consulted any other health care providers outside of the [address] [place] [address] since your last visit? \" No

## 2023-01-24 NOTE — PROGRESS NOTES
[facility] [place] at [facility]  [address] [facility], [address]  W: [phone]  F: [phone]        Reason for Visit:   Anali Peñaloza is a 56 y.o. female who is seen  for follow up of breast cancer      Breast surgeon:  Dr. Ulises Jiménez    Treatment History:   6/10/19 L breast core bx:  IDC, gr 3, 1.1 cm, + LVI, ER negative, DE negative, HER 2 negative at [facility] 1+; DCIS present gr 3, solid with expansile necrosis  7/3/19 BRCA1 pathogenic mutation (invitae), c.5266dup  7/18/19 MRI breast bx:  Negative  Carbo/taxol 7/22/19-10/14/19  C3d15 delayed one week due to neutropenia    [facility] 10/28/19-12/17/19   c1 held one week due to thrombocytopenia  c3 delayed 1 week due to pt trip    2/4/20 right and left nipple bx:  Negative; left ax SLN bx:  0/2 LN  2/18/20 left breast mastectomy:  No invasive ca; residual DCIS, ypTis ypN0 cM0; right breast mastectomy:  Negative    6/1/20 BSO negative    History of Present Illness:   She felt the L breast mass herself in May 2019, with aching pain, leading to the pathology above. Interval history:  Patient presents today for follow up. Reports no complaints.      FH:   2 paternal aunts with post-menopausal breast cancer, one aunt with ovarian cancer; that aunt's daughter tested positive for BRCA1 (first cousin); no pancreas cancer, no prostate cancer    Past Medical History:   Diagnosis Date    Breast cancer ([address] 06/2019    left breast cancer    Nausea and vomiting after administration of anesthetic agent     once pt got home from surgery on day of surgery on 2 of 4 surgeries from 2020      Past Surgical History:   Procedure Laterality Date    COLONOSCOPY N/A 8/11/2022    COLONOSCOPY performed by Dimple Green MD at [facility]  2011    HX BILATERAL SALPINGO-OOPHORECTOMY Bilateral 06/2020    HX BREAST AUGMENTATION Bilateral 4/7/2021    RECONSTRUCTION REVISION LEFT BREAST IMPLANT EXCHANGE AND SCAR REVISIONS/ RIGHT BREAST IMPLANT EXCHANGE AND POCKET REVISIONS/ FAT GRAFTING TO BILATERAL BREAST performed by Christiane Bey MD at [facility]    HX BREAST RECONSTRUCTION Bilateral 2020    BILATERAL BREAST DIRECT TO IMPLANT RECONSTRUCTION WITH ALLODERM performed by Christiane Bey MD at [facility]    HX BREAST RECONSTRUCTION Bilateral 10/15/2020    BILATERAL BREAST RECONSTRUCTION WITH IMPLANT EXCHANGE, POCKET REVISION, FAT GRAFTING TO BILATERAL BREAST performed by Christiane Bey MD at [facility]    HX  SECTION      x3    HX MASTECTOMY Bilateral 2020    BILATERAL BREAST SKIN AND NIPPLE SPARING MASTECTOMIES, PORT A CATH REMOVAL, BILATERAL BREAST DIRECT TO IMPLANT RECONSTRUCTION WITH ALLODERM, AND POSSIBLE TISSUE EXPANDERS performed by Maximino Madera MD at [facility]    HX VASCULAR ACCESS  2020    HX WISDOM TEETH EXTRACTION      young adult      Social History     Tobacco Use    Smoking status: Never     Passive exposure: Never    Smokeless tobacco: Never   Substance Use Topics    Alcohol use: Yes     Alcohol/week: 0.0 - 1.0 standard drinks     Comment: RARELY      Family History   Problem Relation Age of Onset    Diabetes Mother     Heart Disease Mother     Hypertension Mother     Anesth Problems Mother         PONV    Heart Disease Father     Cancer Paternal Aunt         Breast    Breast Cancer Paternal Aunt     Cancer Paternal Aunt         Breast    Breast Cancer Paternal Aunt     No Known Problems Sister     Hypertension Brother     Diabetes Brother      Current Outpatient Medications   Medication Sig    OTHER 1 Tab. Indications: Pro Resolving Mediators    cholecalciferol, vitamin D3, (VITAMIN D3 PO) Take 1 Tab by mouth every evening. OTHER 1 Tab every evening. Indications: AlliBiotic CF    acetaminophen (TYLENOL) 500 mg tablet Take 1,000 mg by mouth every six (6) hours as needed for Pain. aspirin/acetaminophen/caffeine (EXCEDRIN MIGRAINE PO) Take 4 Tabs by mouth as needed for Pain. MELATONIN PO Take 20 mg by mouth nightly as needed. No current facility-administered medications for this visit. Allergies   Allergen Reactions    Other Medication Rash and Itching     dermabond skin glue        Review of Systems: A complete review of systems was obtained, negative except as described above. Physical Exam:     Visit Vitals  /79 (BP 1 Location: Left upper arm, BP Patient Position: Sitting, BP Cuff Size: Adult)   Pulse 86   Temp 98 °F (36.7 °C) (Oral)   Resp 19   Ht 5' 3\" (1.6 m)   Wt 150 lb (68 kg)   SpO2 99%   BMI 26.57 kg/m²     ECOG PS: 0  General: alert, cooperative, no distress   Mental  status: normal mood, behavior, speech, dress, motor activity, and thought processes, able to follow commands   HENT: NCAT   Neck: no visualized mass   Resp: no respiratory distress   Neuro: no gross deficits   Skin: no discoloration or lesions of concern on visible areas   Psychiatric: normal affect, consistent with stated mood, no evidence of hallucinations     Left axilla: no palpable mass          Results:     Lab Results   Component Value Date/Time    WBC 5.6 03/30/2021 12:15 PM    HGB 14.0 03/30/2021 12:15 PM    HCT 41.4 03/30/2021 12:15 PM    PLATELET 654 08/81/4864 12:15 PM    MCV 91.6 03/30/2021 12:15 PM    ABS. NEUTROPHILS 2.9 03/30/2021 12:15 PM     Lab Results   Component Value Date/Time    Sodium 140 03/30/2021 12:15 PM    Potassium 4.0 03/30/2021 12:15 PM    Chloride 106 03/30/2021 12:15 PM    CO2 28 03/30/2021 12:15 PM    Glucose 83 03/30/2021 12:15 PM    BUN 14 03/30/2021 12:15 PM    Creatinine 0.67 03/30/2021 12:15 PM    GFR est AA >60 03/30/2021 12:15 PM    GFR est non-AA >60 03/30/2021 12:15 PM    Calcium 9.1 03/30/2021 12:15 PM     Lab Results   Component Value Date/Time    Bilirubin, total 0.8 03/30/2021 12:15 PM    ALT (SGPT) 21 03/30/2021 12:15 PM    Alk.  phosphatase 88 03/30/2021 12:15 PM    Protein, total 7.3 03/30/2021 12:15 PM    Albumin 4.1 03/30/2021 12:15 PM Globulin 3.2 03/30/2021 12:15 PM       6/13/19 MRI breast  FINDINGS:     Background parenchymal enhancement: Moderate. Lymph nodes: No lymphadenopathy. Left breast: Irregular triangular-shaped mass in the posterior third of the left  breast at 2:00 measures 2.8 x 1.8 x 4.2 cm. Posterior margin is 0.3 cm from the  left pectoralis major muscle. Biopsy clip is in the inferior margin of the mass. In the middle and posterior thirds of the left breast at 5-6:00, segmental non  mass enhancement with heterogeneous kinetics measures 2.6 cm in oblique AP  diameter in the axial plane. Otherwise, there are foci in the left breast.     Right breast: Heterogeneous patchy enhancement in multiple locations. No  suspicious kinetics. Biopsy clip in the middle third is at 3:00. No surrounding  abnormal enhancement or morphology. IMPRESSION:   1. 2.8 x 1.8 x 4.2 cm biopsy-proven infiltrating ductal carcinoma in the left  breast at 2:00 is in close approximation to the chest wall. 2. Left breast 5-6:00 suspicious segmental non mass enhancement. 3. No MRI evidence of malignancy in the right breast.  4. No lymphadenopathy. Assessment: ACR BI-RADS category   Left breast: BI-RADS Assessment Category 4: Suspicious abnormality - Biopsy  should be performed in the absence of clinical contraindication. Right breast: BI-RADS Assessment Category 2: Benign finding. Recommendation: Bilateral mammography if not recently performed elsewhere. Second look ultrasound of the left breast at 5-6:00 to determine  ultrasound-guided or MRI guided biopsy of non mass enhancement. A negative breast MRI examination speaks strongly against invasive cancer down  to a detection threshold of 3 to 5 mm but may not detect some lower grade or in  situ carcinomas. Therefore, routine clinical and mammographic followup are  recommended.   A summary portfolio has been created in PACS.    7/12/19 TTE EF 65%    10/16/19 Left mammo/US:  ULTRASOUND: Corresponding with the mammographic density and previously seen  mass is a 1.2 x 1.2 x 1.4 cm hypoechoic mass lesion, which previously measured  1.9 x 2.7 cm on diagnostic MRI. Note is made of an adjacent biopsy marker clip. No other suspicious cystic or solid lesions identified. IMPRESSION: Response to therapy with apparent reduction in size of left breast  mass compared to prior MRI. BI-RADS Assessment Category 6: Known biopsy proven  malignancy- Appropriate action should be taken. RECOMMENDATION:  Appropriate clinical management of known left breast  malignancy. Records reviewed and summarized above. Pathology report(s) reviewed above. Radiology report(s) reviewed above. Assessment/plan:   1. L UOQ IDC, gr 3, triple negative:  cT2 cN0 cM0, stage IIA, prognostic stage IIB  ypTis ypN0 cM0    We explained to the patient that the goal of systemic adjuvant therapy is to improve the chances for cure and decrease the risk of relapse. We explained why a patient can have microscopic cancer spread now even though physical examination, laboratory studies and imaging studies are negative for cancer. We explained that the same treatments used now as adjuvant or preventive treatments rarely if ever are curative in women who develop metastases. No residual invasive disease, no indication for adjuvant capecitabine. No need for XRT    Having some left axillary pain that is intermittent. No palpable mass today, will let us know if any worsening. Follow-up after early breast cancer was discussed. I recommend follow-up as defined by the American Society of Clinical Oncology and [facility]. This includes a visit to a health care professional every 3-6 months for 3 years, then every 6-12 months for 2 years, and then yearly as well as mammograms yearly. Discussed ASCO guidelines for NOT checking routine tumor markers or staging studies.     2. Emotional well being:  She has excellent support and is coping well with her disease    3. BRCA1 pathogenic mutation:   Discussed recommendation for bilateral mastectomies and bilateral oophorectomies; risk of ovarian, fallopian or peritoneal cancer is 16-59%; risk for pancreatic cancer is 1-3%; risk for contralateral breast cancer is 10-15%    S/p bilateral mastectomies    Has seen Dr. Gibran Evans, s/p bilateral oophorectomies 6/1/20, negative    Referred to GI, MRI abd w MRCP performed 4/28/22 and negative. Seen by Romana Foreman 5/18/2022, will repeat in 1 year. I personally saw and evaluated the patient and performed the entire medical decision making. The history, physical exam, and documentation were performed by Guevara Noriega NP. I reviewed and verified the above documentation and modified it as needed. Left breast TN disease, ANKUR, BRCA 1 mutation, has follow up with GI and derm. RTC 1 year    Signed By: Norris Stephenson MD      No orders of the defined types were placed in this encounter.

## 2023-05-24 RX ORDER — ACETAMINOPHEN 500 MG
1000 TABLET ORAL EVERY 6 HOURS PRN
COMMUNITY

## 2023-11-16 ENCOUNTER — TELEPHONE (OUTPATIENT)
Age: 48
End: 2023-11-16

## 2023-11-16 NOTE — TELEPHONE ENCOUNTER
Lm with patient to move her January appointment to this coming Monday 11/20/23 at 2 or 2:15 per Lisa. Gave her office number to call back.

## 2023-11-29 ENCOUNTER — TELEMEDICINE (OUTPATIENT)
Age: 48
End: 2023-11-29

## 2023-11-29 DIAGNOSIS — Z85.3 HISTORY OF BREAST CANCER: Primary | ICD-10-CM

## 2023-11-29 PROCEDURE — 99212 OFFICE O/P EST SF 10 MIN: CPT | Performed by: INTERNAL MEDICINE

## 2023-11-29 NOTE — PROGRESS NOTES
Nicole Pittman is a 53 y.o. female here today to follow up for breast cancer    1. Have you been to the ER, urgent care clinic since your last visit? Hospitalized since your last visit?no    2. Have you seen or consulted any other health care providers outside of the [address] [place] [address] Avenue since your last visit? Include any pap smears or colon screening.  no
Has seen Dr. Werner Hope, s/p bilateral oophorectomies 6/1/20, negative      Referred to GI, MRI abd w MRCP performed 4/28/22 and negative. Seen by Araseli Amezquita 5/18/2022, will repeat in 1 year. She will follow up with derm and GI in [place]    4. Hormonal replacement:  discussed that I would recommend against oral hormones (vaginal ok) due to her BRCA1 mutation. SC hormones would be an unknown     Sofiaadalgisa Roefarhana, was evaluated through a synchronous (real-time) audio-video encounter. The patient (or guardian if applicable) is aware that this is a billable service, which includes applicable co-pays. This Virtual Visit was conducted with patient's (and/or legal guardian's) consent. Patient identification was verified, and a caregiver was present when appropriate.    The patient was located at [place], [place]  Provider was located at [facility] (Appt Dept): [facility], [address] [place] [address]  Brittney Brizuela            Signed By:  Jonathan Bush MD

## 2023-12-04 ENCOUNTER — TELEPHONE (OUTPATIENT)
Facility: HOSPITAL | Age: 48
End: 2023-12-04

## 2023-12-04 NOTE — TELEPHONE ENCOUNTER
Received request to assist pt with transferring care to an Oncologist in [place]. When I spoke to pt, she had since discussed with Dr. Betsy Cabrera and he has advised her to continue with prevenative care (PCP, GI, dermatology), but can forego routine oncology follow-up at this time. Pt did have questions regarding billing and insurance on file. Unable to answer her questions directly, but did provide her with the phone # and website for [facility] department. No other questions/concerns for NN at this time.        ROGER De La TorreN, RN, VIA [facility]  Breast Cancer Nurse Navigator    [address] [place]  Brittney Brizuela  W: [phone]  F: [phone]  Bernard@"[email or web address]   Good Help to Those in [facility]

## (undated) DEVICE — INSULATED BLADE ELECTRODE: Brand: EDGE

## (undated) DEVICE — (D)PREP SKN CHLRAPRP APPL 26ML -- CONVERT TO ITEM 371833

## (undated) DEVICE — DERMABOND SKIN ADH 0.7ML -- DERMABOND ADVANCED 12/BX

## (undated) DEVICE — SUTURE PDS II SZ 2-0 L27IN ABSRB VLT SH L26MM 1/2 CIR Z317H

## (undated) DEVICE — TOWEL SURG W17XL27IN STD BLU COT NONFENESTRATED PREWASHED

## (undated) DEVICE — BRA SURG POST-OP MED 38IN --

## (undated) DEVICE — STAPLER SKIN H3.9MM WIRE DIA0.58MM CRWN 6.9MM 35 STPL ROT

## (undated) DEVICE — TK® QUICK LOAD® UNIT: Brand: TK® QUICK LOAD®

## (undated) DEVICE — SUTURE MCRYL SZ 3-0 L27IN ABSRB UD L26MM SH 1/2 CIR Y416H

## (undated) DEVICE — SYSTEM IMPL DEL FOR BRST IMPL FUN (SEE COMMENT)

## (undated) DEVICE — NEEDLE HYPO 18GA L1IN PNK POLYPR HUB S STL REG BVL STR W/O

## (undated) DEVICE — STERILE POLYISOPRENE POWDER-FREE SURGICAL GLOVES: Brand: PROTEXIS

## (undated) DEVICE — SOLUTION LACTATED RINGERS INJECTION USP

## (undated) DEVICE — STRAP,POSITIONING,KNEE/BODY,FOAM,4X60": Brand: MEDLINE

## (undated) DEVICE — INFECTION CONTROL KIT SYS

## (undated) DEVICE — CANISTER, RIGID, 3000CC: Brand: MEDLINE INDUSTRIES, INC.

## (undated) DEVICE — ASPIRATION TUBING SET, DISPOSABLE: Brand: MICROAIRE®

## (undated) DEVICE — TOTAL TRAY, 16FR 10ML SIL FOLEY, URN: Brand: MEDLINE

## (undated) DEVICE — TUBING, SUCTION, 1/4" X 10', STRAIGHT: Brand: MEDLINE

## (undated) DEVICE — STERILE NEOPRENE POWDER-FREE SURGICAL GLOVES WITH NITRILE COATING: Brand: PROTEXIS

## (undated) DEVICE — (D)DEVICE COLL TISS DEL SYS -- DISC BY MFR USE ITEM 336430

## (undated) DEVICE — NEEDLE HYPO 22GA L1.5IN BLK S STL HUB POLYPR SHLD REG BVL

## (undated) DEVICE — CURVED, SMALL JAW, OPEN SEALER/DIVIDER: Brand: LIGASURE

## (undated) DEVICE — SMOKE EVACUATION PENCIL: Brand: VALLEYLAB

## (undated) DEVICE — SUTURE PLN GUT SZ 5-0 L18IN ABSRB YELLOWISH TAN L13MM PC-1 1915G

## (undated) DEVICE — SOL IRR SOD CL 0.9% 1000ML BTL --

## (undated) DEVICE — DRAPE,CHEST,FENES,15X10,STERIL: Brand: MEDLINE

## (undated) DEVICE — SUTURE MCRYL SZ 4-0 L27IN ABSRB UD L19MM PS-2 1/2 CIR PRIM Y426H

## (undated) DEVICE — BLADE ELECTRODE: Brand: EDGE

## (undated) DEVICE — STERILE POLYISOPRENE POWDER-FREE SURGICAL GLOVES WITH EMOLLIENT COATING: Brand: PROTEXIS

## (undated) DEVICE — CUFF RMFG BP INF SZ 11 DISP -- LAWSON OEM ITEM 238915

## (undated) DEVICE — 1200 GUARD II KIT W/5MM TUBE W/O VAC TUBE: Brand: GUARDIAN

## (undated) DEVICE — 4MM SINGLE USE CANNULA, 10MM PORT, 30CM LONG, FLARED MERCEDES: Brand: MICROAIRE®

## (undated) DEVICE — 3M™ MEDIPORE™ H SOFT CLOTH TAPE 2864S: Brand: 3M™ MEDIPORE™

## (undated) DEVICE — BANDAGE COBAN 4 IN COMPR W4INXL5YD FOAM COHESIVE QUIK STK SELF ADH SFT

## (undated) DEVICE — SOLUTION SCRB 2OZ 10% POVIDONE IOD ANTIMIC BTL

## (undated) DEVICE — Device

## (undated) DEVICE — CATH IV AUTOGRD BC PNK 20GA 25 -- INSYTE

## (undated) DEVICE — COVER US PRB W15XL120CM W/ GEL RUBBERBAND TAPE STRP FLD GEN

## (undated) DEVICE — KIT SURG PREP POVIDONE IOD PRESATURATED PAINT WET FOR UNIV

## (undated) DEVICE — SYSTEM LIPO FAT PROC REVOLVE RV0001] ALLERGAN USA - LIFECELL]

## (undated) DEVICE — SOLIDIFIER MEDC 1200ML -- CONVERT TO 356117

## (undated) DEVICE — SYR 50ML LR LCK 1ML GRAD NSAF --

## (undated) DEVICE — AEGIS 1" DISK 4MM HOLE, PEEL OPEN: Brand: MEDLINE

## (undated) DEVICE — TUBING SUCT L9FT FOR AUTOFUSE INFLTR SYS

## (undated) DEVICE — 3000CC GUARDIAN II: Brand: GUARDIAN

## (undated) DEVICE — CHEST PACK: Brand: MEDLINE INDUSTRIES, INC.

## (undated) DEVICE — SUTURE MCRYL SZ 2-0 L27IN ABSRB UD SH L26MM TAPERPOINT NDL Y417H

## (undated) DEVICE — COVER LT HNDL PLAS RIG 1 PER PK

## (undated) DEVICE — 3M™ CUROS™ DISINFECTING CAP FOR NEEDLELESS CONNECTORS 270/CARTON 20 CARTONS/CASE CFF1-270: Brand: CUROS™

## (undated) DEVICE — BLANKET WRM W35.4XL86.6IN FULL UNDERBODY + FORC AIR

## (undated) DEVICE — 3M™ TEGADERM™ TRANSPARENT FILM DRESSING FRAME STYLE, 1626W, 4 IN X 4-3/4 IN (10 CM X 12 CM), 50/CT 4CT/CASE: Brand: 3M™ TEGADERM™

## (undated) DEVICE — DRAPE XR C ARM 41X74IN LF --

## (undated) DEVICE — SUTURE MCRYL SZ 3-0 L27IN ABSRB UD L19MM PS-2 3/8 CIR PRIM Y427H

## (undated) DEVICE — SHEAR HARMONIC ACET 5MMX36CM -- ACE PLUS

## (undated) DEVICE — PAD PT POS 36 IN SURGYPAD DISP

## (undated) DEVICE — DRAPE FLD WRM W44XL66IN C6L FOR INTRATEMP SYS THERMABASIN

## (undated) DEVICE — Z DISCONTINUED PER MEDLINE PACK PROCEDURE SURG PLAS

## (undated) DEVICE — SYR 10ML LUER LOK 1/5ML GRAD --

## (undated) DEVICE — SYRINGE CATH TIP 50ML

## (undated) DEVICE — ELECTRODE,RADIOTRANSLUCENT,FOAM,3PK: Brand: MEDLINE

## (undated) DEVICE — SOLUTION IV 1000ML 0.9% SOD CHL

## (undated) DEVICE — SUTURE VCRL SZ 3-0 L27IN ABSRB UD L26MM SH 1/2 CIR J416H

## (undated) DEVICE — DEVICE TRNSF SPIK STL 2008S] MICROTEK MEDICAL INC]

## (undated) DEVICE — RETRACTOR SURG WIDE FLAT LO PROF LTWT PHOTONGUIDE

## (undated) DEVICE — TRAY PREP DRY W/ PREM GLV 2 APPL 6 SPNG 2 UNDPD 1 OVERWRAP

## (undated) DEVICE — RESERVOIR,SUCTION,100CC,SILICONE: Brand: MEDLINE

## (undated) DEVICE — BAG BELONG PT PERS CLEAR HANDL

## (undated) DEVICE — GOWN,SIRUS,NONRNF,SETINSLV,2XL,18/CS: Brand: MEDLINE

## (undated) DEVICE — INTENDED FOR TISSUE SEPARATION, AND OTHER PROCEDURES THAT REQUIRE A SHARP SURGICAL BLADE TO PUNCTURE OR CUT.: Brand: BARD-PARKER ® CARBON RIB-BACK BLADES

## (undated) DEVICE — SHEATH SYS 8.5FRX13CM TEAR AWAY HEMSTAT VLV

## (undated) DEVICE — SPONGE LAP 18X18IN STRL -- 5/PK

## (undated) DEVICE — MARKER,SKIN,WI/RULER AND LABELS: Brand: MEDLINE

## (undated) DEVICE — TROCAR: Brand: KII® SLEEVE

## (undated) DEVICE — BINDER ABD S/M 9X30-45IN --

## (undated) DEVICE — SUTURE PDS II SZ 0 L27IN ABSRB VLT L36MM CT-1 1/2 CIR Z340H

## (undated) DEVICE — PAD,SANITARY,11 IN,MAXI,N-STRL,IND WRAP: Brand: MEDLINE

## (undated) DEVICE — REM POLYHESIVE ADULT PATIENT RETURN ELECTRODE: Brand: VALLEYLAB

## (undated) DEVICE — KIT COLON W/ 1.1OZ LUB AND 2 END

## (undated) DEVICE — LAPAROSCOPIC TROCAR SLEEVE/SINGLE USE: Brand: KII® OPTICAL ACCESS SYSTEM

## (undated) DEVICE — STICK SPONGE PRESAT PVP PAINT STERILE

## (undated) DEVICE — SPONGE GZ W4XL4IN COT 12 PLY TYP VII WVN C FLD DSGN

## (undated) DEVICE — SOL IRRIGATION INJ NACL 0.9% 500ML BTL

## (undated) DEVICE — SUTURE MCRYL SZ 4-0 L18IN ABSRB UD L19MM PS-2 3/8 CIR PRIM Y496G

## (undated) DEVICE — TOTAL TRAY, DB, 100% SILI FOLEY, 16FR 10: Brand: MEDLINE

## (undated) DEVICE — DRAPE,REIN 53X77,STERILE: Brand: MEDLINE

## (undated) DEVICE — BRA SURG POST-OP XL 46IN --

## (undated) DEVICE — SUT ETHLN 3-0 18IN PS2 BLK --

## (undated) DEVICE — COVER US PRB W12XL244CM FLD IORT STR TIP

## (undated) DEVICE — SUT SLK 2-0SH 30IN BLK --

## (undated) DEVICE — SUT PROL 0 30IN CT1 BLU --

## (undated) DEVICE — SYRINGE MED 20ML STD CLR PLAS LUERLOCK TIP N CTRL DISP

## (undated) DEVICE — GARMENT,MEDLINE,DVT,INT,CALF,MED, GEN2: Brand: MEDLINE

## (undated) DEVICE — SIMPLICITY FLUFF UNDERPAD 23X36, MODERATE: Brand: SIMPLICITY

## (undated) DEVICE — SUTURE SZ 0 27IN 5/8 CIR UR-6  TAPER PT VIOLET ABSRB VICRYL J603H

## (undated) DEVICE — TISSUE RETRIEVAL SYSTEM: Brand: INZII RETRIEVAL SYSTEM

## (undated) DEVICE — (D)SENSOR RMFG 02 PULS OXMTR -- DISC BY MFR USE ITEM 133445

## (undated) DEVICE — CANN NASAL O2 CAPNOGRAPHY AD -- FILTERLINE

## (undated) DEVICE — GLOVE SURG SZ 75 L1212IN FNGR THK138MIL BRN LTX FREE

## (undated) DEVICE — SET GRAV CK VLV NEEDLESS ST 3 GANGED 4WAY STPCOCK HI FLO 10

## (undated) DEVICE — 3M™ IOBAN™ 2 ANTIMICROBIAL INCISE DRAPE 6650EZ: Brand: IOBAN™ 2

## (undated) DEVICE — SUT PLN 5-0 18IN P3 YEL --

## (undated) DEVICE — DRAIN SURG 19FR 100% SIL RADPQ RND CHN FULL FLUT

## (undated) DEVICE — SOLUTION IV 500ML 0.9% SOD CHL FLX CONT

## (undated) DEVICE — SURGICAL PROCEDURE PACK GYN LAPAROSCOPY CUST SMH LF